# Patient Record
Sex: MALE | Race: WHITE | Employment: FULL TIME | URBAN - METROPOLITAN AREA
[De-identification: names, ages, dates, MRNs, and addresses within clinical notes are randomized per-mention and may not be internally consistent; named-entity substitution may affect disease eponyms.]

---

## 2017-01-18 ENCOUNTER — OFFICE VISIT (OUTPATIENT)
Dept: PAIN MANAGEMENT | Age: 57
End: 2017-01-18

## 2017-01-18 VITALS
DIASTOLIC BLOOD PRESSURE: 91 MMHG | SYSTOLIC BLOOD PRESSURE: 179 MMHG | HEART RATE: 76 BPM | BODY MASS INDEX: 33.93 KG/M2 | WEIGHT: 256 LBS | HEIGHT: 73 IN

## 2017-01-18 DIAGNOSIS — M54.12 BRACHIAL NEURITIS OR RADICULITIS: ICD-10-CM

## 2017-01-18 DIAGNOSIS — G24.3 CERVICAL DYSTONIA: ICD-10-CM

## 2017-01-18 DIAGNOSIS — M70.61 GREATER TROCHANTERIC BURSITIS OF RIGHT HIP: Primary | ICD-10-CM

## 2017-01-18 DIAGNOSIS — M25.551 CHRONIC RIGHT HIP PAIN: ICD-10-CM

## 2017-01-18 DIAGNOSIS — M50.30 DDD (DEGENERATIVE DISC DISEASE), CERVICAL: ICD-10-CM

## 2017-01-18 DIAGNOSIS — E29.1 HYPOGONADISM MALE: ICD-10-CM

## 2017-01-18 DIAGNOSIS — E55.9 HYPOVITAMINOSIS D: ICD-10-CM

## 2017-01-18 DIAGNOSIS — G89.29 CHRONIC RIGHT SHOULDER PAIN: ICD-10-CM

## 2017-01-18 DIAGNOSIS — M25.511 CHRONIC RIGHT SHOULDER PAIN: ICD-10-CM

## 2017-01-18 DIAGNOSIS — G89.29 CHRONIC RIGHT HIP PAIN: ICD-10-CM

## 2017-01-18 DIAGNOSIS — M54.16 LUMBAR NEURITIS: ICD-10-CM

## 2017-01-18 DIAGNOSIS — M96.1 POSTLAMINECTOMY SYNDROME, LUMBAR REGION: ICD-10-CM

## 2017-01-18 RX ORDER — HYDROCODONE BITARTRATE 40 MG/1
TABLET, EXTENDED RELEASE ORAL
Qty: 30 TAB | Refills: 0 | Status: SHIPPED | OUTPATIENT
Start: 2017-02-16 | End: 2017-03-16 | Stop reason: ALTCHOICE

## 2017-01-18 RX ORDER — HYDROCODONE BITARTRATE 40 MG/1
TABLET, EXTENDED RELEASE ORAL
Qty: 30 TAB | Refills: 0 | Status: SHIPPED | OUTPATIENT
Start: 2017-01-18 | End: 2017-01-18 | Stop reason: SDUPTHER

## 2017-01-18 RX ORDER — HYDROCODONE BITARTRATE AND ACETAMINOPHEN 10; 325 MG/1; MG/1
1 TABLET ORAL 4 TIMES DAILY
Qty: 120 TAB | Refills: 0 | Status: SHIPPED | OUTPATIENT
Start: 2017-02-05 | End: 2017-01-18 | Stop reason: SDUPTHER

## 2017-01-18 RX ORDER — ZOLPIDEM TARTRATE 5 MG/1
5 TABLET ORAL
Qty: 30 TAB | Refills: 5 | Status: SHIPPED | OUTPATIENT
Start: 2017-01-18 | End: 2017-06-08 | Stop reason: DRUGHIGH

## 2017-01-18 RX ORDER — HYDROCODONE BITARTRATE AND ACETAMINOPHEN 10; 325 MG/1; MG/1
1 TABLET ORAL 4 TIMES DAILY
Qty: 120 TAB | Refills: 0 | Status: SHIPPED | OUTPATIENT
Start: 2017-03-06 | End: 2017-03-16 | Stop reason: SDUPTHER

## 2017-01-18 NOTE — PROGRESS NOTES
Nursing Notes    Patient presents to the office today in follow-up. Patient rates his pain at 7/10 on the numerical pain scale. Reviewed medications with counts as follows:    Rx Date filled Qty Dispensed Pill Count Last Dose Short   norco 10/325 mg  01/7/16 240 139 today no                                     POC UDS was not performed in office today. Any new labs or imaging since last appointment? NO    Have you been to an emergency room (ER) or urgent care clinic since your last visit? NO            Have you been hospitalized since your last visit? NO     If yes, where, when, and reason for visit? Have you seen or consulted any other health care providers outside of the [address] [place] [address]  since your last visit? NO     If yes, where, when, and reason for visit? HM deferred to pcp.

## 2017-01-18 NOTE — PATIENT INSTRUCTIONS
Plan:  We will start Hysingla ER 40 mg daily for chronic, around-the-clock pain.   Merdis Brendan will be retained up to four times daily as needed for breakthrough pain  This may be adjusted depending on your response and tolerability of the medication  Physical therapy will be arranged for persistent right hip pain  Follow up in 2 months or sooner if needed  Regular exercise and attention to emotional health and diet remain the most effective ways to treat chronic pain of all kinds  You may contact me with questions or concerns through [address]

## 2017-01-18 NOTE — MR AVS SNAPSHOT
Visit Information Date & Time Provider Department Dept. Phone Encounter #  
 1/18/2017  9:20 AM Tk Patel [address] [place] [address] for Pain Management  [id number] Follow-up Instructions Return in about 2 months (around 3/18/2017). Your Appointments 2/9/2017 11:00 AM  
Office Visit with Pema Madrigal MD  
Internist of [address] [place] [address]) Appt Note: [address]; [address]; [address]  
 [address] [facility], [address] [id number] [address]  
  
   
 [address], [address] Upcoming Health Maintenance Date Due Hepatitis C Screening 1960 EYE EXAM RETINAL OR DILATED Q1 3/24/1971 Pneumococcal 19-64 Medium Risk (1 of 1 - PPSV23) 3/24/1980 DTaP/Tdap/Td series (1 - Tdap) 3/24/1982 FOBT Q 1 YEAR AGE 50-75 3/24/2011 INFLUENZA AGE 9 TO ADULT 8/1/2016 FOOT EXAM Q1 9/18/2016 HEMOGLOBIN A1C Q6M 1/26/2017 MICROALBUMIN Q1 7/26/2017 LIPID PANEL Q1 7/26/2017 Allergies as of 1/18/2017  Review Complete On: 1/18/2017 By: Logan Ashby LPN No Known Allergies Current Immunizations  Reviewed on 2/4/2016 Name Date Influenza Vaccine 1/27/2016 Influenza Vaccine PF 3/13/2015 Influenza Vaccine Whole 12/9/2010 Not reviewed this visit You Were Diagnosed With   
  
 Codes Comments Greater trochanteric bursitis of right hip    -  Primary ICD-10-CM: M70.61 ICD-9-CM: 726.5 Lumbar neuritis     ICD-10-CM: M54.16 
ICD-9-CM: 724.4 Postlaminectomy syndrome, lumbar region     ICD-10-CM: M96.1 ICD-9-CM: 722.83 Chronic right hip pain     ICD-10-CM: M25.551, G89.29 ICD-9-CM: 719.45, 338.29 Hypovitaminosis D     ICD-10-CM: E55.9 ICD-9-CM: 268.9 Hypogonadism male     ICD-10-CM: E29.1 ICD-9-CM: 257.2 Cervical dystonia     ICD-10-CM: G24.3 ICD-9-CM: 333.83  Brachial neuritis or radiculitis     ICD-10-CM: M54.12 
 ICD-9-CM: 723.4 Chronic right shoulder pain     ICD-10-CM: M25.511, G89.29 ICD-9-CM: 719.41, 338.29   
 DDD (degenerative disc disease), cervical     ICD-10-CM: M50.30 ICD-9-CM: 722.4 Vitals BP Pulse Height(growth percentile) Weight(growth percentile) BMI Smoking Status (!) 179/91 76 6' 1\" (1.854 m) 256 lb (116.1 kg) 33.78 kg/m2 Current Every Day Smoker Vitals History BMI and BSA Data Body Mass Index Body Surface Area 33.78 kg/m 2 2.45 m 2 Preferred Pharmacy Pharmacy Name Phone [address], [address] [place] [address] [phone] Your Updated Medication List  
  
   
This list is accurate as of: 1/18/17 10:24 AM.  Always use your most recent med list.  
  
  
  
  
 atorvastatin 40 mg tablet Commonly known as:  LIPITOR  
take 1 tablet by mouth once daily  
  
 cyanocobalamin 1,000 mcg tablet Take 2,000 mcg by mouth daily. ergocalciferol 50,000 unit capsule Commonly known as:  ERGOCALCIFEROL  
take 1 capsule by mouth two times a week  
  
 fenofibrate nanocrystallized 145 mg tablet Commonly known as:  TRICOR  
take 1 tablet by mouth once daily HYDROcodone bitartrate 40 mg ER tablet Commonly known as:  HYSINGLA ER  
for chronic, severe, refractory pain *DO NOT CRUSH,CHEW, OR DISSOLVE TABLET* Start taking on:  2/16/2017 HYDROcodone-acetaminophen  mg tablet Commonly known as:  Barb Petri Take 1 Tab by mouth four (4) times daily for 30 days. Max Daily Amount: 4 Tabs. As needed for breakthrough pain  Indications: PAIN Start taking on:  3/6/2017  
  
 lisinopril-hydroCHLOROthiazide 20-12.5 mg per tablet Commonly known as:  PRINZIDE, ZESTORETIC  
take 2 tablets by mouth once daily  
  
 metFORMIN 1,000 mg tablet Commonly known as:  GLUCOPHAGE  
TAKE 1 TABLET BY MOUTH TWO TIMES A DAY WITH MEALS  
  
 MULTIVITAMIN PO Take  by mouth. omega-3 acid ethyl esters 1 gram capsule Commonly known as:  Natacha [place] Take 2 Caps by mouth two (2) times a day. omeprazole 20 mg capsule Commonly known as:  PRILOSEC Take 20 mg by mouth daily. tamsulosin 0.4 mg capsule Commonly known as:  FLOMAX Take 1 Cap by mouth daily. varenicline 0.5 mg (11)- 1 mg (42) Dspk Commonly known as:  CHANTIX STARTER LAURA Take 0.5 mg by mouth two (2) times daily (after meals). VITAMIN C 1,000 mg tablet Generic drug:  ascorbic acid (vitamin C) Take 1,000 mg by mouth two (2) times a day. zolpidem 5 mg tablet Commonly known as:  AMBIEN Take 1 Tab by mouth nightly as needed for Sleep. Max Daily Amount: 5 mg. Prescriptions Printed Refills HYDROcodone bitartrate (HYSINGLA ER) 40 mg ER tablet 0 Starting on: 2/16/2017 Sig: for chronic, severe, refractory pain *DO NOT CRUSH,CHEW, OR DISSOLVE TABLET* Class: Print HYDROcodone-acetaminophen (NORCO)  mg tablet 0 Starting on: 3/6/2017 Sig: Take 1 Tab by mouth four (4) times daily for 30 days. Max Daily Amount: 4 Tabs. As needed for breakthrough pain  Indications: PAIN Class: Print Route: Oral  
 zolpidem (AMBIEN) 5 mg tablet 5 Sig: Take 1 Tab by mouth nightly as needed for Sleep. Max Daily Amount: 5 mg. Class: Print Route: Oral  
  
We Performed the Following REFERRAL TO PHYSICAL THERAPY [LEZ02 Custom] Comments:  
 Please evaluate and treat patient for chronic right hip pain attributable to OA and trochanteric burisitis. It is no longer relieved with medications or cortisone injections. Thank you in advance for seeing this pleasant patient. Follow-up Instructions Return in about 2 months (around 3/18/2017). Referral Information Referral ID Referred By Referred To  
  
 [id number] LANA, [address] [place] [address], [address] [place], [facility] [address] Phone: [phone] Fax: [phone] Visits Status Start Date End Date 18 New Request 1/18/17 1/18/18 If your referral has a status of pending review or denied, additional information will be sent to support the outcome of this decision. Patient Instructions Plan: We will start Hysingla ER 40 mg daily for chronic, around-the-clock pain. Mark Yury will be retained up to four times daily as needed for breakthrough pain This may be adjusted depending on your response and tolerability of the medication Physical therapy will be arranged for persistent right hip pain Follow up in 2 months or sooner if needed Regular exercise and attention to emotional health and diet remain the most effective ways to treat chronic pain of all kinds You may contact me with questions or concerns through [address] [facility]! Dear Kenneth Rome: 
Thank you for requesting a Ellie account. Our records indicate that you already have an active Ellie account. You can access your account anytime at [email or web address] Did you know that you can access your hospital and ER discharge instructions at any time in Ellie? You can also review all of your test results from your hospital stay or ER visit. Additional Information If you have questions, please visit the Frequently Asked Questions section of the Ellie website at [email or web address] Remember, Ellie is NOT to be used for urgent needs. For medical emergencies, dial 911. Now available from your iPhone and Android! Please provide this summary of care documentation to your next provider. Your primary care clinician is listed as Corrie Mcclellan. If you have any questions after today's visit, please call [phone].

## 2017-01-18 NOTE — PROGRESS NOTES
HISTORY OF PRESENT ILLNESS  Arianne Stover is a 55 y.o. male. Patient presents for follow up of chronic pain due to low back pain due to postlaminectomy syndrome and left hip pain. He also suffers with chronic neck pain due to cervical dystonia. He reports that right hip pain has returned en force, and is now again severe. He estimates that his last trochanteric bursa injection only offered 3-4 months of relief. He reports that the pain seems to have evolved, and is now described as aching, shooting, and throbbing. The pain radiates from the buttock down the entire right leg along the lateral aspect of the limb to the outer ankle. This pain worsens with lying supine or on his right side. Prolonged sitting and walking also exacerbates pain. He denies saddle anesthesia or bowel/bladder dysfunction. He denies any overt worsening of chronic back pain. He is now taking 800 mg Ibuprofen once or twice daily in addition to his usual Norco which has been modestly helpful. We discussed that his complaints are suspicious for both trochanteric bursitis as well as possible lumbar radiculopathy. We will refer to physical therapy to see if this improves pain and functionality. Pt reports average of 4-7/10 pain scale most days. We again discussed that [facility] [facility] 10/325 is high-dose, and seemingly not particularly effective for pain. He takes a tablet every 2-3 hours, day and night. We discussed that this is not an optimal way to address chronic, round-the-clock pain. We have in the past discussed changing to a long acting hydrocodone such as Hysingla ER. He would like to try this. Dosing and side effects were discussed in detail. We will start with 40 mg once daily with norco retained for breakthrough pain. This may be adjusted pending response and tolerability. A total of 45 minutes was spent with the patient of which more than 50% of the time was spent counseling the patient.    HPI--see above    JOANNE STEWART: Positive for neck pain.    Musculoskeletal: Positive for myalgias, back pain and joint pain. Physical Exam  Constitutional: He is oriented to person, place, and time. He appears well-developed and well-nourished. No distress. Musculoskeletal:        Left hip: He exhibits tenderness and bony tenderness. Lumbar back: He exhibits decreased range of motion, tenderness, bony tenderness, pain and spasm. Back:           Areas of tenderness noted with red arrows    Right trochanter is tender to palpation     Neurological: He is alert and oriented to person, place, and time. No cranial nerve deficit or sensory deficit. He exhibits abnormal muscle tone. Coordination and gait normal.   Psychiatric: He has a normal mood and affect. His behavior is normal. Judgment and thought content normal.   ASSESSMENT and PLAN    ICD-10-CM ICD-9-CM    1. Greater trochanteric bursitis of right hip M70.61 726.5 REFERRAL TO PHYSICAL THERAPY   2. Lumbar neuritis M54.16 724.4 HYDROcodone-acetaminophen (NORCO)  mg tablet      DISCONTINUED: HYDROcodone-acetaminophen (NORCO)  mg tablet   3. Postlaminectomy syndrome, lumbar region M96.1 722.83 HYDROcodone-acetaminophen (NORCO)  mg tablet      DISCONTINUED: HYDROcodone-acetaminophen (NORCO)  mg tablet   4. Chronic right hip pain M25.551 719.45 REFERRAL TO PHYSICAL THERAPY    G89.29 338.29    5. Hypovitaminosis D E55.9 268.9    6. Hypogonadism male Dr. George Kramer E29.1 257.2    7. Cervical dystonia G24.3 333.83    8. Brachial neuritis or radiculitis M54.12 723.4    9. Chronic right shoulder pain M25.511 719.41     G89.29 338.29    10. DDD (degenerative disc disease), cervical M50.30 722.4       Plan: We will start Hysingla ER 40 mg daily for chronic, around-the-clock pain.   Shelley Christianson will be retained up to four times daily as needed for breakthrough pain  This may be adjusted depending on your response and tolerability of the medication  Physical therapy will be arranged for persistent right hip pain  Follow up in 2 months or sooner if needed  Regular exercise and attention to emotional health and diet remain the most effective ways to treat chronic pain of all kinds  You may contact me with questions or concerns through [address]

## 2017-01-23 ENCOUNTER — TELEPHONE (OUTPATIENT)
Dept: PAIN MANAGEMENT | Age: 57
End: 2017-01-23

## 2017-01-23 NOTE — TELEPHONE ENCOUNTER
Pt has [address] for drug coverage. Hysingla er needs a pa - [employer] requires trial and failure of 3 opioids and 3 other therapies. Has only tried hydrocodone/apap - needs to try 2 other formulary opioids before they will consider hysingla er. Pt has tried 3 other therapies, but must try other opioids before hysingla er will be considered.

## 2017-01-24 ENCOUNTER — DOCUMENTATION ONLY (OUTPATIENT)
Dept: PAIN MANAGEMENT | Age: 57
End: 2017-01-24

## 2017-01-26 ENCOUNTER — TELEPHONE (OUTPATIENT)
Dept: PAIN MANAGEMENT | Age: 57
End: 2017-01-26

## 2017-01-26 NOTE — TELEPHONE ENCOUNTER
Returned pt's call re pa - explained he does not meet [employer]'s criteria for hysingla er so they won't cover it, but [facility] had given him a co-pay card to use if insurance wouldn't cover med. Pt said co-pay card isn't working - pharmacy tried to run it and it wouldn't go through. Pt said he did go online and activate the card - not sure what to do. Also, pt asked about a consult for ortho that [facility] was going to order.

## 2017-01-26 NOTE — TELEPHONE ENCOUNTER
Spoke with Bandar  re co-pay card would not work  hysingla er. Ria called drug rep and got phone number [phone] [address] helpline for pharmacy to call to get co-pay card to go through. [employer] - spoke with [address] [place] [address]. Explained what Bandar  found out. Ladi Acuna said they were able to get the co-pay card to work finally, so pt just needs to  hysingla er. Called pt to let him know - said yes pharmacy was able to get the card to work. Pt still waiting on answer about ortho consult. Explained this was forwarded to Bandar  - someone will contact him with more info when available.

## 2017-01-27 ENCOUNTER — DOCUMENTATION ONLY (OUTPATIENT)
Dept: PAIN MANAGEMENT | Age: 57
End: 2017-01-27

## 2017-01-27 PROBLEM — K63.5 COLON POLYP: Status: ACTIVE | Noted: 2017-01-27

## 2017-01-29 RX ORDER — FENOFIBRATE 145 MG/1
TABLET, COATED ORAL
Qty: 90 TAB | Refills: 3 | Status: SHIPPED | OUTPATIENT
Start: 2017-01-29 | End: 2018-03-20 | Stop reason: SDUPTHER

## 2017-02-23 ENCOUNTER — OFFICE VISIT (OUTPATIENT)
Dept: ORTHOPEDIC SURGERY | Age: 57
End: 2017-02-23

## 2017-02-23 VITALS
HEART RATE: 78 BPM | WEIGHT: 253 LBS | HEIGHT: 73 IN | BODY MASS INDEX: 33.53 KG/M2 | TEMPERATURE: 97.8 F | DIASTOLIC BLOOD PRESSURE: 73 MMHG | SYSTOLIC BLOOD PRESSURE: 148 MMHG

## 2017-02-23 DIAGNOSIS — M25.551 CHRONIC RIGHT HIP PAIN: Primary | ICD-10-CM

## 2017-02-23 DIAGNOSIS — M70.71 HIP BURSITIS, RIGHT: ICD-10-CM

## 2017-02-23 DIAGNOSIS — M25.561 CHRONIC PAIN OF RIGHT KNEE: ICD-10-CM

## 2017-02-23 DIAGNOSIS — G89.29 CHRONIC PAIN OF RIGHT KNEE: ICD-10-CM

## 2017-02-23 DIAGNOSIS — G89.29 CHRONIC RIGHT HIP PAIN: Primary | ICD-10-CM

## 2017-02-23 RX ORDER — MELOXICAM 15 MG/1
15 TABLET ORAL DAILY
Qty: 30 TAB | Refills: 2 | Status: SHIPPED | OUTPATIENT
Start: 2017-02-23 | End: 2017-06-01 | Stop reason: SDUPTHER

## 2017-02-23 RX ORDER — BETAMETHASONE SODIUM PHOSPHATE AND BETAMETHASONE ACETATE 3; 3 MG/ML; MG/ML
6 INJECTION, SUSPENSION INTRA-ARTICULAR; INTRALESIONAL; INTRAMUSCULAR; SOFT TISSUE ONCE
Qty: 1 ML | Refills: 0
Start: 2017-02-23 | End: 2017-02-23

## 2017-02-23 NOTE — PROGRESS NOTES
Patient: Claire Hernandez                MRN: [id number]       SSN: [id number]  YOB: 1960        AGE: 55 y.o. SEX: male  Body mass index is 33.38 kg/(m^2). PCP: Sobia Sorto MD  02/23/17  HISTORY: Mr. Javier Burt is a very nice gentleman who essentially has a combination of three problems including some low back pain with occasional radiculopathy, right hip pain that is laterally based, and he has had a couple of cortisone injections in the past, and a little bit of heterotopic ossification involving the abductor tendon, which is chronic. Additionally, he has moderate knee arthritis as well. The pain is moderate, aching. The worse problem is really the hip, but when he drives for a prolonged period of time, his low back is sore. He has occasional radiculopathy down the leg stopping at the level of the knee. He denies fevers, chills, night sweats, or weight loss. He is otherwise feeling well. He does  [place] [facility]. All systems are reviewed and are updated on the EMR. All other systems are reviewed and are otherwise negative. PHYSICAL EXAMINATION:  On examination today, he actually gets up and walks quite normally. There is no antalgic component to the gait. The low back is only mildly tender. He has a little decrease of sensation to L4 bilaterally. Tib/ant and EHL are 5/5. Straight leg raise is equivocal.  Both feet are warm and well-perfused. Both hips actually rotate well. There is tenderness over the greater trochanter on the right side. The knee itself has findings consistent with moderate arthritis and a bit of a varus malalignment. There is mild crepitus involving the patellofemoral articulation and just a slight effusion. Again, there is mild decreased sensation to L4 on the right side and left. Tib/ant and EHL are 5/5. He is well-nourished and well-developed.       RADIOGRAPHS:  X-ray evaluation, AP of the pelvis and AP and lateral of the hip, shows just minimal arthritis involving the hip itself and a little bit of heterotopic ossification in the abductor tendon. The knee has findings consistent with moderate arthritis. IMPRESSION:  My overall impression is some low back pain, mechanical, right hip bursitis, a little bit of arthritis, and moderate advanced right knee arthritis. PROCEDURE:  Under aseptic conditions and after informed, written consent with a time out, the right hip was injected with 1 cc of the Celestone preparation, i.e. 6 mg, which was well tolerated. PLAN:  I would recommend some therapy for his hip as well. Certainly, if he is not happy this week we could obtain an MRI, but he is not having any mechanical symptoms and not much in the way of groin pain. His knee will be treatment as symptomatic, and we will see him back accordingly in the not too distant future. REVIEW OF SYSTEMS:      CON: negative for weight loss, fever  EYE: negative for double vision  ENT: negative for hoarseness  RS:   negative for Tb  GI:    negative for blood in stool  :  negative for blood in urine  Other systems reviewed and noted below.           Past Medical History:   Diagnosis Date    Back problem     Colon polyp 08/2008    Dr Mullins Iba Depression 12/13    PHQ-9 was 6/27     Diabetes ([facility] [address] 3/15    on basis of elev hba1c; [facility]    Dyslipidemia     Enthesopathy of hip region     Fatty liver     on US w negative serologies 2009    Foot fracture, right     9/13 5th metatarsal    GERD (gastroesophageal reflux disease)     Hypertension     Hypogonadism male 3/13    Lumbago     Morbid obesity ([address]     peak weight 275 lbs, bmi 36.3 from 2/14    Phymatous rosacea     Postlaminectomy syndrome, lumbar region     Prediabetes on metformin     2 hr  2/10    Shoulder pain     Thoracic or lumbosacral neuritis or radiculitis, unspecified     Tobacco dependence syndrome        Family History Problem Relation Age of Onset    Diabetes Mother     Cancer Mother      stomach       Current Outpatient Prescriptions   Medication Sig Dispense Refill    fenofibrate nanocrystallized (TRICOR) 145 mg tablet take 1 tablet by mouth daily 90 Tab 3    HYDROcodone bitartrate (HYSINGLA ER) 40 mg ER tablet for chronic, severe, refractory pain *DO NOT CRUSH,CHEW, OR DISSOLVE TABLET* 30 Tab 0    zolpidem (AMBIEN) 5 mg tablet Take 1 Tab by mouth nightly as needed for Sleep. Max Daily Amount: 5 mg. 30 Tab 5    ergocalciferol (ERGOCALCIFEROL) 50,000 unit capsule take 1 capsule by mouth two times a week 13 Cap 4    lisinopril-hydrochlorothiazide (PRINZIDE, ZESTORETIC) 20-12.5 mg per tablet take 2 tablets by mouth once daily 60 Tab 12    metFORMIN (GLUCOPHAGE) 1,000 mg tablet TAKE 1 TABLET BY MOUTH TWO TIMES A DAY WITH MEALS 180 Tab 3    atorvastatin (LIPITOR) 40 mg tablet take 1 tablet by mouth once daily 30 Tab 12    tamsulosin (FLOMAX) 0.4 mg capsule Take 1 Cap by mouth daily. 30 Cap 3    omeprazole (PRILOSEC) 20 mg capsule Take 20 mg by mouth daily.  ascorbic acid (VITAMIN C) 1,000 mg tablet Take 1,000 mg by mouth two (2) times a day.  MULTIVITAMIN PO Take  by mouth.  [START ON 3/6/2017] HYDROcodone-acetaminophen (NORCO)  mg tablet Take 1 Tab by mouth four (4) times daily for 30 days. Max Daily Amount: 4 Tabs. As needed for breakthrough pain  Indications: PAIN 120 Tab 0    varenicline (CHANTIX STARTER LAURA) 0.5 mg (11)- 1 mg (42) DsPk Take 0.5 mg by mouth two (2) times daily (after meals). 1 Dose Pack 0    omega-3 acid ethyl esters (LOVAZA) 1 gram capsule Take 2 Caps by mouth two (2) times a day. 120 Cap 5    cyanocobalamin 1,000 mcg tablet Take 2,000 mcg by mouth daily.          No Known Allergies    Past Surgical History:   Procedure Laterality Date    HX COLONOSCOPY      Dr Xander Zimmer 8/22/08    HX ORTHOPAEDIC  12/06    left shoulder repair/rotator cuff    HX ORTHOPAEDIC      DEXA t score -1.0 spine, -0.4 hip (1/14) Dr. Barajas Keep HX OTHER SURGICAL  7/02    L5-S1 hemilaminectomy and diskectomy    NEUROLOGICAL PROCEDURE UNLISTED  4/13    MRI pituitary normal       Social History     Social History    Marital status:      Spouse name: N/A    Number of children: N/A    Years of education: N/A     Occupational History    Not on file. Social History Main Topics    Smoking status: Current Every Day Smoker     Packs/day: 2.00     Types: Cigarettes    Smokeless tobacco: Never Used    Alcohol use Yes      Comment: One drink per 3-4 months    Drug use: No      Comment: Marijuana use in the [de-identified]    Sexual activity: Not on file     Other Topics Concern    Not on file     Social History Narrative       Visit Vitals    /73    Pulse 78    Temp 97.8 °F (36.6 °C) (Oral)    Ht 6' 1\" (1.854 m)    Wt 253 lb (114.8 kg)    BMI 33.38 kg/m2         PHYSICAL EXAMINATION:  GENERAL: Alert and oriented x3, in no acute distress, well-developed, well-nourished, afebrile. HEART: No JVD. EYES: No scleral icterus   NECK: No significant lymphadenopathy   LUNGS: No respiratory compromise or indrawing  ABDOMEN: Soft, non-tender, non-distended. Electronically signed by:  Jud Villar MD

## 2017-02-23 NOTE — PATIENT INSTRUCTIONS
Patient's blood pressure was elevated at today's office visit. Patient instructed to contact primary care physician for treatment. Bursitis: Care Instructions  Your Care Instructions  A bursa is a small sac of fluid that helps the tissues around a joint slide over one another easily. Injury or overuse of a joint can cause pain, redness, and inflammation in the bursa (bursitis). Bursitis usually gets better if you avoid the activity that caused it. You can help prevent bursitis from coming back by doing stretching and strengthening exercises. You may also need to change the way you do some activities. Follow-up care is a key part of your treatment and safety. Be sure to make and go to all appointments, and call your doctor if you are having problems. Its also a good idea to know your test results and keep a list of the medicines you take. How can you care for yourself at home? · Put ice or a cold pack on the area for 10 to 20 minutes at a time. Try to do this every 1 to 2 hours for the next 3 days (when you are awake) or until the swelling goes down. Put a thin cloth between the ice and your skin. · After the 3 days of using ice, you may use heat on the area. You can use a hot water bottle; a warm, moist towel; or a heating pad set on low. You can also try alternating heat and ice. · Rest the area where you have pain. Stop any activities that cause pain. Switch to activities that do not stress the area. · Take pain medicines exactly as directed. ¨ If the doctor gave you a prescription medicine for pain, take it as prescribed. ¨ If you are not taking a prescription pain medicine, ask your doctor if you can take an over-the-counter medicine. ¨ Do not take two or more pain medicines at the same time unless the doctor told you to. Many pain medicines have acetaminophen, which is Tylenol. Too much acetaminophen (Tylenol) can be harmful.   · To prevent stiffness, gently move the joint as much as you can without pain every day. As the pain gets better, keep doing range-of-motion exercises. Ask your doctor for exercises that will make the muscles around the joint stronger. Do these as directed. · You can slowly return to the activity that caused the pain, but do it with less effort until you can do it without pain or swelling. Be sure to warm up before and stretch after you do the activity. When should you call for help? Call your doctor now or seek immediate medical care if:  · You get a fever and chills. · You have increased swelling or redness in a joint. · You cannot use a joint, or the pain in a joint gets worse. Watch closely for changes in your health, and be sure to contact your doctor if:  · You have pain for 2 weeks or longer despite home treatment. Where can you learn more? Go to http://maddie-madison.info/. Enter D474 in the search box to learn more about \"Bursitis: Care Instructions. \"  Current as of: May 23, 2016  Content Version: 11.1  © 6185-9074 Healthwise, Incorporated. Care instructions adapted under license by [employer] (which disclaims liability or warranty for this information). If you have questions about a medical condition or this instruction, always ask your healthcare professional. [address] any warranty or liability for your use of this information.

## 2017-02-23 NOTE — MR AVS SNAPSHOT
Visit Information Date & Time Provider Department Dept. Phone Encounter #  
 2/23/2017  3:45 PM Nhan Tesfaye, 27 [facility] 22 [id number] Your Appointments 3/16/2017  8:40 AM  
Follow Up with Maryuri Morton PA-C [facility] (AYUSH SCHEDULING) Appt Note: return in [address] [place] [address]  
[phone]  Dianna 4559 [id number] Upcoming Health Maintenance Date Due Hepatitis C Screening 1960 EYE EXAM RETINAL OR DILATED Q1 3/24/1971 Pneumococcal 19-64 Medium Risk (1 of 1 - PPSV23) 3/24/1980 DTaP/Tdap/Td series (1 - Tdap) 3/24/1982 INFLUENZA AGE 9 TO ADULT 8/1/2016 FOOT EXAM Q1 9/18/2016 HEMOGLOBIN A1C Q6M 1/26/2017 MICROALBUMIN Q1 7/26/2017 LIPID PANEL Q1 7/26/2017 COLONOSCOPY 8/22/2018 Allergies as of 2/23/2017  Review Complete On: 2/23/2017 By: Nhan Tesfaye MD  
 No Known Allergies Current Immunizations  Reviewed on 2/4/2016 Name Date Influenza Vaccine 1/27/2016 Influenza Vaccine PF 3/13/2015 Influenza Vaccine Whole 12/9/2010 Not reviewed this visit You Were Diagnosed With   
  
 Codes Comments Chronic right hip pain    -  Primary ICD-10-CM: M25.551, G89.29 ICD-9-CM: 719.45, 338.29 Chronic pain of right knee     ICD-10-CM: M25.561, G89.29 ICD-9-CM: 719.46, 338.29 Hip bursitis, right     ICD-10-CM: M70.71 ICD-9-CM: 726.5 Vitals BP  
  
  
  
  
  
 148/73 BMI and BSA Data Body Mass Index Body Surface Area  
 33.38 kg/m 2 2.43 m 2 Preferred Pharmacy Pharmacy Name Phone [address], [address] [place] [address] [phone] Your Updated Medication List  
  
   
This list is accurate as of: 2/23/17  4:42 PM.  Always use your most recent med list.  
  
  
  
  
 atorvastatin 40 mg tablet Commonly known as:  LIPITOR  
take 1 tablet by mouth once daily  
  
 cyanocobalamin 1,000 mcg tablet Take 2,000 mcg by mouth daily. ergocalciferol 50,000 unit capsule Commonly known as:  ERGOCALCIFEROL  
take 1 capsule by mouth two times a week  
  
 fenofibrate nanocrystallized 145 mg tablet Commonly known as:  TRICOR  
take 1 tablet by mouth daily HYDROcodone bitartrate 40 mg ER tablet Commonly known as:  HYSINGLA ER  
for chronic, severe, refractory pain *DO NOT CRUSH,CHEW, OR DISSOLVE TABLET* HYDROcodone-acetaminophen  mg tablet Commonly known as:  Juanetta Rasp Take 1 Tab by mouth four (4) times daily for 30 days. Max Daily Amount: 4 Tabs. As needed for breakthrough pain  Indications: PAIN Start taking on:  3/6/2017  
  
 lisinopril-hydroCHLOROthiazide 20-12.5 mg per tablet Commonly known as:  PRINZIDE, ZESTORETIC  
take 2 tablets by mouth once daily  
  
 metFORMIN 1,000 mg tablet Commonly known as:  GLUCOPHAGE  
TAKE 1 TABLET BY MOUTH TWO TIMES A DAY WITH MEALS  
  
 MULTIVITAMIN PO Take  by mouth. omega-3 acid ethyl esters 1 gram capsule Commonly known as:  Lesley Grew Take 2 Caps by mouth two (2) times a day. omeprazole 20 mg capsule Commonly known as:  PRILOSEC Take 20 mg by mouth daily. tamsulosin 0.4 mg capsule Commonly known as:  FLOMAX Take 1 Cap by mouth daily. varenicline 0.5 mg (11)- 1 mg (42) Dspk Commonly known as:  CHANTIX STARTER LAURA Take 0.5 mg by mouth two (2) times daily (after meals). VITAMIN C 1,000 mg tablet Generic drug:  ascorbic acid (vitamin C) Take 1,000 mg by mouth two (2) times a day. zolpidem 5 mg tablet Commonly known as:  AMBIEN Take 1 Tab by mouth nightly as needed for Sleep. Max Daily Amount: 5 mg. We Performed the Following AMB POC X-RAY RADEX HIP UNI WITH PELVIS 2-3 VIEWS [86243 CPT(R)] AMB POC XRAY, KNEE; COMPLETE, 4+ VIEW [85279 CPT(R)] Patient Instructions Patient's blood pressure was elevated at today's office visit. Patient instructed to contact primary care physician for treatment. Bursitis: Care Instructions Your Care Instructions A bursa is a small sac of fluid that helps the tissues around a joint slide over one another easily. Injury or overuse of a joint can cause pain, redness, and inflammation in the bursa (bursitis). Bursitis usually gets better if you avoid the activity that caused it. You can help prevent bursitis from coming back by doing stretching and strengthening exercises. You may also need to change the way you do some activities. Follow-up care is a key part of your treatment and safety. Be sure to make and go to all appointments, and call your doctor if you are having problems. Its also a good idea to know your test results and keep a list of the medicines you take. How can you care for yourself at home? · Put ice or a cold pack on the area for 10 to 20 minutes at a time. Try to do this every 1 to 2 hours for the next 3 days (when you are awake) or until the swelling goes down. Put a thin cloth between the ice and your skin. · After the 3 days of using ice, you may use heat on the area. You can use a hot water bottle; a warm, moist towel; or a heating pad set on low. You can also try alternating heat and ice. · Rest the area where you have pain. Stop any activities that cause pain. Switch to activities that do not stress the area. · Take pain medicines exactly as directed. ¨ If the doctor gave you a prescription medicine for pain, take it as prescribed. ¨ If you are not taking a prescription pain medicine, ask your doctor if you can take an over-the-counter medicine. ¨ Do not take two or more pain medicines at the same time unless the doctor told you to. Many pain medicines have acetaminophen, which is Tylenol. Too much acetaminophen (Tylenol) can be harmful. · To prevent stiffness, gently move the joint as much as you can without pain every day. As the pain gets better, keep doing range-of-motion exercises. Ask your doctor for exercises that will make the muscles around the joint stronger. Do these as directed. · You can slowly return to the activity that caused the pain, but do it with less effort until you can do it without pain or swelling. Be sure to warm up before and stretch after you do the activity. When should you call for help? Call your doctor now or seek immediate medical care if: 
· You get a fever and chills. · You have increased swelling or redness in a joint. · You cannot use a joint, or the pain in a joint gets worse. Watch closely for changes in your health, and be sure to contact your doctor if: 
· You have pain for 2 weeks or longer despite home treatment. Where can you learn more? Go to http://maddie-madison.info/. Enter U488 in the search box to learn more about \"Bursitis: Care Instructions. \" Current as of: May 23, 2016 Content Version: 11.1 © 5390-6516 View3. Care instructions adapted under license by [employer] (which disclaims liability or warranty for this information). If you have questions about a medical condition or this instruction, always ask your healthcare professional. [address] any warranty or liability for your use of this information. Introducing [facility] HE[facility]! Dear Leonel Doyle: 
Thank you for requesting a FINXI account. Our records indicate that you already have an active FINXI account. You can access your account anytime at [email or web address] [email or web address] Did you know that you can access your hospital and ER discharge instructions at any time in FINXI? You can also review all of your test results from your hospital stay or ER visit. Additional Information If you have questions, please visit the Frequently Asked Questions section of the [employer] website at [email or web address] Remember, Addvocate is NOT to be used for urgent needs. For medical emergencies, dial 911. Now available from your iPhone and Android! Please provide this summary of care documentation to your next provider. Your primary care clinician is listed as Nasreen Jerome. If you have any questions after today's visit, please call [phone].

## 2017-02-24 RX ORDER — HYDROCODONE BITARTRATE 60 MG/1
60 TABLET, EXTENDED RELEASE ORAL DAILY
Qty: 30 TAB | Refills: 0 | Status: SHIPPED | OUTPATIENT
Start: 2017-02-24 | End: 2017-03-16 | Stop reason: SDUPTHER

## 2017-02-27 ENCOUNTER — APPOINTMENT (OUTPATIENT)
Dept: PHYSICAL THERAPY | Age: 57
End: 2017-02-27

## 2017-03-02 ENCOUNTER — TELEPHONE (OUTPATIENT)
Dept: PAIN MANAGEMENT | Age: 57
End: 2017-03-02

## 2017-03-02 NOTE — TELEPHONE ENCOUNTER
Received a fax from the pharmacy for a pa for hysingla er - pt still does not meet criteria for [place] to approve hysingla er. Pt has co-pay card.

## 2017-03-03 DIAGNOSIS — M96.1 POSTLAMINECTOMY SYNDROME, LUMBAR REGION: ICD-10-CM

## 2017-03-03 DIAGNOSIS — M54.16 LUMBAR NEURITIS: ICD-10-CM

## 2017-03-03 RX ORDER — HYDROCODONE BITARTRATE AND ACETAMINOPHEN 10; 325 MG/1; MG/1
2 TABLET ORAL 4 TIMES DAILY
Qty: 240 TAB | Refills: 0 | Status: SHIPPED | OUTPATIENT
Start: 2017-03-03 | End: 2017-03-16 | Stop reason: SDUPTHER

## 2017-03-09 ENCOUNTER — HOSPITAL ENCOUNTER (OUTPATIENT)
Dept: PHYSICAL THERAPY | Age: 57
Discharge: HOME | End: 2017-03-09
Payer: COMMERCIAL

## 2017-03-09 PROCEDURE — 97161 PT EVAL LOW COMPLEX 20 MIN: CPT

## 2017-03-09 PROCEDURE — 97110 THERAPEUTIC EXERCISES: CPT

## 2017-03-09 NOTE — PROGRESS NOTES
[facility]  [address] [facility], [address] [address],[place] [address]  [phone] fax    Plan of Care/ Statement of Necessity for Physical Therapy Services    Patient name: Amna Hernandez Start of Care: 3/9/2017   Referral source: Luz Tee MD : 1960    Medical Diagnosis: Other bursitis of hip, right hip [M70.71]   Onset Date:1- /2 years    Treatment Diagnosis: decrease tolerance to ADLs and activities due to R hip pain   Prior Hospitalization: see medical history Provider#: [id number]   Medications: Verified on Patient summary List    Comorbidities:  L shoulder surgery X2 , Lower back surgery, HTN, DM, hearing impaired   Prior Level of Function: I , works full time, construction industry, 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 The Plan of Care and following information is based on the information from the initial evaluation. Assessment/ key information:  54 YO male diagnosed as above and with S/S consistent with above diagnosis presents to skilled outpatient PT. CCO right hip knee and LBP . Onset 1-1 1/2 years ago, no injury or event. Overall worsening. Pain today 5. He reports today is a good day and the injection he received did help. Previous Treatment/Compliance: medication, injection, xrays,   Pain 5, FOTO 60. Decreased lordosis, MMT B hip F 4+, Seated Hip F B 20 degrees. B minimal hamstring tightness with B TKE -5, Minimal STC R>L piriformis, + Right piriformis tension test, Minimal to moderate TTP R hip joint, posteriorly, greater trochanteric bursa region, buttock  Patient demonstrates the potential to make gains with improved ROM, strength, endurance/activity tolerance, functional FOTO survey score and all within a reasonable time frame so as to increase their functional independence with ADLs and activities for carryover to improve quality of life, tolerance to work demands and house hold chores, yard work, .  Patient requires skilled Physical Therapy so as to monitor their response to and modify their treatment plan accordingly. Patient appears to be an appropriate candidate for skilled outpatient Physical Therapy.         Evaluation Complexity History MEDIUM  Complexity : 1-2 comorbidities / personal factors will impact the outcome/ POC ; Examination HIGH Complexity : 4+ Standardized tests and measures addressing body structure, function, activity limitation and / or participation in recreation  ;Presentation LOW Complexity : Stable, uncomplicated  ;Clinical Decision Making MEDIUM Complexity : FOTO score of 26-74  Overall Complexity Rating: LOW   Problem List: pain affecting function, decrease ROM, decrease strength, decrease ADL/ functional abilitiies, decrease activity tolerance and decrease flexibility/ joint mobility   Treatment Plan may include any combination of the following: Therapeutic exercise, Therapeutic activities, Neuromuscular re-education, Physical agent/modality, Manual therapy and Patient education  Patient / Family readiness to learn indicated by: asking questions, trying to perform skills and interest  Persons(s) to be included in education: patient (P)  Barriers to Learning/Limitations: None  Patient Goal (s): Sandro Lab relief\"  Patient Self Reported Health Status: fair  Rehabilitation Potential: good    Short Term Goals: To be accomplished in 5 treatments:   1 patient will have established and be independent with HEP to aid with progression of skilled PT program   EVAL issued   CURRENT   2 patient will have pain <3/10 to aid with increase tolerance to ADLS and activities   EVAL 5   CURRENT   3 patient will have FOTO improved to 64 to show increase tolerance to ADLs and work demands   EVAL 60   CURRENT    Long Term Goals:  To be accomplished in 10-12 treatments:   1 patient will have pain <1-2/10 to aid with increase tolerance to ADLS and activities   EVAL 5   CURRENT   2 patient will have FOTO improved to 68 to show increase tolerance to ADLs and work demands   EVAL 60   CURRENT   3 patient will have MMT B seated hip F 5 to aid with increase cores strength for ADLS   EVAL B hip F 4+   CURRENT   4 patient will have overall improvement 30-50% to show increase tolerance to ADLS   EVAL   CURRENT    Frequency / Duration: Patient to be seen 2-3 times per week for 10-12 treatments. Patient/ Caregiver education and instruction: Diagnosis, prognosis, self care, activity modification and exercises   [x]  Plan of care has been reviewed with ADRIAN Crawford, PT 3/9/2017 5:45 PM  ________________________________________________________________________    I certify that the above Therapy Services are being furnished while the patient is under my care. I agree with the treatment plan and certify that this therapy is necessary.     [de-identified] Signature:____________________  Date:____________Time: _________    Please sign and return to [facility] - SOFI IBRAHIM [address], [address] [place] [address],[facility] [address]  [phone] fax

## 2017-03-09 NOTE — PROGRESS NOTES
PT DAILY TREATMENT NOTE/HIP EVAL3-16    Patient Name: Sivakumar Graham  Date:3/9/2017  : 1960  [x]  Patient  Verified  Payor: Yadi López / Plan: [employer] H[employer] / Product Type: HMO /    In time:505  Out time:543  Total Treatment Time (min): 38  Total Timed Codes (min): 8  1:1 Treatment Time ( [address] only): 8  Visit #: 1 of      Treatment Area: Other bursitis of hip, right hip [M70.71]    SUBJECTIVE  Pain Level (0-10 scale): 5  [x]constant []intermittent []improving [x]worsening []no change since onset  WORSE with sleeping position (SL),ascending stairs,  increased activity on feet, physical work, prolonged driving increased walking, Better with heating pad, changing positions when in pain  Any medication changes, allergies to medications, adverse drug reactions, diagnosis change, or new procedure performed?: [x] No    [] Yes (see summary sheet for update)  Subjective functional status/changes:     PLOF: I  Limitations to PLOF: pain  Mechanism of Injury:insideous  Current symptoms/Complaints: 56 YO male diagnosed as above and with S/S consistent with above diagnosis presents to skilled outpatient PT. CCO right hip knee and LBP . Onset 1-1 1/2 years ago, no injury or event. Overall worsening. Pain today 5. He reports today is a good day and the injection he received did help.    Previous Treatment/Compliance: medication, injection, xrays,   PMHx/Surgical Hx: L shoulder surgery X2 , Lower back surgery, HTN, DM, hearing impaired  Work Hx: full time construction company  Living Situation: lives in a 2 story house, 5 steps to enter, with rail, lives with wife and daughter  Pt Goals: pain relief  Barriers: [x]pain []financial []time []transportation []other  Motivation: high  Substance use: []Alcohol [x]Tobacco []other:   FABQ Score: []low []elevate  Cognition: A & O x 4  Other:    OBJECTIVE/EXAMINATION  Domestic Life: work, yard work and house work, enjoys walking   Activity/Recreational Limitations: pain  Mobility: I Self Care: I        Modality rationale:    Min Type Additional Details    [] Estim:  []Unatt       []IFC  []Premod                        []Other:  []w/ice   []w/heat  Position:  Location:    [] Estim: []Att    []TENS instruct  []NMES                    []Other:  []w/US   []w/ice   []w/heat  Position:  Location:    []  Traction: [] Cervical       []Lumbar                       [] Prone          []Supine                       []Intermittent   []Continuous Lbs:  [] before manual  [] after manual    []  Ultrasound: []Continuous   [] Pulsed                           []1MHz   []3MHz Location:  W/cm2:    []  Iontophoresis with dexamethasone         Location: [] Take home patch   [] In clinic    []  Ice     []  heat  []  Ice massage  []  Laser   []  Anodyne Position:  Location:    []  Laser with stim  []  Other: Position:  Location:    []  Vasopneumatic Device Pressure:       [] lo [] med [] hi   Temperature: [] lo [] med [] hi   [] Skin assessment post-treatment:  []intact []redness- no adverse reaction    []redness - adverse reaction:     30 min [x]Eval                  []Re-Eval       8 min Therapeutic Exercise:  [] See flow sheet :   Rationale: increase ROM, increase strength and improve coordination to improve the patients ability to aid with increase tolerance to ADLs and activities     min Therapeutic Activity:  []  See flow sheet :   Rationale:   to improve the patients ability to       min Neuromuscular Re-education:  []  See flow sheet :   Rationale:   to improve the patients ability to      min Manual Therapy:     Rationale:  to      min Gait Training:  ___ feet with ___ device on level surfaces with ___ level of assist   Rationale:           With   [] TE   [] TA   [] neuro   [] other: Patient Education: [x] Review HEP    [] Progressed/Changed HEP based on:   [] positioning   [] body mechanics   [] transfers   [] heat/ice application    [] other:      Other Objective/Functional Measures:  FALLS risk is low , no AD use, No LOB    Physical Therapy Evaluation- Hip    Posture: decrease lordosis,     Gait:  [] Normal    [] Abnormal    [] Antalgic    [] NWB    Device: no    Describe:         ROM/Strength        AROM                     PROM        Strength (1-5)  Hip Left Right Left Right Left Right   Flexion 20 20   4+ 4+   Extension         Abduction WFL WFL   5 5   Adduction WFL WFL   5 5   ER         IR         Knee Left Right Left Right Left Right   Extension -5 -5   5 5   Flexion              Flexibility: [] Unable to assess at this time  Hamstrings:    (L) Tightness= [] WNL   [x] Min   [] Mod   [] Severe    (R) Tightness= [] WNL   [x] Min   [] Mod   [] Severe  Quadriceps:    (L) Tightness= [] WNL   [] Min   [] Mod   [] Severe    (R) Tightness= [] WNL   [] Min   [] Mod   [] Severe  Gastroc:    (L) Tightness= [] WNL   [] Min   [] Mod   [] Severe    (R) Tightness= [] WNL   [] Min   [] Mod   [] Severe                                  Palpation    [x] Min  [] Mod  [] Severe    Location:STC R>L piriformis  [x] Min  [x] Mod  [] Severe    Location:TTP R hip joint, posteriorly, greater trochanteric bursa region, buttock  [] Min  [] Mod  [] Severe    Location:    Optional Tests  Deric/ Cha Test: [] Neg    [] Pos  Chucho Test:  [] Neg    [] Pos  Scouring Test: [] Neg    [] Pos  Trendelenberg:  [] Neg    [] Pos [] Left    [] Right  OberTest:   [] Neg    [] Pos  Ely's Test:  [] Neg    [] Pos  Piriformis Test:  [] Neg    [] Pos  Sub-talor alignment: [] Neurtral [] Pronation [] Supination  Forefoot alignment: [] Neutral [] Varus [] Valgus  Functional Leg Length (cm):   Right:  Left:  Discrepancy:    Other tests/ comments:       Pain Level (0-10 scale) post treatment: 5    ASSESSMENT/Changes in Function: Patient demonstrates the potential to make gains with improved ROM, strength, endurance/activity tolerance, functional FOTO survey score  and all within a reasonable time frame so as to increase their functional independence with ADLs and activities for carryover to improve quality of life, tolerance to work demands and house hold chores, yard work, . Patient requires skilled Physical Therapy so as to monitor their response to and modify their treatment plan accordingly. Patient appears to be an appropriate candidate for skilled outpatient Physical Therapy. Patient will continue to benefit from skilled PT services to modify and progress therapeutic interventions, address functional mobility deficits, address ROM deficits, address strength deficits, analyze and address soft tissue restrictions, analyze and cue movement patterns, analyze and modify body mechanics/ergonomics, assess and modify postural abnormalities and instruct in home and community integration to attain remaining goals.      [x]  See Plan of Care  []  See progress note/recertification  []  See Discharge Summary         Progress towards goals / Updated goals:       PLAN  [x]  Upgrade activities as tolerated     [x]  Continue plan of care  []  Update interventions per flow sheet       []  Discharge due to:_  []  Other:_      Wayne Garcia, PT 3/9/2017  5:06 PM

## 2017-03-16 ENCOUNTER — OFFICE VISIT (OUTPATIENT)
Dept: PAIN MANAGEMENT | Age: 57
End: 2017-03-16

## 2017-03-16 VITALS
SYSTOLIC BLOOD PRESSURE: 182 MMHG | BODY MASS INDEX: 33.38 KG/M2 | WEIGHT: 253 LBS | HEART RATE: 72 BPM | DIASTOLIC BLOOD PRESSURE: 89 MMHG

## 2017-03-16 DIAGNOSIS — G89.29 INSOMNIA SECONDARY TO CHRONIC PAIN: ICD-10-CM

## 2017-03-16 DIAGNOSIS — M70.61 GREATER TROCHANTERIC BURSITIS OF RIGHT HIP: ICD-10-CM

## 2017-03-16 DIAGNOSIS — M25.561 CHRONIC PAIN OF RIGHT KNEE: ICD-10-CM

## 2017-03-16 DIAGNOSIS — G89.29 CHRONIC PAIN OF RIGHT KNEE: ICD-10-CM

## 2017-03-16 DIAGNOSIS — M76.891 ENTHESOPATHY OF RIGHT HIP REGION: ICD-10-CM

## 2017-03-16 DIAGNOSIS — M96.1 POSTLAMINECTOMY SYNDROME, LUMBAR REGION: ICD-10-CM

## 2017-03-16 DIAGNOSIS — M50.30 DDD (DEGENERATIVE DISC DISEASE), CERVICAL: ICD-10-CM

## 2017-03-16 DIAGNOSIS — G47.01 INSOMNIA SECONDARY TO CHRONIC PAIN: ICD-10-CM

## 2017-03-16 DIAGNOSIS — M54.16 LUMBAR NEURITIS: ICD-10-CM

## 2017-03-16 DIAGNOSIS — Z79.899 ENCOUNTER FOR LONG-TERM (CURRENT) DRUG USE: ICD-10-CM

## 2017-03-16 DIAGNOSIS — M54.12 BRACHIAL NEURITIS OR RADICULITIS: ICD-10-CM

## 2017-03-16 DIAGNOSIS — M17.11 ARTHRITIS OF RIGHT KNEE: Primary | ICD-10-CM

## 2017-03-16 RX ORDER — HYDROCODONE BITARTRATE AND ACETAMINOPHEN 10; 325 MG/1; MG/1
1 TABLET ORAL 4 TIMES DAILY
Qty: 120 TAB | Refills: 0 | Status: SHIPPED | OUTPATIENT
Start: 2017-06-01 | End: 2017-06-08 | Stop reason: SDUPTHER

## 2017-03-16 RX ORDER — HYDROCODONE BITARTRATE 60 MG/1
60 TABLET, EXTENDED RELEASE ORAL DAILY
Qty: 30 TAB | Refills: 0 | Status: SHIPPED | OUTPATIENT
Start: 2017-03-28 | End: 2017-03-16 | Stop reason: SDUPTHER

## 2017-03-16 RX ORDER — HYDROCODONE BITARTRATE 60 MG/1
60 TABLET, EXTENDED RELEASE ORAL DAILY
Qty: 30 TAB | Refills: 0 | Status: SHIPPED | OUTPATIENT
Start: 2017-05-25 | End: 2017-06-08 | Stop reason: SDUPTHER

## 2017-03-16 RX ORDER — HYDROCODONE BITARTRATE AND ACETAMINOPHEN 10; 325 MG/1; MG/1
1 TABLET ORAL 4 TIMES DAILY
Qty: 120 TAB | Refills: 0 | Status: SHIPPED | OUTPATIENT
Start: 2017-05-03 | End: 2017-03-16 | Stop reason: SDUPTHER

## 2017-03-16 RX ORDER — HYDROCODONE BITARTRATE AND ACETAMINOPHEN 10; 325 MG/1; MG/1
1 TABLET ORAL 4 TIMES DAILY
Qty: 120 TAB | Refills: 0 | Status: SHIPPED | OUTPATIENT
Start: 2017-04-04 | End: 2017-03-16 | Stop reason: SDUPTHER

## 2017-03-16 RX ORDER — HYDROCODONE BITARTRATE 60 MG/1
60 TABLET, EXTENDED RELEASE ORAL DAILY
Qty: 30 TAB | Refills: 0 | Status: SHIPPED | OUTPATIENT
Start: 2017-04-26 | End: 2017-03-16 | Stop reason: SDUPTHER

## 2017-03-16 NOTE — PROGRESS NOTES
Nursing Notes    Patient presents to the office today in follow-up. Patient rates his pain at 6/10 on the numerical pain scale. Reviewed medications with counts as follows:    Rx Date filled Qty Dispensed Pill Count Last Dose Short   ambien 5 3/4/17 30 15 3/15/17 no   norco 10 3/6/17 120 77 3/16/17 no   hysingla 60 2/27/17 30 15 3/16/17 no           Comments: pt relays he is supposed to be able to take the ambien 1 or 2 tablets. POC UDS was not performed in office today    Any new labs or imaging since last appointment? YES, xrays of hip and knee    Have you been to an emergency room (ER) or urgent care clinic since your last visit? NO            Have you been hospitalized since your last visit? NO     If yes, where, when, and reason for visit? Have you seen or consulted any other health care providers outside of the [address] [place] [address]  since your last visit? NO     If yes, where, when, and reason for visit? Mr. Cheyanne Carrillo has a reminder for a \"due or due soon\" health maintenance. I have asked that he contact his primary care provider for follow-up on this health maintenance.

## 2017-03-16 NOTE — PROGRESS NOTES
HISTORY OF PRESENT ILLNESS  Dodie Mistry is a 55 y.o. male. Patient presents for follow up of chronic pain due to low back pain due to postlaminectomy syndrome and left hip pain. He also suffers with chronic neck pain due to cervical dystonia. After much ado and many days of delays at the pharmacy level, he was finally able to have his Hysingla ER 60 mg filled at his [facility]. He was extremely angry several days ago because it took over ten days to have the new dose filled. However, now that he has been taking this new dose for a couple of weeks, he is very happy with his pain control and tolerability. He also reports that he went to see Dr. Kenny Sosa for his persistent right hip and knee pain. X-rays confirmed moderate arthritis in the right hip and moderate to severe arthritis in the right knee, which surprised him. Dr. Kenny Sosa performed a hip injection, and he feels that this has helped significantly. Dr. Kenny Sosa also felt that he will likely need knee replacement surgery within the next couple of years, but Mr. Cheyanne Carrillo hopes to avoid this at all costs. He has been referred for physical therapy as well and is starting this next week. Pt reports average of 2-3/10 pain scale most days. Medications continue to work well for pain control overall. Dodie Mistry is tolerating medications well, with no untoward side effects noted. He is able to stay more active with less discomfort with these current doses. The patient reports an average of 70% relief with this regimen. Most recent UDS and  were consistent with prescribed medications. Pill counts are appropriate. He is informed of side effects, risks, and benefits of this regimen, and emphasizes that he derives a significant improvement in functionality and quality of life, and notes that non-opioid medications and therapies in the past have not offered significant benefit. He denies new or worsening insomnia or constipation issues.  He denies any falls, injuries, or hospitalizations since the last visit. HPI--see above    ROS  HENT: Positive for neck pain. Musculoskeletal: Positive for myalgias, back pain and joint pain. Physical Exam  Constitutional: He is oriented to person, place, and time. He appears well-developed and well-nourished. No distress. Musculoskeletal:        Left hip: He exhibits tenderness and bony tenderness. Lumbar back: He exhibits decreased range of motion, tenderness, bony tenderness, pain and spasm. Back:           Areas of tenderness noted with red arrows    Right trochanter is tender to palpation     Neurological: He is alert and oriented to person, place, and time. No cranial nerve deficit or sensory deficit. He exhibits abnormal muscle tone. Coordination and gait normal.   Psychiatric: He has a normal mood and affect. His behavior is normal. Judgment and thought content normal.   ASSESSMENT and PLAN    ICD-10-CM ICD-9-CM    1. Arthritis of right knee M19.90 716.96    2. Brachial neuritis or radiculitis M54.12 723.4    3. Greater trochanteric bursitis of right hip M70.61 726.5    4. DDD (degenerative disc disease), cervical M50.30 722.4    5. Insomnia secondary to chronic pain G89.29 338.29     G47.01 327.01    6. Enthesopathy of right hip region M76.891 726.5    7. Encounter for long-term (current) drug use Z79.899 V58.69    8. Chronic pain of right knee M25.561 719.46     G89.29 338.29       Plan:  Continue same medications as prescribed for chronic pain  Naloxone 4 mg will be prescribed for accidental overdose of opioids. This is now required with the CDC and the Board of Medicine for all patients on more than 90 mg morphine equivalent per day.  (You are currently on 100 mg MME)  Follow up in 3 months or sooner if needed  Regular exercise and attention to emotional health and diet remain the most effective ways to treat chronic pain of all kinds  You may contact me with questions or concerns through MyChart

## 2017-03-16 NOTE — PATIENT INSTRUCTIONS
Plan:  Continue same medications as prescribed for chronic pain  Naloxone 4 mg will be prescribed for accidental overdose of opioids. This is now required with the CDC and the Board of Medicine for all patients on more than 90 mg morphine equivalent per day.  (You are currently on 100 mg MME)  Follow up in 3 months or sooner if needed  Regular exercise and attention to emotional health and diet remain the most effective ways to treat chronic pain of all kinds  You may contact me with questions or concerns through [address]

## 2017-03-16 NOTE — MR AVS SNAPSHOT
Visit Information Date & Time Provider Department Dept. Phone Encounter #  
 3/16/2017  8:40 AM Tonny Goetz [facility] [address] for Pain Management [phone] Follow-up Instructions Return in about 3 months (around 6/16/2017). Follow-up and Disposition History Your Appointments 3/24/2017  3:00 PM  
Follow Up with Harriet Brown MD  
[address] [facility], [address] [facility] [place] [address] ([address]) Appt Note: 1 mo fu  
 [address], [address] [facility]  
[phone] [address] [facility] Upcoming Health Maintenance Date Due Hepatitis C Screening 1960 EYE EXAM RETINAL OR DILATED Q1 3/24/1971 Pneumococcal 19-64 Medium Risk (1 of 1 - PPSV23) 3/24/1980 DTaP/Tdap/Td series (1 - Tdap) 3/24/1982 INFLUENZA AGE 9 TO ADULT 8/1/2016 FOOT EXAM Q1 9/18/2016 HEMOGLOBIN A1C Q6M 1/26/2017 MICROALBUMIN Q1 7/26/2017 LIPID PANEL Q1 7/26/2017 COLONOSCOPY 8/22/2018 Allergies as of 3/16/2017  Review Complete On: 3/16/2017 By: Conrad Mercado RN No Known Allergies Current Immunizations  Reviewed on 2/4/2016 Name Date Influenza Vaccine 1/27/2016 Influenza Vaccine PF 3/13/2015 Influenza Vaccine Whole 12/9/2010 Not reviewed this visit You Were Diagnosed With   
  
 Codes Comments Arthritis of right knee    -  Primary ICD-10-CM: M19.90 ICD-9-CM: 716.96 Brachial neuritis or radiculitis     ICD-10-CM: M54.12 
ICD-9-CM: 723.4 Greater trochanteric bursitis of right hip     ICD-10-CM: M70.61 ICD-9-CM: 726.5 DDD (degenerative disc disease), cervical     ICD-10-CM: M50.30 ICD-9-CM: 722.4 Insomnia secondary to chronic pain     ICD-10-CM: G89.29, G47.01 
ICD-9-CM: 338.29, 327.01 Enthesopathy of right hip region     ICD-10-CM: M76.891 ICD-9-CM: 726.5 Encounter for long-term (current) drug use     ICD-10-CM: Z79.899 ICD-9-CM: V58.69 Chronic pain of right knee     ICD-10-CM: M25.561, G89.29 ICD-9-CM: 719.46, 338.29 Lumbar neuritis     ICD-10-CM: M54.16 
ICD-9-CM: 724.4 Postlaminectomy syndrome, lumbar region     ICD-10-CM: M96.1 ICD-9-CM: 722.83 Vitals BP Pulse Weight(growth percentile) BMI Smoking Status 182/89 72 253 lb (114.8 kg) 33.38 kg/m2 Current Every Day Smoker BMI and BSA Data Body Mass Index Body Surface Area  
 33.38 kg/m 2 2.43 m 2 Preferred Pharmacy Pharmacy Name Phone [address] [place] [address] [phone] Your Updated Medication List  
  
   
This list is accurate as of: 3/16/17  9:20 AM.  Always use your most recent med list.  
  
  
  
  
 atorvastatin 40 mg tablet Commonly known as:  LIPITOR  
take 1 tablet by mouth once daily  
  
 cyanocobalamin 1,000 mcg tablet Take 2,000 mcg by mouth daily. ergocalciferol 50,000 unit capsule Commonly known as:  ERGOCALCIFEROL  
take 1 capsule by mouth two times a week  
  
 fenofibrate nanocrystallized 145 mg tablet Commonly known as:  TRICOR  
take 1 tablet by mouth daily HYDROcodone bitartrate 60 mg ER tablet Commonly known as:  HYSINGLA Take 1 Tab by mouth daily. Max Daily Amount: 60 mg. for chronic, severe, refractory pain *DO NOT CRUSH,CHEW, OR DISSOLVE TABLET* Start taking on:  5/25/2017 HYDROcodone-acetaminophen  mg tablet Commonly known as:  Wayna Glaze Take 1 Tab by mouth four (4) times daily for 30 days. Max Daily Amount: 4 Tabs. As needed for breakthrough pain  Indications: Pain Start taking on:  6/1/2017  
  
 lisinopril-hydroCHLOROthiazide 20-12.5 mg per tablet Commonly known as:  PRINZIDE, ZESTORETIC  
take 2 tablets by mouth once daily  
  
 meloxicam 15 mg tablet Commonly known as:  MOBIC Take 1 Tab by mouth daily. metFORMIN 1,000 mg tablet Commonly known as:  GLUCOPHAGE  
TAKE 1 TABLET BY MOUTH TWO TIMES A DAY WITH MEALS  
  
 MULTIVITAMIN PO Take  by mouth. omega-3 acid ethyl esters 1 gram capsule Commonly known as:  May Herb Take 2 Caps by mouth two (2) times a day. omeprazole 20 mg capsule Commonly known as:  PRILOSEC Take 20 mg by mouth daily. tamsulosin 0.4 mg capsule Commonly known as:  FLOMAX Take 1 Cap by mouth daily. varenicline 0.5 mg (11)- 1 mg (42) Dspk Commonly known as:  CHANTIX STARTER LAURA Take 0.5 mg by mouth two (2) times daily (after meals). VITAMIN C 1,000 mg tablet Generic drug:  ascorbic acid (vitamin C) Take 1,000 mg by mouth two (2) times a day. zolpidem 5 mg tablet Commonly known as:  AMBIEN Take 1 Tab by mouth nightly as needed for Sleep. Max Daily Amount: 5 mg. Prescriptions Printed Refills HYDROcodone bitartrate (HYSINGLA) 60 mg ER tablet 0 Starting on: 5/25/2017 Sig: Take 1 Tab by mouth daily. Max Daily Amount: 60 mg. for chronic, severe, refractory pain *DO NOT CRUSH,CHEW, OR DISSOLVE TABLET* Class: Print Route: Oral  
 HYDROcodone-acetaminophen (NORCO)  mg tablet 0 Starting on: 6/1/2017 Sig: Take 1 Tab by mouth four (4) times daily for 30 days. Max Daily Amount: 4 Tabs. As needed for breakthrough pain  Indications: Pain Class: Print Route: Oral  
  
Follow-up Instructions Return in about 3 months (around 6/16/2017). To-Do List   
 03/20/2017 3:30 PM  
  Appointment with Tiffanie Hickman PTA at [address] [place] [address] ([phone])  
  
 03/23/2017 3:30 PM  
  Appointment with Tiffanie Hickman PTA at [facility] ([phone]) Patient Instructions Plan: 
Continue same medications as prescribed for chronic pain Naloxone 4 mg will be prescribed for accidental overdose of opioids.  This is now required with the CDC and the Board of Medicine for all patients on more than 90 mg morphine equivalent per day. (You are currently on 100 mg MME) Follow up in 3 months or sooner if needed Regular exercise and attention to emotional health and diet remain the most effective ways to treat chronic pain of all kinds You may contact me with questions or concerns through [address] Introducing [facility] SERVICES! Dear Lizet Parra: 
Thank you for requesting a [employer] account. Our records indicate that you already have an active [employer] account. You can access your account anytime at [email or web address] Did you know that you can access your hospital and ER discharge instructions at any time in [employer]? You can also review all of your test results from your hospital stay or ER visit. Additional Information If you have questions, please visit the Frequently Asked Questions section of the [employer] website at [email or web address] Remember, [employer] is NOT to be used for urgent needs. For medical emergencies, dial 911. Now available from your iPhone and Android! Please provide this summary of care documentation to your next provider. Your primary care clinician is listed as Sara Mercer. If you have any questions after today's visit, please call [phone].

## 2017-03-20 ENCOUNTER — HOSPITAL ENCOUNTER (OUTPATIENT)
Dept: PHYSICAL THERAPY | Age: 57
Discharge: HOME | End: 2017-03-20
Payer: COMMERCIAL

## 2017-03-20 PROCEDURE — 97035 APP MDLTY 1+ULTRASOUND EA 15: CPT

## 2017-03-20 PROCEDURE — 97110 THERAPEUTIC EXERCISES: CPT

## 2017-03-20 NOTE — PROGRESS NOTES
PT DAILY TREATMENT NOTE - [facility]     Patient Name: Kelton Oh  Date:3/20/2017  : 1960  [x]  Patient  Verified  Payor: Liseth Berumen / Plan: [employer] HMO / Product Type: HMO /    In time:3:26  Out time:4:03  Total Treatment Time (min): 32  Visit #: 2 of     Treatment Area: Other bursitis of hip, right hip [M70.71]    SUBJECTIVE  Pain Level (0-10 scale): 0  Any medication changes, allergies to medications, adverse drug reactions, diagnosis change, or new procedure performed?: [x] No    [] Yes (see summary sheet for update)  Subjective functional status/changes:   [] No changes reported  No pain  Right  Now.     OBJECTIVE    Modality rationale: decrease edema, decrease inflammation, decrease pain and increase tissue extensibility to improve the patients ability to perform  ADL   Min Type Additional Details    [] Estim:  []Unatt       []IFC  []Premod                        []Other:  []w/ice   []w/heat  Position:  Location:    [] Estim: []Att    []TENS instruct  []NMES                    []Other:  []w/US   []w/ice   []w/heat  Position:  Location:    []  Traction: [] Cervical       []Lumbar                       [] Prone          []Supine                       []Intermittent   []Continuous Lbs:  [] before manual  [] after manual    [x]  Ultrasound: [x]Continuous   [] Pulsed                  8         [x]1MHz   []3MHz W/cm2:1.3  Location:(R)  hip    []  Iontophoresis with dexamethasone         Location: [] Take home patch   [] In clinic    []  Ice     []  heat  []  Ice massage  []  Laser   []  Anodyne Position:  Location:    []  Laser with stim  []  Other:  Position:  Location:    []  Vasopneumatic Device Pressure:       [] lo [] med [] hi   Temperature: [] lo [] med [] hi   [x] Skin assessment post-treatment:  [x]intact []redness- no adverse reaction    []redness - adverse reaction:      min []Eval                  []Re-Eval       24 min Therapeutic Exercise:  [x] See flow sheet :   Rationale: increase ROM and increase strength to improve the patients ability to perform ADL      min Therapeutic Activity:  []  See flow sheet :   Rationale:   to improve the patients ability to       min Neuromuscular Re-education:  []  See flow sheet :   Rationale:   to improve the patients ability to      min Manual Therapy:     Rationale: decrease pain, increase ROM, increase tissue extensibility and decrease edema  to perform  ADL     min Gait Training:  ___ feet with ___ device on level surfaces with ___ level of assist   Rationale: With   [x] TE   [] TA   [] neuro   [] other: Patient Education: [x] Review HEP    [] Progressed/Changed HEP based on:   [] positioning   [] body mechanics   [] transfers   [] heat/ice application    [] other:      Other Objective/Functional Measures:  Fair  Response  To each there ex. Pain Level (0-10 scale) post treatment: 0    ASSESSMENT/Changes in Function:  Discussed with pt on importance  Of  Performing  HEP. Patient will continue to benefit from skilled PT services to address functional mobility deficits, address ROM deficits, address strength deficits, analyze and address soft tissue restrictions and instruct in home and community integration to attain remaining goals. [x]  See Plan of Care  []  See progress note/recertification  []  See Discharge Summary         Progress towards goals / Updated goals:  1 patient will have established and be independent with HEP to aid with progression of skilled PT program  EVAL issued  CURRENT  2 patient will have pain <3/10 to aid with increase tolerance to ADLS and activities  EVAL 5  CURRENT  3 patient will have FOTO improved to 64 to show increase tolerance to ADLs and work demands  EVAL 60  CURRENT     Long Term Goals:  To be accomplished in 10-12 treatments:  1 patient will have pain <1-2/10 to aid with increase tolerance to ADLS and activities  EVAL 5  CURRENT  2 patient will have FOTO improved to 68 to show increase tolerance to ADLs and work demands  EVAL 60  CURRENT  3 patient will have MMT B seated hip F 5 to aid with increase cores strength for ADLS  EVAL B hip F 4+  CURRENT  4 patient will have overall improvement 30-50% to show increase tolerance to ADLS  EVAL  CURRENT    PLAN  []  Upgrade activities as tolerated     [x]  Continue plan of care  []  Update interventions per flow sheet       []  Discharge due to:_  []  Other:_      James Lee PTA 3/20/2017  3:24 PM    Future Appointments  Date Time Provider Liliya [facility]   3/20/2017 3:30 PM Taras Smalls [facility]   3/23/2017 3:30 PM James Lee PTA [id number]   3/24/2017 3:00 PM Nadege Almodovar MD [facility] 69   6/8/2017 8:40 AM Richardson Martínez MD [facility] [address]

## 2017-03-23 ENCOUNTER — APPOINTMENT (OUTPATIENT)
Dept: PHYSICAL THERAPY | Age: 57
End: 2017-03-23
Payer: COMMERCIAL

## 2017-03-27 ENCOUNTER — HOSPITAL ENCOUNTER (OUTPATIENT)
Dept: PHYSICAL THERAPY | Age: 57
Discharge: HOME | End: 2017-03-27
Payer: COMMERCIAL

## 2017-03-27 PROCEDURE — 97035 APP MDLTY 1+ULTRASOUND EA 15: CPT

## 2017-03-27 PROCEDURE — 97110 THERAPEUTIC EXERCISES: CPT

## 2017-03-27 NOTE — PROGRESS NOTES
PT DAILY TREATMENT NOTE 3    Patient Name: Jaquan Torres  Date:3/27/2017  : 1960  [x]  Patient  Verified  Payor: Eden Fink / Plan: [address] [place] [address] [employer] [place] HMO / Product Type: HMO /    In time:0800  Out [id number]  Total Treatment Time (min): 34  Visit #: 3 of 12    Treatment Area: Other bursitis of hip, right hip [M70.71]    SUBJECTIVE  Pain Level (0-10 scale): 5  Any medication changes, allergies to medications, adverse drug reactions, diagnosis change, or new procedure performed?: [x] No    [] Yes (see summary sheet for update)  Subjective functional status/changes:   [] No changes reported  Not bad pain but I can feel it.   Increased pain after the exercises    OBJECTIVE  Modality rationale: decrease inflammation, decrease pain and increase tissue extensibility to improve the patients ability to tolerate increased activity levels   Min Type Additional Details    [] Estim:  []Unatt       []IFC  []Premod                        []Other:  []w/ice   []w/heat  Position:  Location:    [] Estim: []Att    []TENS instruct  []NMES                    []Other:  []w/US   []w/ice   []w/heat  Position:  Location:    []  Traction: [] Cervical       []Lumbar                       [] Prone          []Supine                       []Intermittent   []Continuous Lbs:  [] before manual  [] after manual    [x]  Ultrasound: [x]Continuous   [] Pulsed      8                     [x]1MHz   []3MHz Location: (R) Glute  W/cm2:1.8    []  Iontophoresis with dexamethasone         Location: [] Take home patch   [] In clinic    []  Ice     []  heat  []  Ice massage  []  Laser   []  Anodyne Position:  Location:    []  Laser with stim  []  Other: Position:  Location:    []  Vasopneumatic Device Pressure:       [] lo [] med [] hi   Temperature: [] lo [] med [] hi   [] Skin assessment post-treatment:  []intact []redness- no adverse reaction    []redness - adverse reaction:     26 min Therapeutic Exercise:  [x] See flow sheet :   Rationale: increase ROM, increase strength and improve coordination to improve the patients ability to tolerate increased activity levels             With   [] TE   [] TA   [] neuro   [] other: Patient Education: [x] Review HEP    [] Progressed/Changed HEP based on:   [] positioning   [] body mechanics   [] transfers   [] heat/ice application    [] other:      Other Objective/Functional Measures:   - Good response to US with decreased pain after treatment back down to 5/10     Pain Level (0-10 scale) post treatment: 4    ASSESSMENT/Changes in Function:      Patient will continue to benefit from skilled PT services to modify and progress therapeutic interventions, address functional mobility deficits, address ROM deficits, address strength deficits, analyze and address soft tissue restrictions and analyze and cue movement patterns to attain remaining goals. []  See Plan of Care  []  See progress note/recertification  []  See Discharge Summary         Progress towards goals / Updated goals:  1 patient will have established and be independent with HEP to aid with progression of skilled PT program  EVAL issued  CURRENT  2 patient will have pain <3/10 to aid with increase tolerance to ADLS and activities  EVAL 5  CURRENT  3 patient will have FOTO improved to 64 to show increase tolerance to ADLs and work demands  EVAL 60  CURRENT      Long Term Goals:  To be accomplished in 10-12 treatments:  1 patient will have pain <1-2/10 to aid with increase tolerance to ADLS and activities  EVAL 5  CURRENT  2 patient will have FOTO improved to 68 to show increase tolerance to ADLs and work demands  EVAL 60  CURRENT  3 patient will have MMT B seated hip F 5 to aid with increase cores strength for ADLS  EVAL B hip F 4+  CURRENT  4 patient will have overall improvement 30-50% to show increase tolerance to ADLS  EVAL  CURRENT    PLAN  [x]  Upgrade activities as tolerated     [x]  Continue plan of care  []  Update interventions per flow sheet       [] Discharge due to:_  []  Other:_      Joel Zarco, PT 3/27/2017  8:45 AM    Future Appointments  Date Time Provider Department Center   4/27/2017 4:15 PM Vivica Klinefelter, MD [place] [address]   6/8/2017 8:40 AM Kailey Martínez MD [facility] [address]

## 2017-04-03 ENCOUNTER — HOSPITAL ENCOUNTER (OUTPATIENT)
Dept: PHYSICAL THERAPY | Age: 57
Discharge: HOME | End: 2017-04-03
Payer: COMMERCIAL

## 2017-04-03 PROCEDURE — 97110 THERAPEUTIC EXERCISES: CPT

## 2017-04-03 PROCEDURE — 97035 APP MDLTY 1+ULTRASOUND EA 15: CPT

## 2017-04-03 NOTE — PROGRESS NOTES
PT DAILY TREATMENT NOTE 3-16    Patient Name: Moses Davis  [id number]  : 1960  [x]  Patient  Verified  Payor: Pablo Fitch / Plan: Vladimir Santos HMO / Product Type: HMO /    In LHFR:[id number]  Out JUVENCIO:1369  Total Treatment Time (min): 36  Visit #: 4 of 6-12    Treatment Area: Other bursitis of hip, right hip [M70.71]    SUBJECTIVE  Pain Level (0-10 scale): 6  Any medication changes, allergies to medications, adverse drug reactions, diagnosis change, or new procedure performed?: [x] No    [] Yes (see summary sheet for update)  Subjective functional status/changes:   [] No changes reported  Had increased pain in the (R) hip this weekend with pain going down the leg.     OBJECTIVE  Modality rationale: decrease inflammation, decrease pain and increase tissue extensibility to improve the patients ability to tolerate increased activity levels   Min Type Additional Details    [] Estim:  []Unatt       []IFC  []Premod                        []Other:  []w/ice   []w/heat  Position:  Location:    [] Estim: []Att    []TENS instruct  []NMES                    []Other:  []w/US   []w/ice   []w/heat  Position:  Location:    []  Traction: [] Cervical       []Lumbar                       [] Prone          []Supine                       []Intermittent   []Continuous Lbs:  [] before manual  [] after manual    [x]  Ultrasound: [x]Continuous   [] Pulsed                           [x]1MHz   []3MHz Location: (R) Buttocks  W/cm2: 1.8    []  Iontophoresis with dexamethasone         Location: [] Take home patch   [] In clinic    []  Ice     []  heat  []  Ice massage  []  Laser   []  Anodyne Position:  Location:    []  Laser with stim  []  Other: Position:  Location:    []  Vasopneumatic Device Pressure:       [] lo [] med [] hi   Temperature: [] lo [] med [] hi   [x] Skin assessment post-treatment:  [x]intact []redness- no adverse reaction    []redness - adverse reaction:     28 min Therapeutic Exercise:  [x] See flow sheet :   Rationale: increase ROM, increase strength and improve coordination to improve the patients ability to tolerate increased activity levels           With   [x] TE   [] TA   [] neuro   [] other: Patient Education: [x] Review HEP    [] Progressed/Changed HEP based on:   [] positioning   [] body mechanics   [] transfers   [] heat/ice application    [] other:      Other Objective/Functional Measures:   - Pt identifies pain in the buttocks along the superior glute and Piriformis region  - - reports pain radiating into (R) LE  - - Reports non compliance with stretches  - Decr pain after treatment     Pain Level (0-10 scale) post treatment: 3-4    ASSESSMENT/Changes in Function:      Patient will continue to benefit from skilled PT services to modify and progress therapeutic interventions, address functional mobility deficits, address ROM deficits, address strength deficits, analyze and address soft tissue restrictions and analyze and cue movement patterns to attain remaining goals. []  See Plan of Care  []  See progress note/recertification  []  See Discharge Summary         Progress towards goals / Updated goals:  1 patient will have established and be independent with HEP to aid with progression of skilled PT program  EVAL issued  CURRENT Pt reports non compliance 4/3/17  2 patient will have pain <3/10 to aid with increase tolerance to ADLS and activities  EVAL 5  CURRENT Increased pain today 6/10 4/3/17  3 patient will have FOTO improved to 64 to show increase tolerance to ADLs and work demands  EVAL 60  CURRENT      Long Term Goals:  To be accomplished in 10-12 treatments:  1 patient will have pain <1-2/10 to aid with increase tolerance to ADLS and activities  EVAL 5  CURRENT  2 patient will have FOTO improved to 68 to show increase tolerance to ADLs and work demands  EVAL 60  CURRENT  3 patient will have MMT B seated hip F 5 to aid with increase cores strength for ADLS  EVAL B hip F 4+  CURRENT  4 patient will have overall improvement 30-50% to show increase tolerance to ADLS  EVAL  CURRENT    PLAN  [x]  Upgrade activities as tolerated     [x]  Continue plan of care  []  Update interventions per flow sheet       []  Discharge due to:_  []  Other:_      Marlene Pew, PT 4/3/2017  7:55 AM    Future Appointments  Date Time Provider [facility]   4/3/2017 8:00 AM Marlene Pew, PT [facility] [address]   4/7/2017 7:30 AM Claribel Gonzalez, PT [facility] [address]   4/11/2017 8:00 AM Marlene Pew, PT [facility] [address]   4/14/2017 7:30 AM Marlene Pew, PT [facility] [address]   4/17/2017 7:30 AM Marlene Pew, PT [facility] [address]   4/21/2017 7:30 AM Marlene Pew, PT [facility] [address]   4/24/2017 7:30 AM Marlene Pew, PT [facility] [address]   4/27/2017 4:15 PM MD Oma Baker 69   4/28/2017 7:30 AM Marlene Pew, PT [facility] [address]   6/8/2017 8:40 AM Vero Orr MD [facility]

## 2017-04-07 ENCOUNTER — APPOINTMENT (OUTPATIENT)
Dept: PHYSICAL THERAPY | Age: 57
End: 2017-04-07
Payer: COMMERCIAL

## 2017-04-11 ENCOUNTER — HOSPITAL ENCOUNTER (OUTPATIENT)
Dept: PHYSICAL THERAPY | Age: 57
Discharge: HOME | End: 2017-04-11
Payer: COMMERCIAL

## 2017-04-11 ENCOUNTER — APPOINTMENT (OUTPATIENT)
Dept: PHYSICAL THERAPY | Age: 57
End: 2017-04-11
Payer: COMMERCIAL

## 2017-04-11 PROCEDURE — 97110 THERAPEUTIC EXERCISES: CPT

## 2017-04-11 NOTE — PROGRESS NOTES
PT DAILY TREATMENT NOTE 3-16    Patient Name: Easton Johnson  Date:2017  : 1960  [x]  Patient  Verified  Payor: Linda Nuno / Plan: Maribell Duff HMO / Product Type: HMO /    In YLLS:[id number]  Out time: 0843  Total Treatment Time (min): 38  Visit #: 5 of 6-12    Treatment Area: Other bursitis of hip, right hip [M70.71]    SUBJECTIVE  Pain Level (0-10 scale): 2-3  Any medication changes, allergies to medications, adverse drug reactions, diagnosis change, or new procedure performed?: [x] No    [] Yes (see summary sheet for update)  Subjective functional status/changes:   [] No changes reported  Had increased pain in the (R) hip this weekend with pain going down the leg.     OBJECTIVE  Modality rationale: decrease inflammation, decrease pain and increase tissue extensibility to improve the patients ability to tolerate increased activity levels   Min Type Additional Details    [] Estim:  []Unatt       []IFC  []Premod                        []Other:  []w/ice   []w/heat  Position:  Location:    [] Estim: []Att    []TENS instruct  []NMES                    []Other:  []w/US   []w/ice   []w/heat  Position:  Location:    []  Traction: [] Cervical       []Lumbar                       [] Prone          []Supine                       []Intermittent   []Continuous Lbs:  [] before manual  [] after manual    []  Ultrasound: []Continuous   [] Pulsed                           []1MHz   []3MHz Location:   W/cm2:     []  Iontophoresis with dexamethasone         Location: [] Take home patch   [] In clinic   10 [x]  Ice     []  heat  []  Ice massage  []  Laser   []  Anodyne Position: (L) SL  Location: (R) Glute    []  Laser with stim  []  Other: Position:  Location:    []  Vasopneumatic Device Pressure:       [] lo [] med [] hi   Temperature: [] lo [] med [] hi   [x] Skin assessment post-treatment:  [x]intact []redness- no adverse reaction    []redness - adverse reaction:     28 min Therapeutic Exercise:  [x] See flow sheet : Rationale: increase ROM, increase strength and improve coordination to improve the patients ability to tolerate increased activity levels           With   [x] TE   [] TA   [] neuro   [] other: Patient Education: [x] Review HEP    [] Progressed/Changed HEP based on:   [] positioning   [] body mechanics   [] transfers   [] heat/ice application    [] other:      Other Objective/Functional Measures:   - Good tolerance with exercise program  - MMT (R) Hip ER 5/5    Pain Level (0-10 scale) post treatment: 0    ASSESSMENT/Changes in Function:      Patient will continue to benefit from skilled PT services to modify and progress therapeutic interventions, address functional mobility deficits, address ROM deficits, address strength deficits, analyze and address soft tissue restrictions and analyze and cue movement patterns to attain remaining goals. []  See Plan of Care  []  See progress note/recertification  []  See Discharge Summary         Progress towards goals / Updated goals:  1 patient will have established and be independent with HEP to aid with progression of skilled PT program  EVAL issued  CURRENT Pt reports non compliance 4/3/17  2 patient will have pain <3/10 to aid with increase tolerance to ADLS and activities  EVAL 5  CURRENT Met at 2-3/10 4/11/17  3 patient will have FOTO improved to 64 to show increase tolerance to ADLs and work demands  EVAL 60  CURRENT      Long Term Goals:  To be accomplished in 10-12 treatments:  1 patient will have pain <1-2/10 to aid with increase tolerance to ADLS and activities  EVAL 5  CURRENT Progressing at 2-3/10 4/11/17  2 patient will have FOTO improved to 68 to show increase tolerance to ADLs and work demands  EVAL 60  CURRENT FOTO 62 4/11/17  3 patient will have MMT B seated hip F 5 to aid with increase cores strength for ADLS  EVAL B hip F 4+  CURRENT  4 patient will have overall improvement 30-50% to show increase tolerance to ADLS  EVAL  CURRENT    PLAN  [x]  Upgrade activities as tolerated     [x]  Continue plan of care  []  Update interventions per flow sheet       []  Discharge due to:_  []  Other:_      Di Frantz, PT 4/11/2017  8:22 AM    Future Appointments  Date Time Provider [facility]   4/14/2017 7:30 AM Di Frantz, PT [facility]   4/17/2017 7:30 AM Di Frantz, PT [facility]   4/21/2017 7:30 AM Antonette Mayer, PTA [facility]   4/24/2017 7:30 AM Di Frantz, PT [facility]   4/27/2017 4:15 PM April Constantino MD [place] [id number]   4/28/2017 7:30 AM Di Frantz, PT [facility]   6/8/2017 8:40 AM Iza Chen MD [facility]

## 2017-04-14 ENCOUNTER — HOSPITAL ENCOUNTER (OUTPATIENT)
Dept: PHYSICAL THERAPY | Age: 57
Discharge: HOME | End: 2017-04-14
Payer: COMMERCIAL

## 2017-04-14 PROCEDURE — 97035 APP MDLTY 1+ULTRASOUND EA 15: CPT

## 2017-04-14 PROCEDURE — 97110 THERAPEUTIC EXERCISES: CPT

## 2017-04-14 NOTE — PROGRESS NOTES
PT DAILY TREATMENT NOTE 3-16    Patient Name: Karla Murray  Date:2017  : 1960  [x]  Patient  Verified  Payor: Almasia Sheets / Plan: Martíndivya Delgado HMO / Product Type: HMO /    In BRITNEY:[id number]  Out time: 0841  Total Treatment Time (min): 66  Visit #: 6 of 6-12    Treatment Area: Other bursitis of hip, right hip [M70.71]    SUBJECTIVE  Pain Level (0-10 scale): 4  Any medication changes, allergies to medications, adverse drug reactions, diagnosis change, or new procedure performed?: [x] No    [] Yes (see summary sheet for update)  Subjective functional status/changes:   [] No changes reported  Increased pain today in the hip. Think I slept wrong.     OBJECTIVE  Modality rationale: decrease inflammation, decrease pain and increase tissue extensibility to improve the patients ability to tolerate increased activity levels   Min Type Additional Details    [] Estim:  []Unatt       []IFC  []Premod                        []Other:  []w/ice   []w/heat  Position:  Location:    [] Estim: []Att    []TENS instruct  []NMES                    []Other:  []w/US   []w/ice   []w/heat  Position:  Location:    []  Traction: [] Cervical       []Lumbar                       [] Prone          []Supine                       []Intermittent   []Continuous Lbs:  [] before manual  [] after manual    [x]  Ultrasound: [x]Continuous   [] Pulsed      8'                    [x]1MHz   []3MHz Location:  (R) TFL/Glute  W/cm2: 1.8    []  Iontophoresis with dexamethasone         Location: [] Take home patch   [] In clinic   10 [x]  Ice     []  heat  []  Ice massage  []  Laser   []  Anodyne Position: (L) SL  Location: (R) Glute    []  Laser with stim  []  Other: Position:  Location:    []  Vasopneumatic Device Pressure:       [] lo [] med [] hi   Temperature: [] lo [] med [] hi   [x] Skin assessment post-treatment:  [x]intact []redness- no adverse reaction    []redness - adverse reaction:     48 min Therapeutic Exercise:  [x] See flow sheet : Rationale: increase ROM, increase strength and improve coordination to improve the patients ability to tolerate increased activity levels           With   [x] TE   [] TA   [] neuro   [] other: Patient Education: [x] Review HEP    [] Progressed/Changed HEP based on:   [] positioning   [] body mechanics   [] transfers   [] heat/ice application    [] other:      Other Objective/Functional Measures:   - Good tolerance with added exercises  - MMT (B) Hip Flx 5-     Pain Level (0-10 scale) post treatment: 4    ASSESSMENT/Changes in Function:      Patient will continue to benefit from skilled PT services to modify and progress therapeutic interventions, address functional mobility deficits, address ROM deficits, address strength deficits, analyze and address soft tissue restrictions and analyze and cue movement patterns to attain remaining goals. []  See Plan of Care  []  See progress note/recertification  []  See Discharge Summary         Progress towards goals / Updated goals:  1 patient will have established and be independent with HEP to aid with progression of skilled PT program  EVAL issued  CURRENT Doing better with exercises and stretches 4/14/17  2 patient will have pain <3/10 to aid with increase tolerance to ADLS and activities  EVAL 5  CURRENT Met at 2-3/10 4/11/17  3 patient will have FOTO improved to 64 to show increase tolerance to ADLs and work demands  EVAL 60  [address] Term Goals:  To be accomplished in 10-12 treatments:  1 patient will have pain <1-2/10 to aid with increase tolerance to ADLS and activities  EVAL 5  CURRENT Progressing at 4/10 4/11/17  2 patient will have FOTO improved to 68 to show increase tolerance to ADLs and work demands  EVAL 60  CURRENT FOTO 62 4/11/17  3 patient will have MMT B seated hip F 5 to aid with increase cores strength for ADLS  EVAL B hip F 4+  CURRENT (B) Hip Flx 4+-5-/5 4/14/17  4 patient will have overall improvement 30-50% to show increase tolerance to ADLS  EVAL  CURRENT    PLAN  [x]  Upgrade activities as tolerated     [x]  Continue plan of care  []  Update interventions per flow sheet       []  Discharge due to:_  []  Other:_      Sabine Irving, PT 4/14/2017  7:44 AM    Future Appointments  Date Time Provider [facility]   4/17/2017 7:30 AM Sabine Irving, PT [facility]   4/21/2017 7:30 AM Juan Lutz, PTA [facility]   4/24/2017 7:30 AM Sabine Irving, PT [facility]   4/27/2017 4:15 PM James De La Torre MD [place] [address]   4/28/2017 7:30 AM Sabine Irving, PT [facility]   6/8/2017 8:40 AM Reilly España MD [facility]

## 2017-04-15 NOTE — PROGRESS NOTES
[facility]  [address] [facility], [address] [address],[place]  [phone] fax    Progress Note  Patient name: Easton Johnson Start of Care: 3/9/2017   Referral source: Omid Mazariegos MD : 1960    Medical Diagnosis: Other bursitis of hip, right hip [M70.71] Onset Date:1- /2 years    Treatment Diagnosis: decrease tolerance to ADLs and activities due to R hip pain   Prior Hospitalization: see medical history Provider#: [id number]   Medications: Verified on Patient summary List   Comorbidities:  L shoulder surgery X2 , Lower back surgery, HTN, DM, hearing impaired  Prior Level of Function: I , works full time, construction industry, 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 Visits from Start of Care: 6    Missed Visits: 1    Established Goals:         Excellent           Good         Limited           None  [] Increased ROM   []  [x]  []  []  [] Increased Strength  []  [x]  []  []  [] Increased Mobility  []  [x]  []  []   [] Decreased Pain   []  []  [x]  []  [] Decreased Swelling  []  []  []  []    Key Functional Changes: Patient has shown good progress with this treatment program. Pain as of last visit was 4-5/10 with patient reporting the the pain is not as sharp and he is able to rest better and do more activity. Patient has shown decreased pain and increased strength and mobility. MMT (B) Hip Flx 4+ - 5-/5 Hip ER 5/5. Patient reports improvement with overall involvement. FOTO score is 62. All STG/LTGs achieved as identified below.       Fall Risk Assessment: Patient demonstrates no Fall Risk   Progress towards goals / Updated goals:  1 patient will have established and be independent with HEP to aid with progression of skilled PT program  EVAL issued  CURRENT Doing better with exercises and stretches 17  2 patient will have pain <3/10 to aid with increase tolerance to ADLS and activities  EVAL 5  CURRENT Met at 2-3/10 4/11/17  3 patient will have FOTO improved to 64 to show increase tolerance to ADLs and work demands  EVAL 60  CURRENT Progressing at 62 4/11/17      Long Term Goals: To be accomplished in 10-12 treatments:  1 patient will have pain <1-2/10 to aid with increase tolerance to ADLS and activities  EVAL 5  CURRENT Progressing at 4/10 4/11/17  2 patient will have FOTO improved to 68 to show increase tolerance to ADLs and work demands  EVAL 60  CURRENT FOTO 62 4/11/17  3 patient will have MMT B seated hip F 5 to aid with increase cores strength for ADLS  EVAL B hip F 4+  CURRENT (B) Hip Flx 4+-5-/5 4/14/17  4 patient will have overall improvement 30-50% to show increase tolerance to ADLS  EVAL  CURRENT    Updated Goals: to be achieved in 4-6 weeks:  1. Pt will have FOTO improved to 68 to show increased tolerance to ADLs and work demands              PNStatus: 62              Current Status:  2. Pt will have decreased pain to 3/10 with verbalized understanding to manage the pain at that level for improved tolerance of ADLs and work demands              PNStatus: 4-5/10              Current Status:  3.   Pt will report an overall 75% improvement or better with involvement to return to usual daily tasks at a tolerable level               PNStatus: initiated              Current Status:    ASSESSMENT/RECOMMENDATIONS:  [x]Continue therapy per initial plan/protocol at a frequency of  2-3 x per week for 4-6 visits  []Continue therapy with the following recommended changes:_____________________      _____________________________________________________________________  []Discontinue therapy progressing towards or have reached established goals  []Discontinue therapy due to lack of appreciable progress towards goals  []Discontinue therapy due to lack of attendance or compliance  []Await Physician's recommendations/decisions regarding therapy  []Other:________________________________________________________________    Thank you for this referral.   Matilda Frankel, PT 4/15/2017 11:47 AM  NOTE TO PHYSICIAN:  PLEASE COMPLETE THE ORDERS BELOW AND   FAX TO [facility]: [phone]  If you are unable to process this request in 24 hours please contact our office: [phone]    []  I have read the above report and request that my patient continue as recommended. []  I have read the above report and request that my patient continue therapy with the following changes/special instructions:________________________________________  []I have read the above report and request that my patient be discharged from therapy.     Physicians signature: ______________________________Date: _____Time:______

## 2017-04-17 ENCOUNTER — APPOINTMENT (OUTPATIENT)
Dept: PHYSICAL THERAPY | Age: 57
End: 2017-04-17
Payer: COMMERCIAL

## 2017-04-21 ENCOUNTER — APPOINTMENT (OUTPATIENT)
Dept: PHYSICAL THERAPY | Age: 57
End: 2017-04-21
Payer: COMMERCIAL

## 2017-04-24 ENCOUNTER — APPOINTMENT (OUTPATIENT)
Dept: PHYSICAL THERAPY | Age: 57
End: 2017-04-24
Payer: COMMERCIAL

## 2017-05-15 RX ORDER — ATORVASTATIN CALCIUM 40 MG/1
TABLET, FILM COATED ORAL
Qty: 30 TAB | Refills: 12 | Status: SHIPPED | OUTPATIENT
Start: 2017-05-15 | End: 2018-06-23 | Stop reason: SDUPTHER

## 2017-05-19 ENCOUNTER — HOSPITAL ENCOUNTER (OUTPATIENT)
Dept: PHYSICAL THERAPY | Age: 57
Discharge: HOME | End: 2017-05-19
Payer: COMMERCIAL

## 2017-05-19 PROCEDURE — 97110 THERAPEUTIC EXERCISES: CPT

## 2017-05-19 NOTE — PROGRESS NOTES
PT DAILY TREATMENT NOTE - [facility]     Patient Name: Anastasiya Pickett  Date:2017  : 1960  [x]  Patient  Verified  Payor: Rufina Valenzuela / Plan: [employer] HMO / Product Type: HMO /    In time:7:35  Out time:8:11  Total Treatment Time (min): 36  Visit #: 7 of 12    Treatment Area: Other bursitis of hip, right hip [M70.71]    SUBJECTIVE  Pain Level (0-10 scale): 3-4  Any medication changes, allergies to medications, adverse drug reactions, diagnosis change, or new procedure performed?: [x] No    [] Yes (see summary sheet for update)  Subjective functional status/changes:   [] No changes reported  Im  Having  Pain at my  (R)  Knee. But  MD  Said  The  Hip doesn't  Have  Nothing  To do with it. He  Romana Night  I will  Be needing  Knee  Surgery.     OBJECTIVE    Modality rationale: decrease edema, decrease inflammation, decrease pain and increase tissue extensibility to improve the patients ability to perform ADL   Min Type Additional Details    [] Estim:  []Unatt       []IFC  []Premod                        []Other:  []w/ice   []w/heat  Position:  Location:    [] Estim: []Att    []TENS instruct  []NMES                    []Other:  []w/US   []w/ice   []w/heat  Position:  Location:    []  Traction: [] Cervical       []Lumbar                       [] Prone          []Supine                       []Intermittent   []Continuous Lbs:  [] before manual  [] after manual    []  Ultrasound: []Continuous   [] Pulsed                           []1MHz   []3MHz W/cm2:  Location:    []  Iontophoresis with dexamethasone         Location: [] Take home patch   [] In clinic    []  Ice     []  heat  []  Ice massage  []  Laser   []  Anodyne Position:  Location:    []  Laser with stim  []  Other:  Position:  Location:    []  Vasopneumatic Device Pressure:       [] lo [] med [] hi   Temperature: [] lo [] med [] hi   [x] Skin assessment post-treatment:  [x]intact []redness- no adverse reaction    []redness - adverse reaction:      min []Eval []Re-Eval       36 min Therapeutic Exercise:  [x] See flow sheet :   Rationale: increase ROM and increase strength to improve the patients ability to perform ADL     min Therapeutic Activity:  []  See flow sheet :   Rationale:   to improve the patients ability to      min Neuromuscular Re-education:  []  See flow sheet :   Rationale:   to improve the patients ability to      min Manual Therapy:     Rationale:  to      min Gait Training:  ___ feet with ___ device on level surfaces with ___ level of assist   Rationale: With   [x] TE   [] TA   [] neuro   [] other: Patient Education: [x] Review HEP    [] Progressed/Changed HEP based on:   [] positioning   [] body mechanics   [] transfers   [] heat/ice application    [] other:      Other Objective/Functional Measures:  Completed  Each there ex  Fairly  well    Pain Level (0-10 scale) post treatment: 0    ASSESSMENT/Changes in Function: Discussed  With pt on importance  Of  HEP. Benefited  With  Treatment  Today. Patient will continue to benefit from skilled PT services to address functional mobility deficits, address ROM deficits, address strength deficits, analyze and address soft tissue restrictions, analyze and cue movement patterns and instruct in home and community integration to attain remaining goals. [x]  See Plan of Care  []  See progress note/recertification  []  See Discharge Summary         Progress towards goals / Updated goals:  1. Pt will have FOTO improved to 68 to show increased tolerance to ADLs and work demands  PNStatus: 62  Current Status:  2. Pt will have decreased pain to 3/10 with verbalized understanding to manage the pain at that level for improved tolerance of ADLs and work demands  PNStatus: 4-5/10  Current Status:  3.  Pt will report an overall 75% improvement or better with involvement to return to usual daily tasks at a tolerable level   PNStatus: initiated  Current Status:    PLAN  []  Upgrade activities as tolerated     [x]  Continue plan of care  []  Update interventions per flow sheet       []  Discharge due to:_  []  Other:_      Antonette Mayer, PTA 5/19/2017  9:06 AM    Future Appointments  Date Time Provider [facility]   5/23/2017 7:30 AM Svitlana Latham, PT [facility]   5/25/2017 8:00 AM Antonette Cooper, PTA [facility]   5/25/2017 4:15 PM Shea Guevara MD [place] [id number]   5/31/2017 7:30 AM Thu Schulte, PT [facility]   6/2/2017 7:30 AM Thu Schulte, PT [facility]   6/8/2017 8:40 AM Kenneth Weinberg MD [facility]

## 2017-05-23 ENCOUNTER — HOSPITAL ENCOUNTER (OUTPATIENT)
Dept: PHYSICAL THERAPY | Age: 57
Discharge: HOME | End: 2017-05-23
Payer: COMMERCIAL

## 2017-05-23 PROCEDURE — 97530 THERAPEUTIC ACTIVITIES: CPT

## 2017-05-23 PROCEDURE — 97110 THERAPEUTIC EXERCISES: CPT

## 2017-05-23 NOTE — PROGRESS NOTES
PT DAILY TREATMENT NOTE 3-16    Patient Name: Fiona Pod  Date:2017  : 1960  [x]  Patient  Verified  Payor: Susan Macdonald / Plan: [employer] / Product Type: HMO /    In TVPS:[id number]  Out GJLT:[id number]  Total Treatment Time (min): 51  Visit #: 8 of 12    Treatment Area: Other bursitis of hip, right hip [M70.71]    SUBJECTIVE  Pain Level (0-10 scale): 0  Any medication changes, allergies to medications, adverse drug reactions, diagnosis change, or new procedure performed?: [x] No    [] Yes (see summary sheet for update)  Subjective functional status/changes:   [] No changes reported  Feeling really good today, no pain. Able to do all usual work tasks even if the hip acts up. Reports Fluctuating pain    OBJECTIVE  40 min Therapeutic Exercise:  [x] See flow sheet :   Rationale: increase ROM, increase strength and improve coordination to improve the patients ability to tolerate increased activity levels   11 min Therapeutic Activity:  [x]  See flow sheet :   Rationale: increase strength, improve coordination and Improve walking tolerance  to improve the patients ability to perform increased ADL             With   [x] TE   [] TA   [] neuro   [] other: Patient Education: [x] Review HEP    [] Progressed/Changed HEP based on:   [] positioning   [] body mechanics   [] transfers   [] heat/ice application    [] other:      Other Objective/Functional Measures:   - Good exercise participation  - VCs required to complete stretches  - Pt provided with additional HEP for stretches    Pain Level (0-10 scale) post treatment: 0    ASSESSMENT/Changes in Function:      Patient will continue to benefit from skilled PT services to modify and progress therapeutic interventions, address functional mobility deficits, address ROM deficits, address strength deficits, analyze and address soft tissue restrictions and analyze and cue movement patterns to attain remaining goals.      []  See Plan of Care  []  See progress note/recertification  []  See Discharge Summary         Progress towards goals / Updated goals:  1. Pt will have FOTO improved to 68 to show increased tolerance to ADLs and work demands  PNStatus: 62  Current Status:  2. Pt will have decreased pain to 3/10 with verbalized understanding to manage the pain at that level for improved tolerance of ADLs and work demands  PNStatus: 4-5/10  Current Status: Progressing at 0/10 5/23/17  3.  Pt will report an overall 75% improvement or better with involvement to return to usual daily tasks at a tolerable level   PNStatus: initiated  Current Status:    PLAN  [x]  Upgrade activities as tolerated     [x]  Continue plan of care  []  Update interventions per flow sheet       []  Discharge due to:_  []  Other:_      Jennie Avendaño, PT 5/23/2017  7:40 AM    Future Appointments  Date Time Provider [facility]   5/25/2017 8:00 AM ADRIAN Kerr [facility]   5/25/2017 4:15 PM Antonio Coelho MD [id number] [facility]   5/31/2017 7:30 AM Therese Lam, PT [facility]   6/2/2017 7:30 AM [facility]   6/8/2017 8:40 AM Gage Duran MD [facility]

## 2017-05-25 ENCOUNTER — APPOINTMENT (OUTPATIENT)
Dept: PHYSICAL THERAPY | Age: 57
End: 2017-05-25
Payer: COMMERCIAL

## 2017-05-30 RX ORDER — ERGOCALCIFEROL 1.25 MG/1
CAPSULE ORAL
Qty: 13 CAP | Refills: 4 | Status: SHIPPED | OUTPATIENT
Start: 2017-05-30 | End: 2018-07-28 | Stop reason: SDUPTHER

## 2017-06-02 ENCOUNTER — HOSPITAL ENCOUNTER (OUTPATIENT)
Dept: PHYSICAL THERAPY | Age: 57
Discharge: HOME | End: 2017-06-02
Payer: COMMERCIAL

## 2017-06-02 PROCEDURE — 97530 THERAPEUTIC ACTIVITIES: CPT

## 2017-06-02 PROCEDURE — 97110 THERAPEUTIC EXERCISES: CPT

## 2017-06-02 NOTE — PROGRESS NOTES
PT DAILY TREATMENT NOTE     Patient Name: Adriane Piazno  Date:2017  : 1960  [x]  Patient  Verified  Payor: Artist Rosette / Plan: [employer] HMO / Product Type: HMO /    In time:734 Out time:822  Total Treatment Time (min): 42  Visit #:9of 12    Treatment Area: Other bursitis of hip, right hip [M70.71]    SUBJECTIVE  Pain Level (0-10 scale): 2-3  Any medication changes, allergies to medications, adverse drug reactions, diagnosis change, or new procedure performed?: [x] No    [] Yes (see summary sheet for update)  Subjective functional status/changes:   [] No changes reported  Doing all right today. I am feeling a little bit of soreness.      OBJECTIVE    Modality rationale:      Min Type Additional Details    [] Estim:  []Unatt       []IFC  []Premod                        []Other:  []w/ice   []w/heat  Position:  Location:    [] Estim: []Att    []TENS instruct  []NMES                    []Other:  []w/US   []w/ice   []w/heat  Position:  Location:    []  Traction: [] Cervical       []Lumbar                       [] Prone          []Supine                       []Intermittent   []Continuous Lbs:  [] before manual  [] after manual    []  Ultrasound: []Continuous   [] Pulsed                           []1MHz   []3MHz W/cm2:  Location:    []  Iontophoresis with dexamethasone         Location: [] Take home patch   [] In clinic    []  Ice     []  heat  []  Ice massage  []  Laser   []  Anodyne Position:  Location:    []  Laser with stim  []  Other:  Position:  Location:    []  Vasopneumatic Device Pressure:       [] lo [] med [] hi   Temperature: [] lo [] med [] hi   [] Skin assessment post-treatment:  []intact []redness- no adverse reaction    []redness - adverse reaction:       min []Eval                  []Re-Eval       30 min Therapeutic Exercise:  [x] See flow sheet :   Rationale: increase ROM, increase strength and improve coordination to improve the patients ability to aid with increase tolerance to ADLS and activities. 12 min Therapeutic Activity:  []  See flow sheet :TM   Rationale: improve coordination, improve balance and increase proprioception  to improve the patients ability to aid with carryover to community ambulation      min Neuromuscular Re-education:  []  See flow sheet :   Rationale:   to improve the patients ability to      min Manual Therapy:     Rationale:  to      min Gait Training:  ___ feet with ___ device on level surfaces with ___ level of assist   Rationale: With   [] TE   [] TA   [] neuro   [] other: Patient Education: [x] Review HEP    [] Progressed/Changed HEP based on:   [] positioning   [] body mechanics   [] transfers   [] heat/ice application    [x] other: issued black tband for HEP     Other Objective/Functional Measures: VC exercises and technique for stretches. FOTO 62    Pain Level (0-10 scale) post treatment: 2-3    ASSESSMENT/Changes in Function: fransico well. Patient will continue to benefit from skilled PT services to modify and progress therapeutic interventions, address functional mobility deficits, address ROM deficits, address strength deficits, analyze and address soft tissue restrictions, analyze and cue movement patterns, analyze and modify body mechanics/ergonomics, assess and modify postural abnormalities and instruct in home and community integration to attain remaining goals. [x]  See Plan of Care  [x]  See progress note/recertification  []  See Discharge Summary         Progress towards goals / Updated goals:  1. Pt will have FOTO improved to 68 to show increased tolerance to ADLs and work demands  PNStatus: 62  Current Status:62  6/2/17  2. Pt will have decreased pain to 3/10 with verbalized understanding to manage the pain at that level for improved tolerance of ADLs and work demands  PNStatus: 4-5/10  Current Status: Progressing 2-3 6/2/17  3.  Pt will report an overall 75% improvement or better with involvement to return to usual daily tasks at a tolerable level   PNStatus: initiated  Current Status:NA       PLAN  [x]  Upgrade activities as tolerated     [x]  Continue plan of care  []  Update interventions per flow sheet       []  Discharge due to:_  [x]  Other:_  Send MD update (30 day)    Taylor Hurtado, PT 6/2/2017  7:33 AM    Future Appointments  Date Time Provider [facility]   6/8/2017 8:40 AM Jean Caicedo MD [facility] [address]

## 2017-06-02 NOTE — PROGRESS NOTES
[facility] [address], [address] [place] [address] [address]  [phone] fax    Physician Update  [x] Progress Note  [] Discharge Summary  Patient name: Belle Hernandez Start of Care: 3/9/17   Referral source: Caty Gracia MD : 1960   Medical/Treatment Diagnosis: Other bursitis of hip, right hip [M70.71] Onset Date:1-/2 years     Prior Hospitalization: see medical history Provider#: [id number]   Medications: Verified on Patient Summary List    Comorbidities: L shoulder surgery X2 , Lower back surgery, HTN, DM, hearing impaired  Prior Level of Function:I , works full time, construction industry, 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 Visits from Start of Care: 9    Missed Visits: 3    Status at Evaluation/Last Progress Note: MD note dated 4/15/17  Progress towards Goals: patient has only had 3 sessions since his latest MD note. He has had some scheduling issues due to his work schedule. Goals: to be achieved in 3-9 treatments:  1. Pt will have FOTO improved to 68 to show increased tolerance to ADLs and work demands  PNStatus: 62  Current Status:   2. Pt will have decreased pain to 0-1/10 with verbalized understanding to manage the pain at that level for improved tolerance of ADLs and work demands  PNStatus: 2-3  Current Status:    3.  Pt will report an overall 75% improvement or better with involvement to return to usual daily tasks at a tolerable level   PNStatus: NA  Current Status:      ASSESSMENT/RECOMMENDATIONS:  [x]Continue therapy per initial plan/protocol at a frequency of  2-3 x per week for 3-9 treatments  []Continue therapy with the following recommended changes:_____________________      _____________________________________________________________________  []Discontinue therapy progressing towards or have reached established goals  []Discontinue therapy due to lack of appreciable progress towards goals  []Discontinue therapy due to lack of attendance or compliance  []Await Physician's recommendations/decisions regarding therapy  []Other:________________________________________________________________    Thank you for this referral. Mago Perez, PT 6/2/2017 7:56 AM  NOTE TO PHYSICIAN:  PLEASE COMPLETE THE ORDERS BELOW AND   FAX TO [facility]: [phone]  If you are unable to process this request in 24 hours please contact our office: [phone]    ? I have read the above report and request that my patient continue as recommended. ? I have read the above report and request that my patient continue therapy with the following changes/special instructions:_____________________________________  ? I have read the above report and request that my patient be discharged from therapy.     Physicians signature: __________________________Date: ________Time:________

## 2017-06-05 RX ORDER — MELOXICAM 15 MG/1
TABLET ORAL
Qty: 30 TAB | Refills: 2 | Status: SHIPPED | OUTPATIENT
Start: 2017-06-05 | End: 2017-09-04 | Stop reason: SDUPTHER

## 2017-06-08 ENCOUNTER — OFFICE VISIT (OUTPATIENT)
Dept: PAIN MANAGEMENT | Age: 57
End: 2017-06-08

## 2017-06-08 VITALS
DIASTOLIC BLOOD PRESSURE: 95 MMHG | WEIGHT: 253 LBS | SYSTOLIC BLOOD PRESSURE: 166 MMHG | HEART RATE: 86 BPM | BODY MASS INDEX: 33.38 KG/M2

## 2017-06-08 DIAGNOSIS — G89.29 INSOMNIA SECONDARY TO CHRONIC PAIN: ICD-10-CM

## 2017-06-08 DIAGNOSIS — G24.3 CERVICAL DYSTONIA: ICD-10-CM

## 2017-06-08 DIAGNOSIS — M50.30 DDD (DEGENERATIVE DISC DISEASE), CERVICAL: ICD-10-CM

## 2017-06-08 DIAGNOSIS — Z79.899 ENCOUNTER FOR LONG-TERM (CURRENT) USE OF HIGH-RISK MEDICATION: ICD-10-CM

## 2017-06-08 DIAGNOSIS — M76.892 ENTHESOPATHY OF LEFT HIP REGION: ICD-10-CM

## 2017-06-08 DIAGNOSIS — M96.1 POSTLAMINECTOMY SYNDROME, LUMBAR REGION: Primary | ICD-10-CM

## 2017-06-08 DIAGNOSIS — M54.16 LUMBAR NEURITIS: ICD-10-CM

## 2017-06-08 DIAGNOSIS — F17.200 TOBACCO DEPENDENCE SYNDROME: ICD-10-CM

## 2017-06-08 DIAGNOSIS — M54.12 BRACHIAL NEURITIS OR RADICULITIS: ICD-10-CM

## 2017-06-08 DIAGNOSIS — G89.4 CHRONIC PAIN SYNDROME: ICD-10-CM

## 2017-06-08 DIAGNOSIS — G47.01 INSOMNIA SECONDARY TO CHRONIC PAIN: ICD-10-CM

## 2017-06-08 LAB
ALCOHOL UR POC: NORMAL
AMPHETAMINES UR POC: NEGATIVE
BARBITURATES UR POC: NEGATIVE
BENZODIAZEPINES UR POC: NEGATIVE
BUPRENORPHINE UR POC: NORMAL
CANNABINOIDS UR POC: NEGATIVE
CARISOPRODOL UR POC: NORMAL
COCAINE UR POC: NEGATIVE
FENTANYL UR POC: NORMAL
MDMA/ECSTASY UR POC: NEGATIVE
METHADONE UR POC: NEGATIVE
METHAMPHETAMINE UR POC: NEGATIVE
METHYLPHENIDATE UR POC: NEGATIVE
OPIATES UR POC: NORMAL
OXYCODONE UR POC: NORMAL
PHENCYCLIDINE UR POC: NEGATIVE
PROPOXYPHENE UR POC: NORMAL
TRAMADOL UR POC: NORMAL
TRICYCLICS UR POC: NEGATIVE

## 2017-06-08 RX ORDER — HYDROCODONE BITARTRATE AND ACETAMINOPHEN 10; 325 MG/1; MG/1
1 TABLET ORAL 4 TIMES DAILY
Qty: 120 TAB | Refills: 0 | Status: SHIPPED | OUTPATIENT
Start: 2017-08-26 | End: 2017-09-05 | Stop reason: SDUPTHER

## 2017-06-08 RX ORDER — HYDROCODONE BITARTRATE 60 MG/1
60 TABLET, EXTENDED RELEASE ORAL DAILY
Qty: 30 TAB | Refills: 0 | Status: SHIPPED | OUTPATIENT
Start: 2017-08-20 | End: 2017-09-05 | Stop reason: SDUPTHER

## 2017-06-08 RX ORDER — HYDROCODONE BITARTRATE 60 MG/1
60 TABLET, EXTENDED RELEASE ORAL DAILY
Qty: 30 TAB | Refills: 0 | Status: SHIPPED | OUTPATIENT
Start: 2017-06-24 | End: 2017-07-24

## 2017-06-08 RX ORDER — HYDROCODONE BITARTRATE AND ACETAMINOPHEN 10; 325 MG/1; MG/1
1 TABLET ORAL 4 TIMES DAILY
Qty: 120 TAB | Refills: 0 | Status: SHIPPED | OUTPATIENT
Start: 2017-06-30 | End: 2017-07-30

## 2017-06-08 RX ORDER — ZOLPIDEM TARTRATE 10 MG/1
5-10 TABLET ORAL
Qty: 30 TAB | Refills: 5 | Status: SHIPPED | OUTPATIENT
Start: 2017-06-08 | End: 2017-09-05 | Stop reason: SDUPTHER

## 2017-06-08 RX ORDER — HYDROCODONE BITARTRATE 60 MG/1
60 TABLET, EXTENDED RELEASE ORAL DAILY
Qty: 30 TAB | Refills: 0 | Status: SHIPPED | OUTPATIENT
Start: 2017-07-22 | End: 2017-08-14 | Stop reason: SDUPTHER

## 2017-06-08 RX ORDER — HYDROCODONE BITARTRATE AND ACETAMINOPHEN 10; 325 MG/1; MG/1
1 TABLET ORAL 4 TIMES DAILY
Qty: 120 TAB | Refills: 0 | Status: SHIPPED | OUTPATIENT
Start: 2017-07-28 | End: 2017-08-14 | Stop reason: SDUPTHER

## 2017-06-08 NOTE — PROGRESS NOTES
HISTORY OF PRESENT ILLNESS  Erna Amaya is a 65 y.o. male. HPI presents for follow up of chronic pain due to low back pain due to postlaminectomy syndrome and left hip pain. He also suffers with chronic neck pain due to cervical dystonia. Medications continue to work well for pain control overall. Erna Amaya is tolerating medications well, with no untoward side effects noted. He is able to stay more active with less discomfort with these current doses. The patient reports an average of 70% relief with this regimen. Most recent UDS and  were consistent with prescribed medications. Pill counts are appropriate. He is informed of side effects, risks, and benefits of this regimen, and emphasizes that he derives a significant improvement in functionality and quality of life, and notes that non-opioid medications and therapies in the past have not offered significant benefit. He denies new or worsening insomnia or constipation issues. He denies any falls, injuries, or hospitalizations since the last visit. Pain level today 5 out of 10, outcome 8/28,(The lower the upper number, the better the outcome)  Physical activity and mobility, mood, and sleep are all good. No reported side effects. A current review of the  does not identify any inconsistency. UDS obtained and reviewed; formal confirmation from laboratory is pending. Review of Systems   Constitutional: Positive for malaise/fatigue. Negative for chills, fever and weight loss. HENT: Negative for congestion and sore throat. Eyes: Negative for blurred vision and double vision. Reading glasses   Respiratory: Negative for cough and shortness of breath. Cardiovascular: Negative for chest pain and leg swelling. Gastrointestinal: Negative for constipation, diarrhea, heartburn, nausea and vomiting. Genitourinary: Negative. Musculoskeletal: Positive for back pain, joint pain and myalgias. Negative for falls and neck pain. Skin: Negative. Neurological: Negative for dizziness, tingling, seizures, weakness and headaches. Endo/Heme/Allergies: Does not bruise/bleed easily. Psychiatric/Behavioral: Negative for depression. The patient has insomnia. The patient is not nervous/anxious. All other systems reviewed and are negative. Physical Exam   Constitutional: He is oriented to person, place, and time. He appears well-developed and well-nourished. No distress. HENT:   Head: Normocephalic. Right Ear: External ear normal.   Left Ear: External ear normal.   Eyes: Conjunctivae and EOM are normal. Pupils are equal, round, and reactive to light. Neck: No thyromegaly present. Pulmonary/Chest: Effort normal. No respiratory distress. Musculoskeletal: He exhibits tenderness (lumbar/bilateral hips). He exhibits no edema. Right shoulder: He exhibits normal range of motion, no tenderness, no pain and no spasm. Right elbow: He exhibits no swelling. Tenderness (\"pain\"/nontender to palpation) found. Lateral epicondyle tenderness noted. Right hip: He exhibits tenderness (trochanteric bursa). Left hip: He exhibits decreased range of motion (flexion) and tenderness (trochanteric bursa). Lumbar back: He exhibits decreased range of motion, tenderness and pain. He exhibits no spasm. Neurological: He is alert and oriented to person, place, and time. Gait normal.   Skin: Skin is warm and dry. Psychiatric: He has a normal mood and affect. His behavior is normal. Judgment and thought content normal.   Nursing note and vitals reviewed. ASSESSMENT and PLAN  Encounter Diagnoses   Name Primary?     Postlaminectomy syndrome, lumbar region Yes    Encounter for long-term (current) use of high-risk medication     Lumbar neuritis     Brachial neuritis or radiculitis     Cervical dystonia     Enthesopathy of left hip region     Insomnia secondary to chronic pain     DDD (degenerative disc disease), cervical     Tobacco dependence syndrome     Chronic pain syndrome      He is scheduled see Dr. Amirah Mireles in follow-up of his right hip. He will continue on his current analgesic regimen as this is providing excellent pain control with improve functionality and minimal side effects. 3 month reassess him    No concerns are raised for misuse, abuse, or diversion. 1. Pain medications are prescribed with the objective of pain relief and improved physical and psychosocial function in this patient. 2. Counseled patient on proper use of prescribed medications and reviewed opioid contract. 3. Counseled patient about chronic medical conditions and their relationship to anxiety and depression and recommended mental health support as needed. 4. Reviewed with patient self-help tools, home exercise, and lifestyle changes to assist the patient in self-management of symptoms. 5. Advised patient to have a primary care provider to continue care for health maintenance and general medical conditions and support for referral to specialty care as needed. 6. Reviewed with patient the treatment plan, goals of treatment plan, and limitations of treatment plan, to include the potential for side effects from medications and procedures. If side effects occur, it is the responsibility of the patient to inform the clinic so that a change in the treatment plan can be made in a safe manner. The patient is advised that stopping prescribed medication may cause an increase in symptoms and possible medication withdrawal symptoms. The patient is informed an emergency room evaluation may be necessary if this occurs. DISPOSITION: The patients condition and plan were discussed at length and all questions were answered. The patient agrees with the plan.     Counseling occupied > 50% of visit:  Total time: 30 minutes

## 2017-06-08 NOTE — MR AVS SNAPSHOT
Visit Information Date & Time Provider Department Dept. Phone Encounter #  
 6/8/2017  8:40 AM Charis Stringer MD [facility] [address] [facility] [phone] [id number] Follow-up Instructions Return in about 3 months (around 9/8/2017). Upcoming Health Maintenance Date Due Hepatitis C Screening 1960 EYE EXAM RETINAL OR DILATED Q1 3/24/1971 Pneumococcal 19-64 Medium Risk (1 of 1 - PPSV23) 3/24/1980 DTaP/Tdap/Td series (1 - Tdap) 3/24/1982 FOOT EXAM Q1 9/18/2016 HEMOGLOBIN A1C Q6M 1/26/2017 MICROALBUMIN Q1 7/26/2017 LIPID PANEL Q1 7/26/2017 INFLUENZA AGE 9 TO ADULT 8/1/2017 COLONOSCOPY 8/22/2018 Allergies as of 6/8/2017  Review Complete On: 6/8/2017 By: Charis Stringer MD  
 No Known Allergies Current Immunizations  Reviewed on 2/4/2016 Name Date Influenza Vaccine 1/27/2016 Influenza Vaccine PF 3/13/2015 Influenza Vaccine Whole 12/9/2010 Not reviewed this visit You Were Diagnosed With   
  
 Codes Comments Postlaminectomy syndrome, lumbar region    -  Primary ICD-10-CM: M96.1 ICD-9-CM: 722.83 Encounter for long-term (current) use of high-risk medication     ICD-10-CM: Z79.899 ICD-9-CM: V58.69 Lumbar neuritis     ICD-10-CM: M54.16 
ICD-9-CM: 724.4 Vitals BP Pulse Weight(growth percentile) BMI Smoking Status (!) 166/95 86 253 lb (114.8 kg) 33.38 kg/m2 Current Every Day Smoker BMI and BSA Data Body Mass Index Body Surface Area  
 33.38 kg/m 2 2.43 m 2 Preferred Pharmacy Pharmacy Name Phone [address], [address] [place] [address] [phone] Your Updated Medication List  
  
   
This list is accurate as of: 6/8/17  9:20 AM.  Always use your most recent med list.  
  
  
  
  
 atorvastatin 40 mg tablet Commonly known as:  LIPITOR  
take 1 tablet by mouth daily  
  
 cyanocobalamin 1,000 mcg tablet Take 2,000 mcg by mouth daily. fenofibrate nanocrystallized 145 mg tablet Commonly known as:  TRICOR  
take 1 tablet by mouth daily * HYDROcodone bitartrate 60 mg ER tablet Commonly known as:  HYSINGLA Take 1 Tab by mouth daily for 30 days. Max Daily Amount: 60 mg. for chronic, severe, refractory pain *DO NOT CRUSH,CHEW, OR DISSOLVE TABLET*  Indications: Chronic Pain, Severe Pain Start taking on:  6/24/2017  
  
 * HYDROcodone bitartrate 60 mg ER tablet Commonly known as:  HYSINGLA Take 1 Tab by mouth daily for 30 days. Max Daily Amount: 60 mg. for chronic, severe, refractory pain *DO NOT CRUSH,CHEW, OR DISSOLVE TABLET*  Indications: Chronic Pain, Severe Pain Start taking on:  7/22/2017  
  
 * HYDROcodone bitartrate 60 mg ER tablet Commonly known as:  HYSINGLA Take 1 Tab by mouth daily for 30 days. Max Daily Amount: 60 mg. for chronic, severe, refractory pain *DO NOT CRUSH,CHEW, OR DISSOLVE TABLET*  Indications: Chronic Pain, Severe Pain Start taking on:  8/20/2017  
  
 * HYDROcodone-acetaminophen  mg tablet Commonly known as:  Niraj Nigh Take 1 Tab by mouth four (4) times daily for 30 days. Max Daily Amount: 4 Tabs. As needed for breakthrough pain  Indications: Pain Start taking on:  6/30/2017  
  
 * HYDROcodone-acetaminophen  mg tablet Commonly known as:  Niraj Nigh Take 1 Tab by mouth four (4) times daily for 30 days. Max Daily Amount: 4 Tabs. As needed for breakthrough pain  Indications: Pain Start taking on:  7/28/2017  
  
 * HYDROcodone-acetaminophen  mg tablet Commonly known as:  Niraj Nigh Take 1 Tab by mouth four (4) times daily for 30 days. Max Daily Amount: 4 Tabs. As needed for breakthrough pain  Indications: Pain Start taking on:  8/26/2017  
  
 lisinopril-hydroCHLOROthiazide 20-12.5 mg per tablet Commonly known as:  PRINZIDE, ZESTORETIC  
take 2 tablets by mouth once daily  
  
 meloxicam 15 mg tablet Commonly known as:  MOBIC  
 take 1 tablet by mouth once daily  
  
 metFORMIN 1,000 mg tablet Commonly known as:  GLUCOPHAGE  
TAKE 1 TABLET BY MOUTH TWO TIMES A DAY WITH MEALS  
  
 MULTIVITAMIN PO Take  by mouth. omega-3 acid ethyl esters 1 gram capsule Commonly known as:  Norina Gene Take 2 Caps by mouth two (2) times a day. omeprazole 20 mg capsule Commonly known as:  PRILOSEC Take 20 mg by mouth daily. tamsulosin 0.4 mg capsule Commonly known as:  FLOMAX Take 1 Cap by mouth daily. varenicline 0.5 mg (11)- 1 mg (42) Dspk Commonly known as:  CHANTIX STARTER LAURA Take 0.5 mg by mouth two (2) times daily (after meals). VITAMIN C 1,000 mg tablet Generic drug:  ascorbic acid (vitamin C) Take 1,000 mg by mouth two (2) times a day. VITAMIN D2 50,000 unit capsule Generic drug:  ergocalciferol  
take 1 capsule by mouth TWO TIMES A WEEK  
  
 zolpidem 10 mg tablet Commonly known as:  AMBIEN Take 0.5-1 Tabs by mouth nightly as needed for Sleep for up to 30 days. Max Daily Amount: 10 mg. Indications: SLEEP-ONSET INSOMNIA * Notice: This list has 6 medication(s) that are the same as other medications prescribed for you. Read the directions carefully, and ask your doctor or other care provider to review them with you. Prescriptions Printed Refills HYDROcodone bitartrate (HYSINGLA) 60 mg ER tablet 0 Starting on: 8/20/2017 Sig: Take 1 Tab by mouth daily for 30 days. Max Daily Amount: 60 mg. for chronic, severe, refractory pain *DO NOT CRUSH,CHEW, OR DISSOLVE TABLET*  Indications: Chronic Pain, Severe Pain Class: Print Route: Oral  
 HYDROcodone-acetaminophen (NORCO)  mg tablet 0 Starting on: 8/26/2017 Sig: Take 1 Tab by mouth four (4) times daily for 30 days. Max Daily Amount: 4 Tabs. As needed for breakthrough pain  Indications: Pain Class: Print Route: Oral  
 HYDROcodone bitartrate (HYSINGLA) 60 mg ER tablet 0 Starting on: 7/22/2017 Sig: Take 1 Tab by mouth daily for 30 days. Max Daily Amount: 60 mg. for chronic, severe, refractory pain *DO NOT CRUSH,CHEW, OR DISSOLVE TABLET*  Indications: Chronic Pain, Severe Pain Class: Print Route: Oral  
 HYDROcodone bitartrate (HYSINGLA) 60 mg ER tablet 0 Starting on: 6/24/2017 Sig: Take 1 Tab by mouth daily for 30 days. Max Daily Amount: 60 mg. for chronic, severe, refractory pain *DO NOT CRUSH,CHEW, OR DISSOLVE TABLET*  Indications: Chronic Pain, Severe Pain Class: Print Route: Oral  
 HYDROcodone-acetaminophen (NORCO)  mg tablet 0 Starting on: 7/28/2017 Sig: Take 1 Tab by mouth four (4) times daily for 30 days. Max Daily Amount: 4 Tabs. As needed for breakthrough pain  Indications: Pain Class: Print Route: Oral  
 HYDROcodone-acetaminophen (NORCO)  mg tablet 0 Starting on: 6/30/2017 Sig: Take 1 Tab by mouth four (4) times daily for 30 days. Max Daily Amount: 4 Tabs. As needed for breakthrough pain  Indications: Pain Class: Print Route: Oral  
 zolpidem (AMBIEN) 10 mg tablet 5 Sig: Take 0.5-1 Tabs by mouth nightly as needed for Sleep for up to 30 days. Max Daily Amount: 10 mg. Indications: SLEEP-ONSET INSOMNIA Class: Print Route: Oral  
  
We Performed the Following AMB POC DRUG SCREEN () [ [facility]] DRUG SCREEN [[id number] Custom] Follow-up Instructions Return in about 3 months (around 9/8/2017). Introducing [facility]! Dear Jasmina Mcmahon: 
Thank you for requesting a [employer] account. Our records indicate that you already have an active [employer] account. You can access your account anytime at [email or web address] Did you know that you can access your hospital and ER discharge instructions at any time in [employer]? You can also review all of your test results from your hospital stay or ER visit. Additional Information If you have questions, please visit the Frequently Asked Questions section of the [employer] website at [email or web address] Remember, [employer] is NOT to be used for urgent needs. For medical emergencies, dial 911. Now available from your iPhone and Android! Please provide this summary of care documentation to your next provider. Your primary care clinician is listed as Makenna Juarez. If you have any questions after today's visit, please call [phone].

## 2017-06-08 NOTE — PROGRESS NOTES
Nursing Notes    Patient presents to the office today in follow-up. Patient rates his pain at 5/10 on the numerical pain scale. Reviewed medications with counts as follows:    Rx Date filled Qty Dispensed Pill Count Last Dose Short   hysingla 60 5/26/17 30 18 6/8/17 no   ambien 5 5/27/17 30 14 6/7/17 *no   norco 10 6/1/17 120 89 6/8/17 no         Comments: * pt is allowed to use up to 2 per night if needed, some nights he only uses one. POC UDS was performed in office today    Any new labs or imaging since last appointment? NO    Have you been to an emergency room (ER) or urgent care clinic since your last visit? NO            Have you been hospitalized since your last visit? NO     If yes, where, when, and reason for visit? Have you seen or consulted any other health care providers outside of the [address] [place] [address]  since your last visit? NO     If yes, where, when, and reason for visit? Mr. Devora Darby has a reminder for a \"due or due soon\" health maintenance. I have asked that he contact his primary care provider for follow-up on this health maintenance.

## 2017-06-28 ENCOUNTER — APPOINTMENT (OUTPATIENT)
Dept: PHYSICAL THERAPY | Age: 57
End: 2017-06-28
Payer: COMMERCIAL

## 2017-06-29 ENCOUNTER — OFFICE VISIT (OUTPATIENT)
Dept: ORTHOPEDIC SURGERY | Age: 57
End: 2017-06-29

## 2017-06-29 VITALS
HEIGHT: 73 IN | SYSTOLIC BLOOD PRESSURE: 161 MMHG | BODY MASS INDEX: 34.11 KG/M2 | WEIGHT: 257.4 LBS | DIASTOLIC BLOOD PRESSURE: 80 MMHG | HEART RATE: 98 BPM | TEMPERATURE: 98.2 F

## 2017-06-29 DIAGNOSIS — M25.551 CHRONIC RIGHT HIP PAIN: Primary | ICD-10-CM

## 2017-06-29 DIAGNOSIS — G89.29 CHRONIC RIGHT HIP PAIN: Primary | ICD-10-CM

## 2017-06-29 DIAGNOSIS — M70.61 TROCHANTERIC BURSITIS OF RIGHT HIP: ICD-10-CM

## 2017-06-29 NOTE — PATIENT INSTRUCTIONS
Patient's blood pressure was elevated at today's office visit. Patient instructed to contact  primary care physician for treatment. Hip Bursitis: Care Instructions  Your Care Instructions    Bursitis is inflammation of the bursa. A bursa is a small sac of fluid that cushions a joint and helps it move easily. A bursa sits between a bone in the hip and the muscles and tendons in the thigh and buttock. Injury or overuse of the hip can cause bursitis. Activities that can lead to bursitis include twisting and rapid joint movement. Bursitis can cause hip pain. Bursitis usually gets better if you avoid the activity that caused it. If pain lasts or gets worse despite home treatment, your doctor may draw fluid from the bursa through a needle. This may relieve your pain and help your doctor know if you have an infection. If so, your doctor will prescribe antibiotics. If you have inflammation only, you may get a corticosteroid shot to reduce swelling and pain. Sometimes surgery is needed to drain or remove the bursa. Follow-up care is a key part of your treatment and safety. Be sure to make and go to all appointments, and call your doctor if you are having problems. It's also a good idea to know your test results and keep a list of the medicines you take. How can you care for yourself at home? · Put ice or a cold pack on your hip for 10 to 20 minutes at a time. Put a thin cloth between the ice and your skin. · After 3 days of using ice, you may use heat on your hip. You can use a hot water bottle, a heating pad set on low, or a warm, moist towel. · Rest your hip. Stop any activities that cause pain. Switch to activities that do not stress your hip. · Take your medicines exactly as prescribed. Call your doctor if you think you are having a problem with your medicine. · Ask your doctor if you can take an over-the-counter pain medicine, such as acetaminophen (Tylenol), ibuprofen (Advil, Motrin), or naproxen (Aleve). Be safe with medicines. Read and follow all instructions on the label. · To prevent stiffness, gently move the hip joint as much as you can without pain every day. As the pain gets better, keep doing range-of-motion exercises. Ask your doctor for exercises that will make the muscles around the hip joint stronger. Do these as directed. · You can slowly return to the activity that caused the pain, but do it with less effort until you can do it without pain or swelling. Be sure to warm up before and stretch after you do the activity. When should you call for help? Call your doctor now or seek immediate medical care if:  · You have a fever. · You have increased swelling or redness in your hip. · You cannot use your hip, or the pain in your hip gets worse. Watch closely for changes in your health, and be sure to contact your doctor if:  · You have pain for 2 weeks or longer despite home treatment. Where can you learn more? Go to http://maddie-madison.info/. Enter G248 in the search box to learn more about \"Hip Bursitis: Care Instructions. \"  Current as of: March 21, 2017  Content Version: 11.3  © 1158-2753 [employer]. Care instructions adapted under license by [employer] (which disclaims liability or warranty for this information). If you have questions about a medical condition or this instruction, always ask your healthcare professional. [address] any warranty or liability for your use of this information.

## 2017-06-29 NOTE — MR AVS SNAPSHOT
Visit Information Date & Time Provider Department Dept. Phone Encounter #  
 6/29/2017  4:00 PM Elise Bishop MD [facility] 22 [id number] Your Appointments 8/7/2017 10:35 AM  
LAB with [facility] NURSE VISIT Internist of [facility] ([address]) Appt Note: lab  
 [address], [address] [facility] [address]  
  
   
 [address], [address]  
  
    
 8/14/2017 11:15 AM  
PHYSICAL with Karin Alpers, MD  
Internist of [facility] [address]) Appt Note: ANNUAL CPE  
 [address] [facility] [address] [address]  
  
   
 [address], [address]  
  
    
 9/5/2017  8:20 AM  
Follow Up with Sarah Hubbard PA-C [facility] ([facility] SCHEDULING) Appt Note: 3 mon f/u per dh. ....to  
 [address] [place] [id number]  
[phone] [facility] [phone] Upcoming Health Maintenance Date Due Hepatitis C Screening 1960 EYE EXAM RETINAL OR DILATED Q1 3/24/1971 Pneumococcal 19-64 Medium Risk (1 of 1 - PPSV23) 3/24/1980 DTaP/Tdap/Td series (1 - Tdap) 3/24/1982 FOOT EXAM Q1 9/18/2016 HEMOGLOBIN A1C Q6M 1/26/2017 MICROALBUMIN Q1 7/26/2017 LIPID PANEL Q1 7/26/2017 INFLUENZA AGE 9 TO ADULT 8/1/2017 COLONOSCOPY 8/22/2018 Allergies as of 6/29/2017  Review Complete On: 6/29/2017 By: Elise Bishop MD  
 No Known Allergies Current Immunizations  Reviewed on 2/4/2016 Name Date Influenza Vaccine 1/27/2016 Influenza Vaccine PF 3/13/2015 Influenza Vaccine Whole 12/9/2010 Not reviewed this visit You Were Diagnosed With   
  
 Codes Comments Chronic right hip pain    -  Primary ICD-10-CM: M25.551, G89.29 ICD-9-CM: 719.45, 338.29  Trochanteric bursitis of right hip     ICD-10-CM: M70.61 
 ICD-9-CM: 726.5 Vitals BP Pulse Temp Height(growth percentile) Weight(growth percentile) BMI  
 161/80 98 98.2 °F (36.8 °C) (Oral) 6' 1\" (1.854 m) 257 lb 6.4 oz (116.8 kg) 33.96 kg/m2 Smoking Status Current Every Day Smoker BMI and BSA Data Body Mass Index Body Surface Area  
 33.96 kg/m 2 2.45 m 2 Preferred Pharmacy Pharmacy Name Phone [address], [address] [place] [address] [phone] Your Updated Medication List  
  
   
This list is accurate as of: 6/29/17  4:54 PM.  Always use your most recent med list.  
  
  
  
  
 atorvastatin 40 mg tablet Commonly known as:  LIPITOR  
take 1 tablet by mouth daily  
  
 cyanocobalamin 1,000 mcg tablet Take 2,000 mcg by mouth daily. fenofibrate nanocrystallized 145 mg tablet Commonly known as:  TRICOR  
take 1 tablet by mouth daily * HYDROcodone bitartrate 60 mg ER tablet Commonly known as:  HYSINGLA Take 1 Tab by mouth daily for 30 days. Max Daily Amount: 60 mg. for chronic, severe, refractory pain *DO NOT CRUSH,CHEW, OR DISSOLVE TABLET*  Indications: Chronic Pain, Severe Pain  
  
 * HYDROcodone bitartrate 60 mg ER tablet Commonly known as:  HYSINGLA Take 1 Tab by mouth daily for 30 days. Max Daily Amount: 60 mg. for chronic, severe, refractory pain *DO NOT CRUSH,CHEW, OR DISSOLVE TABLET*  Indications: Chronic Pain, Severe Pain Start taking on:  7/22/2017  
  
 * HYDROcodone bitartrate 60 mg ER tablet Commonly known as:  HYSINGLA Take 1 Tab by mouth daily for 30 days. Max Daily Amount: 60 mg. for chronic, severe, refractory pain *DO NOT CRUSH,CHEW, OR DISSOLVE TABLET*  Indications: Chronic Pain, Severe Pain Start taking on:  8/20/2017  
  
 * HYDROcodone-acetaminophen  mg tablet Commonly known as:  Melissa Vikas Take 1 Tab by mouth four (4) times daily for 30 days.  Max Daily Amount: 4 Tabs. As needed for breakthrough pain  Indications: Pain Start taking on:  6/30/2017  
  
 * HYDROcodone-acetaminophen  mg tablet Commonly known as:  Ceil Heap Take 1 Tab by mouth four (4) times daily for 30 days. Max Daily Amount: 4 Tabs. As needed for breakthrough pain  Indications: Pain Start taking on:  7/28/2017  
  
 * HYDROcodone-acetaminophen  mg tablet Commonly known as:  Ceil Heap Take 1 Tab by mouth four (4) times daily for 30 days. Max Daily Amount: 4 Tabs. As needed for breakthrough pain  Indications: Pain Start taking on:  8/26/2017  
  
 lisinopril-hydroCHLOROthiazide 20-12.5 mg per tablet Commonly known as:  PRINZIDE, ZESTORETIC  
take 2 tablets by mouth once daily  
  
 meloxicam 15 mg tablet Commonly known as:  MOBIC  
take 1 tablet by mouth once daily  
  
 metFORMIN 1,000 mg tablet Commonly known as:  GLUCOPHAGE  
TAKE 1 TABLET BY MOUTH TWO TIMES A DAY WITH MEALS  
  
 MULTIVITAMIN PO Take  by mouth. omega-3 acid ethyl esters 1 gram capsule Commonly known as:  Darylene Deist Take 2 Caps by mouth two (2) times a day. omeprazole 20 mg capsule Commonly known as:  PRILOSEC Take 20 mg by mouth daily. tamsulosin 0.4 mg capsule Commonly known as:  FLOMAX Take 1 Cap by mouth daily. varenicline 0.5 mg (11)- 1 mg (42) Dspk Commonly known as:  CHANTIX STARTER LAURA Take 0.5 mg by mouth two (2) times daily (after meals). VITAMIN C 1,000 mg tablet Generic drug:  ascorbic acid (vitamin C) Take 1,000 mg by mouth two (2) times a day. VITAMIN D2 50,000 unit capsule Generic drug:  ergocalciferol  
take 1 capsule by mouth TWO TIMES A WEEK  
  
 zolpidem 10 mg tablet Commonly known as:  AMBIEN Take 0.5-1 Tabs by mouth nightly as needed for Sleep for up to 30 days. Max Daily Amount: 10 mg. Indications: SLEEP-ONSET INSOMNIA * Notice:   This list has 6 medication(s) that are the same as other medications prescribed for you. Read the directions carefully, and ask your doctor or other care provider to review them with you. To-Do List   
 06/30/2017 8:00 AM  
  Appointment with Aby Carbajal PTA at [facility] ([phone])  
  
 07/03/2017 8:30 AM  
  Appointment with Aby Carbajal PTA at [facility] ([phone])  
  
 07/07/2017 9:00 AM  
  Appointment with Aby Carbajal PTA at [facility] ([phone]) Patient Instructions Patient's blood pressure was elevated at today's office visit. Patient instructed to contact  primary care physician for treatment. Hip Bursitis: Care Instructions Your Care Instructions Bursitis is inflammation of the bursa. A bursa is a small sac of fluid that cushions a joint and helps it move easily. A bursa sits between a bone in the hip and the muscles and tendons in the thigh and buttock. Injury or overuse of the hip can cause bursitis. Activities that can lead to bursitis include twisting and rapid joint movement. Bursitis can cause hip pain. Bursitis usually gets better if you avoid the activity that caused it. If pain lasts or gets worse despite home treatment, your doctor may draw fluid from the bursa through a needle. This may relieve your pain and help your doctor know if you have an infection. If so, your doctor will prescribe antibiotics. If you have inflammation only, you may get a corticosteroid shot to reduce swelling and pain. Sometimes surgery is needed to drain or remove the bursa. Follow-up care is a key part of your treatment and safety. Be sure to make and go to all appointments, and call your doctor if you are having problems. It's also a good idea to know your test results and keep a list of the medicines you take. How can you care for yourself at home? · Put ice or a cold pack on your hip for 10 to 20 minutes at a time. Put a thin cloth between the ice and your skin. · After 3 days of using ice, you may use heat on your hip. You can use a hot water bottle, a heating pad set on low, or a warm, moist towel. · Rest your hip. Stop any activities that cause pain. Switch to activities that do not stress your hip. · Take your medicines exactly as prescribed. Call your doctor if you think you are having a problem with your medicine. · Ask your doctor if you can take an over-the-counter pain medicine, such as acetaminophen (Tylenol), ibuprofen (Advil, Motrin), or naproxen (Aleve). Be safe with medicines. Read and follow all instructions on the label. · To prevent stiffness, gently move the hip joint as much as you can without pain every day. As the pain gets better, keep doing range-of-motion exercises. Ask your doctor for exercises that will make the muscles around the hip joint stronger. Do these as directed. · You can slowly return to the activity that caused the pain, but do it with less effort until you can do it without pain or swelling. Be sure to warm up before and stretch after you do the activity. When should you call for help? Call your doctor now or seek immediate medical care if: 
· You have a fever. · You have increased swelling or redness in your hip. · You cannot use your hip, or the pain in your hip gets worse. Watch closely for changes in your health, and be sure to contact your doctor if: 
· You have pain for 2 weeks or longer despite home treatment. Where can you learn more? Go to http://maddie-madison.info/. Enter Q020 in the search box to learn more about \"Hip Bursitis: Care Instructions. \" Current as of: March 21, 2017 Content Version: 11.3 © 4098-1071 [employer]. Care instructions adapted under license by [employer] (which disclaims liability or warranty for this information).  If you have questions about a medical condition or this instruction, always ask your healthcare professional. [employer] disclaims any warranty or liability for your use of this information. Introducing [facility]! Dear Kristofer Linton: 
Thank you for requesting a vushaper account. Our records indicate that you already have an active vushaper account. You can access your account anytime at [email or web address] Did you know that you can access your hospital and ER discharge instructions at any time in vushaper? You can also review all of your test results from your hospital stay or ER visit. Additional Information If you have questions, please visit the Frequently Asked Questions section of the vushaper website at [email or web address] Remember, vushaper is NOT to be used for urgent needs. For medical emergencies, dial 911. Now available from your iPhone and Android! Please provide this summary of care documentation to your next provider. Your primary care clinician is listed as Bearl Dense. If you have any questions after today's visit, please call [phone].

## 2017-06-29 NOTE — PROGRESS NOTES
Patient: Gio Wolf                MRN: [id number]       SSN: [id number]  YOB: 1960        AGE: 65 y.o. SEX: male  Body mass index is 33.96 kg/(m^2). PCP: Reynold Blunt MD  06/29/17    HISTORY: Ran Arboleda is here in followup. He has gone to some therapy for hip bursitis and back problems. He only occasionally gets pain in the groin area. He, about once a month or so, gets a radiculopathy going all the way down the right leg towards the foot, which can be quite unpleasant for him. He denies fevers or chills. He has had a couple of episodes where it is really quite sore to roll over on the hip at night. He denies fevers or chills. He thinks that the therapy has helped to some degree. He really likes Mobic, which has been helpful for him, and he has asked, and I would request, that he be followed up for kidney and liver function with the usual precautions with the Mobic. PHYSICAL EXAMINATION:  I did examine him today. He actually walks quite normally. He holds his back somewhat stiffly. Both hips rotate well, and I could not get the hips to click or lock. He has only minimal tenderness over the greater trochanter. The calf is nontender. Ishaan's sign is negative. The knees themselves are relatively noncontributory today. There is a minimal effusion, good motion, and not too much patellofemoral crepitus. Sensation is grossly intact to L4-5 distally. There is no foot drop. Tib/ant and EHL are 5/5. IMPRESSION:  My overall impression is continued low back pain and hip bursitis somewhat improved. PLAN:  I would recommend nonoperative measures for the time being. Certainly, if he gets more pain attributable to the hip or groin pain or difficulties with shoes and socks, I can certainly get an MRI, but I think it is more back than hip, and his bursitis seems to be improved.   If he needs an injection, I would be very happy to work him into the schedule at any time, and we can hopefully manage him nonoperatively currently. It has been a pleasure to share in his care. I will see him back whenever he would like to be seen. REVIEW OF SYSTEMS:      CON: negative for weight loss, fever  EYE: negative for double vision  ENT: negative for hoarseness  RS:   negative for Tb  GI:    negative for blood in stool  :  negative for blood in urine  Other systems reviewed and noted below. Past Medical History:   Diagnosis Date    Arthritis     Back problem     Colon polyp 08/2008    Dr Alejandrina Farrell Depression 12/13    PHQ-9 was 6/27     Diabetes ([facility] [address] 3/15    on basis of elev hba1c; [facility]    Dyslipidemia     Enthesopathy of hip region     Fatty liver     on US w negative serologies 2009    Foot fracture, right     9/13 5th metatarsal    GERD (gastroesophageal reflux disease)     Hypertension     Hypogonadism male 3/13    Lumbago     Morbid obesity ([facility] [address]     peak weight 275 lbs, bmi 36.3 from 2/14    Phymatous rosacea     Postlaminectomy syndrome, lumbar region     Prediabetes on metformin     2 hr  2/10    Shoulder pain     Thoracic or lumbosacral neuritis or radiculitis, unspecified     Tobacco dependence syndrome        Family History   Problem Relation Age of Onset    Diabetes Mother     Cancer Mother      stomach       Current Outpatient Prescriptions   Medication Sig Dispense Refill    [START ON 8/20/2017] HYDROcodone bitartrate (HYSINGLA) 60 mg ER tablet Take 1 Tab by mouth daily for 30 days. Max Daily Amount: 60 mg. for chronic, severe, refractory pain *DO NOT CRUSH,CHEW, OR DISSOLVE TABLET*  Indications: Chronic Pain, Severe Pain 30 Tab 0    [START ON 8/26/2017] HYDROcodone-acetaminophen (NORCO)  mg tablet Take 1 Tab by mouth four (4) times daily for 30 days. Max Daily Amount: 4 Tabs.  As needed for breakthrough pain  Indications: Pain 120 Tab 0    [START ON 7/22/2017] HYDROcodone bitartrate (HYSINGLA) 60 mg ER tablet Take 1 Tab by mouth daily for 30 days. Max Daily Amount: 60 mg. for chronic, severe, refractory pain *DO NOT CRUSH,CHEW, OR DISSOLVE TABLET*  Indications: Chronic Pain, Severe Pain 30 Tab 0    HYDROcodone bitartrate (HYSINGLA) 60 mg ER tablet Take 1 Tab by mouth daily for 30 days. Max Daily Amount: 60 mg. for chronic, severe, refractory pain *DO NOT CRUSH,CHEW, OR DISSOLVE TABLET*  Indications: Chronic Pain, Severe Pain 30 Tab 0    [START ON 7/28/2017] HYDROcodone-acetaminophen (NORCO)  mg tablet Take 1 Tab by mouth four (4) times daily for 30 days. Max Daily Amount: 4 Tabs. As needed for breakthrough pain  Indications: Pain 120 Tab 0    [START ON 6/30/2017] HYDROcodone-acetaminophen (NORCO)  mg tablet Take 1 Tab by mouth four (4) times daily for 30 days. Max Daily Amount: 4 Tabs. As needed for breakthrough pain  Indications: Pain 120 Tab 0    zolpidem (AMBIEN) 10 mg tablet Take 0.5-1 Tabs by mouth nightly as needed for Sleep for up to 30 days. Max Daily Amount: 10 mg. Indications: SLEEP-ONSET INSOMNIA 30 Tab 5    meloxicam (MOBIC) 15 mg tablet take 1 tablet by mouth once daily 30 Tab 2    VITAMIN D2 50,000 unit capsule take 1 capsule by mouth TWO TIMES A WEEK 13 Cap 4    atorvastatin (LIPITOR) 40 mg tablet take 1 tablet by mouth daily 30 Tab 12    fenofibrate nanocrystallized (TRICOR) 145 mg tablet take 1 tablet by mouth daily 90 Tab 3    lisinopril-hydrochlorothiazide (PRINZIDE, ZESTORETIC) 20-12.5 mg per tablet take 2 tablets by mouth once daily 60 Tab 12    varenicline (CHANTIX STARTER LAURA) 0.5 mg (11)- 1 mg (42) DsPk Take 0.5 mg by mouth two (2) times daily (after meals). 1 Dose Pack 0    metFORMIN (GLUCOPHAGE) 1,000 mg tablet TAKE 1 TABLET BY MOUTH TWO TIMES A DAY WITH MEALS 180 Tab 3    omega-3 acid ethyl esters (LOVAZA) 1 gram capsule Take 2 Caps by mouth two (2) times a day. 120 Cap 5    cyanocobalamin 1,000 mcg tablet Take 2,000 mcg by mouth daily.       tamsulosin (FLOMAX) 0.4 mg capsule Take 1 Cap by mouth daily. 30 Cap 3    omeprazole (PRILOSEC) 20 mg capsule Take 20 mg by mouth daily.  ascorbic acid (VITAMIN C) 1,000 mg tablet Take 1,000 mg by mouth two (2) times a day.  MULTIVITAMIN PO Take  by mouth. No Known Allergies    Past Surgical History:   Procedure Laterality Date    HX BACK SURGERY      HX COLONOSCOPY      Dr Lorna Ramirez 8/22/08    HX ORTHOPAEDIC  12/06    left shoulder repair/rotator cuff    HX ORTHOPAEDIC      DEXA t score -1.0 spine, -0.4 hip (1/14) Dr. Valdemar Proctor HX OTHER SURGICAL  7/02    L5-S1 hemilaminectomy and diskectomy    NEUROLOGICAL PROCEDURE UNLISTED  4/13    MRI pituitary normal    CA ANESTH,SURGERY OF SHOULDER         Social History     Social History    Marital status:      Spouse name: N/A    Number of children: N/A    Years of education: N/A     Occupational History    Not on file. Social History Main Topics    Smoking status: Current Every Day Smoker     Packs/day: 2.00     Types: Cigarettes    Smokeless tobacco: Never Used    Alcohol use No      Comment: One drink per 3-4 months    Drug use: No      Comment: Marijuana use in the [de-identified]    Sexual activity: Not on file     Other Topics Concern    Not on file     Social History Narrative       Visit Vitals    /80    Pulse 98    Temp 98.2 °F (36.8 °C) (Oral)    Ht 6' 1\" (1.854 m)    Wt 257 lb 6.4 oz (116.8 kg)    BMI 33.96 kg/m2         PHYSICAL EXAMINATION:  GENERAL: Alert and oriented x3, in no acute distress, well-developed, well-nourished, afebrile. HEART: No JVD. EYES: No scleral icterus   NECK: No significant lymphadenopathy   LUNGS: No respiratory compromise or indrawing  ABDOMEN: Soft, non-tender, non-distended. Electronically signed by:  Aryan Murillo MD

## 2017-06-30 ENCOUNTER — HOSPITAL ENCOUNTER (OUTPATIENT)
Dept: PHYSICAL THERAPY | Age: 57
Discharge: HOME | End: 2017-06-30
Payer: COMMERCIAL

## 2017-06-30 PROCEDURE — 97530 THERAPEUTIC ACTIVITIES: CPT

## 2017-06-30 PROCEDURE — 97110 THERAPEUTIC EXERCISES: CPT

## 2017-06-30 NOTE — PROGRESS NOTES
PT DAILY TREATMENT NOTE - [facility]     Patient Name: David West  Date:2017  : 1960  [x]  Patient  Verified  Payor: Milton Morales / Plan: [address] HMO / Product Type: HMO /    In time:8:00  Out time: 8:51  Total Treatment Time (min): 41  Visit #: 10 of 12    Treatment Area: Other bursitis of hip, right hip [M70.71]    SUBJECTIVE  Pain Level (0-10 scale): 5-6  Any medication changes, allergies to medications, adverse drug reactions, diagnosis change, or new procedure performed?: [x] No    [] Yes (see summary sheet for update)  Subjective functional status/changes:   [] No changes reported  I had  Rough  Night.     OBJECTIVE    Modality rationale: decrease edema, decrease inflammation, decrease pain and increase tissue extensibility to improve the patients ability to perform ADL   Min Type Additional Details    [] Estim:  []Unatt       []IFC  []Premod                        []Other:  []w/ice   []w/heat  Position:  Location:    [] Estim: []Att    []TENS instruct  []NMES                    []Other:  []w/US   []w/ice   []w/heat  Position:  Location:    []  Traction: [] Cervical       []Lumbar                       [] Prone          []Supine                       []Intermittent   []Continuous Lbs:  [] before manual  [] after manual    []  Ultrasound: []Continuous   [] Pulsed                           []1MHz   []3MHz W/cm2:  Location:    []  Iontophoresis with dexamethasone         Location: [] Take home patch   [] In clinic    []  Ice     []  heat  []  Ice massage  []  Laser   []  Anodyne Position:  Location:    []  Laser with stim  []  Other:  Position:  Location:    []  Vasopneumatic Device Pressure:       [] lo [] med [] hi   Temperature: [] lo [] med [] hi   [x] Skin assessment post-treatment:  [x]intact []redness- no adverse reaction    []redness - adverse reaction:      min []Eval                  []Re-Eval       30 min Therapeutic Exercise:  [x] See flow sheet :   Rationale: increase ROM and increase strength to improve the patients ability to perform  ADL    11 min Therapeutic Activity:  []  See flow sheet :   Rationale: increase ROM and increase strength  to improve the patients ability to perform ADL      min Neuromuscular Re-education:  []  See flow sheet :   Rationale:   to improve the patients ability to      min Manual Therapy:     Rationale: decrease pain, increase ROM, increase tissue extensibility and decrease edema  to perform ADL     min Gait Training:  ___ feet with ___ device on level surfaces with ___ level of assist   Rationale: With   [x] TE   [] TA   [] neuro   [] other: Patient Education: [x] Review HEP    [] Progressed/Changed HEP based on:   [] positioning   [] body mechanics   [] transfers   [] heat/ice application    [] other:      Other Objective/Functional Measures: FOTO:  56    Pain Level (0-10 scale) post treatment: 0    ASSESSMENT/Changes in Function: FOTO has  Decreased  Slightly  Due pt  Was non  Compliant  With  HEP. Fair  Response  To each there ex. Patient will continue to benefit from skilled PT services to address functional mobility deficits, address ROM deficits, address strength deficits, analyze and address soft tissue restrictions and instruct in home and community integration to attain remaining goals. [x]  See Plan of Care  []  See progress note/recertification  []  See Discharge Summary         Progress towards goals / Updated goals:  Goals: to be achieved in 3-9 treatments:  1. Pt will have FOTO improved to 68 to show increased tolerance to ADLs and work demands  PNStatus: 62  Current Status:   2. Pt will have decreased pain to 0-1/10 with verbalized understanding to manage the pain at that level for improved tolerance of ADLs and work demands  PNStatus: 2-3  Current Status:    3.  Pt will report an overall 75% improvement or better with involvement to return to usual daily tasks at a tolerable level   PNStatus: NA  Current Status:     PLAN  [] Upgrade activities as tolerated     [x]  Continue plan of care  []  Update interventions per flow sheet       []  Discharge due to:_  []  Other:_      Antonette Mayer PTA 6/30/2017  8:10 AM    Future Appointments  Date Time Provider [facility]   7/3/2017 8:30 AM Antonette Cooper PTA [facility] [address]   7/7/2017 9:00 AM Yancy Simpson PTA [facility]   8/7/2017 10:35 AM [facility] NURSE VISIT [facility] SCHED   8/14/2017 11:15 AM Mariam Peraza MD [facility] SCHED   9/5/2017 8:20 AM Ria Bustos PA-C [facility] SCHED

## 2017-07-05 RX ORDER — METFORMIN HYDROCHLORIDE 1000 MG/1
TABLET ORAL
Qty: 180 TAB | Refills: 3 | Status: SHIPPED | OUTPATIENT
Start: 2017-07-05 | End: 2018-08-27 | Stop reason: SDUPTHER

## 2017-07-21 ENCOUNTER — TELEPHONE (OUTPATIENT)
Dept: PAIN MANAGEMENT | Age: 57
End: 2017-07-21

## 2017-07-21 NOTE — TELEPHONE ENCOUNTER
Received call from pharmacy requesting to fill patient's hysingla one day early in order be able to process coupon for medication through  (which may not be open tomorrow) ; please advise.

## 2017-08-03 ENCOUNTER — TELEPHONE (OUTPATIENT)
Dept: INTERNAL MEDICINE CLINIC | Age: 57
End: 2017-08-03

## 2017-08-03 DIAGNOSIS — E78.5 DYSLIPIDEMIA: ICD-10-CM

## 2017-08-03 DIAGNOSIS — E11.65 UNCONTROLLED TYPE 2 DIABETES MELLITUS WITH COMPLICATION, UNSPECIFIED LONG TERM INSULIN USE STATUS: ICD-10-CM

## 2017-08-03 DIAGNOSIS — I10 ESSENTIAL HYPERTENSION: ICD-10-CM

## 2017-08-03 DIAGNOSIS — Z11.59 NEED FOR HEPATITIS C SCREENING TEST: ICD-10-CM

## 2017-08-03 DIAGNOSIS — Z00.00 ROUTINE GENERAL MEDICAL EXAMINATION AT A HEALTH CARE FACILITY: Primary | ICD-10-CM

## 2017-08-03 DIAGNOSIS — E11.8 UNCONTROLLED TYPE 2 DIABETES MELLITUS WITH COMPLICATION, UNSPECIFIED LONG TERM INSULIN USE STATUS: ICD-10-CM

## 2017-08-03 DIAGNOSIS — E55.9 HYPOVITAMINOSIS D: ICD-10-CM

## 2017-08-03 NOTE — PROGRESS NOTES
[facility] [address], [address] [place] [address],[facility] 300  [phone] fax    Discharge Summary    Patient name: Michelle Hannon     Start of Care:3/9/17  Referral source: Ever Hernandez MD    : 1960  Medical/Treatment Diagnosis: Other bursitis of hip, right hip [M70.71]  Onset Date: 1-1 1/2 years  Prior Hospitalization: see medical history   Provider#: [id number]  Comorbidities:L shoulder surgery X2 , Lower back surgery, HTN, DM, hearing impaired  Prior Level of Function::I , works full time, construction industry, 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      Medications: Verified on Patient Summary List    Visits from Start of Care: 10    Missed Visits: 4  Reporting Period : 3/9/17 to 17      Summary of Care: Patient seen for 10 skilled sessions. Please see latest MD note for specifics. Residual pain was 5-6. He had exercises for his HEP. He should follow up with the MD as needed.  Thank you  Goal:patient will have established and be independent with HEP to aid with progression of skilled PT program             Status at last note/certification:eval  Status at discharge: met    Goal:patient will have pain <3/10 to aid with increase tolerance to ADLS and activities  Status at last note/certification:eval  Status at discharge: not met, 5-6    Goal:patient will have FOTO improved to 64 to show increase tolerance to ADLs and work demands  Status at last note/certification:eval  Status at discharge: not met, 64    Goal:patient will have MMT B seated hip F 5 to aid with increase cores strength for ADLSStatus at last note/certification:eval  Status at discharge: not met (B) Hip Flx 4+-5-/5 17      ASSESSMENT/RECOMMENDATIONS:  []Discontinue therapy progressing towards or have reached established goals  []Discontinue therapy due to lack of appreciable progress towards goals  [x]Discontinue therapy due to lack of attendance or compliance  []Other:     Thank you for this referral.     Andrew Carrion, PT 8/3/2017 2:14 PM

## 2017-08-07 ENCOUNTER — HOSPITAL ENCOUNTER (OUTPATIENT)
Dept: LAB | Age: 57
Discharge: HOME | End: 2017-08-07

## 2017-08-07 LAB
25(OH)D3 SERPL-MCNC: 27 NG/ML (ref 32–100)
A-G RATIO,AGRAT: 2.3 RATIO (ref 1.1–2.6)
ABSOLUTE LYMPHOCYTE COUNT, 10803: 2.7 K/UL (ref 1–4.8)
ALBUMIN SERPL-MCNC: 4.6 G/DL (ref 3.5–5)
ALP SERPL-CCNC: 68 U/L (ref 25–115)
ALT SERPL-CCNC: 33 U/L (ref 5–40)
ANION GAP SERPL CALC-SCNC: 19 MMOL/L
AST SERPL W P-5'-P-CCNC: 22 U/L (ref 10–37)
AVG GLU, 10930: 141 MG/DL (ref 91–123)
BASOPHILS # BLD: 0 K/UL (ref 0–0.2)
BASOPHILS NFR BLD: 0 % (ref 0–2)
BILIRUB SERPL-MCNC: 0.2 MG/DL (ref 0.2–1.2)
BUN SERPL-MCNC: 20 MG/DL (ref 6–22)
CALCIUM SERPL-MCNC: 10 MG/DL (ref 8.4–10.4)
CHLORIDE SERPL-SCNC: 96 MMOL/L (ref 98–110)
CHOLEST SERPL-MCNC: 171 MG/DL (ref 110–200)
CO2 SERPL-SCNC: 27 MMOL/L (ref 20–32)
CREAT SERPL-MCNC: 0.8 MG/DL (ref 0.5–1.2)
CREATININE, URINE: 241 MG/DL
EOSINOPHIL # BLD: 0.3 K/UL (ref 0–0.5)
EOSINOPHIL NFR BLD: 3 % (ref 0–6)
ERYTHROCYTE [DISTWIDTH] IN BLOOD BY AUTOMATED COUNT: 13.5 % (ref 10–16)
GFRAA, 66117: >60
GFRNA, 66118: >60
GLOBULIN,GLOB: 2 G/DL (ref 2–4)
GLUCOSE SERPL-MCNC: 129 MG/DL (ref 65–99)
GRANULOCYTES,GRANS: 61 % (ref 40–75)
HBA1C MFR BLD HPLC: 6.5 % (ref 4.8–5.9)
HCT VFR BLD AUTO: 49.3 % (ref 39.3–51.6)
HDLC SERPL-MCNC: 27 MG/DL (ref 40–59)
HGB BLD-MCNC: 15.8 G/DL (ref 13.1–17.2)
LDLC SERPL CALC-MCNC: ABNORMAL MG/DL (ref 50–99)
LYMPHOCYTES, LYMLT: 28 % (ref 27–45)
MCH RBC QN AUTO: 29 PG (ref 26–34)
MCHC RBC AUTO-ENTMCNC: 32 G/DL (ref 32–36)
MCV RBC AUTO: 91 FL (ref 80–95)
MICROALB/CREAT RATIO, 140286: 15 MCG/MG OF CREATININE (ref 0–30)
MICROALBUMIN,URINE RANDOM 140054: 36.1 UG/ML (ref 0.1–17)
MONOCYTES # BLD: 0.7 K/UL (ref 0.1–0.9)
MONOCYTES NFR BLD: 7 % (ref 3–9)
NEUTROPHILS # BLD AUTO: 5.7 K/UL (ref 1.8–7.7)
PLATELET # BLD AUTO: 204 K/UL (ref 140–440)
PMV BLD AUTO: 10.4 FL (ref 6–10.8)
POTASSIUM SERPL-SCNC: 3.8 MMOL/L (ref 3.5–5.5)
PROT SERPL-MCNC: 6.6 G/DL (ref 6.4–8.3)
RBC # BLD AUTO: 5.41 M/UL (ref 3.8–5.8)
SENTARA SPECIMEN COL,SENBCF: NORMAL
SODIUM SERPL-SCNC: 142 MMOL/L (ref 133–145)
TRIGL SERPL-MCNC: 479 MG/DL (ref 40–149)
VLDLC SERPL CALC-MCNC: 96 MG/DL (ref 8–30)
WBC # BLD AUTO: 9.4 K/UL (ref 4–11)

## 2017-08-07 PROCEDURE — 99001 SPECIMEN HANDLING PT-LAB: CPT | Performed by: INTERNAL MEDICINE

## 2017-08-08 LAB
HCV AB SER IA-ACNC: NORMAL
LDL, DIRECT,DLDL: 84 MG/DL (ref 50–99)

## 2017-08-12 NOTE — PROGRESS NOTES
65 y.o. white male who presents for RPE    He denies any cardiovascular complaints. BP has been controlled, remains active although no set exercise program.    He stopped taking the lovaza due to cost, still on statin and fibrate    Denies polyuria, polydipsia, nocturia, vision change. Not checking sugars regularly. Weight is up some, could be better w the diet. He wants to see ophth as he's been having some vision issues and intermittent eye pain    Vitals 8/14/2017 6/29/2017 6/8/2017 3/16/2017 2/23/2017   Weight 261 lb 3.2 oz 257 lb 6.4 oz 253 lb 253 lb 253 lb     Denies any gi or gu complaints    Dr Madeline Palomino continues to follow him for pain mgmt. He saw Dr Charlynn Lombard for hip and knee pain on the right, much better w PT    He is interested in starting meds for smoking cessation but the chantix is too expensive.   He's interested in wellbutrin, no prior h/o seizures    Past Medical History:   Diagnosis Date    Arthritis     Back problem     Colon polyp 08/2008    Dr Dustin Kern Depression 12/13    PHQ-9 was 6/27     Diabetes ([facility] [address] 3/15    on basis of elev hba1c; [facility] for [facility]    Dyslipidemia     Enthesopathy of hip region     Fatty liver     on US w negative serologies 2009    Foot fracture, right     9/13 5th metatarsal    GERD (gastroesophageal reflux disease)     Hypertension     Hypogonadism male 3/13    Lumbago     Morbid obesity ([facility] [address]     peak weight 275 lbs, bmi 36.3 from 2/14    Phymatous rosacea     Postlaminectomy syndrome, lumbar region     Prediabetes on metformin     2 hr  2/10    Shoulder pain     Thoracic or lumbosacral neuritis or radiculitis, unspecified     Tobacco dependence syndrome      Past Surgical History:   Procedure Laterality Date    HX BACK SURGERY      HX COLONOSCOPY      Dr Aleks Barnett 8/22/08    HX ORTHOPAEDIC  12/06    left shoulder repair/rotator cuff    HX ORTHOPAEDIC      DEXA t score -1.0 spine, -0.4 hip (1/14) Dr. Cari Mistry [facility]  7/02 L5-S1 hemilaminectomy and diskectomy    NEUROLOGICAL PROCEDURE UNLISTED  4/13    MRI pituitary normal    [place] ANESTH,SURGERY OF SHOULDER       Social History     Social History    Marital status:      Spouse name: N/A    Number of children: N/A    Years of education: N/A     Occupational History    Not on file. Social History Main Topics    Smoking status: Current Every Day Smoker     Packs/day: 2.00     Types: Cigarettes    Smokeless tobacco: Never Used    Alcohol use No      Comment: One drink per 3-4 months    Drug use: No      Comment: Marijuana use in the [de-identified]    Sexual activity: Not on file     Other Topics Concern    Not on file     Social History Narrative     Family History   Problem Relation Age of Onset    Diabetes Mother     Cancer Mother      stomach     Immunization History   Administered Date(s) Administered    Influenza Vaccine 01/27/2016, 10/01/2016    Influenza Vaccine PF 03/13/2015    Influenza Vaccine Whole 12/09/2010    Pneumococcal Polysaccharide (PPSV-23) 08/14/2017     Current Outpatient Prescriptions   Medication Sig    metFORMIN (GLUCOPHAGE) 1,000 mg tablet take 1 tablet by mouth twice a day with meals    [START ON 8/20/2017] HYDROcodone bitartrate (HYSINGLA) 60 mg ER tablet Take 1 Tab by mouth daily for 30 days. Max Daily Amount: 60 mg. for chronic, severe, refractory pain *DO NOT CRUSH,CHEW, OR DISSOLVE TABLET*  Indications: Chronic Pain, Severe Pain    [START ON 8/26/2017] HYDROcodone-acetaminophen (NORCO)  mg tablet Take 1 Tab by mouth four (4) times daily for 30 days. Max Daily Amount: 4 Tabs.  As needed for breakthrough pain  Indications: Pain    meloxicam (MOBIC) 15 mg tablet take 1 tablet by mouth once daily    VITAMIN D2 50,000 unit capsule take 1 capsule by mouth TWO TIMES A WEEK    atorvastatin (LIPITOR) 40 mg tablet take 1 tablet by mouth daily    fenofibrate nanocrystallized (TRICOR) 145 mg tablet take 1 tablet by mouth daily    lisinopril-hydrochlorothiazide (PRINZIDE, ZESTORETIC) 20-12.5 mg per tablet take 2 tablets by mouth once daily    tamsulosin (FLOMAX) 0.4 mg capsule Take 1 Cap by mouth daily.  omeprazole (PRILOSEC) 20 mg capsule Take 20 mg by mouth daily.  ascorbic acid (VITAMIN C) 1,000 mg tablet Take 1,000 mg by mouth two (2) times a day.  MULTIVITAMIN PO Take  by mouth. No current facility-administered medications for this visit. No Known Allergies    REVIEW OF SYSTEMS: colo 6/08 Dr Marcella Fabian - no vision change or eye pain  Oral - no mouth pain, tongue or tooth problems  Ears - no hearing loss, ear pain, fullness, no swallowing problems  Cardiac - no CP, PND, orthopnea, edema, palpitations or syncope  Chest - no breast masses  Resp - no wheezing, chronic coughing, dyspnea  GI - no heartburn, nausea, vomiting, change in bowel habits, bleeding, hemorrhoids  Urinary - no dysuria, hematuria, flank pain, urgency, frequency  Constitutional - no wt loss, night sweats, unexplained fevers  Neuro - no focal weakness, numbness, paresthesias, incoordination, ataxia, involuntary movements  Endo - no polyuria, polydipsia, nocturia, hot flashes    Visit Vitals    /60 (BP 1 Location: Right arm, BP Patient Position: Sitting)    Pulse 77    Temp 99.3 °F (37.4 °C) (Oral)    Resp 16    Ht 6' 1\" (1.854 m)    Wt 261 lb 3.2 oz (118.5 kg)    SpO2 96%    BMI 34.46 kg/m2   Affect is appropriate. Mood stable  No apparent distress  HEENT --Anicteric sclerae, tympanic membranes normal,  ear canals normal.  PERRL, EOMI, conjunctiva and lids normal.  Disks were sharp  Sinuses were nontender, turbinates normal, hearing normal.  Oropharynx without  erythema, normal tongue, oral mucosa and tonsils. No thyromegaly, JVD, or bruits. Lungs --Clear to auscultation, normal percussion. Heart --Regular rate and rhythm, no murmurs, rubs, gallops, or clicks. Chest wall --Nontender to palpation.   PMI normal.  Abdomen -- Soft and nontender, no hepatosplenomegaly or masses. Prostate  -- no asymmetry, nodularity, tenderness or enlargement  Rectal  -- normal tone, guiaiac negative brown stool  Extremities -- Without cyanosis, clubbing, edema. 2+ pulses equally and bilaterally.   Normal looking digits, ROM intact    LABS  From 9/09 showed   gluc 88,   cr 0.70,              alt 106,                   chol 183, tg 431,  hdl 32, ldl-c NA,  wbc 8.0, hb 15.8, plt 149,        From 2/10 showed   gluc 120, cr 0.60, gfr>60, alt 113,                   chol 306, tg 1135, hdl 31, ldl-c NA,                                ast 45, ap 95, tb 0.4  From 3/10 showed                                    2 hr gtt 165  From 6/10 showed   gluc 165,                               hba1c 5.6,                tg 406,  hdl 31, ldl-c NA   From 11/12 showed gluc 102, cr 0.60,      alt 43,   hba1c 5.9, chol 183, tg 489,  hdl 28, ldl-c NA, wbc 9.0, hb 15.8, plt 185,  tsh 3.0,    psa 0.09,   From 11/12 showed                                 vit d 15.2, test 145, james 5.5, acth 1.7, fsh 5.8, lh 1.5, prl 5.1, b12 878, RA neg, crp 0.83, %sat 31, ferritin 308, hep a/b/c neg,  gracie neg, lyme neg  From 8/13 showed           hba1c 5.8, chol 176, tg 521,  hdl 23, ldl-c NA  From 11/13 showed gluc 135, cr 0.80, gfr 52,   alt 30,   hba1c 5.8, chol 154, tg 323,  hdl 31, ldl-c 58,                      vit d 20.5, test 185  From 3/14 showed           hba1c 6.0, chol 177, tg 607,  hdl 26, ldl-c NA  From 6/14 showed   gluc 114, cr 0.80, gfr>60,  alt 32,   hba1c 5.8, chol 147, tg 360,  hdl 29, ldl-c 46  From 7/14 showed   gluc 148, cr 0.77, gfr>60,             wbc 9.3, hb 14.9, plt 214  From 2/15 showed   gluc 140, cr 0.72, gfr 107, alt 39,   hba1c 6.5, chol 180, tg 377,  hdl 31, ldl-c 74, wbc 7.5, hb 15.2, plt 205  From 7/15 showed           hba1c 6.2, chol 137, tg 293,  hdl 28, ldl-c 50,               umar neg  From 7/16 showed   gluc 125, cr 0.60, gfr>60, alt 29,    hba1c 6.2, chol 145, tg 247,  hdl 32, ldl-c 64, wbc 7.6, hb 15.2, plt 198, umar 11.8, psa 0.35  From 8/17 showed   gluc 129, cr 0.80, gfr>60, alt 22,    hba1c 6.5, chol 171, tg 479,  hdl 27, ldl-c na, wbc 9.4, hb 15.8, plt 204, umar 15.0, vit d 27.0    Patient Active Problem List   Diagnosis Code    Encounter for long-term (current) drug use Z79.899    Dyslipidemia E78.5    Hypogonadism male Dr. Juan Blue E29.1    Hypovitaminosis D E55.9    Depression F32.9    Brachial neuritis or radiculitis M54.12    Enthesopathy of hip region M76.899    Insomnia secondary to chronic pain G89.29, G47.01    Essential hypertension I10    Cervical dystonia G24.3    Chronic right shoulder pain M25.511, G89.29    DDD (degenerative disc disease), cervical M50.30    Diabetes mellitus type 2, uncontrolled (HCC) E11.65    Obesity (BMI 30-39. 9) E66.9    Tobacco dependence syndrome F17.200    Greater trochanteric bursitis of right hip M70.61    Colon polyp 8/08 Dr Bert King K63.5    Arthritis of right knee M17.11    Chronic pain of right knee M25.561, G89.29    Chronic pain syndrome G89.4     Assessment and plan:  1. Chronic pain. F/U Dr Mahendra Ernst  2. HTN. Continue current, inc exercise as tolerated, wt loss would be ideal  3. DM. Continue current, f/u ophth, no podiatry at this time, as above w the wt loss  4. Dyslipidemia. Continue current lipitor and tricor, add otc krill oil  5. Fatty liver. Doing relatively well  6. Hypogonadism. F/U Dr. Juan Blue  7. Hypovit d. Continue supplementation  8. Depression. Doing well  9. Podiatry. F/U Dr. Dennis Frances  10. Smoking cessation discussed at length. Trial of wellbutrin, he is interested in trying nicotine replacement also. Total time 3 min  11. Will send to Dr Derek Neil  12. PVX given        RTC 2/18    Above conditions discussed at length and patient vocalized understanding.   All questions answered to patient satisfaction

## 2017-08-14 ENCOUNTER — OFFICE VISIT (OUTPATIENT)
Dept: INTERNAL MEDICINE CLINIC | Age: 57
End: 2017-08-14

## 2017-08-14 VITALS
BODY MASS INDEX: 34.62 KG/M2 | HEIGHT: 73 IN | WEIGHT: 261.2 LBS | OXYGEN SATURATION: 96 % | RESPIRATION RATE: 16 BRPM | DIASTOLIC BLOOD PRESSURE: 60 MMHG | TEMPERATURE: 99.3 F | HEART RATE: 77 BPM | SYSTOLIC BLOOD PRESSURE: 144 MMHG

## 2017-08-14 DIAGNOSIS — Z00.00 PHYSICAL EXAM: Primary | ICD-10-CM

## 2017-08-14 DIAGNOSIS — F32.A DEPRESSION, UNSPECIFIED DEPRESSION TYPE: ICD-10-CM

## 2017-08-14 DIAGNOSIS — E29.1 HYPOGONADISM MALE: ICD-10-CM

## 2017-08-14 DIAGNOSIS — I10 ESSENTIAL HYPERTENSION: ICD-10-CM

## 2017-08-14 DIAGNOSIS — Z23 ENCOUNTER FOR IMMUNIZATION: ICD-10-CM

## 2017-08-14 DIAGNOSIS — E11.9 CONTROLLED TYPE 2 DIABETES MELLITUS WITHOUT COMPLICATION, WITHOUT LONG-TERM CURRENT USE OF INSULIN (HCC): ICD-10-CM

## 2017-08-14 DIAGNOSIS — F17.200 TOBACCO DEPENDENCE SYNDROME: ICD-10-CM

## 2017-08-14 DIAGNOSIS — H57.13 EYE PAIN, BILATERAL: ICD-10-CM

## 2017-08-14 DIAGNOSIS — K63.5 HYPERPLASTIC COLONIC POLYP, UNSPECIFIED PART OF COLON: ICD-10-CM

## 2017-08-14 DIAGNOSIS — E66.9 OBESITY (BMI 30-39.9): ICD-10-CM

## 2017-08-14 DIAGNOSIS — E78.5 DYSLIPIDEMIA: ICD-10-CM

## 2017-08-14 DIAGNOSIS — G89.4 CHRONIC PAIN SYNDROME: ICD-10-CM

## 2017-08-14 RX ORDER — BUPROPION HYDROCHLORIDE 150 MG/1
150 TABLET, EXTENDED RELEASE ORAL 2 TIMES DAILY
Qty: 60 TAB | Refills: 5 | Status: SHIPPED | OUTPATIENT
Start: 2017-08-14 | End: 2019-05-03

## 2017-08-14 NOTE — MR AVS SNAPSHOT
Visit Information Date & Time Provider Department Dept. Phone Encounter #  
 8/14/2017 11:15 AM Elda Saleh MD Internist of [address] [id number] Your Appointments 9/5/2017  8:20 AM  
Follow Up with Saarhy Baker PA-C [address] for Pain Management ([facility] SCHEDULING) Appt Note: 3 mon f/u per . ....to  
 [facility] [address]  
[phone] [facility] [address] [address]  
  
    
 2/20/2018  8:00 AM  
Office Visit with Elda Saleh MD  
Internist of [address] [place] [address] [facility]) Appt Note: 6 month f/u  
 [address] [facility], [address] Arlana Jacks [address] [place]  
  
   
 [address], [address] Upcoming Health Maintenance Date Due  
 EYE EXAM RETINAL OR DILATED Q1 3/24/1971 Pneumococcal 19-64 Medium Risk (1 of 1 - PPSV23) 3/24/1980 DTaP/Tdap/Td series (1 - Tdap) 3/24/1982 FOOT EXAM Q1 9/18/2016 INFLUENZA AGE 9 TO ADULT 8/1/2017 HEMOGLOBIN A1C Q6M 2/7/2018 MICROALBUMIN Q1 8/7/2018 LIPID PANEL Q1 8/7/2018 COLONOSCOPY 8/22/2018 Allergies as of 8/14/2017  Review Complete On: 8/14/2017 By: Tory Logan No Known Allergies Current Immunizations  Reviewed on 2/4/2016 Name Date Influenza Vaccine 1/27/2016 Influenza Vaccine PF 3/13/2015 Influenza Vaccine Whole 12/9/2010 Not reviewed this visit Vitals BP Pulse Temp Resp Height(growth percentile) Weight(growth percentile) 160/88 (BP 1 Location: Right arm, BP Patient Position: Sitting) 77 99.3 °F (37.4 °C) (Oral) 16 6' 1\" (1.854 m) 261 lb 3.2 oz (118.5 kg) SpO2 BMI Smoking Status 96% 34.46 kg/m2 Current Every Day Smoker Vitals History BMI and BSA Data Body Mass Index Body Surface Area 34.46 kg/m 2 2.47 m 2 Preferred Pharmacy Pharmacy Name Phone [address] [phone] Your Updated Medication List  
  
   
This list is accurate as of: 8/14/17 12:24 PM.  Always use your most recent med list.  
  
  
  
  
 atorvastatin 40 mg tablet Commonly known as:  LIPITOR  
take 1 tablet by mouth daily  
  
 cyanocobalamin 1,000 mcg tablet Take 2,000 mcg by mouth daily. fenofibrate nanocrystallized 145 mg tablet Commonly known as:  TRICOR  
take 1 tablet by mouth daily HYDROcodone bitartrate 60 mg ER tablet Commonly known as:  HYSINGLA Take 1 Tab by mouth daily for 30 days. Max Daily Amount: 60 mg. for chronic, severe, refractory pain *DO NOT CRUSH,CHEW, OR DISSOLVE TABLET*  Indications: Chronic Pain, Severe Pain Start taking on:  8/20/2017 HYDROcodone-acetaminophen  mg tablet Commonly known as:  [address] Take 1 Tab by mouth four (4) times daily for 30 days. Max Daily Amount: 4 Tabs. As needed for breakthrough pain  Indications: Pain Start taking on:  8/26/2017  
  
 lisinopril-hydroCHLOROthiazide 20-12.5 mg per tablet Commonly known as:  PRINZIDE, ZESTORETIC  
take 2 tablets by mouth once daily  
  
 meloxicam 15 mg tablet Commonly known as:  MOBIC  
take 1 tablet by mouth once daily  
  
 metFORMIN 1,000 mg tablet Commonly known as:  GLUCOPHAGE  
take 1 tablet by mouth twice a day with meals MULTIVITAMIN PO Take  by mouth. omeprazole 20 mg capsule Commonly known as:  PRILOSEC Take 20 mg by mouth daily. tamsulosin 0.4 mg capsule Commonly known as:  FLOMAX Take 1 Cap by mouth daily. varenicline 0.5 mg (11)- 1 mg (42) Dspk Commonly known as:  CHANTIX STARTER LAURA Take 0.5 mg by mouth two (2) times daily (after meals). VITAMIN C 1,000 mg tablet Generic drug:  ascorbic acid (vitamin C) Take 1,000 mg by mouth two (2) times a day. VITAMIN D2 50,000 unit capsule Generic drug:  ergocalciferol take 1 capsule by mouth TWO TIMES A WEEK Introducing [facility]! Dear Monica Taylor: 
Thank you for requesting a [employer] account. Our records indicate that you already have an active [employer] account. You can access your account anytime at [email or web address] Did you know that you can access your hospital and ER discharge instructions at any time in [employer]? You can also review all of your test results from your hospital stay or ER visit. Additional Information If you have questions, please visit the Frequently Asked Questions section of the [employer] website at [email or web address] Remember, [employer] is NOT to be used for urgent needs. For medical emergencies, dial 911. Now available from your iPhone and Android! Please provide this summary of care documentation to your next provider. Your primary care clinician is listed as Steven Taylor. If you have any questions after today's visit, please call [phone].

## 2017-08-14 NOTE — PROGRESS NOTES
1. Have you been to the ER, urgent care clinic or hospitalized since your last visit? NO.     2. Have you seen or consulted any other health care providers outside of the [address] [place] [address] since your last visit (Include any pap smears or colon screening)? YES  Dr. Sophia Lynn and Spine Specialist    Do you have an Advanced Directive? YES    Patient states he believes he has one.

## 2017-09-05 ENCOUNTER — OFFICE VISIT (OUTPATIENT)
Dept: PAIN MANAGEMENT | Age: 57
End: 2017-09-05

## 2017-09-05 VITALS
DIASTOLIC BLOOD PRESSURE: 98 MMHG | BODY MASS INDEX: 34.72 KG/M2 | SYSTOLIC BLOOD PRESSURE: 177 MMHG | RESPIRATION RATE: 14 BRPM | WEIGHT: 262 LBS | HEIGHT: 73 IN | HEART RATE: 90 BPM | TEMPERATURE: 97.4 F

## 2017-09-05 DIAGNOSIS — M17.11 ARTHRITIS OF RIGHT KNEE: ICD-10-CM

## 2017-09-05 DIAGNOSIS — M50.30 DDD (DEGENERATIVE DISC DISEASE), CERVICAL: ICD-10-CM

## 2017-09-05 DIAGNOSIS — Z79.899 ENCOUNTER FOR LONG-TERM (CURRENT) DRUG USE: ICD-10-CM

## 2017-09-05 DIAGNOSIS — M96.1 POSTLAMINECTOMY SYNDROME, LUMBAR REGION: ICD-10-CM

## 2017-09-05 DIAGNOSIS — G24.3 CERVICAL DYSTONIA: ICD-10-CM

## 2017-09-05 DIAGNOSIS — M25.561 CHRONIC PAIN OF RIGHT KNEE: ICD-10-CM

## 2017-09-05 DIAGNOSIS — M76.892 ENTHESOPATHY OF LEFT HIP REGION: ICD-10-CM

## 2017-09-05 DIAGNOSIS — M54.16 LUMBAR NEURITIS: ICD-10-CM

## 2017-09-05 DIAGNOSIS — M25.511 CHRONIC RIGHT SHOULDER PAIN: ICD-10-CM

## 2017-09-05 DIAGNOSIS — M70.61 GREATER TROCHANTERIC BURSITIS OF RIGHT HIP: Primary | ICD-10-CM

## 2017-09-05 DIAGNOSIS — M54.12 BRACHIAL NEURITIS OR RADICULITIS: ICD-10-CM

## 2017-09-05 DIAGNOSIS — G89.29 CHRONIC RIGHT SHOULDER PAIN: ICD-10-CM

## 2017-09-05 DIAGNOSIS — G89.29 CHRONIC PAIN OF RIGHT KNEE: ICD-10-CM

## 2017-09-05 RX ORDER — HYDROCODONE BITARTRATE AND ACETAMINOPHEN 10; 325 MG/1; MG/1
1 TABLET ORAL 4 TIMES DAILY
Qty: 120 TAB | Refills: 0 | Status: SHIPPED | OUTPATIENT
Start: 2017-10-23 | End: 2017-09-05 | Stop reason: SDUPTHER

## 2017-09-05 RX ORDER — HYDROCODONE BITARTRATE AND ACETAMINOPHEN 10; 325 MG/1; MG/1
1 TABLET ORAL 4 TIMES DAILY
Qty: 120 TAB | Refills: 0 | Status: SHIPPED | OUTPATIENT
Start: 2017-09-24 | End: 2017-09-05 | Stop reason: SDUPTHER

## 2017-09-05 RX ORDER — HYDROCODONE BITARTRATE 60 MG/1
60 TABLET, EXTENDED RELEASE ORAL DAILY
Qty: 30 TAB | Refills: 0 | Status: SHIPPED | OUTPATIENT
Start: 2017-11-15 | End: 2017-09-05 | Stop reason: SDUPTHER

## 2017-09-05 RX ORDER — HYDROCODONE BITARTRATE 60 MG/1
60 TABLET, EXTENDED RELEASE ORAL DAILY
Qty: 30 TAB | Refills: 0 | Status: SHIPPED | OUTPATIENT
Start: 2017-12-14 | End: 2017-12-18 | Stop reason: SDUPTHER

## 2017-09-05 RX ORDER — HYDROCODONE BITARTRATE AND ACETAMINOPHEN 10; 325 MG/1; MG/1
1 TABLET ORAL 4 TIMES DAILY
Qty: 120 TAB | Refills: 0 | Status: SHIPPED | OUTPATIENT
Start: 2017-12-20 | End: 2017-12-18 | Stop reason: SDUPTHER

## 2017-09-05 RX ORDER — NALOXONE HYDROCHLORIDE 4 MG/.1ML
4 SPRAY NASAL
Qty: 1 PACKAGE | Refills: 0 | Status: SHIPPED | OUTPATIENT
Start: 2017-09-05

## 2017-09-05 RX ORDER — ZOLPIDEM TARTRATE 10 MG/1
5-10 TABLET ORAL
Qty: 30 TAB | Refills: 2 | Status: SHIPPED | OUTPATIENT
Start: 2017-12-02 | End: 2017-12-18 | Stop reason: SDUPTHER

## 2017-09-05 RX ORDER — LISINOPRIL AND HYDROCHLOROTHIAZIDE 12.5; 2 MG/1; MG/1
TABLET ORAL
Qty: 60 TAB | Refills: 12 | Status: SHIPPED | OUTPATIENT
Start: 2017-09-05 | End: 2018-09-13 | Stop reason: SDUPTHER

## 2017-09-05 RX ORDER — HYDROCODONE BITARTRATE 60 MG/1
60 TABLET, EXTENDED RELEASE ORAL DAILY
Qty: 30 TAB | Refills: 0 | Status: SHIPPED | OUTPATIENT
Start: 2017-10-17 | End: 2017-09-05 | Stop reason: SDUPTHER

## 2017-09-05 RX ORDER — HYDROCODONE BITARTRATE AND ACETAMINOPHEN 10; 325 MG/1; MG/1
1 TABLET ORAL 4 TIMES DAILY
Qty: 120 TAB | Refills: 0 | Status: SHIPPED | OUTPATIENT
Start: 2017-11-21 | End: 2017-09-05 | Stop reason: SDUPTHER

## 2017-09-05 RX ORDER — HYDROCODONE BITARTRATE 60 MG/1
60 TABLET, EXTENDED RELEASE ORAL DAILY
Qty: 30 TAB | Refills: 0 | Status: SHIPPED | OUTPATIENT
Start: 2017-09-18 | End: 2017-09-05 | Stop reason: SDUPTHER

## 2017-09-05 NOTE — PROGRESS NOTES
Nursing Notes    Patient presents to the office today in follow-up. Patient rates his pain at 3/10 on the numerical pain scale. Reviewed medications with counts as follows:    Rx Date filled Qty Dispensed Pill Count Last Dose Short   hydrocone 10-3256mg 08/26/17/ 120 82 today no   hysingla ER 60mg 08/20/17 30 13 today no   [id number] 08/11/17 30 3.5 yesterday no                            Comments:      POC UDS was not performed  in office today    Any new labs or imaging since last appointment? YES,lab PCP     Have you been to an emergency room (ER) or urgent care clinic since your last visit? NO            Have you been hospitalized since your last visit? NO     If yes, where, when, and reason for visit? Have you seen or consulted any other health care providers outside of the [address] [place] [address]  since your last visit? YES,PCP     If yes, where, when, and reason for visit?   Today No Remington Tsang

## 2017-09-05 NOTE — PROGRESS NOTES
HISTORY OF PRESENT ILLNESS  William Taylor is a 65 y.o. male. Patient presents for follow up of chronic pain due to low back pain due to postlaminectomy syndrome and left hip pain. He also suffers with chronic neck pain due to cervical dystonia. He reports that right hip and leg pain has worsened over the past several weeks. He notes that his pain waxes and wanes in severity, particularly at the outer aspect of the hip. Dr. Christofer Burns recommended non-operative measures such as PT and injections, and informed Mr. Dwight Herbert that he may call to come in for injections whenever needed. He has completed a course of PT, which was helpful overall. He has \"tough days\" but overall feels that his pain remains well controlled, and he is able to continue working full time in a demanding profession. Pain is now described as aching, shooting, and throbbing. The pain radiates from the buttock down the entire right leg along the lateral aspect of the limb to the outer ankle. This pain worsens with lying supine or on his right side. Prolonged sitting and walking also exacerbates pain. He denies saddle anesthesia or bowel/bladder dysfunction. He denies any overt worsening of chronic back pain. Neck pain has been stable and well controlled. Pt reports average of 3-5/10 pain scale most days. Medications continue to work well for pain control overall. William Taylor is tolerating medications well, with no untoward side effects noted. He is able to stay more active with less discomfort with these current doses. The patient reports an average of 50-60% relief with this regimen. Most recent UDS and  were consistent with prescribed medications. Pill counts are appropriate. He is informed of side effects, risks, and benefits of this regimen, and emphasizes that he derives a significant improvement in functionality and quality of life, and notes that non-opioid medications and therapies in the past have not offered significant benefit.   We also discussed the departure of Dr. Sunday Fuentes and myself from the practice and discussed at length \"next steps\". He chooses to stay with the practice for the time being but may choose to follow Dr. Sunday Fuentes if this practice declines to continue this current regimen. We will prescribe Naloxone 4 mg nasal spray to use in case of accidental overdose. The patient is aware not to use Naloxone for any reasons other than opioid toxicity, as its use may precipitate withdrawals. The patient was instructed on directions and indications for use, and has also been advised to inform family members on how to use naloxone if overdose occurs. The patient expresses understanding. He denies new or worsening insomnia or constipation issues. He denies any falls, injuries, or hospitalizations since the last visit. A total of 45 minutes was spent with the patient of which more than 50% of the time was spent counseling the patient. HPI--see above    ROS  HENT: Positive for neck pain. Musculoskeletal: Positive for myalgias, back pain and joint pain. Physical Exam   Constitutional: He is oriented to person, place, and time. He appears well-developed and well-nourished. No distress. Musculoskeletal:        Left hip: He exhibits tenderness and bony tenderness. Lumbar back: He exhibits decreased range of motion, tenderness, bony tenderness, pain and spasm. Back:           Areas of tenderness noted with red arrows    Right trochanter is tender to palpation     Neurological: He is alert and oriented to person, place, and time. No cranial nerve deficit or sensory deficit. He exhibits abnormal muscle tone. Coordination and gait normal.   Psychiatric: He has a normal mood and affect. His behavior is normal. Judgment and thought content normal.   ASSESSMENT and PLAN    ICD-10-CM ICD-9-CM    1. Greater trochanteric bursitis of right hip M70.61 726.5    2.  Lumbar neuritis M54.16 724.4 HYDROcodone-acetaminophen (NORCO) 10325 mg tablet      DISCONTINUED: HYDROcodone-acetaminophen (NORCO)  mg tablet      DISCONTINUED: HYDROcodone-acetaminophen (NORCO)  mg tablet      DISCONTINUED: HYDROcodone-acetaminophen (NORCO)  mg tablet   3. Postlaminectomy syndrome, lumbar region M96.1 722.83 HYDROcodone-acetaminophen (NORCO)  mg tablet      DISCONTINUED: HYDROcodone-acetaminophen (NORCO)  mg tablet      DISCONTINUED: HYDROcodone-acetaminophen (NORCO)  mg tablet      DISCONTINUED: HYDROcodone-acetaminophen (NORCO)  mg tablet   4. Cervical dystonia G24.3 333.83    5. Brachial neuritis or radiculitis M54.12 723.4    6. Arthritis of right knee M17.11 716.96    7. Chronic pain of right knee M25.561 719.46     G89.29 338.29    8. DDD (degenerative disc disease), cervical M50.30 722.4    9. Enthesopathy of left hip region M76.892 726.5    10. Encounter for long-term (current) drug use Z79.899 V58.69    11. Chronic right shoulder pain M25.511 719.41     G89.29 338.29       Plan:  Continue same medications as prescribed for chronic pain  Per the [address] of Medicine recommendation, we will prescribe Naloxone 4 mg nasal spray to use in case of accidental overdose. Do no use Naloxone for any reasons other than opioid toxicity, as its use may precipitate withdrawals.     Follow up in 4 months or sooner if needed  Regular exercise and attention to emotional health and diet remain the most effective ways to treat chronic pain of all kinds  You may contact me with questions or concerns through [address]

## 2017-09-05 NOTE — MR AVS SNAPSHOT
Visit Information Date & Time Provider Department Dept. Phone Encounter #  
 9/5/2017  8:20 AM Sandro Arevalo [facility] [phone] [id number] Follow-up Instructions Return in about 4 months (around 1/5/2018). Your Appointments 2/20/2018  8:00 AM  
Office Visit with Paul Harrison MD  
Internist of [facility] [address] [facility]) Appt Note: 6 month f/u  
 [address] [facility], [id number] [id number] [address]  
  
   
 [address], [address] Upcoming Health Maintenance Date Due DTaP/Tdap/Td series (1 - Tdap) 3/24/1982 FOOT EXAM Q1 9/18/2016 INFLUENZA AGE 9 TO ADULT 8/1/2017 HEMOGLOBIN A1C Q6M 2/7/2018 MICROALBUMIN Q1 8/7/2018 LIPID PANEL Q1 8/7/2018 COLONOSCOPY 8/22/2018 EYE EXAM RETINAL OR DILATED Q1 8/24/2018 Allergies as of 9/5/2017  Review Complete On: 9/5/2017 By: Heath Seip No Known Allergies Current Immunizations  Reviewed on 8/14/2017 Name Date Influenza Vaccine 10/1/2016, 1/27/2016 Influenza Vaccine PF 3/13/2015 Influenza Vaccine Whole 12/9/2010 Pneumococcal Polysaccharide (PPSV-23) 8/14/2017 Not reviewed this visit You Were Diagnosed With   
  
 Codes Comments Greater trochanteric bursitis of right hip    -  Primary ICD-10-CM: M70.61 ICD-9-CM: 726.5 Lumbar neuritis     ICD-10-CM: M54.16 
ICD-9-CM: 724.4 Postlaminectomy syndrome, lumbar region     ICD-10-CM: M96.1 ICD-9-CM: 722.83 Cervical dystonia     ICD-10-CM: G24.3 ICD-9-CM: 333.83 Brachial neuritis or radiculitis     ICD-10-CM: M54.12 
ICD-9-CM: 723.4 Arthritis of right knee     ICD-10-CM: M17.11 ICD-9-CM: 716.96 Chronic pain of right knee     ICD-10-CM: M25.561, G89.29 ICD-9-CM: 719.46, 338.29   
 DDD (degenerative disc disease), cervical     ICD-10-CM: M50.30 ICD-9-CM: 722.4 Enthesopathy of left hip region     ICD-10-CM: M07.691 ICD-9-CM: 726.5 Encounter for long-term (current) drug use     ICD-10-CM: Z79.899 ICD-9-CM: V58.69 Chronic right shoulder pain     ICD-10-CM: M25.511, G89.29 ICD-9-CM: 719.41, 338.29 Vitals BP Pulse Temp Resp Height(growth percentile) Weight(growth percentile) (!) 177/98 90 97.4 °F (36.3 °C) 14 6' 1\" (1.854 m) 262 lb (118.8 kg) BMI Smoking Status 34.57 kg/m2 Current Every Day Smoker BMI and BSA Data Body Mass Index Body Surface Area 34.57 kg/m 2 2.47 m 2 Preferred Pharmacy Pharmacy Name Phone [address] [place] [address] [phone] Your Updated Medication List  
  
   
This list is accurate as of: 9/5/17  9:01 AM.  Always use your most recent med list.  
  
  
  
  
 atorvastatin 40 mg tablet Commonly known as:  LIPITOR  
take 1 tablet by mouth daily buPROPion  mg SR tablet Commonly known as:  Garret Valdes Take 1 Tab by mouth two (2) times a day. fenofibrate nanocrystallized 145 mg tablet Commonly known as:  TRICOR  
take 1 tablet by mouth daily HYDROcodone bitartrate 60 mg ER tablet Commonly known as:  HYSINGLA Take 1 Tab by mouth daily for 30 days. Max Daily Amount: 60 mg. for chronic, severe, refractory pain *DO NOT CRUSH,CHEW, OR DISSOLVE TABLET*  Indications: Chronic Pain, Severe Pain Start taking on:  12/14/2017 HYDROcodone-acetaminophen  mg tablet Commonly known as:  Leah Mura Take 1 Tab by mouth four (4) times daily for 30 days. Max Daily Amount: 4 Tabs. As needed for breakthrough pain  Indications: Pain Start taking on:  12/20/2017  
  
 lisinopril-hydroCHLOROthiazide 20-12.5 mg per tablet Commonly known as:  PRINZIDE, ZESTORETIC  
take 2 tablets by mouth once daily  
  
 meloxicam 15 mg tablet Commonly known as:  MOBIC  
take 1 tablet by mouth once daily metFORMIN 1,000 mg tablet Commonly known as:  GLUCOPHAGE  
take 1 tablet by mouth twice a day with meals MULTIVITAMIN PO Take  by mouth.  
  
 naloxone 4 mg/actuation Spry 4 mg by Nasal route once as needed (opioid overdose) for up to 1 dose. May substitute 2 mg syringe if insurance requires  
  
 omeprazole 20 mg capsule Commonly known as:  PRILOSEC Take 20 mg by mouth daily. tamsulosin 0.4 mg capsule Commonly known as:  FLOMAX Take 1 Cap by mouth daily. VITAMIN C 1,000 mg tablet Generic drug:  ascorbic acid (vitamin C) Take 1,000 mg by mouth two (2) times a day. VITAMIN D2 50,000 unit capsule Generic drug:  ergocalciferol  
take 1 capsule by mouth TWO TIMES A WEEK  
  
 zolpidem 10 mg tablet Commonly known as:  AMBIEN Take 0.5-1 Tabs by mouth nightly as needed for Sleep for up to 30 days. Max Daily Amount: 10 mg. Indications: SLEEP-ONSET INSOMNIA Start taking on:  2017 Prescriptions Printed Refills HYDROcodone bitartrate (HYSINGLA) 60 mg ER tablet 0 Starting on: 2017 Sig: Take 1 Tab by mouth daily for 30 days. Max Daily Amount: 60 mg. for chronic, severe, refractory pain *DO NOT CRUSH,CHEW, OR DISSOLVE TABLET*  Indications: Chronic Pain, Severe Pain Class: Print Route: Oral  
 HYDROcodone-acetaminophen (NORCO)  mg tablet 0 Starting on: 2017 Sig: Take 1 Tab by mouth four (4) times daily for 30 days. Max Daily Amount: 4 Tabs. As needed for breakthrough pain  Indications: Pain Class: Print Route: Oral  
 naloxone 4 mg/actuation spry 0 Si mg by Nasal route once as needed (opioid overdose) for up to 1 dose. May substitute 2 mg syringe if insurance requires Class: Print Route: Nasal  
 zolpidem (AMBIEN) 10 mg tablet 2 Starting on: 2017 Sig: Take 0.5-1 Tabs by mouth nightly as needed for Sleep for up to 30 days. Max Daily Amount: 10 mg.  Indications: SLEEP-ONSET INSOMNIA  
 Class: Print Route: Oral  
  
Follow-up Instructions Return in about 4 months (around 1/5/2018). Patient Instructions Plan: 
Continue same medications as prescribed for chronic pain Follow up in 4 months or sooner if needed Regular exercise and attention to emotional health and diet remain the most effective ways to treat chronic pain of all kinds You may contact me with questions or concerns through [address] Introducing [facility] SERVICES! Dear Hannah Stone: 
Thank you for requesting a InS[employer] account. Our records indicate that you already have an active InSpa account. You can access your account anytime at [email or web address] Did you know that you can access your hospital and ER discharge instructions at any time in InSpa? You can also review all of your test results from your hospital stay or ER visit. Additional Information If you have questions, please visit the Frequently Asked Questions section of the InSpa website at [email or web address] Remember, InSpa is NOT to be used for urgent needs. For medical emergencies, dial 911. Now available from your iPhone and Android! Please provide this summary of care documentation to your next provider. Your primary care clinician is listed as Maru Bryson. If you have any questions after today's visit, please call [phone].

## 2017-12-18 ENCOUNTER — OFFICE VISIT (OUTPATIENT)
Dept: PAIN MANAGEMENT | Age: 57
End: 2017-12-18

## 2017-12-18 VITALS
BODY MASS INDEX: 34.43 KG/M2 | RESPIRATION RATE: 20 BRPM | WEIGHT: 261 LBS | SYSTOLIC BLOOD PRESSURE: 174 MMHG | DIASTOLIC BLOOD PRESSURE: 93 MMHG | HEART RATE: 74 BPM | TEMPERATURE: 98.2 F

## 2017-12-18 DIAGNOSIS — G89.29 CHRONIC RIGHT SHOULDER PAIN: ICD-10-CM

## 2017-12-18 DIAGNOSIS — M96.1 POSTLAMINECTOMY SYNDROME, LUMBAR REGION: Primary | ICD-10-CM

## 2017-12-18 DIAGNOSIS — M25.511 CHRONIC RIGHT SHOULDER PAIN: ICD-10-CM

## 2017-12-18 DIAGNOSIS — M50.30 DDD (DEGENERATIVE DISC DISEASE), CERVICAL: ICD-10-CM

## 2017-12-18 DIAGNOSIS — M25.561 CHRONIC PAIN OF RIGHT KNEE: ICD-10-CM

## 2017-12-18 DIAGNOSIS — M17.11 ARTHRITIS OF RIGHT KNEE: ICD-10-CM

## 2017-12-18 DIAGNOSIS — M54.16 LUMBAR NEURITIS: ICD-10-CM

## 2017-12-18 DIAGNOSIS — G89.29 CHRONIC PAIN OF RIGHT KNEE: ICD-10-CM

## 2017-12-18 DIAGNOSIS — Z79.899 ENCOUNTER FOR LONG-TERM (CURRENT) USE OF HIGH-RISK MEDICATION: ICD-10-CM

## 2017-12-18 DIAGNOSIS — G89.4 CHRONIC PAIN SYNDROME: ICD-10-CM

## 2017-12-18 LAB
ALCOHOL UR POC: NORMAL
AMPHETAMINES UR POC: NEGATIVE
BARBITURATES UR POC: NEGATIVE
BENZODIAZEPINES UR POC: NEGATIVE
BUPRENORPHINE UR POC: NORMAL
CANNABINOIDS UR POC: NEGATIVE
CARISOPRODOL UR POC: NORMAL
COCAINE UR POC: NEGATIVE
FENTANYL UR POC: NORMAL
MDMA/ECSTASY UR POC: NEGATIVE
METHADONE UR POC: NEGATIVE
METHAMPHETAMINE UR POC: NEGATIVE
METHYLPHENIDATE UR POC: NEGATIVE
OPIATES UR POC: NORMAL
OXYCODONE UR POC: NEGATIVE
PHENCYCLIDINE UR POC: NORMAL
PROPOXYPHENE UR POC: NORMAL
TRAMADOL UR POC: NORMAL
TRICYCLICS UR POC: NEGATIVE

## 2017-12-18 RX ORDER — HYDROCODONE BITARTRATE AND ACETAMINOPHEN 10; 325 MG/1; MG/1
1 TABLET ORAL 4 TIMES DAILY
Qty: 120 TAB | Refills: 0 | Status: SHIPPED | OUTPATIENT
Start: 2017-12-20 | End: 2017-12-18 | Stop reason: SDUPTHER

## 2017-12-18 RX ORDER — HYDROCODONE BITARTRATE 60 MG/1
60 TABLET, EXTENDED RELEASE ORAL DAILY
Qty: 30 TAB | Refills: 0 | Status: SHIPPED | OUTPATIENT
Start: 2018-01-17 | End: 2017-12-18 | Stop reason: SDUPTHER

## 2017-12-18 RX ORDER — ZOLPIDEM TARTRATE 10 MG/1
5-10 TABLET ORAL
Qty: 30 TAB | Refills: 2 | Status: SHIPPED | OUTPATIENT
Start: 2017-12-18 | End: 2018-01-17

## 2017-12-18 RX ORDER — HYDROCODONE BITARTRATE 60 MG/1
60 TABLET, EXTENDED RELEASE ORAL DAILY
Qty: 30 TAB | Refills: 0 | Status: SHIPPED | OUTPATIENT
Start: 2018-02-16 | End: 2018-03-14 | Stop reason: CLARIF

## 2017-12-18 RX ORDER — HYDROCODONE BITARTRATE AND ACETAMINOPHEN 10; 325 MG/1; MG/1
1 TABLET ORAL 4 TIMES DAILY
Qty: 120 TAB | Refills: 0 | Status: SHIPPED | OUTPATIENT
Start: 2018-01-17 | End: 2017-12-18 | Stop reason: SDUPTHER

## 2017-12-18 RX ORDER — HYDROCODONE BITARTRATE 60 MG/1
60 TABLET, EXTENDED RELEASE ORAL DAILY
Qty: 30 TAB | Refills: 0 | Status: SHIPPED | OUTPATIENT
Start: 2017-12-18 | End: 2017-12-18 | Stop reason: SDUPTHER

## 2017-12-18 RX ORDER — HYDROCODONE BITARTRATE AND ACETAMINOPHEN 10; 325 MG/1; MG/1
1 TABLET ORAL 4 TIMES DAILY
Qty: 120 TAB | Refills: 0 | Status: SHIPPED | OUTPATIENT
Start: 2018-02-16 | End: 2018-03-01 | Stop reason: SDUPTHER

## 2017-12-18 NOTE — PATIENT INSTRUCTIONS
Chronic Pain: Care Instructions  Your Care Instructions    Chronic pain is pain that lasts a long time (months or even years) and may or may not have a clear cause. It is different from acute pain, which usually does have a clear cause-like an injury or illness-and gets better over time. Chronic pain:  · Lasts over time but may vary from day to day. · Does not go away despite efforts to end it. · May disrupt your sleep and lead to fatigue. · May cause depression or anxiety. · May make your muscles tense, causing more pain. · Can disrupt your work, hobbies, home life, and relationships with friends and family. Chronic pain is a very real condition. It is not just in your head. Treatment can help and usually includes several methods used together, such as medicines, physical therapy, exercise, and other treatments. Learning how to relax and changing negative thought patterns can also help you cope. Chronic pain is complex. Taking an active role in your treatment will help you better manage your pain. Tell your doctor if you have trouble dealing with your pain. You may have to try several things before you find what works best for you. Follow-up care is a key part of your treatment and safety. Be sure to make and go to all appointments, and call your doctor if you are having problems. It's also a good idea to know your test results and keep a list of the medicines you take. How can you care for yourself at home? · Pace yourself. Break up large jobs into smaller tasks. Save harder tasks for days when you have less pain, or go back and forth between hard tasks and easier ones. Take rest breaks. · Relax, and reduce stress. Relaxation techniques such as deep breathing or meditation can help. · Keep moving. Gentle, daily exercise can help reduce pain over the long run. Try low- or no-impact exercises such as walking, swimming, and stationary biking. Do stretches to stay flexible.   · Try heat, cold packs, and massage. · Get enough sleep. Chronic pain can make you tired and drain your energy. Talk with your doctor if you have trouble sleeping because of pain. · Think positive. Your thoughts can affect your pain level. Do things that you enjoy to distract yourself when you have pain instead of focusing on the pain. See a movie, read a book, listen to music, or spend time with a friend. · If you think you are depressed, talk to your doctor about treatment. · Keep a daily pain diary. Record how your moods, thoughts, sleep patterns, activities, and medicine affect your pain. You may find that your pain is worse during or after certain activities or when you are feeling a certain emotion. Having a record of your pain can help you and your doctor find the best ways to treat your pain. · Take pain medicines exactly as directed. ¨ If the doctor gave you a prescription medicine for pain, take it as prescribed. ¨ If you are not taking a prescription pain medicine, ask your doctor if you can take an over-the-counter medicine. Reducing constipation caused by pain medicine  · Include fruits, vegetables, beans, and whole grains in your diet each day. These foods are high in fiber. · Drink plenty of fluids, enough so that your urine is light yellow or clear like water. If you have kidney, heart, or liver disease and have to limit fluids, talk with your doctor before you increase the amount of fluids you drink. · If your doctor recommends it, get more exercise. Walking is a good choice. Bit by bit, increase the amount you walk every day. Try for at least 30 minutes on most days of the week. · Schedule time each day for a bowel movement. A daily routine may help. Take your time and do not strain when having a bowel movement. When should you call for help? Call your doctor now or seek immediate medical care if:  ? · Your pain gets worse or is out of control.    ? · You feel down or blue, or you do not enjoy things like you once did. You may be depressed, which is common in people with chronic pain. Depression can be treated. ? · You have vomiting or cramps for more than 2 hours. ? Watch closely for changes in your health, and be sure to contact your doctor if:  ? · You cannot sleep because of pain. ? · You are very worried or anxious about your pain. ? · You have trouble taking your pain medicine. ? · You have any concerns about your pain medicine. ? · You have trouble with bowel movements, such as:  ¨ No bowel movement in 3 days. ¨ Blood in the anal area, in your stool, or on the toilet paper. ¨ Diarrhea for more than 24 hours. Where can you learn more? Go to http://maddie-madison.info/. Enter N004 in the search box to learn more about \"Chronic Pain: Care Instructions. \"  Current as of: October 14, 2016  Content Version: 11.4  © 6041-7035 [employer]. Care instructions adapted under license by [employer] (which disclaims liability or warranty for this information). If you have questions about a medical condition or this instruction, always ask your healthcare professional. [place] [address] any warranty or liability for your use of this information.

## 2017-12-18 NOTE — PROGRESS NOTES
Nursing Notes    Patient presents to the office today in follow-up. Patient rates his pain at 7/10 on the numerical pain scale. Reviewed medications with counts as follows:    Rx Date filled Qty Dispensed Pill Count Last Dose Short   norco 10/325mg 11/21/17 120 3 Today  No ( with one juliana day )    hysingla 60mg ER 12/14/17 30 25 This am  No                                   Comments: Patient given educational material regarding opioids and chronic pain. 2. B/p elevated; patient states he just drank an energy drink; provider made aware. POC UDS was performed in office today per verbal order of provider (GS) ; rbv'd. Any new labs or imaging since last appointment? NO    Have you been to an emergency room (ER) or urgent care clinic since your last visit? NO            Have you been hospitalized since your last visit? NO     If yes, where, when, and reason for visit? Have you seen or consulted any other health care providers outside of the [address] [place] [address]  since your last visit? NO     If yes, where, when, and reason for visit? Patient did not bring in medications , ambien 10mg (#30) filled on 12/03/17 to this office; advised to bring in all medications to all office visit. HM deferred to pcp.

## 2017-12-18 NOTE — PROGRESS NOTES
HISTORY OF PRESENT ILLNESS  Angel Garvin is a 65 y.o. male. HPI Comments: Visit survey reviewed  Chief complaint- chronic pain syndrome- low back pain, neck pain  Medication helps general activity, mood, walking, sleep, enjoyment of life  80% complete relief in the past 30 days  He will continue with Ambien 10 mg at night as needed  Extended release hydrocodone 60 mg once a day  Hydrocodone 10 mg 4 times a day as needed  Wellbutrin prescribed by primary care physician for smoking cessation  Mobic prescribed by orthopedic clinic    The patient's care with this clinic has been under the direction of Dr. Mohan Vann. I have reviewed medical information today including progress notes. Review of past treatments, past medications, current medications. Review of diagnoses. I have obtained history from the patient today as well. There have been changes concerning pain management across our country and in the state of [place]. Also, changes within our own clinic. Nursing discussed these changes with the patient. I also spoke with the patient on these matters today. The patient's questions were answered. The patient is aware that Dr. Mohan Vann is no longer with this clinic. The patient is also aware that they are free to choose a different pain clinic if they wish and our clinic will help transition them if necessary. Chronic pain and opioids. Also, when appropriate, the patient will receive a copy of a research study, titled- Benzodiazepines and risk of all cause mortality in adults. No new significant side effects reported  Medication continues to help improve quality of life. Medications reviewed including risk and benefits. The patient did tell me, he just handed in a prescription for hydrocodone to the pharmacy. Therefore he only needs 2 prescriptions for 3 months    Recent average level of pain-7        Review of Systems   Constitutional: Positive for malaise/fatigue.  Negative for chills, fever and weight loss. HENT: Negative for congestion and sore throat. Eyes: Negative for blurred vision and double vision. Reading glasses   Respiratory: Negative for cough and shortness of breath. Cardiovascular: Negative for chest pain and leg swelling. Gastrointestinal: Negative for constipation, diarrhea, heartburn, nausea and vomiting. Genitourinary: Negative. Musculoskeletal: Positive for back pain, joint pain and myalgias. Negative for falls and neck pain. Skin: Negative. Neurological: Negative for dizziness, tingling, seizures, weakness and headaches. Endo/Heme/Allergies: Does not bruise/bleed easily. Psychiatric/Behavioral: Negative for depression. The patient has insomnia. The patient is not nervous/anxious. All other systems reviewed and are negative. Physical Exam   Constitutional: He appears well-developed and well-nourished. He is cooperative. He does not have a sickly appearance. HENT:   Head: Normocephalic and atraumatic. Right Ear: External ear normal. No drainage. Left Ear: External ear normal. No drainage. Nose: Nose normal.   Eyes: Lids are normal. Right eye exhibits no discharge. Left eye exhibits no discharge. Right conjunctiva has no hemorrhage. Left conjunctiva has no hemorrhage. Neck: Neck supple. No tracheal deviation present. No thyroid mass present. Pulmonary/Chest: Effort normal. No respiratory distress. Neurological: He is alert. No cranial nerve deficit. Skin: Skin is intact. No rash noted. Psychiatric: His speech is normal. His affect is not angry. He does not express inappropriate judgment. Nursing note and vitals reviewed. ASSESSMENT and PLAN  Encounter Diagnoses   Name Primary?     Postlaminectomy syndrome, lumbar region Yes    Encounter for long-term (current) use of high-risk medication     Lumbar neuritis     Chronic right shoulder pain     DDD (degenerative disc disease), cervical     Arthritis of right knee     Chronic pain of right knee     Chronic pain syndrome    No indicators for recent medication misuse.  reviewed. Pain Meds and Quality Of Life have been reviewed. Nonpharmacologic therapy and non-opioid pharmacologic therapy were considered. If opioid therapy is prescribed, this is only if the expected benefits are anticipated to outweigh risks. Possible changes to treatment plan considered. Support/education given as needed. Today-medications are as listed. No significant changes to medications. Follow up -- 3 months. Urine test today  He was reminded to follow-up with his primary care physician concerning elevated blood pressure  He prefers a 3 month follow-up  The patient was informed that moving forward, I will no longer be with this clinic. They were reminded that they can continue care with this clinic and I did request a follow-up for the patient with this clinic. They will also receive a list of other pain physicians/clinics in the area in case they are interested.

## 2017-12-18 NOTE — MR AVS SNAPSHOT
Visit Information Date & Time Provider Department Dept. Phone Encounter #  
 12/18/2017  1:15 PM Yasir Parada MD [facility] [phone] Follow-up Instructions Return in about 3 months (around 3/18/2018). Follow-up and Disposition History Your Appointments 2/20/2018  8:00 AM  
Office Visit with Ellen Cantrell MD  
[facility]ists of [address] [place] [address] [address] [facility]) Appt Note: 6 month f/u  
 [address] [facility], [address] [address] [address] [place]  
  
   
 [address], [address] Upcoming Health Maintenance Date Due DTaP/Tdap/Td series (1 - Tdap) 3/24/1982 FOOT EXAM Q1 9/18/2016 Influenza Age 5 to Adult 8/1/2017 HEMOGLOBIN A1C Q6M 2/7/2018 MICROALBUMIN Q1 8/7/2018 LIPID PANEL Q1 8/7/2018 COLONOSCOPY 8/22/2018 EYE EXAM RETINAL OR DILATED Q1 8/24/2018 Allergies as of 12/18/2017  Review Complete On: 12/18/2017 By: Yasir Parada MD  
 No Known Allergies Current Immunizations  Reviewed on 8/14/2017 Name Date Influenza Vaccine 10/1/2016, 1/27/2016 Influenza Vaccine PF 3/13/2015 Influenza Vaccine Whole 12/9/2010 Pneumococcal Polysaccharide (PPSV-23) 8/14/2017 Not reviewed this visit You Were Diagnosed With   
  
 Codes Comments Postlaminectomy syndrome, lumbar region    -  Primary ICD-10-CM: M96.1 ICD-9-CM: 722.83 Encounter for long-term (current) use of high-risk medication     ICD-10-CM: Z79.899 ICD-9-CM: V58.69 Lumbar neuritis     ICD-10-CM: M54.16 
ICD-9-CM: 724.4 Chronic right shoulder pain     ICD-10-CM: M25.511, G89.29 ICD-9-CM: 719.41, 338.29   
 DDD (degenerative disc disease), cervical     ICD-10-CM: M50.30 ICD-9-CM: 722.4 Arthritis of right knee     ICD-10-CM: M17.11 ICD-9-CM: 716.96 Chronic pain of right knee     ICD-10-CM: M25.561, G89.29 ICD-9-CM: 719.46, 338.29   
 Chronic pain syndrome     ICD-10-CM: G89.4 ICD-9-CM: 338. 4 Vitals BP Pulse Temp Resp Weight(growth percentile) BMI  
 (!) 174/93 74 98.2 °F (36.8 °C) 20 261 lb (118.4 kg) 34.43 kg/m2 Smoking Status Current Every Day Smoker Vitals History BMI and BSA Data Body Mass Index Body Surface Area 34.43 kg/m 2 2.47 m 2 Preferred Pharmacy Pharmacy Name Phone [address], [address] [place] [address] [phone] Your Updated Medication List  
  
   
This list is accurate as of: 12/18/17  2:04 PM.  Always use your most recent med list.  
  
  
  
  
 atorvastatin 40 mg tablet Commonly known as:  LIPITOR  
take 1 tablet by mouth daily buPROPion  mg SR tablet Commonly known as:  [employer] Take 1 Tab by mouth two (2) times a day. fenofibrate nanocrystallized 145 mg tablet Commonly known as:  TRICOR  
take 1 tablet by mouth daily HYDROcodone bitartrate 60 mg ER tablet Commonly known as:  HYSINGLA Take 1 Tab by mouth daily for 30 days. Max Daily Amount: 60 mg. for chronic, severe, refractory pain *DO NOT CRUSH,CHEW, OR DISSOLVE TABLET*  Indications: Chronic Pain, Severe Pain Start taking on:  2/16/2018 HYDROcodone-acetaminophen  mg tablet Commonly known as:  Marlena President Take 1 Tab by mouth four (4) times daily for 30 days. Max Daily Amount: 4 Tabs. As needed for breakthrough pain  Indications: Pain Start taking on:  2/16/2018  
  
 lisinopril-hydroCHLOROthiazide 20-12.5 mg per tablet Commonly known as:  PRINZIDE, ZESTORETIC  
take 2 tablets by mouth daily  
  
 meloxicam 15 mg tablet Commonly known as:  MOBIC  
take 1 tablet by mouth once daily  
  
 metFORMIN 1,000 mg tablet Commonly known as:  GLUCOPHAGE  
take 1 tablet by mouth twice a day with meals MULTIVITAMIN PO Take  by mouth.  
  
 naloxone 4 mg/actuation nasal spray Commonly known as:  [employer]  
 4 mg by Nasal route once as needed (opioid overdose) for up to 1 dose. May substitute 2 mg syringe if insurance requires  
  
 omeprazole 20 mg capsule Commonly known as:  PRILOSEC Take 20 mg by mouth daily. tamsulosin 0.4 mg capsule Commonly known as:  FLOMAX Take 1 Cap by mouth daily. VITAMIN C 1,000 mg tablet Generic drug:  ascorbic acid (vitamin C) Take 1,000 mg by mouth two (2) times a day. VITAMIN D2 50,000 unit capsule Generic drug:  ergocalciferol  
take 1 capsule by mouth TWO TIMES A WEEK  
  
 zolpidem 10 mg tablet Commonly known as:  AMBIEN Take 0.5-1 Tabs by mouth nightly as needed for Sleep for up to 30 days. Max Daily Amount: 10 mg. Indications: SLEEP-ONSET INSOMNIA Prescriptions Printed Refills  
 zolpidem (AMBIEN) 10 mg tablet 2 Sig: Take 0.5-1 Tabs by mouth nightly as needed for Sleep for up to 30 days. Max Daily Amount: 10 mg. Indications: SLEEP-ONSET INSOMNIA Class: Print Route: Oral  
 HYDROcodone bitartrate (HYSINGLA) 60 mg ER tablet 0 Starting on: 2/16/2018 Sig: Take 1 Tab by mouth daily for 30 days. Max Daily Amount: 60 mg. for chronic, severe, refractory pain *DO NOT CRUSH,CHEW, OR DISSOLVE TABLET*  Indications: Chronic Pain, Severe Pain Class: Print Route: Oral  
 HYDROcodone-acetaminophen (NORCO)  mg tablet 0 Starting on: 2/16/2018 Sig: Take 1 Tab by mouth four (4) times daily for 30 days. Max Daily Amount: 4 Tabs. As needed for breakthrough pain  Indications: Pain Class: Print Route: Oral  
  
We Performed the Following AMB [facility] DRUG SCREEN () [ [facility]] DRUG SCREEN [[id number] Custom] Follow-up Instructions Return in about 3 months (around 3/18/2018). Patient Instructions Chronic Pain: Care Instructions Your Care Instructions Chronic pain is pain that lasts a long time (months or even years) and may or may not have a clear cause. It is different from acute pain, which usually does have a clear cause-like an injury or illness-and gets better over time. Chronic pain: 
· Lasts over time but may vary from day to day. · Does not go away despite efforts to end it. · May disrupt your sleep and lead to fatigue. · May cause depression or anxiety. · May make your muscles tense, causing more pain. · Can disrupt your work, hobbies, home life, and relationships with friends and family. Chronic pain is a very real condition. It is not just in your head. Treatment can help and usually includes several methods used together, such as medicines, physical therapy, exercise, and other treatments. Learning how to relax and changing negative thought patterns can also help you cope. Chronic pain is complex. Taking an active role in your treatment will help you better manage your pain. Tell your doctor if you have trouble dealing with your pain. You may have to try several things before you find what works best for you. Follow-up care is a key part of your treatment and safety. Be sure to make and go to all appointments, and call your doctor if you are having problems. It's also a good idea to know your test results and keep a list of the medicines you take. How can you care for yourself at home? · Pace yourself. Break up large jobs into smaller tasks. Save harder tasks for days when you have less pain, or go back and forth between hard tasks and easier ones. Take rest breaks. · Relax, and reduce stress. Relaxation techniques such as deep breathing or meditation can help. · Keep moving. Gentle, daily exercise can help reduce pain over the long run. Try low- or no-impact exercises such as walking, swimming, and stationary biking. Do stretches to stay flexible. · Try heat, cold packs, and massage. · Get enough sleep. Chronic pain can make you tired and drain your energy. Talk with your doctor if you have trouble sleeping because of pain. · Think positive. Your thoughts can affect your pain level. Do things that you enjoy to distract yourself when you have pain instead of focusing on the pain. See a movie, read a book, listen to music, or spend time with a friend. · If you think you are depressed, talk to your doctor about treatment. · Keep a daily pain diary. Record how your moods, thoughts, sleep patterns, activities, and medicine affect your pain. You may find that your pain is worse during or after certain activities or when you are feeling a certain emotion. Having a record of your pain can help you and your doctor find the best ways to treat your pain. · Take pain medicines exactly as directed. ¨ If the doctor gave you a prescription medicine for pain, take it as prescribed. ¨ If you are not taking a prescription pain medicine, ask your doctor if you can take an over-the-counter medicine. Reducing constipation caused by pain medicine · Include fruits, vegetables, beans, and whole grains in your diet each day. These foods are high in fiber. · Drink plenty of fluids, enough so that your urine is light yellow or clear like water. If you have kidney, heart, or liver disease and have to limit fluids, talk with your doctor before you increase the amount of fluids you drink. · If your doctor recommends it, get more exercise. Walking is a good choice. Bit by bit, increase the amount you walk every day. Try for at least 30 minutes on most days of the week. · Schedule time each day for a bowel movement. A daily routine may help. Take your time and do not strain when having a bowel movement. When should you call for help? Call your doctor now or seek immediate medical care if: 
? · Your pain gets worse or is out of control. ? · You feel down or blue, or you do not enjoy things like you once did. You may be depressed, which is common in people with chronic pain. Depression can be treated. ? · You have vomiting or cramps for more than 2 hours. ? Watch closely for changes in your health, and be sure to contact your doctor if: 
? · You cannot sleep because of pain. ? · You are very worried or anxious about your pain. ? · You have trouble taking your pain medicine. ? · You have any concerns about your pain medicine. ? · You have trouble with bowel movements, such as: 
¨ No bowel movement in 3 days. ¨ Blood in the anal area, in your stool, or on the toilet paper. ¨ Diarrhea for more than 24 hours. Where can you learn more? Go to http://maddie-madison.info/. Enter N004 in the search box to learn more about \"Chronic Pain: Care Instructions. \" Current as of: October 14, 2016 Content Version: 11.4 © 6659-8017 [employer]. Care instructions adapted under license by [employer] (which disclaims liability or warranty for this information). If you have questions about a medical condition or this instruction, always ask your healthcare professional. [address] any warranty or liability for your use of this information. Introducing hospitals & HEALTH SERVICES! Dear Vandana Ocasio: 
Thank you for requesting a [employer] account. Our records indicate that you already have an active [employer] account. You can access your account anytime at [email or web address] Did you know that you can access your hospital and ER discharge instructions at any time in [employer]? You can also review all of your test results from your hospital stay or ER visit. Additional Information If you have questions, please visit the Frequently Asked Questions section of the [employer] website at [email or web address] Remember, [employer] is NOT to be used for urgent needs. For medical emergencies, dial 911. Now available from your iPhone and Android! Please provide this summary of care documentation to your next provider. Your primary care clinician is listed as Dwight Webber. If you have any questions after today's visit, please call [phone].

## 2017-12-19 RX ORDER — MELOXICAM 15 MG/1
15 TABLET ORAL DAILY
Qty: 30 TAB | Refills: 2 | Status: SHIPPED | OUTPATIENT
Start: 2017-12-19 | End: 2018-02-20 | Stop reason: SDUPTHER

## 2017-12-19 NOTE — TELEPHONE ENCOUNTER
Last Visit: 06/29/2017 with MD Vicenta Arreguin    Next Appointment: noted to f/u prn   Previous Refill Encounters: 09/06/2017 per MD Vicenta Arreguin #[phone] with 2 refills     Requested Prescriptions     Pending Prescriptions Disp Refills    meloxicam (MOBIC) 15 mg tablet [Pharmacy Med Name: MELOXICAM 15 MG TABLET] 30 Tab 2     Sig: Take 1 Tab by mouth daily.

## 2018-01-08 RX ORDER — MELOXICAM 15 MG/1
TABLET ORAL
Qty: 30 TAB | Refills: 2 | Status: SHIPPED | OUTPATIENT
Start: 2018-01-08 | End: 2018-09-17

## 2018-01-12 ENCOUNTER — TELEPHONE (OUTPATIENT)
Dept: PAIN MANAGEMENT | Age: 58
End: 2018-01-12

## 2018-01-12 NOTE — TELEPHONE ENCOUNTER
Submitted pa to [facility] for hysingla - according to pharmacy pt has new insurance.  Louann Polanco is still his insurance - may have changed to the [facility] side of [place] which is [facility]

## 2018-02-11 DIAGNOSIS — E11.9 CONTROLLED TYPE 2 DIABETES MELLITUS WITHOUT COMPLICATION, WITHOUT LONG-TERM CURRENT USE OF INSULIN (HCC): ICD-10-CM

## 2018-02-13 DIAGNOSIS — E11.9 CONTROLLED TYPE 2 DIABETES MELLITUS WITHOUT COMPLICATION, WITHOUT LONG-TERM CURRENT USE OF INSULIN (HCC): ICD-10-CM

## 2018-02-17 ENCOUNTER — HOSPITAL ENCOUNTER (OUTPATIENT)
Dept: LAB | Age: 58
Discharge: HOME | End: 2018-02-17

## 2018-02-17 LAB
A-G RATIO,AGRAT: 2.5 RATIO (ref 1.1–2.6)
ALBUMIN SERPL-MCNC: 4.7 G/DL (ref 3.5–5)
ALP SERPL-CCNC: 81 U/L (ref 25–115)
ALT SERPL-CCNC: 44 U/L (ref 5–40)
ANION GAP SERPL CALC-SCNC: 19 MMOL/L
AST SERPL W P-5'-P-CCNC: 35 U/L (ref 10–37)
AVG GLU, 10930: 139 MG/DL (ref 91–123)
BILIRUB SERPL-MCNC: 0.4 MG/DL (ref 0.2–1.2)
BUN SERPL-MCNC: 8 MG/DL (ref 6–22)
CALCIUM SERPL-MCNC: 9.2 MG/DL (ref 8.4–10.4)
CHLORIDE SERPL-SCNC: 92 MMOL/L (ref 98–110)
CHOLEST SERPL-MCNC: 168 MG/DL (ref 110–200)
CO2 SERPL-SCNC: 26 MMOL/L (ref 20–32)
CREAT SERPL-MCNC: 0.5 MG/DL (ref 0.5–1.2)
GFRAA, 66117: >60
GFRNA, 66118: >60
GLOBULIN,GLOB: 1.9 G/DL (ref 2–4)
GLUCOSE SERPL-MCNC: 121 MG/DL (ref 70–99)
HBA1C MFR BLD HPLC: 6.5 % (ref 4.8–5.9)
HDLC SERPL-MCNC: 26 MG/DL (ref 40–59)
HDLC SERPL-MCNC: 6.5 MG/DL (ref 0–5)
LDL, DIRECT,DLDL: 72 MG/DL (ref 50–99)
LDLC SERPL CALC-MCNC: ABNORMAL MG/DL (ref 50–99)
POTASSIUM SERPL-SCNC: 4.1 MMOL/L (ref 3.5–5.5)
PROT SERPL-MCNC: 6.6 G/DL (ref 6.4–8.3)
PSA SERPL-MCNC: 0.11 NG/ML
SENTARA SPECIMEN COL,SENBCF: NORMAL
SODIUM SERPL-SCNC: 137 MMOL/L (ref 133–145)
TRIGL SERPL-MCNC: 501 MG/DL (ref 40–149)
VLDLC SERPL CALC-MCNC: 100 MG/DL (ref 8–30)

## 2018-02-17 PROCEDURE — 99001 SPECIMEN HANDLING PT-LAB: CPT | Performed by: INTERNAL MEDICINE

## 2018-02-20 ENCOUNTER — OFFICE VISIT (OUTPATIENT)
Dept: INTERNAL MEDICINE CLINIC | Age: 58
End: 2018-02-20

## 2018-02-20 VITALS
DIASTOLIC BLOOD PRESSURE: 89 MMHG | SYSTOLIC BLOOD PRESSURE: 132 MMHG | RESPIRATION RATE: 14 BRPM | HEIGHT: 73 IN | HEART RATE: 78 BPM | OXYGEN SATURATION: 100 % | WEIGHT: 253 LBS | BODY MASS INDEX: 33.53 KG/M2 | TEMPERATURE: 98.8 F

## 2018-02-20 DIAGNOSIS — G89.4 CHRONIC PAIN SYNDROME: ICD-10-CM

## 2018-02-20 DIAGNOSIS — E78.5 DYSLIPIDEMIA: ICD-10-CM

## 2018-02-20 DIAGNOSIS — E55.9 HYPOVITAMINOSIS D: ICD-10-CM

## 2018-02-20 DIAGNOSIS — K63.5 HYPERPLASTIC COLONIC POLYP, UNSPECIFIED PART OF COLON: ICD-10-CM

## 2018-02-20 DIAGNOSIS — I10 ESSENTIAL HYPERTENSION: ICD-10-CM

## 2018-02-20 DIAGNOSIS — E66.9 OBESITY (BMI 30-39.9): ICD-10-CM

## 2018-02-20 DIAGNOSIS — E11.9 CONTROLLED TYPE 2 DIABETES MELLITUS WITHOUT COMPLICATION, WITHOUT LONG-TERM CURRENT USE OF INSULIN (HCC): Primary | ICD-10-CM

## 2018-02-20 DIAGNOSIS — F17.200 TOBACCO DEPENDENCE SYNDROME: ICD-10-CM

## 2018-02-20 RX ORDER — VARENICLINE TARTRATE 1 MG/1
1 TABLET, FILM COATED ORAL 2 TIMES DAILY
Qty: 60 TAB | Refills: 5 | Status: SHIPPED | OUTPATIENT
Start: 2018-02-20 | End: 2018-05-31 | Stop reason: SDUPTHER

## 2018-02-20 RX ORDER — ZOLPIDEM TARTRATE 10 MG/1
TABLET ORAL
COMMUNITY
End: 2018-05-31 | Stop reason: SDUPTHER

## 2018-02-20 RX ORDER — VARENICLINE TARTRATE 25 MG
0.5 KIT ORAL
Qty: 1 DOSE PACK | Refills: 0 | Status: SHIPPED | OUTPATIENT
Start: 2018-02-20 | End: 2018-09-17

## 2018-02-20 NOTE — PROGRESS NOTES
1. Have you been to the ER, urgent care clinic or hospitalized since your last visit? NO.     2. Have you seen or consulted any other health care providers outside of the [address] [place] [address] since your last visit (Include any pap smears or colon screening)? NO      Do you have an Advanced Directive? YES    Would you like information on Advanced Directives?  NO

## 2018-02-20 NOTE — TELEPHONE ENCOUNTER
[facility] called-chantix starter pack has  0.5mg and 1 mg in it- directions say take 0.5mg daily- please advise

## 2018-02-20 NOTE — MR AVS SNAPSHOT
[address] [facility] 
 
 
 [address] [facility], [facility] [address] [phone] Patient: Sterling Miranda MRN: [id number] PKO:[id number] Visit Information Date & Time Provider Department Dept. Phone Encounter #  
 2/20/2018  8:00 AM Leanna Chávez MD [facility] of [address] [place] [address] [phone] Your Appointments 3/1/2018  3:20 PM  
Follow Up with LUCILLE Fong [facility] ([facility] SCHEDULING) Appt Note: Monday, March 19th 2018 @ 9:40am.  
 [address] [address]  
[phone] [address]  
  
    
 8/24/2018  2:30 PM  
Office Visit with Leanna Chávez MD  
[facility] of [address] [place] [address] [address] [facility]) Appt Note: 6 month labs at [facility] [address] [facility], [facility] [id number] [place] [address]  
  
   
 [address] [facility] Upcoming Health Maintenance Date Due DTaP/Tdap/Td series (1 - Tdap) 3/24/1982 FOOT EXAM Q1 9/18/2016 Influenza Age 5 to Adult 8/1/2017 MICROALBUMIN Q1 8/7/2018 HEMOGLOBIN A1C Q6M 8/17/2018 COLONOSCOPY 8/22/2018 EYE EXAM RETINAL OR DILATED Q1 8/24/2018 LIPID PANEL Q1 2/17/2019 Allergies as of 2/20/2018  Review Complete On: 2/20/2018 By: Leanna Chávez MD  
 No Known Allergies Current Immunizations  Reviewed on 8/14/2017 Name Date Influenza Vaccine 10/1/2016, 1/27/2016 Influenza Vaccine PF 3/13/2015 Influenza Vaccine Whole 12/9/2010 Pneumococcal Polysaccharide (PPSV-23) 8/14/2017 Not reviewed this visit Vitals BP Pulse Temp Resp Height(growth percentile) Weight(growth percentile) (!) 138/98 (BP 1 Location: Left arm, BP Patient Position: Sitting) 78 98.8 °F (37.1 °C) (Oral) 14 6' 1\" (1.854 m) 253 lb (114.8 kg) SpO2 BMI Smoking Status 100% 33.38 kg/m2 Current Every Day Smoker Vitals History BMI and BSA Data Body Mass Index Body Surface Area  
 33.38 kg/m 2 2.43 m 2 Preferred Pharmacy Pharmacy Name Phone [address], [address] [place] [address] [phone] Your Updated Medication List  
  
   
This list is accurate as of: 2/20/18  8:38 AM.  Always use your most recent med list.  
  
  
  
  
 AMBIEN 10 mg tablet Generic drug:  zolpidem Take  by mouth nightly as needed for Sleep. atorvastatin 40 mg tablet Commonly known as:  LIPITOR  
take 1 tablet by mouth daily buPROPion  mg SR tablet Commonly known as:  Joe Radha Take 1 Tab by mouth two (2) times a day. fenofibrate nanocrystallized 145 mg tablet Commonly known as:  TRICOR  
take 1 tablet by mouth daily HYDROcodone bitartrate 60 mg ER tablet Commonly known as:  HYSINGLA Take 1 Tab by mouth daily for 30 days. Max Daily Amount: 60 mg. for chronic, severe, refractory pain *DO NOT CRUSH,CHEW, OR DISSOLVE TABLET*  Indications: Chronic Pain, Severe Pain HYDROcodone-acetaminophen  mg tablet Commonly known as:  Alexandra Pont Take 1 Tab by mouth four (4) times daily for 30 days. Max Daily Amount: 4 Tabs. As needed for breakthrough pain  Indications: Pain  
  
 lisinopril-hydroCHLOROthiazide 20-12.5 mg per tablet Commonly known as:  PRINZIDE, ZESTORETIC  
take 2 tablets by mouth daily  
  
 meloxicam 15 mg tablet Commonly known as:  MOBIC  
take 1 tablet by mouth once daily  
  
 metFORMIN 1,000 mg tablet Commonly known as:  GLUCOPHAGE  
take 1 tablet by mouth twice a day with meals MULTIVITAMIN PO Take  by mouth.  
  
 naloxone 4 mg/actuation nasal spray Commonly known as:  NARCAN  
4 mg by Nasal route once as needed (opioid overdose) for up to 1 dose. May substitute 2 mg syringe if insurance requires  
  
 omeprazole 20 mg capsule Commonly known as:  PRILOSEC Take 20 mg by mouth daily. tamsulosin 0.4 mg capsule Commonly known as:  FLOMAX Take 1 Cap by mouth daily. VITAMIN C 1,000 mg tablet Generic drug:  ascorbic acid (vitamin C) Take 1,000 mg by mouth two (2) times a day. VITAMIN D2 50,000 unit capsule Generic drug:  ergocalciferol  
take 1 capsule by mouth TWO TIMES A WEEK Introducing [facility] & HEALTH SERVICES! Dear Dianne Miranda: 
Thank you for requesting a [employer] account. Our records indicate that you already have an active [employer] account. You can access your account anytime at [email or web address] Did you know that you can access your hospital and ER discharge instructions at any time in [employer]? You can also review all of your test results from your hospital stay or ER visit. Additional Information If you have questions, please visit the Frequently Asked Questions section of the [employer] website at [email or web address] Remember, [employer] is NOT to be used for urgent needs. For medical emergencies, dial 911. Now available from your iPhone and Android! Please provide this summary of care documentation to your next provider. Your primary care clinician is listed as Blank Gonsalves. If you have any questions after today's visit, please call [phone].

## 2018-02-20 NOTE — PROGRESS NOTES
65 y.o. white male who presents for f/u    He denies any cardiovascular complaints. BP has been intermittently high although asymptomatic    He stopped taking the lovaza due to cost, still on statin and fibrate and taking krill oil without much effects as below    Denies polyuria, polydipsia, nocturia, vision change. Not checking sugars regularly. Vitals 2/20/2018 12/18/2017 9/5/2017 8/14/2017 6/29/2017   Weight 253 lb 261 lb 262 lb 261 lb 3.2 oz 257 lb 6.4 oz     Denies any gi or gu complaints    Pain mgmt continues to follow him.   He saw Dr Toyin Morales for hip and knee pain on the right, much better w PT    He's interested in chantix as he has new insurance coverage, the wellbutrin hasn't really helped stop the smoking    He's had cold sx about 2 weeks, peaked last weekend and he's much better today     IF 2/18    Past Medical History:   Diagnosis Date    Arthritis     Back problem     Colon polyp 08/2008    Dr Jean Adrian Depression 12/13    PHQ-9 was 6/27     Diabetes ([facility] [address] 3/15    on basis of elev hba1c; [facility]    Dyslipidemia     Enthesopathy of hip region     Fatty liver     on US w negative serologies 2009    Foot fracture, right     9/13 5th metatarsal    GERD (gastroesophageal reflux disease)     Hypertension     Hypogonadism male 3/13    Lumbago     Morbid obesity ([address]     peak weight 275 lbs, bmi 36.3 from 2/14; IF 2/18    Phymatous rosacea     Postlaminectomy syndrome, lumbar region     Prediabetes on metformin     2 hr  2/10    Shoulder pain     Thoracic or lumbosacral neuritis or radiculitis, unspecified     Tobacco dependence syndrome      Past Surgical History:   Procedure Laterality Date    HX BACK SURGERY      HX COLONOSCOPY      Dr Debbi Sutherland 8/22/08    HX ORTHOPAEDIC  12/06    left shoulder repair/rotator cuff    HX ORTHOPAEDIC      DEXA t score -1.0 spine, -0.4 hip (1/14) Dr. Jose Cruz Avila [address] [facility]  7/02    L5-S1 hemilaminectomy and diskectomy    NEUROLOGICAL PROCEDURE UNLISTED  4/13    MRI pituitary normal    [facility] ANESTH,SURGERY OF SHOULDER       Social History     Social History    Marital status:      Spouse name: N/A    Number of children: N/A    Years of education: N/A     Occupational History    Not on file. Social History Main Topics    Smoking status: Current Every Day Smoker     Packs/day: 2.00     Types: Cigarettes    Smokeless tobacco: Never Used    Alcohol use No      Comment: One drink per 3-4 months    Drug use: No      Comment: Marijuana use in the [de-identified]    Sexual activity: Not on file     Other Topics Concern    Not on file     Social History Narrative     Family History   Problem Relation Age of Onset    Diabetes Mother     Cancer Mother      stomach     Immunization History   Administered Date(s) Administered    Influenza Vaccine 01/27/2016, 10/01/2016, 10/01/2017    Influenza Vaccine PF 03/13/2015    Influenza Vaccine Whole 12/09/2010    Pneumococcal Polysaccharide (PPSV-23) 08/14/2017     Current Outpatient Prescriptions   Medication Sig    zolpidem (AMBIEN) 10 mg tablet Take  by mouth nightly as needed for Sleep.  varenicline (CHANTIX STARTER LAURA) 0.5 mg (11)- 1 mg (42) DsPk Take 0.5 mg by mouth daily (after breakfast).  varenicline (CHANTIX) 1 mg tablet Take 1 Tab by mouth two (2) times a day.  meloxicam (MOBIC) 15 mg tablet take 1 tablet by mouth once daily    HYDROcodone-acetaminophen (NORCO)  mg tablet Take 1 Tab by mouth four (4) times daily for 30 days. Max Daily Amount: 4 Tabs. As needed for breakthrough pain  Indications: Pain    lisinopril-hydroCHLOROthiazide (PRINZIDE, ZESTORETIC) 20-12.5 mg per tablet take 2 tablets by mouth daily    naloxone 4 mg/actuation spry 4 mg by Nasal route once as needed (opioid overdose) for up to 1 dose. May substitute 2 mg syringe if insurance requires    buPROPion SR (WELLBUTRIN SR) 150 mg SR tablet Take 1 Tab by mouth two (2) times a day.     metFORMIN (GLUCOPHAGE) 1,000 mg tablet take 1 tablet by mouth twice a day with meals    VITAMIN D2 50,000 unit capsule take 1 capsule by mouth TWO TIMES A WEEK    atorvastatin (LIPITOR) 40 mg tablet take 1 tablet by mouth daily    fenofibrate nanocrystallized (TRICOR) 145 mg tablet take 1 tablet by mouth daily    omeprazole (PRILOSEC) 20 mg capsule Take 20 mg by mouth daily.  ascorbic acid (VITAMIN C) 1,000 mg tablet Take 1,000 mg by mouth two (2) times a day.  MULTIVITAMIN PO Take  by mouth.  HYDROcodone bitartrate (HYSINGLA) 60 mg ER tablet Take 1 Tab by mouth daily for 30 days. Max Daily Amount: 60 mg. for chronic, severe, refractory pain *DO NOT CRUSH,CHEW, OR DISSOLVE TABLET*  Indications: Chronic Pain, Severe Pain    tamsulosin (FLOMAX) 0.4 mg capsule Take 1 Cap by mouth daily. No current facility-administered medications for this visit. No Known Allergies    REVIEW OF SYSTEMS: colo 6/08 Dr Kailey Deal - no vision change or eye pain  Oral - no mouth pain, tongue or tooth problems  Ears - no hearing loss, ear pain, fullness, no swallowing problems  Cardiac - no CP, PND, orthopnea, edema, palpitations or syncope  Chest - no breast masses  Resp - no wheezing, chronic coughing, dyspnea  GI - no heartburn, nausea, vomiting, change in bowel habits, bleeding, hemorrhoids  Urinary - no dysuria, hematuria, flank pain, urgency, frequency  Constitutional - no wt loss, night sweats, unexplained fevers  Neuro - no focal weakness, numbness, paresthesias, incoordination, ataxia, involuntary movements  Endo - no polyuria, polydipsia, nocturia, hot flashes    Visit Vitals    /89 (BP 1 Location: Left arm, BP Patient Position: Sitting)    Pulse 78    Temp 98.8 °F (37.1 °C) (Oral)    Resp 14    Ht 6' 1\" (1.854 m)    Wt 253 lb (114.8 kg)    SpO2 100%    BMI 33.38 kg/m2   Affect is appropriate.   Mood stable  No apparent distress  HEENT --Anicteric sclerae, tympanic membranes normal, No thyromegaly, JVD, or bruits. Lungs --Clear to auscultation, normal percussion. Heart --Regular rate and rhythm, no murmurs, rubs, gallops, or clicks. Chest wall --Nontender to palpation. PMI normal.  Abdomen -- Soft and nontender, no hepatosplenomegaly or masses. Extremities -- Without cyanosis, clubbing, edema. 2+ pulses equally and bilaterally.     LABS  From 9/09 showed   gluc 88,   cr 0.70,              alt 106,                   chol 183, tg 431,  hdl 32, ldl-c NA,  wbc 8.0, hb 15.8, plt 149,        From 2/10 showed   gluc 120, cr 0.60, gfr>60, alt 113,                   chol 306, tg 1135, hdl 31, ldl-c NA,                                ast 45, ap 95, tb 0.4  From 3/10 showed                                    2 hr gtt 165  From 6/10 showed   gluc 165,                               hba1c 5.6,                tg 406,  hdl 31, ldl-c NA   From 11/12 showed gluc 102, cr 0.60,      alt 43,   hba1c 5.9, chol 183, tg 489,  hdl 28, ldl-c NA, wbc 9.0, hb 15.8, plt 185,  tsh 3.0,    psa 0.09,   From 11/12 showed                                 vit d 15.2, test 145, james 5.5, acth 1.7, fsh 5.8, lh 1.5, prl 5.1, b12 878, RA neg, crp 0.83, %sat 31, ferritin 308, hep a/b/c neg,  gracie neg, lyme neg  From 8/13 showed           hba1c 5.8, chol 176, tg 521,  hdl 23, ldl-c NA  From 11/13 showed gluc 135, cr 0.80, gfr 52,   alt 30,   hba1c 5.8, chol 154, tg 323,  hdl 31, ldl-c 58,                      vit d 20.5, test 185  From 3/14 showed           hba1c 6.0, chol 177, tg 607,  hdl 26, ldl-c NA  From 6/14 showed   gluc 114, cr 0.80, gfr>60,  alt 32,   hba1c 5.8, chol 147, tg 360,  hdl 29, ldl-c 46  From 7/14 showed   gluc 148, cr 0.77, gfr>60,             wbc 9.3, hb 14.9, plt 214  From 2/15 showed   gluc 140, cr 0.72, gfr 107, alt 39,   hba1c 6.5, chol 180, tg 377,  hdl 31, ldl-c 74, wbc 7.5, hb 15.2, plt 205  From 7/15 showed           hba1c 6.2, chol 137, tg 293,  hdl 28, ldl-c 50,               umar neg  From 7/16 showed   gluc 125, cr 0.60, gfr>60, alt 29,    hba1c 6.2, chol 145, tg 247,  hdl 32, ldl-c 64, wbc 7.6, hb 15.2, plt 198, umar 11.8, psa 0.35  From 8/17 showed   gluc 129, cr 0.80, gfr>60, alt 22,    hba1c 6.5, chol 171, tg 479,  hdl 27, ldl-c na, wbc 9.4, hb 15.8, plt 204, umar 15.0, vit d 27.0  From 2/18 showed   gluc 121, cr 0.50, gfr>60, alt 44,    hba1c 6.5, chol 168, tg 501,  hdl 26, ldl-c na,           psa 0.11    Patient Active Problem List   Diagnosis Code    Encounter for long-term (current) drug use Z79.899    Dyslipidemia E78.5    Hypogonadism male Dr. Enio Robbins E29.1    Hypovitaminosis D E55.9    Brachial neuritis or radiculitis M54.12    Enthesopathy of hip region M76.899    Insomnia secondary to chronic pain G89.29, G47.01    Essential hypertension I10    Cervical dystonia G24.3    Chronic right shoulder pain M25.511, G89.29    DDD (degenerative disc disease), cervical M50.30    Obesity (BMI 30-39. 9) E66.9    Tobacco dependence syndrome F17.200    Greater trochanteric bursitis of right hip M70.61    Colon polyp 8/08 Dr Jeanne Penny K63.5    Arthritis of right knee M17.11    Chronic pain of right knee M25.561, G89.29    Chronic pain syndrome G89.4    Controlled type 2 diabetes mellitus without complication, without long-term current use of insulin (HCC) E11.9     Assessment and plan:  1. Chronic pain. F/U Dr Bari See  2. HTN. Continue current, inc exercise as tolerated, wt loss would be ideal  3. DM. Continue current, f/u ophth, no podiatry at this time, as above w the wt loss  4. Dyslipidemia. Continue current lipitor and tricor, not sure the krill oil is helping  5. Fatty liver. Doing relatively well  6. Hypogonadism. F/U Dr. Enio Robbins  7. Hypovit d. Continue supplementation  8. Depression. Doing well  9. Podiatry. F/U Dr. Reji Ellis  10. Smoking cessation discussed at length. Wants to try chantix, sfx discussed  11. Obesity. Intermittent fasting discussed at length. Explained the concepts in detail. Went over possible physiologic changes that could occur and how that would possibly impact his situation in a positive way. Discussed 16:8 program in particular. Emphasized the difference between hunger and hypoglycemia, he is aware. He will do some more research and consider implementing in the near future      RTC 8/18    Above conditions discussed at length and patient vocalized understanding.   All questions answered to patient satisfaction

## 2018-02-22 ENCOUNTER — TELEPHONE (OUTPATIENT)
Dept: INTERNAL MEDICINE CLINIC | Age: 58
End: 2018-02-22

## 2018-02-22 DIAGNOSIS — J40 BRONCHITIS: Primary | ICD-10-CM

## 2018-02-22 RX ORDER — AZITHROMYCIN 250 MG/1
TABLET, FILM COATED ORAL
Qty: 6 TAB | Refills: 0 | Status: SHIPPED | OUTPATIENT
Start: 2018-02-22 | End: 2018-05-31 | Stop reason: ALTCHOICE

## 2018-02-22 NOTE — TELEPHONE ENCOUNTER
Pt says he saw RD on Tuesday. Says he was told to call if he got worse. Says he has a bad dry cough, chest congestion that is not moving, can't come out. Making chest feel tight. No fever. Wants to know if you can call something in? [facility] on [address] [facility] [address]

## 2018-03-01 ENCOUNTER — OFFICE VISIT (OUTPATIENT)
Dept: PAIN MANAGEMENT | Age: 58
End: 2018-03-01

## 2018-03-01 VITALS
BODY MASS INDEX: 33.53 KG/M2 | DIASTOLIC BLOOD PRESSURE: 81 MMHG | WEIGHT: 253 LBS | RESPIRATION RATE: 16 BRPM | HEART RATE: 77 BPM | TEMPERATURE: 98.4 F | SYSTOLIC BLOOD PRESSURE: 168 MMHG | HEIGHT: 73 IN

## 2018-03-01 DIAGNOSIS — M96.1 POSTLAMINECTOMY SYNDROME, LUMBAR REGION: ICD-10-CM

## 2018-03-01 DIAGNOSIS — G89.29 CHRONIC RIGHT SHOULDER PAIN: ICD-10-CM

## 2018-03-01 DIAGNOSIS — G89.4 CHRONIC PAIN SYNDROME: ICD-10-CM

## 2018-03-01 DIAGNOSIS — M50.30 DDD (DEGENERATIVE DISC DISEASE), CERVICAL: ICD-10-CM

## 2018-03-01 DIAGNOSIS — M25.511 CHRONIC RIGHT SHOULDER PAIN: ICD-10-CM

## 2018-03-01 DIAGNOSIS — M54.12 BRACHIAL NEURITIS OR RADICULITIS: ICD-10-CM

## 2018-03-01 DIAGNOSIS — M70.61 GREATER TROCHANTERIC BURSITIS OF RIGHT HIP: ICD-10-CM

## 2018-03-01 DIAGNOSIS — M54.16 LUMBAR NEURITIS: ICD-10-CM

## 2018-03-01 DIAGNOSIS — M25.561 CHRONIC PAIN OF RIGHT KNEE: ICD-10-CM

## 2018-03-01 DIAGNOSIS — G24.3 CERVICAL DYSTONIA: ICD-10-CM

## 2018-03-01 DIAGNOSIS — G89.29 CHRONIC PAIN OF RIGHT KNEE: ICD-10-CM

## 2018-03-01 RX ORDER — HYDROCODONE BITARTRATE 15 MG/1
1 CAPSULE, EXTENDED RELEASE ORAL EVERY 12 HOURS
Qty: 60 EACH | Refills: 0 | Status: SHIPPED | OUTPATIENT
Start: 2018-03-30 | End: 2018-03-14 | Stop reason: CLARIF

## 2018-03-01 RX ORDER — HYDROCODONE BITARTRATE AND ACETAMINOPHEN 10; 325 MG/1; MG/1
1 TABLET ORAL 4 TIMES DAILY
Qty: 120 TAB | Refills: 0 | Status: SHIPPED | OUTPATIENT
Start: 2018-05-15 | End: 2018-05-31 | Stop reason: SDUPTHER

## 2018-03-01 RX ORDER — HYDROCODONE BITARTRATE AND ACETAMINOPHEN 10; 325 MG/1; MG/1
1 TABLET ORAL 4 TIMES DAILY
Qty: 120 TAB | Refills: 0 | Status: SHIPPED | OUTPATIENT
Start: 2018-03-17 | End: 2018-05-31 | Stop reason: SDUPTHER

## 2018-03-01 RX ORDER — HYDROCODONE BITARTRATE 15 MG/1
15 CAPSULE, EXTENDED RELEASE ORAL EVERY 12 HOURS
Qty: 60 EACH | Refills: 0 | Status: SHIPPED | OUTPATIENT
Start: 2018-03-01 | End: 2018-03-14 | Stop reason: CLARIF

## 2018-03-01 RX ORDER — HYDROCODONE BITARTRATE 15 MG/1
1 CAPSULE, EXTENDED RELEASE ORAL EVERY 12 HOURS
Qty: 30 EACH | Refills: 0 | Status: SHIPPED | OUTPATIENT
Start: 2018-04-29 | End: 2018-03-14 | Stop reason: CLARIF

## 2018-03-01 RX ORDER — HYDROCODONE BITARTRATE AND ACETAMINOPHEN 10; 325 MG/1; MG/1
1 TABLET ORAL 4 TIMES DAILY
Qty: 120 TAB | Refills: 0 | Status: SHIPPED | OUTPATIENT
Start: 2018-04-16 | End: 2018-05-31 | Stop reason: SDUPTHER

## 2018-03-01 NOTE — PATIENT INSTRUCTIONS
1. Continue current plan with no evidence of addiction or diversion. Stable on current medication without adverse events. 2. Discontinue Hysingla. Due to insurance denial  3. Add Zohydro 15 mg every 12 hours. 4. Refill hydrocodone 10/325 mg. Please take half to 1 tablet up to 4 times daily as needed  5. Naloxone 4 mg nasal spray for opioid induced respiratory depression emergency only. 6. Discussed risks of addiction, dependency, and opioid induced hyperalgesia.    7. Return to clinic in 3 months

## 2018-03-01 NOTE — MR AVS SNAPSHOT
[address] [address] 
[phone] Patient: Rolando Arroyo MRN: [id number] PIL:[id number] Visit Information Date & Time Provider Department Dept. Phone Encounter #  
 3/1/2018  3:20 PM Simón Lin, [facility] [address] Pain Management  [id number] Follow-up Instructions Return in about 3 months (around 6/1/2018). Your Appointments 8/24/2018  2:30 PM  
Office Visit with Ana Cristina Mera MD  
[facility] [address] [place] [address] [facility]) Appt Note: 6 month labs at [facility] [address], [address] [place]  
  
   
 [address], [address] Upcoming Health Maintenance Date Due DTaP/Tdap/Td series (1 - Tdap) 3/24/1982 COLONOSCOPY 8/22/2018 MICROALBUMIN Q1 8/7/2018 HEMOGLOBIN A1C Q6M 8/17/2018 EYE EXAM RETINAL OR DILATED Q1 8/24/2018 LIPID PANEL Q1 2/17/2019 FOOT EXAM Q1 2/20/2019 Allergies as of 3/1/2018  Review Complete On: 3/1/2018 By: LUCILLE Stearns No Known Allergies Current Immunizations  Reviewed on 2/20/2018 Name Date Influenza Vaccine 10/1/2017, 10/1/2016, 1/27/2016 Influenza Vaccine PF 3/13/2015 Influenza Vaccine Whole 12/9/2010 Pneumococcal Polysaccharide (PPSV-23) 8/14/2017 Not reviewed this visit You Were Diagnosed With   
  
 Codes Comments Lumbar neuritis     ICD-10-CM: M54.16 
ICD-9-CM: 724.4 Postlaminectomy syndrome, lumbar region     ICD-10-CM: M96.1 ICD-9-CM: 722.83 Brachial neuritis or radiculitis     ICD-10-CM: M54.12 
ICD-9-CM: 723.4 Cervical dystonia     ICD-10-CM: G24.3 ICD-9-CM: 333.83 Chronic right shoulder pain     ICD-10-CM: M25.511, G89.29 ICD-9-CM: 719.41, 338.29   
 DDD (degenerative disc disease), cervical     ICD-10-CM: M50.30 ICD-9-CM: 722.4 Greater trochanteric bursitis of right hip     ICD-10-CM: M70.61 ICD-9-CM: 726.5 Chronic pain of right knee     ICD-10-CM: M25.561, G89.29 ICD-9-CM: 719.46, 338.29 Chronic pain syndrome     ICD-10-CM: G89.4 ICD-9-CM: 338. 4 Vitals BP Pulse Temp Resp Height(growth percentile) Weight(growth percentile) 168/81 (BP 1 Location: Right arm, BP Patient Position: Sitting) 77 98.4 °F (36.9 °C) 16 6' 1\" (1.854 m) 253 lb (114.8 kg) BMI Smoking Status 33.38 kg/m2 Current Every Day Smoker BMI and BSA Data Body Mass Index Body Surface Area  
 33.38 kg/m 2 2.43 m 2 Preferred Pharmacy Pharmacy Name Phone [address], [address] [place] [address] [phone] Your Updated Medication List  
  
   
This list is accurate as of 3/1/18  4:49 PM.  Always use your most recent med list.  
  
  
  
  
 AMBIEN 10 mg tablet Generic drug:  zolpidem Take  by mouth nightly as needed for Sleep. atorvastatin 40 mg tablet Commonly known as:  LIPITOR  
take 1 tablet by mouth daily  
  
 azithromycin 250 mg tablet Commonly known as:  Tameka Grumet Take 2 tablets today, then take 1 tablet daily buPROPion  mg SR tablet Commonly known as:  Bacon [place] Take 1 Tab by mouth two (2) times a day. fenofibrate nanocrystallized 145 mg tablet Commonly known as:  TRICOR  
take 1 tablet by mouth daily * HYDROcodone bitartrate 60 mg ER tablet Commonly known as:  HYSINGLA Take 1 Tab by mouth daily for 30 days. Max Daily Amount: 60 mg. for chronic, severe, refractory pain *DO NOT CRUSH,CHEW, OR DISSOLVE TABLET*  Indications: Chronic Pain, Severe Pain  
  
 * HYDROcodone bitartrate 15 mg Cr12 Commonly known as:  ZOHYDRO ER Take 15 mg by mouth every twelve (12) hours for 30 days. Max Daily Amount: 30 mg.  
  
 * HYDROcodone bitartrate 15 mg Cr12 Commonly known as:  ZOHYDRO ER  
 Take 1 Cap by mouth every twelve (12) hours for 30 days. Max Daily Amount: 2 Caps. Start taking on:  3/30/2018  
  
 * HYDROcodone bitartrate 15 mg Cr12 Commonly known as:  ZOHYDRO ER Take 1 Cap by mouth every twelve (12) hours for 30 days. Max Daily Amount: 2 Caps. Start taking on:  4/29/2018  
  
 * HYDROcodone-acetaminophen  mg tablet Commonly known as:  Ferne [place] Take 1 Tab by mouth four (4) times daily for 30 days. Max Daily Amount: 4 Tabs. As needed for breakthrough pain  Indications: Pain Start taking on:  3/17/2018  
  
 * HYDROcodone-acetaminophen  mg tablet Commonly known as:  Ferne Arrow Take 1 Tab by mouth four (4) times daily for 30 days. Max Daily Amount: 4 Tabs. As needed for breakthrough pain  Indications: Pain Start taking on:  4/16/2018  
  
 * HYDROcodone-acetaminophen  mg tablet Commonly known as:  Ferne Arrow Take 1 Tab by mouth four (4) times daily for 30 days. Max Daily Amount: 4 Tabs. As needed for breakthrough pain  Indications: Pain Start taking on:  5/15/2018  
  
 lisinopril-hydroCHLOROthiazide 20-12.5 mg per tablet Commonly known as:  PRINZIDE, ZESTORETIC  
take 2 tablets by mouth daily  
  
 meloxicam 15 mg tablet Commonly known as:  MOBIC  
take 1 tablet by mouth once daily  
  
 metFORMIN 1,000 mg tablet Commonly known as:  GLUCOPHAGE  
take 1 tablet by mouth twice a day with meals MULTIVITAMIN PO Take  by mouth.  
  
 naloxone 4 mg/actuation nasal spray Commonly known as:  NARCAN  
4 mg by Nasal route once as needed (opioid overdose) for up to 1 dose. May substitute 2 mg syringe if insurance requires  
  
 omeprazole 20 mg capsule Commonly known as:  PRILOSEC Take 20 mg by mouth daily. tamsulosin 0.4 mg capsule Commonly known as:  FLOMAX Take 1 Cap by mouth daily. * varenicline 0.5 mg (11)- 1 mg (42) Dspk Commonly known as:  CHANTIX STARTER LAURA Take 0.5 mg by mouth daily (after breakfast). * varenicline 1 mg tablet Commonly known as:  Lilminna [place] Take 1 Tab by mouth two (2) times a day. VITAMIN C 1,000 mg tablet Generic drug:  ascorbic acid (vitamin C) Take 1,000 mg by mouth two (2) times a day. VITAMIN D2 50,000 unit capsule Generic drug:  ergocalciferol  
take 1 capsule by mouth TWO TIMES A WEEK * Notice: This list has 9 medication(s) that are the same as other medications prescribed for you. Read the directions carefully, and ask your doctor or other care provider to review them with you. Prescriptions Printed Refills HYDROcodone-acetaminophen (NORCO)  mg tablet 0 Starting on: 3/17/2018 Sig: Take 1 Tab by mouth four (4) times daily for 30 days. Max Daily Amount: 4 Tabs. As needed for breakthrough pain  Indications: Pain Class: Print Route: Oral  
 HYDROcodone-acetaminophen (NORCO)  mg tablet 0 Starting on: 4/16/2018 Sig: Take 1 Tab by mouth four (4) times daily for 30 days. Max Daily Amount: 4 Tabs. As needed for breakthrough pain  Indications: Pain Class: Print Route: Oral  
 HYDROcodone-acetaminophen (NORCO)  mg tablet 0 Starting on: 5/15/2018 Sig: Take 1 Tab by mouth four (4) times daily for 30 days. Max Daily Amount: 4 Tabs. As needed for breakthrough pain  Indications: Pain Class: Print Route: Oral  
 HYDROcodone bitartrate (ZOHYDRO ER) 15 mg CR12 0 Sig: Take 15 mg by mouth every twelve (12) hours for 30 days. Max Daily Amount: 30 mg.  
 Class: Print Route: Oral  
 HYDROcodone bitartrate (ZOHYDRO ER) 15 mg CR12 0 Starting on: 3/30/2018 Sig: Take 1 Cap by mouth every twelve (12) hours for 30 days. Max Daily Amount: 2 Caps. Class: Print Route: Oral  
 HYDROcodone bitartrate (ZOHYDRO ER) 15 mg CR12 0 Starting on: 4/29/2018 Sig: Take 1 Cap by mouth every twelve (12) hours for 30 days. Max Daily Amount: 2 Caps. Class: Print Route: Oral  
  
Follow-up Instructions Return in about 3 months (around 6/1/2018). Patient Instructions 1. Continue current plan with no evidence of addiction or diversion. Stable on current medication without adverse events. 2. Discontinue Hysingla. Due to insurance denial 
3. Add Zohydro 15 mg every 12 hours. 4. Refill hydrocodone 10/325 mg. Please take half to 1 tablet up to 4 times daily as needed 5. Naloxone 4 mg nasal spray for opioid induced respiratory depression emergency only. 6. Discussed risks of addiction, dependency, and opioid induced hyperalgesia. 7. Return to clinic in 3 months Introducing [facility]! Dear Ajit Darden: 
Thank you for requesting a [employer] account. Our records indicate that you already have an active [employer] account. You can access your account anytime at [email or web address] Did you know that you can access your hospital and ER discharge instructions at any time in [employer]? You can also review all of your test results from your hospital stay or ER visit. Additional Information If you have questions, please visit the Frequently Asked Questions section of the [employer] website at [email or web address] Remember, [employer] is NOT to be used for urgent needs. For medical emergencies, dial 911. Now available from your iPhone and Android! Please provide this summary of care documentation to your next provider. Your primary care clinician is listed as Miriam Cohen. If you have any questions after today's visit, please call [phone].

## 2018-03-01 NOTE — PROGRESS NOTES
HISTORY OF PRESENT ILLNESS  Shaila Nixon is a 65 y.o. male    HPI: Mr. Marley Payan  returns today for f/u of chronic pain due to low back pain due to postlaminectomy syndrome and left hip pain. He also suffers with chronic neck pain due to cervical dystonia. Today is my first visit with Mr. Marley Payan. He continues with pain unchanged since last visit. We discussed his current condition medications in detail today. He has been doing very well with his current treatment plan which has been offering good pain control. Unfortunately his insurance has denied continuing with Hysingla which has been very effective. We discussed several different options in the office today. I have recommended that we try Zohydro. He reports that he has been out of the medication for about 2 weeks due to insurance. We will have to adjust his dosage. He has been using 60 mg daily. He has been continuing with his hydrocodone. He admits that he did take a few extra hydrocodone because he was out of his Hysingla. Extensive counseling was provided today. I explained to him that further self increases could result in discharge from our practice. We will start Zohydro ER 15 mg every 12 hours. We had a lengthy conversation about long-acting versus short acting medication. I have asked him to continue with the Zohydro on a set schedule and uses hydrocodone as half to 1 tablet up to 4 times daily on an as-needed basis only. He is in agreement with this plan. He also notes that his PCP is now started him on Chantix to help him with smoking cessation. He is otherwise doing well with no other complaints today. I will have him follow-up in 3 months or sooner if needed. Current medication management includes  Hysingla ER 60 mg once daily and hydrocodone 10/325 mg 4 times daily as needed. Chantix prescribed from outside provider for smoking cessation. Medications are helping with pain control and quality of life.  His pain is 4-5/10 with medication and 6-7/10 without. Pt describes pain as aching and stabbing. Aggravating factors include activity. Relieved with rest, medication, and avoiding painful activities. Current treatment is helping to improve general activity, mood, sleep, and enjoyment of life    Mr. Jefferson Kimble is tolerating medications well, with no side effects noted. He is able to stay more active with less discomfort with these current doses. The patient reports an average of 30% pain relief with current treatment/medications. Pill counts are appropriate. He is informed of side effects, risks, and benefits of this regimen, and emphasizes that he derives a significant improvement in functionality and quality of life, and notes that non-opioid medications and therapies in the past have not offered significant benefit. He  is otherwise doing well with no other complaints today. He denies any adverse events including nausea, vomiting, dizziness, increased constipation, hallucinations, or seizures. Because the patient's current regimen places him/her at increased risk for possible overdose, a prescription for naloxone nasal spray has been provided. The patient understands that this medication is only to be used in the setting of a possible overdose and that inadvertent use of this medication could precipitate overt withdrawal.        No Known Allergies    Past Surgical History:   Procedure Laterality Date    HX BACK SURGERY      HX COLONOSCOPY      Dr Glass Solders 8/22/08    HX ORTHOPAEDIC  12/06    left shoulder repair/rotator cuff    HX ORTHOPAEDIC      DEXA t score -1.0 spine, -0.4 hip (1/14) Dr. Mahnaz Guevara HX OTHER SURGICAL  7/02    L5-S1 hemilaminectomy and diskectomy    NEUROLOGICAL PROCEDURE UNLISTED  4/13    MRI pituitary normal    [place] ANESTH,SURGERY OF SHOULDER           Review of Systems   Constitutional: Negative for chills and fever. HENT: Negative for congestion and sore throat.     Eyes: Negative for blurred vision and double vision. Respiratory: Negative for cough, shortness of breath and wheezing. Cardiovascular: Negative for chest pain, palpitations and leg swelling. Gastrointestinal: Negative for abdominal pain, constipation, diarrhea, heartburn, nausea and vomiting. Genitourinary: Negative. Musculoskeletal: Positive for back pain, joint pain and myalgias. Negative for falls and neck pain. Skin: Negative. Neurological: Negative for dizziness, tingling, seizures, loss of consciousness, weakness and headaches. Endo/Heme/Allergies: Does not bruise/bleed easily. Psychiatric/Behavioral: Negative for depression. The patient has insomnia. The patient is not nervous/anxious. All other systems reviewed and are negative. Physical Exam   Constitutional: He is oriented to person, place, and time and well-developed, well-nourished, and in no distress. No distress. HENT:   Head: Normocephalic and atraumatic. Eyes: EOM are normal.   Pulmonary/Chest: Effort normal.   Neurological: He is alert and oriented to person, place, and time. Skin: Skin is dry. No rash noted. No erythema. Psychiatric: Mood, memory, affect and judgment normal.   Nursing note and vitals reviewed. ASSESSMENT:    1. Lumbar neuritis    2. Postlaminectomy syndrome, lumbar region    3. Brachial neuritis or radiculitis    4. Cervical dystonia    5. Chronic right shoulder pain    6. DDD (degenerative disc disease), cervical    7. Greater trochanteric bursitis of right hip    8. Chronic pain of right knee    9. Chronic pain syndrome           [place] Prescription Monitoring Program was reviewed which does not demonstrate aberrancies and/or inconsistencies with regard to the historical prescribing of controlled medications to this patient by other providers. PLAN / Pt Instructions:  1. Continue current plan with no evidence of addiction or diversion. Stable on current medication without adverse events. 2. Discontinue Hysingla. Due to insurance denial  3. Add Zohydro 15 mg every 12 hours. 4. Refill hydrocodone 10/325 mg. Please take half to 1 tablet up to 4 times daily as needed  5. Naloxone 4 mg nasal spray for opioid induced respiratory depression emergency only. 6. Discussed risks of addiction, dependency, and opioid induced hyperalgesia. 7. Return to clinic in 3 months      Medications Ordered Today   Medications    HYDROcodone-acetaminophen (NORCO)  mg tablet     Sig: Take 1 Tab by mouth four (4) times daily for 30 days. Max Daily Amount: 4 Tabs. As needed for breakthrough pain  Indications: Pain     Dispense:  120 Tab     Refill:  0    HYDROcodone-acetaminophen (NORCO)  mg tablet     Sig: Take 1 Tab by mouth four (4) times daily for 30 days. Max Daily Amount: 4 Tabs. As needed for breakthrough pain  Indications: Pain     Dispense:  120 Tab     Refill:  0    HYDROcodone-acetaminophen (NORCO)  mg tablet     Sig: Take 1 Tab by mouth four (4) times daily for 30 days. Max Daily Amount: 4 Tabs. As needed for breakthrough pain  Indications: Pain     Dispense:  120 Tab     Refill:  0    HYDROcodone bitartrate (ZOHYDRO ER) 15 mg CR12     Sig: Take 15 mg by mouth every twelve (12) hours for 30 days. Max Daily Amount: 30 mg. Dispense:  60 Each     Refill:  0    HYDROcodone bitartrate (ZOHYDRO ER) 15 mg CR12     Sig: Take 1 Cap by mouth every twelve (12) hours for 30 days. Max Daily Amount: 2 Caps. Dispense:  60 Each     Refill:  0    HYDROcodone bitartrate (ZOHYDRO ER) 15 mg CR12     Sig: Take 1 Cap by mouth every twelve (12) hours for 30 days. Max Daily Amount: 2 Caps. Dispense:  30 Each     Refill:  0         DISPOSITION     Pain medications are prescribed with the objective of pain relief and improved physical and psychosocial function in this patient.  Pain Meds and Quality Of Life have been reviewed. Nonpharmacologic therapy and non-opioid pharmacologic therapy have been considered.  Opioid therapy is only prescribed if the expected benefits are anticipated to outweigh risks.  Counseled patient on proper use of prescribed medications.  Reviewed with patient self-help tools, home exercise, and lifestyle changes to assist the patient in self-management of symptoms.  Reviewed with patient the treatment plan, goals of treatment plan, and limitations of treatment plan, to include the potential for side effects from medications and procedures. If side effects occur, it is the responsibility of the patient to inform the clinic so that a change in the treatment plan can be made in a safe manner. The patient is advised that stopping prescribed medication may cause an increase in symptoms and possible medication withdrawal symptoms. The patient is informed an emergency room evaluation may be necessary if this occurs. Spent 25 minutes with patient today which more than 50% of that time was spent on counseling and coordination of care. [facility], [id number] 3/1/2018        Note: Please excuse any typographical errors. Voice recognition software was used for this note and may cause mistakes.

## 2018-03-01 NOTE — PROGRESS NOTES
Nursing Notes    Patient presents to the office today in follow-up. Patient rates his pain at 6/10 on the numerical pain scale. Reviewed medications with counts as follows:    Rx Date filled Qty Dispensed Pill Count Last Dose Short   Ambien 10 mg 02/26/18 30 28.5 Last pm no   Norco 10 mg 02/16/18 120 46 today Yes 4 days         Comments:  Patient is here today for a follow up appt today he states his pain level today is a 6  He states he went to his pcm due to having a fever he was given a z pack and is better now. He states he was not able to fill the Hysingla and he has been self increasing on the [place]     POC UDS was not performed in office today    Any new labs or imaging since last appointment? YES labs done at [facility]     Have you been to an emergency room (ER) or urgent care clinic since your last visit? NO            Have you been hospitalized since your last visit? NO     If yes, where, when, and reason for visit? Have you seen or consulted any other health care providers outside of the [address] [place] [address]  since your last visit? YES   PCM  If yes, where, when, and reason for visit? HM deferred to pcp.

## 2018-03-02 ENCOUNTER — TELEPHONE (OUTPATIENT)
Dept: PAIN MANAGEMENT | Age: 58
End: 2018-03-02

## 2018-03-02 NOTE — TELEPHONE ENCOUNTER
Zohydro er submitted to [facility]). Called Nikia at [employer] - told her I had submitted the pa - hopefully we will have an answer by Mon. Explained pt has not tried much besides hysingla er and hydrocodone/apap - several doses and that is where we are running into problems. Troy Black asked me to call once we get an answer.

## 2018-03-07 ENCOUNTER — TELEPHONE (OUTPATIENT)
Dept: PAIN MANAGEMENT | Age: 58
End: 2018-03-07

## 2018-03-07 NOTE — TELEPHONE ENCOUNTER
Called [facility] re pa for zohydro er submitted on 3/2/17. Spoke with Karson Ross - at first she could not find the pt - then she said she saw 2 active accounts. Karson Ross said she does not see anything in either account for a pa. Explained is was submitted through cover my meds on 3/2/18 and there is confirmations it was sent through cover my meds. Since she didn't find the request could we do an urgent request over the phone. Karson Ross said yes - asked me to hold a moment and was disconnected. Faxed urgent request to [facility]. Total time 15 min. Called pt to let him know what the situation is. Told him I will call him and fax the pharmacy when I receive an answer. Pt understood.

## 2018-03-12 ENCOUNTER — TELEPHONE (OUTPATIENT)
Dept: PAIN MANAGEMENT | Age: 58
End: 2018-03-12

## 2018-03-13 ENCOUNTER — TELEPHONE (OUTPATIENT)
Dept: PAIN MANAGEMENT | Age: 58
End: 2018-03-13

## 2018-03-14 DIAGNOSIS — G89.4 CHRONIC PAIN SYNDROME: Primary | ICD-10-CM

## 2018-03-14 RX ORDER — MORPHINE SULFATE 15 MG/1
15 TABLET, FILM COATED, EXTENDED RELEASE ORAL EVERY 12 HOURS
Qty: 60 TAB | Refills: 0 | Status: SHIPPED | OUTPATIENT
Start: 2018-03-14 | End: 2018-05-31 | Stop reason: SDUPTHER

## 2018-03-14 RX ORDER — MORPHINE SULFATE 15 MG/1
15 TABLET, FILM COATED, EXTENDED RELEASE ORAL EVERY 12 HOURS
Qty: 60 TAB | Refills: 0 | Status: SHIPPED | OUTPATIENT
Start: 2018-04-13 | End: 2018-05-31 | Stop reason: SDUPTHER

## 2018-03-14 NOTE — TELEPHONE ENCOUNTER
Spoke with Bakari - will issue new script for mser 15mg bid. [address] to make sure pt would not have a problem getting this script filled. Spoke with Joshua Riggs - after checking pt's plan she mser 15mg bid is on the formulary and no pa is required. Called pt to let him know Meaghan Gillespie will issue a new script. Pt said he works in [place], but will leave early to come  script. Please call pt to let him know when script is ready - he wants to get it to the pharmacy before we close just incase there are any issues even though the insurance said it should go through. Told pt will let him know when ready.

## 2018-03-14 NOTE — TELEPHONE ENCOUNTER
Zohydro er was denied on the original pa and reconsideration by On license of [facility]. Notes had been submitted showing pt has tried hydrocodone/apap, oxycodone/apap and hysingla er (paid out of pocket with savings card for a year because insurance would not approve). Even though notes showed pt on hysingla er [facility] did not accept this - only noted trial and failure of hydrocodone/apap and oxycodone/apap. We have exhausted our options for getting this medication. [facility] says the only thing that can be done at this point is for the pt to appeal zohydro er if he wishes to. Spoke with pt explained about the denial. Asked pt if he has ever tried anything else like mser or fentanyl. Pt states he has not tried anything besides hysingla er - he has been out of this for a month. Told pt I will talk with Eunice Fox and let him know what he is going to do at this point. Also told him if Eunice Fox will write a new script I will call [facility] to make sure he won't have a problem filling the script (will do a pa if necessary).

## 2018-03-14 NOTE — TELEPHONE ENCOUNTER
Called pt - left vm - to let him know Monica Bray has issued new scripts for mser - pt must return zohydro er script.

## 2018-03-20 RX ORDER — FENOFIBRATE 145 MG/1
TABLET, COATED ORAL
Qty: 90 TAB | Refills: 3 | Status: SHIPPED | OUTPATIENT
Start: 2018-03-20 | End: 2019-04-15 | Stop reason: SDUPTHER

## 2018-05-14 DIAGNOSIS — Z79.899 ENCOUNTER FOR LONG-TERM (CURRENT) USE OF HIGH-RISK MEDICATION: Primary | ICD-10-CM

## 2018-05-14 NOTE — TELEPHONE ENCOUNTER
Patient needs prescription to last until appt on 05/31/18 ; patient only given one month prescription of ms contin due to zohydro denial by insurance.

## 2018-05-15 RX ORDER — MORPHINE SULFATE 15 MG/1
15 TABLET, FILM COATED, EXTENDED RELEASE ORAL EVERY 12 HOURS
Qty: 60 TAB | Refills: 0 | Status: SHIPPED | OUTPATIENT
Start: 2018-05-15 | End: 2018-05-31 | Stop reason: SDUPTHER

## 2018-05-31 ENCOUNTER — OFFICE VISIT (OUTPATIENT)
Dept: PAIN MANAGEMENT | Age: 58
End: 2018-05-31

## 2018-05-31 VITALS
SYSTOLIC BLOOD PRESSURE: 170 MMHG | DIASTOLIC BLOOD PRESSURE: 90 MMHG | HEART RATE: 93 BPM | TEMPERATURE: 97.4 F | WEIGHT: 253 LBS | RESPIRATION RATE: 16 BRPM | BODY MASS INDEX: 33.53 KG/M2 | HEIGHT: 73 IN

## 2018-05-31 DIAGNOSIS — G89.4 CHRONIC PAIN SYNDROME: ICD-10-CM

## 2018-05-31 DIAGNOSIS — M25.561 CHRONIC PAIN OF RIGHT KNEE: ICD-10-CM

## 2018-05-31 DIAGNOSIS — M54.16 LUMBAR NEURITIS: ICD-10-CM

## 2018-05-31 DIAGNOSIS — M54.12 BRACHIAL NEURITIS OR RADICULITIS: ICD-10-CM

## 2018-05-31 DIAGNOSIS — G89.29 CHRONIC PAIN OF RIGHT KNEE: ICD-10-CM

## 2018-05-31 DIAGNOSIS — M96.1 POSTLAMINECTOMY SYNDROME, LUMBAR REGION: Primary | ICD-10-CM

## 2018-05-31 DIAGNOSIS — G47.9 DIFFICULTY SLEEPING: ICD-10-CM

## 2018-05-31 DIAGNOSIS — G24.3 CERVICAL DYSTONIA: ICD-10-CM

## 2018-05-31 DIAGNOSIS — Z79.899 ENCOUNTER FOR LONG-TERM (CURRENT) USE OF HIGH-RISK MEDICATION: ICD-10-CM

## 2018-05-31 DIAGNOSIS — M50.30 DDD (DEGENERATIVE DISC DISEASE), CERVICAL: ICD-10-CM

## 2018-05-31 LAB
ALCOHOL UR POC: NORMAL
AMPHETAMINES UR POC: NEGATIVE
BARBITURATES UR POC: NORMAL
BENZODIAZEPINES UR POC: NEGATIVE
BUPRENORPHINE UR POC: NEGATIVE
CANNABINOIDS UR POC: NEGATIVE
CARISOPRODOL UR POC: NORMAL
COCAINE UR POC: NEGATIVE
FENTANYL UR POC: NORMAL
MDMA/ECSTASY UR POC: NORMAL
METHADONE UR POC: NEGATIVE
METHAMPHETAMINE UR POC: NORMAL
METHYLPHENIDATE UR POC: NORMAL
OPIATES UR POC: NORMAL
OXYCODONE UR POC: NORMAL
PHENCYCLIDINE UR POC: NORMAL
PROPOXYPHENE UR POC: NORMAL
TRAMADOL UR POC: NORMAL
TRICYCLICS UR POC: NORMAL

## 2018-05-31 RX ORDER — MORPHINE SULFATE 15 MG/1
15 TABLET, FILM COATED, EXTENDED RELEASE ORAL EVERY 12 HOURS
Qty: 60 TAB | Refills: 0 | Status: SHIPPED | OUTPATIENT
Start: 2018-06-14 | End: 2019-04-30

## 2018-05-31 RX ORDER — ZOLPIDEM TARTRATE 5 MG/1
TABLET ORAL
Qty: 18 TAB | Refills: 0 | Status: SHIPPED | OUTPATIENT
Start: 2018-05-31 | End: 2018-09-17

## 2018-05-31 RX ORDER — MORPHINE SULFATE 15 MG/1
15 TABLET, FILM COATED, EXTENDED RELEASE ORAL EVERY 12 HOURS
Qty: 60 TAB | Refills: 0 | Status: SHIPPED | OUTPATIENT
Start: 2018-08-12 | End: 2018-09-10 | Stop reason: SDUPTHER

## 2018-05-31 RX ORDER — MORPHINE SULFATE 15 MG/1
15 TABLET, FILM COATED, EXTENDED RELEASE ORAL EVERY 12 HOURS
Qty: 60 TAB | Refills: 0 | Status: SHIPPED | OUTPATIENT
Start: 2018-07-13 | End: 2018-08-12

## 2018-05-31 RX ORDER — HYDROCODONE BITARTRATE AND ACETAMINOPHEN 10; 325 MG/1; MG/1
1 TABLET ORAL 4 TIMES DAILY
Qty: 120 TAB | Refills: 0 | Status: SHIPPED | OUTPATIENT
Start: 2018-08-12 | End: 2018-09-10 | Stop reason: SDUPTHER

## 2018-05-31 RX ORDER — HYDROCODONE BITARTRATE AND ACETAMINOPHEN 10; 325 MG/1; MG/1
1 TABLET ORAL 4 TIMES DAILY
Qty: 120 TAB | Refills: 0 | Status: SHIPPED | OUTPATIENT
Start: 2018-07-13 | End: 2018-09-10 | Stop reason: SDUPTHER

## 2018-05-31 RX ORDER — HYDROCODONE BITARTRATE AND ACETAMINOPHEN 10; 325 MG/1; MG/1
1 TABLET ORAL 4 TIMES DAILY
Qty: 120 TAB | Refills: 0 | Status: SHIPPED | OUTPATIENT
Start: 2018-06-14 | End: 2018-09-10 | Stop reason: SDUPTHER

## 2018-05-31 NOTE — PROGRESS NOTES
HISTORY OF PRESENT ILLNESS  Isa Mathews is a 63 y.o. male    HPI: Mr. Minh Sarabia  returns today for f/u of chronic pain due to low back pain due to postlaminectomy syndrome and left hip pain. He also suffers with chronic neck pain due to cervical dystonia. Mr. Minh Sarabia continues unchanged since last visit. He has been doing very well with his current treatment plan. We try to switch his Hysingla ER over to Zohydro due to insurance issues but this was denied as well. We started him on morphine ER 15 mg which has been working well. He very happy since starting morphine ER and says that he does not even notice that he is taking the medication but has been helping with his pain significantly. We had a very lengthy conversation about changes to our practice. I explained to him that moving forward we will be focusing on a more conservative and non-opioid plan of care. This will result in changes to his current treatment plan. I have agreed to continue with his current treatment plan for today but he understands we will begin tapering next visit. We will start by tapering his morphine ER. He is not very happy with these changes but states that he would like to see how he will do with the non-opioid plan of care. I also explained to him that we can no longer prescribe opioid medications while he is concurrently using benzodiazepines. This will include his Ambien. We discussed tapering plan in detail. Please see plan below. He is otherwise doing well with no other complaints today. I will have him follow-up in 3 months for further evaluation and recommendation. I did ask that he call and cancel his appointment and pain management agreement if he does decide to transfer his care. Current medication management includes Morphine ER 15 mg q 12 hours and hydrocodone 10/325 mg 4 times daily as needed. Chantix prescribed from outside provider for smoking cessation.   Medications are helping with pain control and quality of life. His pain is 4-5/10 with medication and 6-7/10 without. Pt describes pain as aching and stabbing. Aggravating factors include activity. Relieved with rest, medication, and avoiding painful activities. Current treatment is helping to improve general activity, mood, sleep, and enjoyment of life    Mr. Alejandro Coleman is tolerating medications well, with no side effects noted. He is able to stay more active with less discomfort with these current doses. The patient reports an average of 30% pain relief with current treatment/medications. He is informed of side effects, risks, and benefits of this regimen, and emphasizes that he derives a significant improvement in functionality and quality of life, and notes that non-opioid medications and therapies in the past have not offered significant benefit. He  is otherwise doing well with no other complaints today. He denies any adverse events including nausea, vomiting, dizziness, increased constipation, hallucinations, or seizures. Because the patient's current regimen places him/her at increased risk for possible overdose, a prescription for naloxone nasal spray has been provided. The patient understands that this medication is only to be used in the setting of a possible overdose and that inadvertent use of this medication could precipitate overt withdrawal.     MME: 90  COMM: 4  OSWESTRY: 30 %  ORT: 0  PMA updated: 5/31/2018  POC UDS today. Confirmation pending.        No Known Allergies    Past Surgical History:   Procedure Laterality Date    HX BACK SURGERY      HX COLONOSCOPY      Dr Michele Paniagua 8/22/08    HX ORTHOPAEDIC  12/06    left shoulder repair/rotator cuff    HX ORTHOPAEDIC      DEXA t score -1.0 spine, -0.4 hip (1/14) Dr. Fercho Alicea HX OTHER SURGICAL  7/02    L5-S1 hemilaminectomy and diskectomy    NEUROLOGICAL PROCEDURE UNLISTED  4/13    MRI pituitary normal    [place] ANESTH,SURGERY OF SHOULDER           Review of Systems Constitutional: Negative for chills and fever. HENT: Negative for congestion and sore throat. Eyes: Negative for blurred vision and double vision. Respiratory: Negative for cough, shortness of breath and wheezing. Cardiovascular: Negative for chest pain, palpitations and leg swelling. Gastrointestinal: Negative for abdominal pain, constipation, diarrhea, heartburn, nausea and vomiting. Genitourinary: Negative. Musculoskeletal: Positive for back pain, joint pain and myalgias. Negative for falls and neck pain. Skin: Negative. Neurological: Negative for dizziness, tingling, seizures, loss of consciousness, weakness and headaches. Endo/Heme/Allergies: Does not bruise/bleed easily. Psychiatric/Behavioral: Negative for depression. The patient has insomnia. The patient is not nervous/anxious. All other systems reviewed and are negative. Physical Exam   Constitutional: He is oriented to person, place, and time and well-developed, well-nourished, and in no distress. No distress. HENT:   Head: Normocephalic and atraumatic. Eyes: EOM are normal.   Pulmonary/Chest: Effort normal.   Neurological: He is alert and oriented to person, place, and time. Skin: Skin is dry. No rash noted. No erythema. Psychiatric: Mood, memory, affect and judgment normal.   Nursing note and vitals reviewed. ASSESSMENT:    1. Postlaminectomy syndrome, lumbar region    2. Lumbar neuritis    3. Chronic pain syndrome    4. Difficulty sleeping    5. Cervical dystonia    6. DDD (degenerative disc disease), cervical    7. Chronic pain of right knee    8. Brachial neuritis or radiculitis    9. Encounter for long-term (current) use of high-risk medication         [place] Prescription Monitoring Program was reviewed which does not demonstrate aberrancies and/or inconsistencies with regard to the historical prescribing of controlled medications to this patient by other providers.     PLAN / Pt Instructions:  1. Continue current plan with no evidence of addiction or diversion. Stable on current medication without adverse events. 2. Refill morphine ER 15 mg every 12 hours. Plans to start tapering next visit. 3. Refill hydrocodone 10/325 mg. Please take half to 1 tablet up to 4 times daily as needed  4. Refill and follow tapering plan as discussed regarding Ambien. 5. Naloxone 4 mg nasal spray for opioid induced respiratory depression emergency only. 6. Discussed risks of addiction, dependency, and opioid induced hyperalgesia. 7. Please remember to call at least 4-5 business days prior to your medication refill. 8. Return to clinic in 3 months. Please call and cancel your appointment and pain management agreement if you do decide to transfer your care. Medications Ordered Today   Medications    morphine CR (MS CONTIN) 15 mg CR tablet     Sig: Take 1 Tab by mouth every twelve (12) hours. Max Daily Amount: 30 mg. Dispense:  60 Tab     Refill:  0    HYDROcodone-acetaminophen (NORCO)  mg tablet     Sig: Take 1 Tab by mouth four (4) times daily for 30 days. Max Daily Amount: 4 Tabs. As needed for breakthrough pain  Indications: Pain     Dispense:  120 Tab     Refill:  0    morphine CR (MS CONTIN) 15 mg CR tablet     Sig: Take 1 Tab by mouth every twelve (12) hours for 30 days. Max Daily Amount: 30 mg. Dispense:  60 Tab     Refill:  0    HYDROcodone-acetaminophen (NORCO)  mg tablet     Sig: Take 1 Tab by mouth four (4) times daily for 30 days. Max Daily Amount: 4 Tabs. As needed for breakthrough pain  Indications: Pain     Dispense:  120 Tab     Refill:  0    morphine CR (MS CONTIN) 15 mg CR tablet     Sig: Take 1 Tab by mouth every twelve (12) hours for 30 days. Max Daily Amount: 30 mg. Dispense:  60 Tab     Refill:  0    HYDROcodone-acetaminophen (NORCO)  mg tablet     Sig: Take 1 Tab by mouth four (4) times daily for 30 days. Max Daily Amount: 4 Tabs. As needed for breakthrough pain  Indications: Pain     Dispense:  120 Tab     Refill:  0    zolpidem (AMBIEN) 5 mg tablet     Sig: Take 1 tablet nightly as needed times 2 weeks, then half tablet nightly as needed times 1 week then discontinue     Dispense:  18 Tab     Refill:  0         DISPOSITION     Pain medications are prescribed with the objective of pain relief and improved physical and psychosocial function in this patient.  Pain Meds and Quality Of Life have been reviewed. Nonpharmacologic therapy and non-opioid pharmacologic therapy have been considered. Opioid therapy is only prescribed if the expected benefits are anticipated to outweigh risks.  Counseled patient on proper use of prescribed medications.  Reviewed with patient self-help tools, home exercise, and lifestyle changes to assist the patient in self-management of symptoms.  Reviewed with patient the treatment plan, goals of treatment plan, and limitations of treatment plan, to include the potential for side effects from medications and procedures. If side effects occur, it is the responsibility of the patient to inform the clinic so that a change in the treatment plan can be made in a safe manner. The patient is advised that stopping prescribed medication may cause an increase in symptoms and possible medication withdrawal symptoms. The patient is informed an emergency room evaluation may be necessary if this occurs. Spent 25 minutes with patient today which more than 50% of that time was spent on counseling and coordination of care. Raheel Hopper 5/31/2018        Note: Please excuse any typographical errors. Voice recognition software was used for this note and may cause mistakes.

## 2018-05-31 NOTE — PROGRESS NOTES
Nursing Notes    Patient presents to the office today in follow-up. Patient rates his pain at 6/10 on the numerical pain scale. Reviewed medications with counts as follows:    Rx Date filled Qty Dispensed Pill Count Last Dose Short   norco  mg 05/15/18 120 42 today Yes 3 days   ambien 10 mg 04/25/18 30 0 2 days ago no   Morphine ER 15 mg 05/15/18 60 23 today no                          Comments:     POC UDS was performed in office today    Any new labs or imaging since last appointment? NO    Have you been to an emergency room (ER) or urgent care clinic since your last visit? NO            Have you been hospitalized since your last visit? NO     If yes, where, when, and reason for visit? Have you seen or consulted any other health care providers outside of the [address] [place] [address]  since your last visit? NO     If yes, where, when, and reason for visit? Mr. Minh Sarabia has a reminder for a \"due or due soon\" health maintenance. I have asked that he contact his primary care provider for follow-up on this health maintenance.

## 2018-05-31 NOTE — MR AVS SNAPSHOT
[address] [place] [address] 
[phone] Patient: Corinne Housekeeper MRN: [id number] PFA:[id number] Visit Information Date & Time Provider Department Dept. Phone Encounter #  
 5/31/2018  8:20 AM Radha Ellis, [facility] [phone] [id number] Follow-up Instructions Return in about 3 months (around 8/31/2018). Your Appointments 8/24/2018  2:30 PM  
Office Visit with Merlinda Posner, MD  
Internists of [address] [place] [address]) Appt Note: 6 month labs at [facility] [address], [address]  
  
   
 [address], [address] Upcoming Health Maintenance Date Due DTaP/Tdap/Td series (1 - Tdap) 3/24/1982 COLONOSCOPY 8/22/2018 Influenza Age 5 to Adult 8/1/2018 MICROALBUMIN Q1 8/7/2018 HEMOGLOBIN A1C Q6M 8/17/2018 EYE EXAM RETINAL OR DILATED Q1 8/24/2018 LIPID PANEL Q1 2/17/2019 FOOT EXAM Q1 2/20/2019 Allergies as of 5/31/2018  Review Complete On: 5/31/2018 By: LUCILLE Pereira No Known Allergies Current Immunizations  Reviewed on 2/20/2018 Name Date Influenza Vaccine 10/1/2017, 10/1/2016, 1/27/2016 Influenza Vaccine PF 3/13/2015 Influenza Vaccine Whole 12/9/2010 Pneumococcal Polysaccharide (PPSV-23) 8/14/2017 Not reviewed this visit You Were Diagnosed With   
  
 Codes Comments Postlaminectomy syndrome, lumbar region    -  Primary ICD-10-CM: M96.1 ICD-9-CM: 722.83 Lumbar neuritis     ICD-10-CM: M54.16 
ICD-9-CM: 724.4 Chronic pain syndrome     ICD-10-CM: G89.4 ICD-9-CM: 338.4 Difficulty sleeping     ICD-10-CM: G47.9 ICD-9-CM: 780.50 Cervical dystonia     ICD-10-CM: G24.3 ICD-9-CM: 333.83 DDD (degenerative disc disease), cervical     ICD-10-CM: M50.30 ICD-9-CM: 722.4 Chronic pain of right knee     ICD-10-CM: M25.561, G89.29 ICD-9-CM: 719.46, 338.29 Brachial neuritis or radiculitis     ICD-10-CM: M54.12 
ICD-9-CM: 723.4 Encounter for long-term (current) use of high-risk medication     ICD-10-CM: Z79.899 ICD-9-CM: V58.69 Vitals BP Pulse Temp Resp Height(growth percentile) Weight(growth percentile) 170/90 (BP 1 Location: Right arm, BP Patient Position: Sitting) 93 97.4 °F (36.3 °C) (Oral) 16 6' 1\" (1.854 m) 253 lb (114.8 kg) BMI Smoking Status 33.38 kg/m2 Current Every Day Smoker Vitals History BMI and BSA Data Body Mass Index Body Surface Area  
 33.38 kg/m 2 2.43 m 2 Preferred Pharmacy Pharmacy Name Phone [address], [address] [place] [address] [phone] Your Updated Medication List  
  
   
This list is accurate as of 5/31/18  9:12 AM.  Always use your most recent med list.  
  
  
  
  
 atorvastatin 40 mg tablet Commonly known as:  LIPITOR  
take 1 tablet by mouth daily buPROPion  mg SR tablet Commonly known as:  Teresa Koko Take 1 Tab by mouth two (2) times a day. fenofibrate nanocrystallized 145 mg tablet Commonly known as:  TRICOR  
take 1 tablet by mouth once daily * HYDROcodone-acetaminophen  mg tablet Commonly known as:  Jordyn Dimes Take 1 Tab by mouth four (4) times daily for 30 days. Max Daily Amount: 4 Tabs. As needed for breakthrough pain  Indications: Pain Start taking on:  6/14/2018  
  
 * HYDROcodone-acetaminophen  mg tablet Commonly known as:  Jordyn Dimes Take 1 Tab by mouth four (4) times daily for 30 days. Max Daily Amount: 4 Tabs. As needed for breakthrough pain  Indications: Pain Start taking on:  7/13/2018  
  
 * HYDROcodone-acetaminophen  mg tablet Commonly known as:  Jordyn Dimes Take 1 Tab by mouth four (4) times daily for 30 days.  Max Daily Amount: 4 Tabs. As needed for breakthrough pain  Indications: Pain Start taking on:  8/12/2018  
  
 lisinopril-hydroCHLOROthiazide 20-12.5 mg per tablet Commonly known as:  PRINZIDE, ZESTORETIC  
take 2 tablets by mouth daily  
  
 meloxicam 15 mg tablet Commonly known as:  MOBIC  
take 1 tablet by mouth once daily  
  
 metFORMIN 1,000 mg tablet Commonly known as:  GLUCOPHAGE  
take 1 tablet by mouth twice a day with meals * morphine CR 15 mg CR tablet Commonly known as:  MS CONTIN Take 1 Tab by mouth every twelve (12) hours. Max Daily Amount: 30 mg. Start taking on:  6/14/2018 * morphine CR 15 mg CR tablet Commonly known as:  MS CONTIN Take 1 Tab by mouth every twelve (12) hours for 30 days. Max Daily Amount: 30 mg. Start taking on:  7/13/2018 * morphine CR 15 mg CR tablet Commonly known as:  MS CONTIN Take 1 Tab by mouth every twelve (12) hours for 30 days. Max Daily Amount: 30 mg. Start taking on:  8/12/2018 MULTIVITAMIN PO Take  by mouth.  
  
 naloxone 4 mg/actuation nasal spray Commonly known as:  NARCAN  
4 mg by Nasal route once as needed (opioid overdose) for up to 1 dose. May substitute 2 mg syringe if insurance requires  
  
 omeprazole 20 mg capsule Commonly known as:  PRILOSEC Take 20 mg by mouth daily. tamsulosin 0.4 mg capsule Commonly known as:  FLOMAX Take 1 Cap by mouth daily. varenicline 0.5 mg (11)- 1 mg (42) Dspk Commonly known as:  CHANTIX STARTER LAURA Take 0.5 mg by mouth daily (after breakfast). VITAMIN C 1,000 mg tablet Generic drug:  ascorbic acid (vitamin C) Take 1,000 mg by mouth two (2) times a day. VITAMIN D2 50,000 unit capsule Generic drug:  ergocalciferol  
take 1 capsule by mouth TWO TIMES A WEEK  
  
 zolpidem 5 mg tablet Commonly known as:  AMBIEN Take 1 tablet nightly as needed times 2 weeks, then half tablet nightly as needed times 1 week then discontinue * Notice: This list has 6 medication(s) that are the same as other medications prescribed for you. Read the directions carefully, and ask your doctor or other care provider to review them with you. Prescriptions Printed Refills  
 morphine CR (MS CONTIN) 15 mg CR tablet 0 Starting on: 6/14/2018 Sig: Take 1 Tab by mouth every twelve (12) hours. Max Daily Amount: 30 mg.  
 Class: Print Route: Oral  
 HYDROcodone-acetaminophen (NORCO)  mg tablet 0 Starting on: 6/14/2018 Sig: Take 1 Tab by mouth four (4) times daily for 30 days. Max Daily Amount: 4 Tabs. As needed for breakthrough pain  Indications: Pain Class: Print Route: Oral  
 morphine CR (MS CONTIN) 15 mg CR tablet 0 Starting on: 7/13/2018 Sig: Take 1 Tab by mouth every twelve (12) hours for 30 days. Max Daily Amount: 30 mg.  
 Class: Print Route: Oral  
 HYDROcodone-acetaminophen (NORCO)  mg tablet 0 Starting on: 7/13/2018 Sig: Take 1 Tab by mouth four (4) times daily for 30 days. Max Daily Amount: 4 Tabs. As needed for breakthrough pain  Indications: Pain Class: Print Route: Oral  
 morphine CR (MS CONTIN) 15 mg CR tablet 0 Starting on: 8/12/2018 Sig: Take 1 Tab by mouth every twelve (12) hours for 30 days. Max Daily Amount: 30 mg.  
 Class: Print Route: Oral  
 HYDROcodone-acetaminophen (NORCO)  mg tablet 0 Starting on: 8/12/2018 Sig: Take 1 Tab by mouth four (4) times daily for 30 days. Max Daily Amount: 4 Tabs. As needed for breakthrough pain  Indications: Pain Class: Print Route: Oral  
 zolpidem (AMBIEN) 5 mg tablet 0 Sig: Take 1 tablet nightly as needed times 2 weeks, then half tablet nightly as needed times 1 week then discontinue Class: Print We Performed the Following AMB POC DRUG SCREEN () [ [facility]] DRUG SCREEN [[id number] Custom] Follow-up Instructions Return in about 3 months (around 8/31/2018). Patient Instructions 1. Continue current plan with no evidence of addiction or diversion. Stable on current medication without adverse events. 2. Refill morphine ER 15 mg every 12 hours. Plans to start tapering next visit. 3. Refill hydrocodone 10/325 mg. Please take half to 1 tablet up to 4 times daily as needed 4. Refill and follow tapering plan as discussed regarding Ambien. 5. Naloxone 4 mg nasal spray for opioid induced respiratory depression emergency only. 6. Discussed risks of addiction, dependency, and opioid induced hyperalgesia. 7. Please remember to call at least 4-5 business days prior to your medication refill. 8. Return to clinic in 3 months. Please call and cancel your appointment and pain management agreement if you do decide to transfer your care. Introducing [facility] SERVICES! Dear Nadira Macdonald: 
Thank you for requesting a [employer] account. Our records indicate that you already have an active CallAround account. You can access your account anytime at [email or web address] Did you know that you can access your hospital and ER discharge instructions at any time in CallAround? You can also review all of your test results from your hospital stay or ER visit. Additional Information If you have questions, please visit the Frequently Asked Questions section of the CallAround website at [email or web address] Remember, CallAround is NOT to be used for urgent needs. For medical emergencies, dial 911. Now available from your iPhone and Android! Please provide this summary of care documentation to your next provider. Your primary care clinician is listed as Mouna Martin. If you have any questions after today's visit, please call [phone].

## 2018-05-31 NOTE — PATIENT INSTRUCTIONS
1. Continue current plan with no evidence of addiction or diversion. Stable on current medication without adverse events. 2. Refill morphine ER 15 mg every 12 hours. Plans to start tapering next visit. 3. Refill hydrocodone 10/325 mg. Please take half to 1 tablet up to 4 times daily as needed  4. Refill and follow tapering plan as discussed regarding Ambien. 5. Naloxone 4 mg nasal spray for opioid induced respiratory depression emergency only. 6. Discussed risks of addiction, dependency, and opioid induced hyperalgesia. 7. Please remember to call at least 4-5 business days prior to your medication refill. 8. Return to clinic in 3 months. Please call and cancel your appointment and pain management agreement if you do decide to transfer your care.

## 2018-06-24 RX ORDER — ATORVASTATIN CALCIUM 40 MG/1
TABLET, FILM COATED ORAL
Qty: 30 TAB | Refills: 12 | Status: SHIPPED | OUTPATIENT
Start: 2018-06-24 | End: 2019-06-25 | Stop reason: SDUPTHER

## 2018-07-05 ENCOUNTER — TELEPHONE (OUTPATIENT)
Dept: PAIN MANAGEMENT | Age: 58
End: 2018-07-05

## 2018-07-05 NOTE — TELEPHONE ENCOUNTER
Returned call re script refills - left vm. Asked pt to anwer the 4 A's in order for the scripts to be refilled. Explained once the questions are answered we can forward the request for refills to [facility], [facility] - will not be back in the office until Monday 7/9/18.

## 2018-07-05 NOTE — TELEPHONE ENCOUNTER
Patrick Zamora has called requesting a refill of their controlled medication, morphine sulfate er and hydrocodone/apap, for the management of back pain. Last office visit date: 2/31/18    Date last  was pulled and reviewed : 7/5/18    Was the patient compliant when the above report was pulled? yes    Analgesia: 70% with current regimen    Aberrancies: None within the past 30 days    ADL's: Able to complete    Adverse Reaction: none    Provider's last note and plan of care reviewed? Yes    Request forwarded to provider for review.

## 2018-07-06 ENCOUNTER — DOCUMENTATION ONLY (OUTPATIENT)
Dept: PAIN MANAGEMENT | Age: 58
End: 2018-07-06

## 2018-07-06 NOTE — TELEPHONE ENCOUNTER
Patrick Ibarramstead has called requesting a refill of their controlled medication, Norco  mg and Morphine 15 mg, for the management of Postlaminectomy syndrome, lumbar region. Last office visit date: 5/31/18    Date last  was pulled and reviewed : 7/6/18 and compliant. Last filled 6/14/18    Was the patient compliant when the above report was pulled? yes    Analgesia: Patient report 80% of pain relief with current medication regimen    Aberrancies: No aberrancies in the last 30 days. ADL's: Patient report he is able to do basic ADL's at home. Adverse Reaction:Patient report no adverse reaction. Provider's last note and plan of care reviewed? yes  Request forwarded to provider for review.

## 2018-07-29 RX ORDER — ERGOCALCIFEROL 1.25 MG/1
CAPSULE ORAL
Qty: 13 CAP | Refills: 4 | Status: SHIPPED | OUTPATIENT
Start: 2018-07-29 | End: 2019-05-03

## 2018-08-09 ENCOUNTER — TELEPHONE (OUTPATIENT)
Dept: INTERNAL MEDICINE CLINIC | Age: 58
End: 2018-08-09

## 2018-08-09 DIAGNOSIS — G89.4 CHRONIC PAIN SYNDROME: Primary | ICD-10-CM

## 2018-08-09 NOTE — TELEPHONE ENCOUNTER
Patient has been going to [facility]. He has been having a hard times getting his meds. He left messages mon and weds and has not gotten a response. His meds will run out on Sunday. He was taking Ambien and that was the only way he was getting any sleep. They took him off that medication for no reason. He is at a loss. He is looking for some advice because he needs his meds.

## 2018-08-09 NOTE — TELEPHONE ENCOUNTER
The plan was very clearly outlined by LUCILLE Jerez at their last visit in May if you read the note  He was told they would be tapering his meds - they're doing this to ALL their patients as it's a philosophy/practice change    This means eventually getting him off all pain meds  Suggest he keep calling their office  He's supposed to have an appt 3 mos from last visit (which is this month august)  We will NOT be refilling his pain meds in our office    He will need to establish with a pain mgmt clinic if he wishes to continue pain meds  Suggest Dr Claudette Santiago appt - schedule if he's interested

## 2018-08-10 ENCOUNTER — TELEPHONE (OUTPATIENT)
Dept: PAIN MANAGEMENT | Age: 58
End: 2018-08-10

## 2018-08-10 NOTE — TELEPHONE ENCOUNTER
Spoke with patient he has an appointment in 1 week with LUCILLE Perrin. They are going to give him the refills and he is okay with going to see .

## 2018-08-10 NOTE — TELEPHONE ENCOUNTER
Mansfieldmiguel a Hansen has called requesting a refill of their controlled medication, morphine sulfate ER and norco, for the management of his chronic neck and back pain. Last office visit date: 05/31/18    Date last  was pulled and reviewed : 08/10/18, last fill date for his morphine sulfate ER and norco was 07/13/18    Was the patient compliant when the above report was pulled? yes    Analgesia: pt reports an 80% pain relief with his current opioid medication regimen     Aberrancies: none noted    ADL's: pt reports being able to perform his normal daily duties while taking his medication     Adverse Reaction: none reported by pt at this time. Provider's last note and plan of care reviewed? Yes, pre-printed prescription. Provider gives the verbal order for the pt to come by the office to  his prescriptions. The order was RBV'd  Request forwarded to provider for review.

## 2018-08-20 DIAGNOSIS — E11.9 CONTROLLED TYPE 2 DIABETES MELLITUS WITHOUT COMPLICATION, WITHOUT LONG-TERM CURRENT USE OF INSULIN (HCC): ICD-10-CM

## 2018-08-21 DIAGNOSIS — E11.9 CONTROLLED TYPE 2 DIABETES MELLITUS WITHOUT COMPLICATION, WITHOUT LONG-TERM CURRENT USE OF INSULIN (HCC): ICD-10-CM

## 2018-08-22 DIAGNOSIS — E55.9 HYPOVITAMINOSIS D: ICD-10-CM

## 2018-08-22 DIAGNOSIS — E11.9 CONTROLLED TYPE 2 DIABETES MELLITUS WITHOUT COMPLICATION, WITHOUT LONG-TERM CURRENT USE OF INSULIN (HCC): ICD-10-CM

## 2018-08-28 RX ORDER — METFORMIN HYDROCHLORIDE 1000 MG/1
TABLET ORAL
Qty: 180 TAB | Refills: 3 | Status: SHIPPED | OUTPATIENT
Start: 2018-08-28 | End: 2019-09-04 | Stop reason: SDUPTHER

## 2018-09-10 ENCOUNTER — OFFICE VISIT (OUTPATIENT)
Dept: PAIN MANAGEMENT | Age: 58
End: 2018-09-10

## 2018-09-10 VITALS
HEART RATE: 76 BPM | WEIGHT: 246.6 LBS | TEMPERATURE: 98.6 F | DIASTOLIC BLOOD PRESSURE: 77 MMHG | RESPIRATION RATE: 16 BRPM | HEIGHT: 72 IN | BODY MASS INDEX: 33.4 KG/M2 | SYSTOLIC BLOOD PRESSURE: 147 MMHG

## 2018-09-10 DIAGNOSIS — G89.4 CHRONIC PAIN SYNDROME: ICD-10-CM

## 2018-09-10 DIAGNOSIS — G89.29 CHRONIC PAIN OF RIGHT KNEE: ICD-10-CM

## 2018-09-10 DIAGNOSIS — M25.561 CHRONIC PAIN OF RIGHT KNEE: ICD-10-CM

## 2018-09-10 DIAGNOSIS — M96.1 POSTLAMINECTOMY SYNDROME, LUMBAR REGION: ICD-10-CM

## 2018-09-10 DIAGNOSIS — G24.3 CERVICAL DYSTONIA: ICD-10-CM

## 2018-09-10 DIAGNOSIS — M54.12 BRACHIAL NEURITIS OR RADICULITIS: ICD-10-CM

## 2018-09-10 DIAGNOSIS — M54.16 LUMBAR NEURITIS: ICD-10-CM

## 2018-09-10 DIAGNOSIS — M50.30 DDD (DEGENERATIVE DISC DISEASE), CERVICAL: ICD-10-CM

## 2018-09-10 RX ORDER — HYDROCODONE BITARTRATE AND ACETAMINOPHEN 10; 325 MG/1; MG/1
1 TABLET ORAL
Qty: 120 TAB | Refills: 0 | Status: SHIPPED | OUTPATIENT
Start: 2018-10-09 | End: 2018-09-17 | Stop reason: SDUPTHER

## 2018-09-10 RX ORDER — HYDROCODONE BITARTRATE AND ACETAMINOPHEN 10; 325 MG/1; MG/1
1 TABLET ORAL
Qty: 120 TAB | Refills: 0 | Status: SHIPPED | OUTPATIENT
Start: 2018-11-08 | End: 2018-09-17 | Stop reason: SDUPTHER

## 2018-09-10 RX ORDER — HYDROCODONE BITARTRATE AND ACETAMINOPHEN 10; 325 MG/1; MG/1
1 TABLET ORAL
Qty: 120 TAB | Refills: 0 | Status: SHIPPED | OUTPATIENT
Start: 2018-09-10 | End: 2018-10-10

## 2018-09-10 RX ORDER — MORPHINE SULFATE 15 MG/1
15 TABLET, FILM COATED, EXTENDED RELEASE ORAL DAILY
Qty: 30 TAB | Refills: 0 | Status: SHIPPED | OUTPATIENT
Start: 2018-09-10 | End: 2018-09-17 | Stop reason: SDUPTHER

## 2018-09-10 NOTE — MR AVS SNAPSHOT
[address] [facility] [address] 
[phone] Patient: Sivakumar Graham MRN: [id number] UCA:[id number]/[id number] Visit Information Date & Time Provider Department Dept. Phone Encounter #  
 9/10/2018  3:00 PM Jorja Crigler, [facility] [address] for Pain Management [phone] [id number] Follow-up Instructions Return in about 3 months (around 12/10/2018). Your Appointments 9/17/2018  2:45 PM  
Office Visit with Andrew Dia MD  
Internists of [address] [place] [address]) Appt Note: 6 month labs at [facility]; PT R/S FROM 8/24/18; Rescheduling Appointment From 09/10/2018  
 [address] [facility], [address] [facility] [address]  
  
   
 [address], [address] Upcoming Health Maintenance Date Due DTaP/Tdap/Td series (1 - Tdap) 3/24/1982 Influenza Age 5 to Adult 8/1/2018 MICROALBUMIN Q1 8/7/2018 HEMOGLOBIN A1C Q6M 8/17/2018 COLONOSCOPY 8/22/2018 EYE EXAM RETINAL OR DILATED Q1 8/24/2018 LIPID PANEL Q1 2/17/2019 FOOT EXAM Q1 2/20/2019 Allergies as of 9/10/2018  Review Complete On: 9/10/2018 By: Jorja Crigler, PA No Known Allergies Current Immunizations  Reviewed on 2/20/2018 Name Date Influenza Vaccine 10/1/2017, 10/1/2016, 1/27/2016 Influenza Vaccine PF 3/13/2015 Influenza Vaccine Whole 12/9/2010 Pneumococcal Polysaccharide (PPSV-23) 8/14/2017 Not reviewed this visit You Were Diagnosed With   
  
 Codes Comments Lumbar neuritis     ICD-10-CM: M54.16 
ICD-9-CM: 724.4 Postlaminectomy syndrome, lumbar region     ICD-10-CM: M96.1 ICD-9-CM: 722.83 Chronic pain syndrome     ICD-10-CM: G89.4 ICD-9-CM: 338.4 Brachial neuritis or radiculitis     ICD-10-CM: M54.12 
ICD-9-CM: 723.4 Cervical dystonia     ICD-10-CM: G24.3 ICD-9-CM: 333.83   
 DDD (degenerative disc disease), cervical     ICD-10-CM: M50.30 ICD-9-CM: 722.4 Chronic pain of right knee     ICD-10-CM: M25.561, G89.29 ICD-9-CM: 719.46, 338.29 Vitals BP Pulse Temp Resp Height(growth percentile) Weight(growth percentile) 147/77 (BP 1 Location: Left arm, BP Patient Position: Sitting) 76 98.6 °F (37 °C) 16 6' (1.829 m) 246 lb 9.6 oz (111.9 kg) BMI Smoking Status 33.44 kg/m2 Current Every Day Smoker Vitals History BMI and BSA Data Body Mass Index Body Surface Area  
 33.44 kg/m 2 2.38 m 2 Preferred Pharmacy Pharmacy Name Phone [address], [address] [place] [address] [phone] Your Updated Medication List  
  
   
This list is accurate as of 9/10/18  4:22 PM.  Always use your most recent med list.  
  
  
  
  
 atorvastatin 40 mg tablet Commonly known as:  LIPITOR  
take 1 tablet by mouth once daily buPROPion  mg SR tablet Commonly known as:  Evaline Milton Take 1 Tab by mouth two (2) times a day. fenofibrate nanocrystallized 145 mg tablet Commonly known as:  TRICOR  
take 1 tablet by mouth once daily * HYDROcodone-acetaminophen  mg tablet Commonly known as:  Ramesh Solis Take 1 Tab by mouth four (4) times daily as needed for Pain for up to 30 days. Max Daily Amount: 4 Tabs. As needed for breakthrough pain  Indications: Pain  
  
 * HYDROcodone-acetaminophen  mg tablet Commonly known as:  Ramesh Solis Take 1 Tab by mouth four (4) times daily as needed for Pain for up to 30 days. Max Daily Amount: 4 Tabs. As needed for breakthrough pain  Indications: Pain Start taking on:  10/9/2018  
  
 * HYDROcodone-acetaminophen  mg tablet Commonly known as:  Ramesh Solis Take 1 Tab by mouth four (4) times daily as needed for Pain for up to 30 days. Max Daily Amount: 4 Tabs. As needed for breakthrough pain  Indications: Pain Start taking on:  11/8/2018 lisinopril-hydroCHLOROthiazide 20-12.5 mg per tablet Commonly known as:  PRINZIDE, ZESTORETIC  
take 2 tablets by mouth daily  
  
 meloxicam 15 mg tablet Commonly known as:  MOBIC  
take 1 tablet by mouth once daily  
  
 metFORMIN 1,000 mg tablet Commonly known as:  GLUCOPHAGE  
TAKE 1 TABLET BY MOUTH TWO TIMES A DAY WITH MEALS  
  
 * morphine CR 15 mg CR tablet Commonly known as:  MS CONTIN Take 1 Tab by mouth every twelve (12) hours. Max Daily Amount: 30 mg.  
  
 * morphine CR 15 mg CR tablet Commonly known as:  MS CONTIN Take 1 Tab by mouth daily for 30 days. Max Daily Amount: 15 mg. MULTIVITAMIN PO Take  by mouth.  
  
 naloxone 4 mg/actuation nasal spray Commonly known as:  NARCAN  
4 mg by Nasal route once as needed (opioid overdose) for up to 1 dose. May substitute 2 mg syringe if insurance requires  
  
 omeprazole 20 mg capsule Commonly known as:  PRILOSEC Take 20 mg by mouth daily. tamsulosin 0.4 mg capsule Commonly known as:  FLOMAX Take 1 Cap by mouth daily. varenicline 0.5 mg (11)- 1 mg (42) Dspk Commonly known as:  CHANTIX STARTER LAURA Take 0.5 mg by mouth daily (after breakfast). VITAMIN C 1,000 mg tablet Generic drug:  ascorbic acid (vitamin C) Take 1,000 mg by mouth two (2) times a day. VITAMIN D2 50,000 unit capsule Generic drug:  ergocalciferol  
take 1 capsule by mouth two times a week  
  
 zolpidem 5 mg tablet Commonly known as:  AMBIEN Take 1 tablet nightly as needed times 2 weeks, then half tablet nightly as needed times 1 week then discontinue * Notice: This list has 5 medication(s) that are the same as other medications prescribed for you. Read the directions carefully, and ask your doctor or other care provider to review them with you. Prescriptions Printed Refills  HYDROcodone-acetaminophen (NORCO)  mg tablet 0  
 Sig: Take 1 Tab by mouth four (4) times daily as needed for Pain for up to 30 days. Max Daily Amount: 4 Tabs. As needed for breakthrough pain  Indications: Pain Class: Print Route: Oral  
 morphine CR (MS CONTIN) 15 mg CR tablet 0 Sig: Take 1 Tab by mouth daily for 30 days. Max Daily Amount: 15 mg.  
 Class: Print Route: Oral  
 HYDROcodone-acetaminophen (NORCO)  mg tablet 0 Starting on: 10/9/2018 Sig: Take 1 Tab by mouth four (4) times daily as needed for Pain for up to 30 days. Max Daily Amount: 4 Tabs. As needed for breakthrough pain  Indications: Pain Class: Print Route: Oral  
 HYDROcodone-acetaminophen (NORCO)  mg tablet 0 Starting on: 11/8/2018 Sig: Take 1 Tab by mouth four (4) times daily as needed for Pain for up to 30 days. Max Daily Amount: 4 Tabs. As needed for breakthrough pain  Indications: Pain Class: Print Route: Oral  
  
Follow-up Instructions Return in about 3 months (around 12/10/2018). Patient Instructions 1. Modify current plan with no evidence of addiction or diversion. Stable on current medication without adverse events. 2. Refill and adjust morphine ER 15 mg down to once daily ×1 month, then discontinue. 3. Refill hydrocodone 10/325 mg. Please take half to 1 tablet up to 4 times daily as needed 4. Please follow-up with your PCP to discuss treatment for sleep. Please remember to avoid any benzodiazepines while you are concurrently using opioid medication. 5. Naloxone 4 mg nasal spray for opioid induced respiratory depression emergency only. 6. Discussed risks of addiction, dependency, and opioid induced hyperalgesia. 7. Please remember to call at least 5 business days prior to your medication refill. 8. Return to clinic in 3 months. Please call and cancel your appointment and pain management agreement if you do decide to transfer your care. Introducing [facility]! Dear Dominique Bowles: Thank you for requesting a [employer] account. Our records indicate that you already have an active [employer] account. You can access your account anytime at [email or web address] Did you know that you can access your hospital and ER discharge instructions at any time in [employer]? You can also review all of your test results from your hospital stay or ER visit. Additional Information If you have questions, please visit the Frequently Asked Questions section of the [employer] website at [email or web address] Remember, [employer] is NOT to be used for urgent needs. For medical emergencies, dial 911. Now available from your iPhone and Android! Please provide this summary of care documentation to your next provider. Your primary care clinician is listed as Wenceslao Closs. If you have any questions after today's visit, please call [phone].

## 2018-09-10 NOTE — PROGRESS NOTES
HISTORY OF PRESENT ILLNESS  Padmini Rosario is a 63 y.o. male    HPI: Mr. Keshawn Hardy  returns today for f/u of chronic pain due to low back pain due to postlaminectomy syndrome and left hip pain. He also suffers with chronic neck pain due to cervical dystonia. Mr. Keshawn Hardy continues unchanged since last visit. He continues to do well with his current treatment plan which has been offering significant pain control. We had a lengthy conversation previously regarding changes to our practice. We will start tapering his morphine. Please see tapering plan below. He will continue with his hydrocodone to use on an as-needed basis only. He does understand that we will begin tapering his hydrocodone next visit. We tapered and discontinued his Ambien last visit. I have asked him to follow-up with his PCP to discuss further treatment for sleep. He was reminded to avoid benzodiazepines with his current treatment plan. I will have him follow-up in 3 months for further evaluation and recommendation or sooner if needed. Current medication management includes Morphine ER 15 mg q 12 hours and hydrocodone 10/325 mg 4 times daily as needed. Chantix prescribed from outside provider for smoking cessation. Medications are helping with pain control and quality of life. His pain is 4-5/10 with medication and 6-7/10 without. Pt describes pain as aching and stabbing. Aggravating factors include activity. Relieved with rest, medication, and avoiding painful activities. Current treatment is helping to improve general activity, mood, sleep, and enjoyment of life    Mr. Keshawn Hardy is tolerating medications well, with no side effects noted. He is able to stay more active with less discomfort with these current doses. The patient reports an average of 30% pain relief with current treatment/medications.    He is informed of side effects, risks, and benefits of this regimen, and emphasizes that he derives a significant improvement in functionality and quality of life, and notes that non-opioid medications and therapies in the past have not offered significant benefit. He  is otherwise doing well with no other complaints today. He denies any adverse events including nausea, vomiting, dizziness, increased constipation, hallucinations, or seizures. Because the patient's current regimen places him/her at increased risk for possible overdose, a prescription for naloxone nasal spray has been provided. The patient understands that this medication is only to be used in the setting of a possible overdose and that inadvertent use of this medication could precipitate overt withdrawal.     MME: 70  COMM: 2  OSWESTRY: 30 %  ORT: 0  PMA updated: 5/31/2018  Last UDS reviewed. No Known Allergies    Past Surgical History:   Procedure Laterality Date    HX BACK SURGERY      HX COLONOSCOPY      Dr Chun Rojas 8/22/08    HX ORTHOPAEDIC  12/06    left shoulder repair/rotator cuff    HX ORTHOPAEDIC      DEXA t score -1.0 spine, -0.4 hip (1/14) Dr. Carmen Loera HX OTHER SURGICAL  7/02    L5-S1 hemilaminectomy and diskectomy    NEUROLOGICAL PROCEDURE UNLISTED  4/13    MRI pituitary normal    AR ANESTH,SURGERY OF SHOULDER           Review of Systems   Constitutional: Negative for chills and fever. HENT: Negative for congestion and sore throat. Eyes: Negative for blurred vision and double vision. Respiratory: Negative for cough, shortness of breath and wheezing. Cardiovascular: Negative for chest pain, palpitations and leg swelling. Gastrointestinal: Negative for abdominal pain, constipation, diarrhea, heartburn, nausea and vomiting. Genitourinary: Negative. Musculoskeletal: Positive for back pain, joint pain and myalgias. Negative for falls and neck pain. Skin: Negative. Neurological: Negative for dizziness, tingling, seizures, loss of consciousness, weakness and headaches. Endo/Heme/Allergies: Does not bruise/bleed easily. Psychiatric/Behavioral: Negative for depression. The patient has insomnia. The patient is not nervous/anxious. All other systems reviewed and are negative. Physical Exam   Constitutional: He is oriented to person, place, and time and well-developed, well-nourished, and in no distress. No distress. HENT:   Head: Normocephalic and atraumatic. Eyes: EOM are normal.   Pulmonary/Chest: Effort normal.   Neurological: He is alert and oriented to person, place, and time. Skin: Skin is dry. No rash noted. No erythema. Psychiatric: Mood, memory, affect and judgment normal.   Nursing note and vitals reviewed. ASSESSMENT:    1. Lumbar neuritis    2. Postlaminectomy syndrome, lumbar region    3. Chronic pain syndrome    4. Brachial neuritis or radiculitis    5. Cervical dystonia    6. DDD (degenerative disc disease), cervical    7. Chronic pain of right knee         [place] Prescription Monitoring Program was reviewed which does not demonstrate aberrancies and/or inconsistencies with regard to the historical prescribing of controlled medications to this patient by other providers. PLAN / Pt Instructions:  1. Modify current plan with no evidence of addiction or diversion. Stable on current medication without adverse events. 2. Refill and adjust morphine ER 15 mg down to once daily ×1 month, then discontinue. 3. Refill hydrocodone 10/325 mg. Please take half to 1 tablet up to 4 times daily as needed  4. Please follow-up with your PCP to discuss treatment for sleep. Please remember to avoid any benzodiazepines while you are concurrently using opioid medication. 5. Naloxone 4 mg nasal spray for opioid induced respiratory depression emergency only. 6. Discussed risks of addiction, dependency, and opioid induced hyperalgesia. 7. Please remember to call at least 5 business days prior to your medication refill. 8. Return to clinic in 3 months.  Please call and cancel your appointment and pain management agreement if you do decide to transfer your care. Medications Ordered Today   Medications    HYDROcodone-acetaminophen (NORCO)  mg tablet     Sig: Take 1 Tab by mouth four (4) times daily as needed for Pain for up to 30 days. Max Daily Amount: 4 Tabs. As needed for breakthrough pain  Indications: Pain     Dispense:  120 Tab     Refill:  0    morphine CR (MS CONTIN) 15 mg CR tablet     Sig: Take 1 Tab by mouth daily for 30 days. Max Daily Amount: 15 mg. Dispense:  30 Tab     Refill:  0    HYDROcodone-acetaminophen (NORCO)  mg tablet     Sig: Take 1 Tab by mouth four (4) times daily as needed for Pain for up to 30 days. Max Daily Amount: 4 Tabs. As needed for breakthrough pain  Indications: Pain     Dispense:  120 Tab     Refill:  0    HYDROcodone-acetaminophen (NORCO)  mg tablet     Sig: Take 1 Tab by mouth four (4) times daily as needed for Pain for up to 30 days. Max Daily Amount: 4 Tabs. As needed for breakthrough pain  Indications: Pain     Dispense:  120 Tab     Refill:  0         DISPOSITION     Pain medications are prescribed with the objective of pain relief and improved physical and psychosocial function in this patient.  Pain Meds and Quality Of Life have been reviewed. Nonpharmacologic therapy and non-opioid pharmacologic therapy have been considered. Opioid therapy is only prescribed if the expected benefits are anticipated to outweigh risks.  Counseled patient on proper use of prescribed medications.  Reviewed with patient self-help tools, home exercise, and lifestyle changes to assist the patient in self-management of symptoms.  Reviewed with patient the treatment plan, goals of treatment plan, and limitations of treatment plan, to include the potential for side effects from medications and procedures.   If side effects occur, it is the responsibility of the patient to inform the clinic so that a change in the treatment plan can be made in a safe manner. The patient is advised that stopping prescribed medication may cause an increase in symptoms and possible medication withdrawal symptoms. The patient is informed an emergency room evaluation may be necessary if this occurs. Spent 25 minutes with patient today which more than 50% of that time was spent on counseling and coordination of care. Raheel Bolden 9/10/2018        Note: Please excuse any typographical errors. Voice recognition software was used for this note and may cause mistakes.

## 2018-09-10 NOTE — PROGRESS NOTES
Nursing Notes    Patient presents to the office today in follow-up. Patient rates his pain at 7/10 on the numerical pain scale. Reviewed medications with counts as follows:    Rx Date filled Qty Dispensed Pill Count Last Dose Short   Norco 10 mg 08/12/18 120 0 today no   Ms.contin Er 15 mg  08/12/18 60 0 todday no         Comments: Patient is here today for a follow up appt today he states his pain level today is a 7  PHQ 9 was done patient denies any depression     POC UDS was not performed in office today    Any new labs or imaging since last appointment? NO    Have you been to an emergency room (ER) or urgent care clinic since your last visit? NO            Have you been hospitalized since your last visit? NO     If yes, where, when, and reason for visit? Have you seen or consulted any other health care providers outside of the [address] [place] [address]  since your last visit? NO     If yes, where, when, and reason for visit? Mr. Dalila Palomino has a reminder for a \"due or due soon\" health maintenance. I have asked that he contact his primary care provider for follow-up on this health maintenance.

## 2018-09-10 NOTE — PATIENT INSTRUCTIONS
1. Modify current plan with no evidence of addiction or diversion. Stable on current medication without adverse events. 2. Refill and adjust morphine ER 15 mg down to once daily ×1 month, then discontinue. 3. Refill hydrocodone 10/325 mg. Please take half to 1 tablet up to 4 times daily as needed  4. Please follow-up with your PCP to discuss treatment for sleep. Please remember to avoid any benzodiazepines while you are concurrently using opioid medication. 5. Naloxone 4 mg nasal spray for opioid induced respiratory depression emergency only. 6. Discussed risks of addiction, dependency, and opioid induced hyperalgesia. 7. Please remember to call at least 5 business days prior to your medication refill. 8. Return to clinic in 3 months. Please call and cancel your appointment and pain management agreement if you do decide to transfer your care.

## 2018-09-13 RX ORDER — LISINOPRIL AND HYDROCHLOROTHIAZIDE 12.5; 2 MG/1; MG/1
TABLET ORAL
Qty: 60 TAB | Refills: 12 | Status: SHIPPED | OUTPATIENT
Start: 2018-09-13 | End: 2019-04-30

## 2018-09-17 ENCOUNTER — OFFICE VISIT (OUTPATIENT)
Dept: INTERNAL MEDICINE CLINIC | Age: 58
End: 2018-09-17

## 2018-09-17 VITALS
SYSTOLIC BLOOD PRESSURE: 132 MMHG | OXYGEN SATURATION: 96 % | BODY MASS INDEX: 33.05 KG/M2 | TEMPERATURE: 98 F | HEART RATE: 74 BPM | HEIGHT: 72 IN | RESPIRATION RATE: 14 BRPM | WEIGHT: 244 LBS | DIASTOLIC BLOOD PRESSURE: 82 MMHG

## 2018-09-17 DIAGNOSIS — E29.1 HYPOGONADISM IN MALE: ICD-10-CM

## 2018-09-17 DIAGNOSIS — E11.9 CONTROLLED TYPE 2 DIABETES MELLITUS WITHOUT COMPLICATION, WITHOUT LONG-TERM CURRENT USE OF INSULIN (HCC): Primary | ICD-10-CM

## 2018-09-17 DIAGNOSIS — G47.00 INSOMNIA, UNSPECIFIED TYPE: ICD-10-CM

## 2018-09-17 DIAGNOSIS — E78.5 DYSLIPIDEMIA: ICD-10-CM

## 2018-09-17 DIAGNOSIS — I10 ESSENTIAL HYPERTENSION: ICD-10-CM

## 2018-09-17 DIAGNOSIS — K63.5 HYPERPLASTIC COLONIC POLYP, UNSPECIFIED PART OF COLON: ICD-10-CM

## 2018-09-17 DIAGNOSIS — Z23 ENCOUNTER FOR IMMUNIZATION: ICD-10-CM

## 2018-09-17 DIAGNOSIS — E66.9 OBESITY (BMI 30-39.9): ICD-10-CM

## 2018-09-17 RX ORDER — TRAZODONE HYDROCHLORIDE 50 MG/1
50 TABLET ORAL
Qty: 30 TAB | Refills: 2 | Status: SHIPPED | OUTPATIENT
Start: 2018-09-17 | End: 2018-12-05 | Stop reason: SDUPTHER

## 2018-09-17 NOTE — PROGRESS NOTES
1. Have you been to the ER, urgent care clinic or hospitalized since your last visit? NO.     2. Have you seen or consulted any other health care providers outside of the [address] [place] [address] since your last visit (Include any pap smears or colon screening)? NO      Do you have an Advanced Directive? NO    Would you like information on Advanced Directives?  NO  Chief Complaint   Patient presents with    Hypertension     6 month follow up

## 2018-09-17 NOTE — PROGRESS NOTES
Davida Zarco is a 63 y.o. male who presents for routine immunizations. Per verbal order from [address] for influenza. Influenza given in left deltoid. He denies any symptoms , reactions or allergies that would exclude them from being immunized today. Risks and adverse reactions were discussed and the VIS was given to them. All questions were addressed. He was observed for 15 min post injection. There were no reactions observed.     Kenn Florentino LPN

## 2018-09-17 NOTE — PATIENT INSTRUCTIONS
Vaccine Information Statement    Influenza (Flu) Vaccine (Inactivated or Recombinant): What you need to know    Many Vaccine Information Statements are available in Irish and other languages. See www.immunize.org/vis  Hojas de Información Sobre Vacunas están disponibles en Español y en muchos otros idiomas. Visite www.immunize.org/vis    1. Why get vaccinated? Influenza (flu) is a contagious disease that spreads around the [place] every year, usually between October and May. Flu is caused by influenza viruses, and is spread mainly by coughing, sneezing, and close contact. Anyone can get flu. Flu strikes suddenly and can last several days. Symptoms vary by age, but can include:   fever/chills   sore throat   muscle aches   fatigue   cough   headache    runny or stuffy nose    Flu can also lead to pneumonia and blood infections, and cause diarrhea and seizures in children. If you have a medical condition, such as heart or lung disease, flu can make it worse. Flu is more dangerous for some people. Infants and young children, people 72years of age and older, pregnant women, and people with certain health conditions or a weakened immune system are at greatest risk. Each year thousands of people in the [facility] die from flu, and many more are hospitalized. Flu vaccine can:   keep you from getting flu,   make flu less severe if you do get it, and   keep you from spreading flu to your family and other people. 2. Inactivated and recombinant flu vaccines    A dose of flu vaccine is recommended every flu season. Children 6 months through 6years of age may need two doses during the same flu season. Everyone else needs only one dose each flu season.        Some inactivated flu vaccines contain a very small amount of a mercury-based preservative called thimerosal. Studies have not shown thimerosal in vaccines to be harmful, but flu vaccines that do not contain thimerosal are available. There is no live flu virus in flu shots. They cannot cause the flu. There are many flu viruses, and they are always changing. Each year a new flu vaccine is made to protect against three or four viruses that are likely to cause disease in the upcoming flu season. But even when the vaccine doesnt exactly match these viruses, it may still provide some protection    Flu vaccine cannot prevent:   flu that is caused by a virus not covered by the vaccine, or   illnesses that look like flu but are not. It takes about 2 weeks for protection to develop after vaccination, and protection lasts through the flu season. 3. Some people should not get this vaccine    Tell the person who is giving you the vaccine:     If you have any severe, life-threatening allergies. If you ever had a life-threatening allergic reaction after a dose of flu vaccine, or have a severe allergy to any part of this vaccine, you may be advised not to get vaccinated. Most, but not all, types of flu vaccine contain a small amount of egg protein.  If you ever had Guillain-Barré Syndrome (also called GBS). Some people with a history of GBS should not get this vaccine. This should be discussed with your doctor.  If you are not feeling well. It is usually okay to get flu vaccine when you have a mild illness, but you might be asked to come back when you feel better. 4. Risks of a vaccine reaction    With any medicine, including vaccines, there is a chance of reactions. These are usually mild and go away on their own, but serious reactions are also possible. Most people who get a flu shot do not have any problems with it.      Minor problems following a flu shot include:    soreness, redness, or swelling where the shot was given     hoarseness   sore, red or itchy eyes   cough   fever   aches   headache   itching   fatigue  If these problems occur, they usually begin soon after the shot and last 1 or 2 days. More serious problems following a flu shot can include the following:     There may be a small increased risk of Guillain-Barré Syndrome (GBS) after inactivated flu vaccine. This risk has been estimated at 1 or 2 additional cases per million people vaccinated. This is much lower than the risk of severe complications from flu, which can be prevented by flu vaccine.  Young children who get the flu shot along with pneumococcal vaccine (PCV13) and/or DTaP vaccine at the same time might be slightly more likely to have a seizure caused by fever. Ask your doctor for more information. Tell your doctor if a child who is getting flu vaccine has ever had a seizure. Problems that could happen after any injected vaccine:      People sometimes faint after a medical procedure, including vaccination. Sitting or lying down for about 15 minutes can help prevent fainting, and injuries caused by a fall. Tell your doctor if you feel dizzy, or have vision changes or ringing in the ears.  Some people get severe pain in the shoulder and have difficulty moving the arm where a shot was given. This happens very rarely.  Any medication can cause a severe allergic reaction. Such reactions from a vaccine are very rare, estimated at about 1 in a million doses, and would happen within a few minutes to a few hours after the vaccination. As with any medicine, there is a very remote chance of a vaccine causing a serious injury or death. The safety of vaccines is always being monitored. For more information, visit: www.cdc.gov/vaccinesafety/    5. What if there is a serious reaction? What should I look for?  Look for anything that concerns you, such as signs of a severe allergic reaction, very high fever, or unusual behavior.     Signs of a severe allergic reaction can include hives, swelling of the face and throat, difficulty breathing, a fast heartbeat, dizziness, and weakness - usually within a few minutes to a few hours after the vaccination. What should I do?  If you think it is a severe allergic reaction or other emergency that cant wait, call 9-1-1 and get the person to the nearest hospital. Otherwise, call your doctor.  Reactions should be reported to the Vaccine Adverse Event Reporting System (VAERS). Your doctor should file this report, or you can do it yourself through  the VAERS web site at www.vaers. [facility].gov, or by calling 9-915.703.9728. VAERS does not give medical advice. 6. The National Vaccine Injury Compensation Program    The [facility] Vaccine Injury Compensation Program (VICP) is a federal program that was created to compensate people who may have been injured by certain vaccines. Persons who believe they may have been injured by a vaccine can learn about the program and about filing a claim by calling 0-571.192.3882 or visiting the [facility] website at www.[facility].gov/vaccinecompensation. There is a time limit to file a claim for compensation. 7. How can I learn more?  Ask your healthcare provider. He or she can give you the vaccine package insert or suggest other sources of information.  Call your local or state health department.  Contact the Centers for Disease Control and Prevention (CDC):  - Call 8-479.435.1249 (1-800-CDC-INFO) or  - Visit CDCs website at www.cdc.gov/flu    Vaccine Information Statement   Inactivated Influenza Vaccine   8/7/2015  [address] AT[address] Davis Oscar [id number]    Department of Health and Human Services  Centers for Disease Control and Prevention    Office Use Only

## 2018-09-17 NOTE — MR AVS SNAPSHOT
[address] [facility] 
 
 
 [address], [facility] 200 [facility] 
[phone] Patient: William Taylor MRN: [id number] ASHLEY:[birth year] Visit Information Date & Time Provider Department Dept. Phone Encounter #  
 9/17/2018  2:45 PM Aida Bertrand MD [facility] [address] [place] [address] [id number] Your Appointments 12/6/2018  3:00 PM  
Follow Up with LUCILLE Dumont [address] for Pain Management (AYUSH SCHEDULING) Appt Note: Return in about 3 months (around 12/10/2018). 30 [facility] [address]  
[phone] [address]  
  
    
 3/26/2019  2:00 PM  
PHYSICAL with iAda Bertrand MD  
[facility] [address] [place] [address] [facility] [facility]) Appt Note: rpe rd  
 [address] [facility], [facility] [address] [place]  
  
   
 [address] [facility] Upcoming Health Maintenance Date Due DTaP/Tdap/Td series (1 - Tdap) 3/24/1982 Influenza Age 5 to Adult 8/1/2018 MICROALBUMIN Q1 8/7/2018 HEMOGLOBIN A1C Q6M 8/17/2018 COLONOSCOPY 8/22/2018 EYE EXAM RETINAL OR DILATED Q1 8/24/2018 LIPID PANEL Q1 2/17/2019 FOOT EXAM Q1 2/20/2019 Allergies as of 9/17/2018  Review Complete On: 9/17/2018 By: Daphne Whyte LPN No Known Allergies Current Immunizations  Reviewed on 2/20/2018 Name Date Influenza Vaccine 10/1/2017, 10/1/2016, 1/27/2016 Influenza Vaccine (Quad) PF 9/17/2018 Influenza Vaccine PF 3/13/2015 Influenza Vaccine Whole 12/9/2010 Pneumococcal Polysaccharide (PPSV-23) 8/14/2017 Not reviewed this visit You Were Diagnosed With   
  
 Codes Comments Encounter for immunization    -  Primary ICD-10-CM: K66 ICD-9-CM: V03.89 Insomnia, unspecified type     ICD-10-CM: G47.00 ICD-9-CM: 780.52 Vitals BP Pulse Temp Resp Height(growth percentile) Weight(growth percentile) 132/82 74 98 °F (36.7 °C) (Oral) 14 6' (1.829 m) 244 lb (110.7 kg) SpO2 BMI Smoking Status 96% 33.09 kg/m2 Current Every Day Smoker Vitals History BMI and BSA Data Body Mass Index Body Surface Area 33.09 kg/m 2 2.37 m 2 Preferred Pharmacy Pharmacy Name Phone [address], [address] [place] [address] [phone] Your Updated Medication List  
  
   
This list is accurate as of 9/17/18  4:16 PM.  Always use your most recent med list.  
  
  
  
  
 atorvastatin 40 mg tablet Commonly known as:  LIPITOR  
take 1 tablet by mouth once daily buPROPion  mg SR tablet Commonly known as:  [place] Antoine Take 1 Tab by mouth two (2) times a day. fenofibrate nanocrystallized 145 mg tablet Commonly known as:  TRICOR  
take 1 tablet by mouth once daily HYDROcodone-acetaminophen  mg tablet Commonly known as:  Viva Olivares Take 1 Tab by mouth four (4) times daily as needed for Pain for up to 30 days. Max Daily Amount: 4 Tabs. As needed for breakthrough pain  Indications: Pain  
  
 lisinopril-hydroCHLOROthiazide 20-12.5 mg per tablet Commonly known as:  PRINZIDE, ZESTORETIC  
TAKE 2 TABLETS BY MOUTH DAILY  
  
 meloxicam 15 mg tablet Commonly known as:  MOBIC  
take 1 tablet by mouth once daily  
  
 metFORMIN 1,000 mg tablet Commonly known as:  GLUCOPHAGE  
TAKE 1 TABLET BY MOUTH TWO TIMES A DAY WITH MEALS  
  
 morphine CR 15 mg CR tablet Commonly known as:  MS CONTIN Take 1 Tab by mouth every twelve (12) hours. Max Daily Amount: 30 mg. MULTIVITAMIN PO Take  by mouth.  
  
 naloxone 4 mg/actuation nasal spray Commonly known as:  NARCAN  
4 mg by Nasal route once as needed (opioid overdose) for up to 1 dose. May substitute 2 mg syringe if insurance requires  
  
 omeprazole 20 mg capsule Commonly known as:  PRILOSEC  
 Take 20 mg by mouth daily. tamsulosin 0.4 mg capsule Commonly known as:  FLOMAX Take 1 Cap by mouth daily. traZODone 50 mg tablet Commonly known as:  Omaira Ivonne Take 1 Tab by mouth nightly. varenicline 0.5 mg (11)- 1 mg (42) Dspk Commonly known as:  CHANTIX STARTER LAURA Take 0.5 mg by mouth daily (after breakfast). VITAMIN C 1,000 mg tablet Generic drug:  ascorbic acid (vitamin C) Take 1,000 mg by mouth two (2) times a day. VITAMIN D2 50,000 unit capsule Generic drug:  ergocalciferol  
take 1 capsule by mouth two times a week  
  
 zolpidem 5 mg tablet Commonly known as:  AMBIEN Take 1 tablet nightly as needed times 2 weeks, then half tablet nightly as needed times 1 week then discontinue Prescriptions Sent to Pharmacy Refills  
 traZODone (DESYREL) 50 mg tablet 2 Sig: Take 1 Tab by mouth nightly. Class: Normal  
 Pharmacy: [facility] [address] #: [phone] Route: Oral  
  
We Performed the Following INFLUENZA VIRUS VAC QUAD,SPLIT,PRESV FREE SYRINGE IM [id number] CPT(R)] Patient Instructions Vaccine Information Statement Influenza (Flu) Vaccine (Inactivated or Recombinant): What you need to know Many Vaccine Information Statements are available in [place]ican and other languages. See www.immunize.org/vis Hojas de Información Sobre Vacunas están disponibles en Español y en muchos otros idiomas. Visite www.immunize.org/vis 1. Why get vaccinated? Influenza (flu) is a contagious disease that spreads around the [place] every year, usually between October and May. Flu is caused by influenza viruses, and is spread mainly by coughing, sneezing, and close contact. Anyone can get flu. Flu strikes suddenly and can last several days. Symptoms vary by age, but can include: 
 fever/chills  sore throat  muscle aches  fatigue  cough  headache  runny or stuffy nose Flu can also lead to pneumonia and blood infections, and cause diarrhea and seizures in children. If you have a medical condition, such as heart or lung disease, flu can make it worse. Flu is more dangerous for some people. Infants and young children, people 72years of age and older, pregnant women, and people with certain health conditions or a weakened immune system are at greatest risk. Each year thousands of people in the [facility] die from flu, and many more are hospitalized. Flu vaccine can: 
 keep you from getting flu, 
 make flu less severe if you do get it, and 
 keep you from spreading flu to your family and other people. 2. Inactivated and recombinant flu vaccines A dose of flu vaccine is recommended every flu season. Children 6 months through 6years of age may need two doses during the same flu season. Everyone else needs only one dose each flu season. Some inactivated flu vaccines contain a very small amount of a mercury-based preservative called thimerosal. Studies have not shown thimerosal in vaccines to be harmful, but flu vaccines that do not contain thimerosal are available. There is no live flu virus in flu shots. They cannot cause the flu. There are many flu viruses, and they are always changing. Each year a new flu vaccine is made to protect against three or four viruses that are likely to cause disease in the upcoming flu season. But even when the vaccine doesnt exactly match these viruses, it may still provide some protection Flu vaccine cannot prevent: 
 flu that is caused by a virus not covered by the vaccine, or 
 illnesses that look like flu but are not. It takes about 2 weeks for protection to develop after vaccination, and protection lasts through the flu season. 3. Some people should not get this vaccine Tell the person who is giving you the vaccine:  If you have any severe, life-threatening allergies. If you ever had a life-threatening allergic reaction after a dose of flu vaccine, or have a severe allergy to any part of this vaccine, you may be advised not to get vaccinated. Most, but not all, types of flu vaccine contain a small amount of egg protein.  If you ever had Guillain-Barré Syndrome (also called GBS). Some people with a history of GBS should not get this vaccine. This should be discussed with your doctor.  If you are not feeling well. It is usually okay to get flu vaccine when you have a mild illness, but you might be asked to come back when you feel better. 4. Risks of a vaccine reaction With any medicine, including vaccines, there is a chance of reactions. These are usually mild and go away on their own, but serious reactions are also possible. Most people who get a flu shot do not have any problems with it. Minor problems following a flu shot include:  
 soreness, redness, or swelling where the shot was given  hoarseness  sore, red or itchy eyes  cough  fever  aches  headache  itching  fatigue If these problems occur, they usually begin soon after the shot and last 1 or 2 days. More serious problems following a flu shot can include the following:  There may be a small increased risk of Guillain-Barré Syndrome (GBS) after inactivated flu vaccine. This risk has been estimated at 1 or 2 additional cases per million people vaccinated. This is much lower than the risk of severe complications from flu, which can be prevented by flu vaccine.  Young children who get the flu shot along with pneumococcal vaccine (PCV13) and/or DTaP vaccine at the same time might be slightly more likely to have a seizure caused by fever. Ask your doctor for more information. Tell your doctor if a child who is getting flu vaccine has ever had a seizure. Problems that could happen after any injected vaccine:  People sometimes faint after a medical procedure, including vaccination. Sitting or lying down for about 15 minutes can help prevent fainting, and injuries caused by a fall. Tell your doctor if you feel dizzy, or have vision changes or ringing in the ears.  Some people get severe pain in the shoulder and have difficulty moving the arm where a shot was given. This happens very rarely.  Any medication can cause a severe allergic reaction. Such reactions from a vaccine are very rare, estimated at about 1 in a million doses, and would happen within a few minutes to a few hours after the vaccination. As with any medicine, there is a very remote chance of a vaccine causing a serious injury or death. The safety of vaccines is always being monitored. For more information, visit: www.cdc.gov/vaccinesafety/ 
 
 
6. The National Vaccine Injury Compensation Program 
 
The [facility] Vaccine Injury Compensation Program (VICP) is a federal program that was created to compensate people who may have been injured by certain vaccines. Persons who believe they may have been injured by a vaccine can learn about the program and about filing a claim by calling 6-286.550.6113 or visiting the [employer] website at www.[facility].gov/vaccinecompensation. There is a time limit to file a claim for compensation. 7. How can I learn more?  Ask your healthcare provider. He or she can give you the vaccine package insert or suggest other sources of information.  Call your local or state health department.  Contact the Centers for Disease Control and Prevention (CDC): 
- Call 1-969.747.3763 (0-731-JHY-INFO) or 
- Visit CDCs website at www.cdc.gov/flu Vaccine Information Statement Inactivated Influenza Vaccine 8/7/2015 
42 ATUL Davis Oscar [id number] Department of Health and [employer] Centers for Disease Control and Prevention Office Use Only [facility]! Dear David Centeno: 
Thank you for requesting a [employer] account. Our records indicate that you already have an active [employer] account. You can access your account anytime at [email or web address] Did you know that you can access your hospital and ER discharge instructions at any time in [employer]? You can also review all of your test results from your hospital stay or ER visit. Additional Information If you have questions, please visit the Frequently Asked Questions section of the [employer] website at [email or web address] Remember, [employer] is NOT to be used for urgent needs. For medical emergencies, dial 911. Now available from your iPhone and Android! Please provide this summary of care documentation to your next provider. Your primary care clinician is listed as Durga Wagner. If you have any questions after today's visit, please call [phone].

## 2018-09-17 NOTE — PROGRESS NOTES
63 y.o. white male who presents for f/u    He denies any cardiovascular complaints. Trying to be active although no set exercise regime    Denies polyuria, polydipsia, nocturia, vision change. Not checking sugars regularly. We talked about intermittent fasting in 2/18 and he did it intermittently specially during the summer months and he did drop about 17 lbs on his scale but has not been consistent and gained some of the weight back     Vitals 9/17/2018 9/10/2018 5/31/2018 3/1/2018 2/20/2018   Weight 244 lb 246 lb 9.6 oz 253 lb 253 lb 253 lb     Denies any gi or gu complaints    He is changing over to Dr Avila Elaine the near future.   Wants something for sleep as the Estela Huggins was dc'ed by pain clinic    He wants to restart test replacement but [facility]; interested in depo vs topicals     IF 2/18    Past Medical History:   Diagnosis Date    Arthritis     Back problem     Colon polyp 08/2008    Dr Liana Bah Depression 12/13    PHQ-9 was 6/27     Diabetes ([facility] [address] 3/15    on basis of elev hba1c; [facility] for [facility]    Dyslipidemia     Enthesopathy of hip region     Fatty liver     on US w negative serologies 2009    Foot fracture, right     9/13 5th metatarsal    GERD (gastroesophageal reflux disease)     Hypertension     Hypogonadism male 03/2013    Dr Loree Mcdowell; took androgel in past    Lumbago     Morbid obesity ([address]     peak weight 275 lbs, bmi 36.3 from 2/14; IF 2/18 start weight 253 lbs    Phymatous rosacea     Postlaminectomy syndrome, lumbar region     Prediabetes on metformin     2 hr  2/10    Shoulder pain     Thoracic or lumbosacral neuritis or radiculitis, unspecified     Tobacco dependence syndrome      Past Surgical History:   Procedure Laterality Date    HX BACK SURGERY      HX COLONOSCOPY      Dr Glen Singletary 8/22/08    HX ORTHOPAEDIC  12/06    left shoulder repair/rotator cuff    HX ORTHOPAEDIC      DEXA t score -1.0 spine, -0.4 hip (1/14)  Dorita    HX OTHER SURGICAL  7/02    L5-S1 hemilaminectomy and diskectomy    NEUROLOGICAL PROCEDURE UNLISTED  4/13    MRI pituitary normal    MA ANESTH,SURGERY OF SHOULDER       Social History     Social History    Marital status:      Spouse name: N/A    Number of children: N/A    Years of education: N/A     Occupational History    Not on file. Social History Main Topics    Smoking status: Current Every Day Smoker     Packs/day: 2.00     Types: Cigarettes    Smokeless tobacco: Never Used    Alcohol use No      Comment: One drink per 3-4 months    Drug use: No      Comment: Marijuana use in the [de-identified]    Sexual activity: Not on file     Other Topics Concern    Not on file     Social History Narrative     Current Outpatient Prescriptions   Medication Sig    traZODone (DESYREL) 50 mg tablet Take 1 Tab by mouth nightly.  lisinopril-hydroCHLOROthiazide (PRINZIDE, ZESTORETIC) 20-12.5 mg per tablet TAKE 2 TABLETS BY MOUTH DAILY    HYDROcodone-acetaminophen (NORCO)  mg tablet Take 1 Tab by mouth four (4) times daily as needed for Pain for up to 30 days. Max Daily Amount: 4 Tabs. As needed for breakthrough pain  Indications: Pain    metFORMIN (GLUCOPHAGE) 1,000 mg tablet TAKE 1 TABLET BY MOUTH TWO TIMES A DAY WITH MEALS    VITAMIN D2 50,000 unit capsule take 1 capsule by mouth two times a week    atorvastatin (LIPITOR) 40 mg tablet take 1 tablet by mouth once daily    morphine CR (MS CONTIN) 15 mg CR tablet Take 1 Tab by mouth every twelve (12) hours. Max Daily Amount: 30 mg. (Patient taking differently: Take 15 mg by mouth daily.)    fenofibrate nanocrystallized (TRICOR) 145 mg tablet take 1 tablet by mouth once daily    tamsulosin (FLOMAX) 0.4 mg capsule Take 1 Cap by mouth daily. (Patient taking differently: Take  by mouth as needed.)    omeprazole (PRILOSEC) 20 mg capsule Take 20 mg by mouth daily.     ascorbic acid (VITAMIN C) 1,000 mg tablet Take 1,000 mg by mouth two (2) times a day.  MULTIVITAMIN PO Take  by mouth.  naloxone 4 mg/actuation spry 4 mg by Nasal route once as needed (opioid overdose) for up to 1 dose. May substitute 2 mg syringe if insurance requires    buPROPion SR (WELLBUTRIN SR) 150 mg SR tablet Take 1 Tab by mouth two (2) times a day. (Patient taking differently: Take 150 mg by mouth two (2) times a day. Indications: not taking)     No current facility-administered medications for this visit. No Known Allergies    REVIEW OF SYSTEMS: colo 6/08 Dr Isrrael Kan - no vision change or eye pain  Oral - no mouth pain, tongue or tooth problems  Ears - no hearing loss, ear pain, fullness, no swallowing problems  Cardiac - no CP, PND, orthopnea, edema, palpitations or syncope  Chest - no breast masses  Resp - no wheezing, chronic coughing, dyspnea  GI - no heartburn, nausea, vomiting, change in bowel habits, bleeding, hemorrhoids  Urinary - no dysuria, hematuria, flank pain, urgency, frequency  Constitutional - no wt loss, night sweats, unexplained fevers  Neuro - no focal weakness, numbness, paresthesias, incoordination, ataxia, involuntary movements  Endo - no polyuria, polydipsia, nocturia, hot flashes    Visit Vitals    /82    Pulse 74    Temp 98 °F (36.7 °C) (Oral)    Resp 14    Ht 6' (1.829 m)    Wt 244 lb (110.7 kg)    SpO2 96%    BMI 33.09 kg/m2   Affect is appropriate. Mood stable  No apparent distress  HEENT --Anicteric sclerae, tympanic membranes normal, No thyromegaly, JVD, or bruits. Lungs --Clear to auscultation, normal percussion. Heart --Regular rate and rhythm, no murmurs, rubs, gallops, or clicks. Chest wall --Nontender to palpation. PMI normal.  Abdomen -- Soft and nontender, no hepatosplenomegaly or masses. Extremities -- Without cyanosis, clubbing, edema. 2+ pulses equally and bilaterally.     LABS  From 9/09 showed   gluc 88,   cr 0.70,              alt 106,                   chol 183, tg 431,  hdl 32, ldl-c NA,  wbc 8.0, hb 15.8, plt 149,        From 2/10 showed   gluc 120, cr 0.60, gfr>60, alt 113,                   chol 306, tg 1135, hdl 31, ldl-c NA,                                ast 45, ap 95, tb 0.4  From 3/10 showed                                    2 hr gtt 165  From 6/10 showed   gluc 165,                               hba1c 5.6,                tg 406,  hdl 31, ldl-c NA   From 11/12 showed gluc 102, cr 0.60,      alt 43,   hba1c 5.9, chol 183, tg 489,  hdl 28, ldl-c NA, wbc 9.0, hb 15.8, plt 185,  tsh 3.0,    psa 0.09,   From 11/12 showed                                 vit d 15.2, test 145, james 5.5, acth 1.7, fsh 5.8, lh 1.5, prl 5.1, b12 878, RA neg, crp 0.83, %sat 31, ferritin 308, hep a/b/c neg,  gracie neg, lyme neg  From 8/13 showed           hba1c 5.8, chol 176, tg 521,  hdl 23, ldl-c NA  From 11/13 showed gluc 135, cr 0.80, gfr 52,   alt 30,   hba1c 5.8, chol 154, tg 323,  hdl 31, ldl-c 58,                      vit d 20.5, test 185  From 3/14 showed           hba1c 6.0, chol 177, tg 607,  hdl 26, ldl-c NA  From 6/14 showed   gluc 114, cr 0.80, gfr>60,  alt 32,   hba1c 5.8, chol 147, tg 360,  hdl 29, ldl-c 46  From 7/14 showed   gluc 148, cr 0.77, gfr>60,             wbc 9.3, hb 14.9, plt 214  From 2/15 showed   gluc 140, cr 0.72, gfr 107, alt 39,   hba1c 6.5, chol 180, tg 377,  hdl 31, ldl-c 74, wbc 7.5, hb 15.2, plt 205  From 7/15 showed           hba1c 6.2, chol 137, tg 293,  hdl 28, ldl-c 50,               umar neg  From 7/16 showed   gluc 125, cr 0.60, gfr>60, alt 29,    hba1c 6.2, chol 145, tg 247,  hdl 32, ldl-c 64, wbc 7.6, hb 15.2, plt 198, umar 11.8, psa 0.35  From 8/17 showed   gluc 129, cr 0.80, gfr>60, alt 22,    hba1c 6.5, chol 171, tg 479,  hdl 27, ldl-c na, wbc 9.4, hb 15.8, plt 204, umar 15.0, vit d 27.0  From 2/18 showed   gluc 121, cr 0.50, gfr>60, alt 44,    hba1c 6.5, chol 168, tg 501,  hdl 26, ldl-c na,           psa 0.11    Patient Active Problem List   Diagnosis Code    Encounter for long-term (current) drug use Z79.899    Dyslipidemia E78.5    Hypogonadism male Dr. Brad Pak E29.1    Hypovitaminosis D E55.9    Brachial neuritis or radiculitis M54.12    Enthesopathy of hip region M76.899    Insomnia secondary to chronic pain G89.29, G47.01    Essential hypertension I10    Cervical dystonia G24.3    Chronic right shoulder pain M25.511, G89.29    DDD (degenerative disc disease), cervical M50.30    Obesity (BMI 30-39. 9) E66.9    Tobacco dependence syndrome F17.200    Greater trochanteric bursitis of right hip M70.61    Colon polyp 8/08 Dr Mac Desir K63.5    Arthritis of right knee M17.11    Chronic pain of right knee M25.561, G89.29    Chronic pain syndrome G89.4    Controlled type 2 diabetes mellitus without complication, without long-term current use of insulin (HCC) E11.9     Assessment and plan:  1. Chronic pain. F/U pain clinic  2. HTN. Continue current, inc exercise as tolerated, wt loss would be ideal  3. DM. Continue current, f/u ophth, check labs  4. Dyslipidemia. Continue current lipitor and tricor  5. Fatty liver. Doing relatively well  6. Hypogonadism. He will go to [facility]  7. Hypovit d. Continue supplementation  8. Depression. Doing well  9. Podiatry. F/U Dr. Libia De La Paz  10. Smoking cessation again reiterated  11. Obesity. He will try to be more strict with the fasting      RTC 3/19    Above conditions discussed at length and patient vocalized understanding.   All questions answered to patient satisfaction

## 2018-09-20 ENCOUNTER — TELEPHONE (OUTPATIENT)
Dept: INTERNAL MEDICINE CLINIC | Age: 58
End: 2018-09-20

## 2018-09-20 NOTE — TELEPHONE ENCOUNTER
According to Aydin ADKINS at Pain Management- as follows    We tapered and discontinued his Ambien last visit. I have asked him to follow-up with his PCP to discuss further treatment for sleep. He was reminded to avoid benzodiazepines with his current treatment plan.

## 2018-09-20 NOTE — TELEPHONE ENCOUNTER
He is having side effects such as morning grogginess, & extreme dry mouth    May he go back on the [place]? Advise the patient, LM to let him know id it has been approved. [facility] is using the [facility] off of [address].

## 2018-09-20 NOTE — TELEPHONE ENCOUNTER
If we start ambien, pain clinic may stop writing for his narcotics  They also don't want him taking benzos   Not sure what to offer to him at this point

## 2018-09-21 NOTE — TELEPHONE ENCOUNTER
Patient is returning call. He doesn't get very good  reception where he works so if he doesn't answer, leave a message and he will call right back.

## 2018-10-03 ENCOUNTER — TELEPHONE (OUTPATIENT)
Dept: PAIN MANAGEMENT | Age: 58
End: 2018-10-03

## 2018-10-03 NOTE — TELEPHONE ENCOUNTER
Medicine refill called in [id number]  10:33am    1. Name, verified  2. Medicine - hydrocodone, morphine  3.  [phone]  4. Analgesia - 80-90%  5. ADL - yes  6.   Adverse reaction - no

## 2018-10-04 NOTE — TELEPHONE ENCOUNTER
Cali Quezada has called requesting a refill of their controlled medication Norco and Morphine  for the management of Lumbar neuritis     Last office visit date: 09/10/18    Date last  was pulled and reviewed : 10/04/18    Was the patient compliant when the above report was pulled? yes    Analgesia: The patient states that he receives 80-90% of pain relief from the medications     Aberrancies: There are no aberrancies noted at this time     ADL's: The patient states that he is able to perform his adls while on the medication     Adverse Reaction: There are no adverse reactions noted at this time     Provider's last note and plan of care reviewed? yes  Request forwarded to provider for review.     Script is preprinted I will send this call to [facility]

## 2018-10-22 ENCOUNTER — HOSPITAL ENCOUNTER (OUTPATIENT)
Age: 58
Discharge: HOME | End: 2018-10-22
Attending: PHYSICAL MEDICINE & REHABILITATION
Payer: COMMERCIAL

## 2018-10-22 DIAGNOSIS — M70.61 TROCHANTERIC BURSITIS OF RIGHT HIP: ICD-10-CM

## 2018-10-22 DIAGNOSIS — M96.1 LUMBAR POSTLAMINECTOMY SYNDROME: ICD-10-CM

## 2018-10-22 LAB — CREAT UR-MCNC: 0.7 MG/DL (ref 0.6–1.3)

## 2018-10-22 PROCEDURE — 74011250636 HC RX REV CODE- 250/636

## 2018-10-22 PROCEDURE — A9575 INJ GADOTERATE MEGLUMI 0.1ML: HCPCS

## 2018-10-22 PROCEDURE — 72158 MRI LUMBAR SPINE W/O & W/DYE: CPT

## 2018-10-22 PROCEDURE — 73721 MRI JNT OF LWR EXTRE W/O DYE: CPT

## 2018-10-22 PROCEDURE — 82565 ASSAY OF CREATININE: CPT

## 2018-10-22 RX ORDER — GADOTERATE MEGLUMINE 376.9 MG/ML
23 INJECTION INTRAVENOUS
Status: COMPLETED | OUTPATIENT
Start: 2018-10-22 | End: 2018-10-22

## 2018-10-22 RX ADMIN — GADOTERATE MEGLUMINE 23 ML: 376.9 INJECTION INTRAVENOUS at 19:00

## 2018-11-02 ENCOUNTER — TELEPHONE (OUTPATIENT)
Dept: PAIN MANAGEMENT | Age: 58
End: 2018-11-02

## 2018-11-02 NOTE — TELEPHONE ENCOUNTER
Shanae Dobson has called requesting a refill of their controlled medication, hydrocodone, for the management of back pain. Last office visit date: 9/10/18 with Scar Schultz, next 12/6/18 with Huma Leivaer    Date last  was pulled and reviewed : 11/2/18 last filled 10/9/18    Was the patient compliant when the above report was pulled? yes    Analgesia: 85-90%    Aberrancies: none    ADL's: yes    Adverse Reaction: no    Provider's last note and plan of care reviewed? yes  Request forwarded to provider for review.   Contract 6/28/18   UDS 7/26/18                     prescriptions pre-printed by provider

## 2019-03-17 DIAGNOSIS — E29.1 HYPOGONADISM IN MALE: ICD-10-CM

## 2019-03-17 DIAGNOSIS — E11.9 CONTROLLED TYPE 2 DIABETES MELLITUS WITHOUT COMPLICATION, WITHOUT LONG-TERM CURRENT USE OF INSULIN (HCC): ICD-10-CM

## 2019-03-18 DIAGNOSIS — E11.9 CONTROLLED TYPE 2 DIABETES MELLITUS WITHOUT COMPLICATION, WITHOUT LONG-TERM CURRENT USE OF INSULIN (HCC): ICD-10-CM

## 2019-03-19 DIAGNOSIS — E11.9 CONTROLLED TYPE 2 DIABETES MELLITUS WITHOUT COMPLICATION, WITHOUT LONG-TERM CURRENT USE OF INSULIN (HCC): ICD-10-CM

## 2019-03-20 DIAGNOSIS — E29.1 HYPOGONADISM IN MALE: ICD-10-CM

## 2019-04-15 DIAGNOSIS — E78.5 DYSLIPIDEMIA: Primary | ICD-10-CM

## 2019-04-15 DIAGNOSIS — I10 ESSENTIAL HYPERTENSION: ICD-10-CM

## 2019-04-15 DIAGNOSIS — E29.1 HYPOGONADISM IN MALE: ICD-10-CM

## 2019-04-15 DIAGNOSIS — Z00.00 PHYSICAL EXAM: ICD-10-CM

## 2019-04-15 DIAGNOSIS — E11.9 CONTROLLED TYPE 2 DIABETES MELLITUS WITHOUT COMPLICATION, WITHOUT LONG-TERM CURRENT USE OF INSULIN (HCC): ICD-10-CM

## 2019-04-18 RX ORDER — FENOFIBRATE 145 MG/1
TABLET, COATED ORAL
Qty: 30 TAB | Refills: 1 | Status: SHIPPED | OUTPATIENT
Start: 2019-04-18 | End: 2019-06-25 | Stop reason: SDUPTHER

## 2019-04-18 NOTE — TELEPHONE ENCOUNTER
Please update labs with current information. He will go to [facility]. Patient has scheduled a pe and will go get labs at [facility]. Can you call in a 30 day supply?

## 2019-04-25 ENCOUNTER — APPOINTMENT (OUTPATIENT)
Dept: GENERAL RADIOLOGY | Age: 59
DRG: 247 | End: 2019-04-25
Attending: PHYSICIAN ASSISTANT
Payer: COMMERCIAL

## 2019-04-25 ENCOUNTER — TELEPHONE (OUTPATIENT)
Dept: INTERNAL MEDICINE CLINIC | Age: 59
End: 2019-04-25

## 2019-04-25 ENCOUNTER — HOSPITAL ENCOUNTER (INPATIENT)
Age: 59
LOS: 5 days | Discharge: HOME | DRG: 247 | End: 2019-04-30
Attending: EMERGENCY MEDICINE | Admitting: INTERNAL MEDICINE
Payer: COMMERCIAL

## 2019-04-25 ENCOUNTER — OFFICE VISIT (OUTPATIENT)
Dept: INTERNAL MEDICINE CLINIC | Age: 59
End: 2019-04-25

## 2019-04-25 VITALS
RESPIRATION RATE: 14 BRPM | BODY MASS INDEX: 34.13 KG/M2 | DIASTOLIC BLOOD PRESSURE: 72 MMHG | HEART RATE: 89 BPM | WEIGHT: 252 LBS | HEIGHT: 72 IN | OXYGEN SATURATION: 97 % | SYSTOLIC BLOOD PRESSURE: 145 MMHG | TEMPERATURE: 98.7 F

## 2019-04-25 DIAGNOSIS — L02.811 CELLULITIS AND ABSCESS OF HEAD: ICD-10-CM

## 2019-04-25 DIAGNOSIS — S01.00XA OPEN WOUND OF SCALP, UNSPECIFIED OPEN WOUND TYPE, INITIAL ENCOUNTER: Primary | ICD-10-CM

## 2019-04-25 DIAGNOSIS — I48.91 ATRIAL FIBRILLATION WITH RAPID VENTRICULAR RESPONSE (HCC): Primary | ICD-10-CM

## 2019-04-25 DIAGNOSIS — L03.811 CELLULITIS AND ABSCESS OF HEAD: ICD-10-CM

## 2019-04-25 DIAGNOSIS — I21.4 NSTEMI (NON-ST ELEVATED MYOCARDIAL INFARCTION) (HCC): ICD-10-CM

## 2019-04-25 LAB
ANION GAP SERPL CALC-SCNC: 10 MMOL/L (ref 3–18)
ATRIAL RATE: 159 BPM
BASOPHILS # BLD: 0 K/UL (ref 0–0.1)
BASOPHILS NFR BLD: 0 % (ref 0–2)
BUN SERPL-MCNC: 19 MG/DL (ref 7–18)
BUN/CREAT SERPL: 14 (ref 12–20)
CALCIUM SERPL-MCNC: 9.5 MG/DL (ref 8.5–10.1)
CALCULATED R AXIS, ECG10: -2 DEGREES
CALCULATED T AXIS, ECG11: 3 DEGREES
CHLORIDE SERPL-SCNC: 101 MMOL/L (ref 100–108)
CK MB CFR SERPL CALC: 2 % (ref 0–4)
CK MB SERPL-MCNC: 2.5 NG/ML (ref 5–25)
CK SERPL-CCNC: 126 U/L (ref 39–308)
CO2 SERPL-SCNC: 30 MMOL/L (ref 21–32)
CREAT SERPL-MCNC: 1.37 MG/DL (ref 0.6–1.3)
D DIMER PPP FEU-MCNC: 0.3 UG/ML(FEU)
DIAGNOSIS, 93000: NORMAL
DIFFERENTIAL METHOD BLD: ABNORMAL
EOSINOPHIL # BLD: 0.2 K/UL (ref 0–0.4)
EOSINOPHIL NFR BLD: 1 % (ref 0–5)
ERYTHROCYTE [DISTWIDTH] IN BLOOD BY AUTOMATED COUNT: 13.2 % (ref 11.6–14.5)
GLUCOSE SERPL-MCNC: 196 MG/DL (ref 74–99)
HCT VFR BLD AUTO: 48.8 % (ref 36–48)
HGB BLD-MCNC: 16.1 G/DL (ref 13–16)
LYMPHOCYTES # BLD: 2.2 K/UL (ref 0.9–3.6)
LYMPHOCYTES NFR BLD: 14 % (ref 21–52)
MAGNESIUM SERPL-MCNC: 2.1 MG/DL (ref 1.6–2.6)
MCH RBC QN AUTO: 28.9 PG (ref 24–34)
MCHC RBC AUTO-ENTMCNC: 33 G/DL (ref 31–37)
MCV RBC AUTO: 87.5 FL (ref 74–97)
MONOCYTES # BLD: 1.1 K/UL (ref 0.05–1.2)
MONOCYTES NFR BLD: 7 % (ref 3–10)
NEUTS SEG # BLD: 12.5 K/UL (ref 1.8–8)
NEUTS SEG NFR BLD: 78 % (ref 40–73)
PLATELET # BLD AUTO: 238 K/UL (ref 135–420)
PMV BLD AUTO: 10.1 FL (ref 9.2–11.8)
POTASSIUM SERPL-SCNC: 3.6 MMOL/L (ref 3.5–5.5)
Q-T INTERVAL, ECG07: 294 MS
QRS DURATION, ECG06: 96 MS
QTC CALCULATION (BEZET), ECG08: 500 MS
RBC # BLD AUTO: 5.58 M/UL (ref 4.7–5.5)
SODIUM SERPL-SCNC: 141 MMOL/L (ref 136–145)
TROPONIN I SERPL-MCNC: 0.05 NG/ML (ref 0–0.04)
TSH SERPL DL<=0.05 MIU/L-ACNC: 1.01 UIU/ML (ref 0.36–3.74)
VENTRICULAR RATE, ECG03: 174 BPM
WBC # BLD AUTO: 15.9 K/UL (ref 4.6–13.2)

## 2019-04-25 PROCEDURE — 99285 EMERGENCY DEPT VISIT HI MDM: CPT

## 2019-04-25 PROCEDURE — 96365 THER/PROPH/DIAG IV INF INIT: CPT

## 2019-04-25 PROCEDURE — 84443 ASSAY THYROID STIM HORMONE: CPT

## 2019-04-25 PROCEDURE — 85379 FIBRIN DEGRADATION QUANT: CPT

## 2019-04-25 PROCEDURE — 82550 ASSAY OF CK (CPK): CPT

## 2019-04-25 PROCEDURE — 83735 ASSAY OF MAGNESIUM: CPT

## 2019-04-25 PROCEDURE — 96376 TX/PRO/DX INJ SAME DRUG ADON: CPT

## 2019-04-25 PROCEDURE — 80048 BASIC METABOLIC PNL TOTAL CA: CPT

## 2019-04-25 PROCEDURE — 74011250636 HC RX REV CODE- 250/636: Performed by: EMERGENCY MEDICINE

## 2019-04-25 PROCEDURE — 74011250637 HC RX REV CODE- 250/637: Performed by: INTERNAL MEDICINE

## 2019-04-25 PROCEDURE — 71045 X-RAY EXAM CHEST 1 VIEW: CPT

## 2019-04-25 PROCEDURE — 74011250636 HC RX REV CODE- 250/636: Performed by: INTERNAL MEDICINE

## 2019-04-25 PROCEDURE — 96361 HYDRATE IV INFUSION ADD-ON: CPT

## 2019-04-25 PROCEDURE — 96375 TX/PRO/DX INJ NEW DRUG ADDON: CPT

## 2019-04-25 PROCEDURE — 85025 COMPLETE CBC W/AUTO DIFF WBC: CPT

## 2019-04-25 PROCEDURE — 74011000250 HC RX REV CODE- 250: Performed by: EMERGENCY MEDICINE

## 2019-04-25 PROCEDURE — 74011000258 HC RX REV CODE- 258: Performed by: EMERGENCY MEDICINE

## 2019-04-25 PROCEDURE — 65660000000 HC RM CCU STEPDOWN

## 2019-04-25 PROCEDURE — 93005 ELECTROCARDIOGRAM TRACING: CPT

## 2019-04-25 RX ORDER — AMOXICILLIN AND CLAVULANATE POTASSIUM 875; 125 MG/1; MG/1
1 TABLET, FILM COATED ORAL 2 TIMES DAILY
Qty: 20 TAB | Refills: 0 | Status: SHIPPED | OUTPATIENT
Start: 2019-04-25 | End: 2019-04-30

## 2019-04-25 RX ORDER — DILTIAZEM HYDROCHLORIDE 5 MG/ML
20 INJECTION INTRAVENOUS
Status: COMPLETED | OUTPATIENT
Start: 2019-04-25 | End: 2019-04-25

## 2019-04-25 RX ORDER — TAMSULOSIN HYDROCHLORIDE 0.4 MG/1
0.4 CAPSULE ORAL DAILY
Status: DISCONTINUED | OUTPATIENT
Start: 2019-04-26 | End: 2019-04-30 | Stop reason: HOSPADM

## 2019-04-25 RX ORDER — FENOFIBRATE 145 MG/1
145 TABLET, COATED ORAL DAILY
Status: DISCONTINUED | OUTPATIENT
Start: 2019-04-26 | End: 2019-04-30 | Stop reason: HOSPADM

## 2019-04-25 RX ORDER — HYDROCODONE BITARTRATE AND ACETAMINOPHEN 10; 325 MG/1; MG/1
1 TABLET ORAL
COMMUNITY

## 2019-04-25 RX ORDER — SODIUM CHLORIDE 9 MG/ML
500 INJECTION, SOLUTION INTRAVENOUS ONCE
Status: COMPLETED | OUTPATIENT
Start: 2019-04-25 | End: 2019-04-25

## 2019-04-25 RX ORDER — METOPROLOL TARTRATE 50 MG/1
100 TABLET ORAL EVERY 12 HOURS
Status: DISCONTINUED | OUTPATIENT
Start: 2019-04-25 | End: 2019-04-30 | Stop reason: HOSPADM

## 2019-04-25 RX ORDER — TRAZODONE HYDROCHLORIDE 50 MG/1
50 TABLET ORAL
Status: DISCONTINUED | OUTPATIENT
Start: 2019-04-25 | End: 2019-04-30 | Stop reason: HOSPADM

## 2019-04-25 RX ORDER — SODIUM CHLORIDE 9 MG/ML
500 INJECTION, SOLUTION INTRAVENOUS CONTINUOUS
Status: DISCONTINUED | OUTPATIENT
Start: 2019-04-25 | End: 2019-04-26

## 2019-04-25 RX ORDER — ATORVASTATIN CALCIUM 40 MG/1
40 TABLET, FILM COATED ORAL
Status: DISCONTINUED | OUTPATIENT
Start: 2019-04-25 | End: 2019-04-30 | Stop reason: HOSPADM

## 2019-04-25 RX ORDER — BUPROPION HYDROCHLORIDE 150 MG/1
150 TABLET, EXTENDED RELEASE ORAL 2 TIMES DAILY
Status: DISCONTINUED | OUTPATIENT
Start: 2019-04-26 | End: 2019-04-30 | Stop reason: HOSPADM

## 2019-04-25 RX ORDER — MORPHINE SULFATE 15 MG/1
15 TABLET, FILM COATED, EXTENDED RELEASE ORAL DAILY
Status: DISCONTINUED | OUTPATIENT
Start: 2019-04-26 | End: 2019-04-26

## 2019-04-25 RX ORDER — HEPARIN SODIUM 5000 [USP'U]/ML
5000 INJECTION, SOLUTION INTRAVENOUS; SUBCUTANEOUS EVERY 8 HOURS
Status: DISCONTINUED | OUTPATIENT
Start: 2019-04-25 | End: 2019-04-26

## 2019-04-25 RX ORDER — METOPROLOL TARTRATE 5 MG/5ML
5 INJECTION INTRAVENOUS
Status: COMPLETED | OUTPATIENT
Start: 2019-04-25 | End: 2019-04-25

## 2019-04-25 RX ADMIN — TRAZODONE HYDROCHLORIDE 50 MG: 50 TABLET ORAL at 22:52

## 2019-04-25 RX ADMIN — ATORVASTATIN CALCIUM 40 MG: 40 TABLET, FILM COATED ORAL at 22:52

## 2019-04-25 RX ADMIN — DILTIAZEM HYDROCHLORIDE 20 MG: 5 INJECTION INTRAVENOUS at 14:16

## 2019-04-25 RX ADMIN — DILTIAZEM HYDROCHLORIDE 15 MG/HR: 5 INJECTION INTRAVENOUS at 19:36

## 2019-04-25 RX ADMIN — DILTIAZEM HYDROCHLORIDE 2 MG/HR: 5 INJECTION INTRAVENOUS at 14:54

## 2019-04-25 RX ADMIN — METOPROLOL TARTRATE 100 MG: 50 TABLET ORAL at 22:52

## 2019-04-25 RX ADMIN — HEPARIN SODIUM 5000 UNITS: 5000 INJECTION INTRAVENOUS; SUBCUTANEOUS at 22:52

## 2019-04-25 RX ADMIN — DILTIAZEM HYDROCHLORIDE 20 MG: 5 INJECTION INTRAVENOUS at 14:03

## 2019-04-25 RX ADMIN — METOPROLOL TARTRATE 5 MG: 5 INJECTION INTRAVENOUS at 14:48

## 2019-04-25 RX ADMIN — SODIUM CHLORIDE 500 ML: 900 INJECTION, SOLUTION INTRAVENOUS at 14:07

## 2019-04-25 RX ADMIN — SODIUM CHLORIDE 500 ML: 900 INJECTION, SOLUTION INTRAVENOUS at 14:55

## 2019-04-25 NOTE — Clinical Note
UNSURE IF WIFE IS PRESENT, WILL HAVE [facility] STAFF NOTIFY HER WHEN AVAILABLE

## 2019-04-25 NOTE — TELEPHONE ENCOUNTER
Patient calling office stating that about an hour after he left from his appointment he started having throbbing pain in his left upper arm and shoulder. He stated that he was also experiencing a numbing feeling in the arm. He was concerned as to if this could have been caused by the BP check in our office. No other concerns or symptoms. Spoke with Dena Green who advised that she wanted the patient to go to the ED for evaluation and treatment as these were new symptoms and he had none of these complaints while in office. Patient agreed and stated he would go to ED. No further questions or concerns at this time.

## 2019-04-25 NOTE — ED NOTES
Attempted to call report to [address] [place] [address], was on hold for 14 minutes and phone was not picked up.

## 2019-04-25 NOTE — Clinical Note
ID band present and verified. Family is not present.  PER PT, WIFE IS \"ON THE WAY\" TO [address]

## 2019-04-25 NOTE — ED PROVIDER NOTES
HPI patient states he went out to work today he got out of his truck he developed an acute onset of heaviness in the center of his chest, the heaviness radiated to his left shoulder and upper arm. Patient also experienced shortness of breath. He says that when he tried to walk around he became even more short of breath. He says he is never had these symptoms before. Patient got back into his truck and drove to the ER for further evaluation. Patient relates that early this morning he went to his PCP office for follow-up of a scalp infection and was feeling fine. Records from the PCPs office show that at that time his pulse was 89. Patient says for the past several days he has been otherwise feeling fine. He denies any history of cardiac problems. He denies any other symptoms or complaints.     Past Medical History:   Diagnosis Date    Arthritis     Back problem     Colon polyp 08/2008    Dr Liana Bah Depression 12/13    PHQ-9 was 6/27     Diabetes ([facility] [address] 3/15    on basis of elev hba1c; [facility]    Dyslipidemia     Enthesopathy of hip region     Fatty liver     on US w negative serologies 2009    Foot fracture, right     9/13 5th metatarsal    GERD (gastroesophageal reflux disease)     Hypertension     Hypogonadism male 03/2013    Dr Loree Mcdowell; took androgel in past    Lumbago     Morbid obesity ([address]     peak weight 275 lbs, bmi 36.3 from 2/14; IF 2/18 start weight 253 lbs    Phymatous rosacea     Postlaminectomy syndrome, lumbar region     Prediabetes on metformin     2 hr  2/10    Shoulder pain     Thoracic or lumbosacral neuritis or radiculitis, unspecified     Tobacco dependence syndrome        Past Surgical History:   Procedure Laterality Date    HX BACK SURGERY      HX COLONOSCOPY      Dr Glen Singletary 8/22/08    HX ORTHOPAEDIC  12/06    left shoulder repair/rotator cuff    HX ORTHOPAEDIC      DEXA t score -1.0 spine, -0.4 hip (1/14) Dr. Glory Chandra 7/02    L5-S1 hemilaminectomy and diskectomy    NEUROLOGICAL PROCEDURE UNLISTED  4/13    MRI pituitary normal    ME ANESTH,SURGERY OF SHOULDER           Family History:   Problem Relation Age of Onset    Diabetes Mother     Cancer Mother         stomach       Social History     Socioeconomic History    Marital status:      Spouse name: Not on file    Number of children: Not on file    Years of education: Not on file    Highest education level: Not on file   Occupational History    Not on file   Social Needs    Financial resource strain: Not on file    Food insecurity:     Worry: Not on file     Inability: Not on file    Transportation needs:     Medical: Not on file     Non-medical: Not on file   Tobacco Use    Smoking status: Current Every Day Smoker     Packs/day: 2.00     Types: Cigarettes    Smokeless tobacco: Never Used   Substance and Sexual Activity    Alcohol use: No     Comment: One drink per 3-4 months    Drug use: No     Comment: Marijuana use in the [de-identified]    Sexual activity: Not on file   Lifestyle    Physical activity:     Days per week: Not on file     Minutes per session: Not on file    Stress: Not on file   Relationships    Social connections:     Talks on phone: Not on file     Gets together: Not on file     Attends Worship service: Not on file     Active member of club or organization: Not on file     Attends meetings of clubs or organizations: Not on file     Relationship status: Not on file    Intimate partner violence:     Fear of current or ex partner: Not on file     Emotionally abused: Not on file     Physically abused: Not on file     Forced sexual activity: Not on file   Other Topics Concern    Not on file   Social History Narrative    Not on file         ALLERGIES: Patient has no known allergies. Review of Systems   Constitutional: Negative. HENT: Negative. Eyes: Negative. Respiratory: Positive for chest tightness and shortness of breath. Cardiovascular: Negative. Gastrointestinal: Negative. Endocrine: Negative. Genitourinary: Negative. Musculoskeletal: Negative. Neurological: Negative. Psychiatric/Behavioral: Negative. Vitals:    04/25/19 1405 04/25/19 1406 04/25/19 1407 04/25/19 1408   BP:       Pulse: (!) 164 (!) 171 (!) 152 (!) 154   Resp: 17 17 22 24   Temp:       SpO2: 97% 96% 98% 92%   Weight:       Height:                Physical Exam   Constitutional: He is oriented to person, place, and time. He appears well-developed and well-nourished. HENT:   Head: Normocephalic and atraumatic. Mouth/Throat: Oropharynx is clear and moist.   Eyes: Pupils are equal, round, and reactive to light. EOM are normal.   Neck: Neck supple. Cardiovascular:   Heart sounds are tachycardic and irregularly irregular   Pulmonary/Chest: Effort normal and breath sounds normal.   Abdominal: Soft. Musculoskeletal: Normal range of motion. Neurological: He is alert and oriented to person, place, and time. Skin: Skin is warm and dry. There is a 2 x 2 cm area in the mid top scalp consistent with a resolving cellulitis. Mild erythema and mild tenderness to palpation no drainage noted. Psychiatric: He has a normal mood and affect. Nursing note and vitals reviewed. [facility]    CRITICAL CARE:  3:33 PM  I have spent 60 minutes of critical care time involved in lab review, consultations with specialist, family decision-making, and documentation. During this entire length of time I was immediately available to the patient. Critical Care: The reason for providing this level of medical care for this critically ill patient was due a critical illness that impaired one or more vital organ systems such that there was a high probability of imminent or life threatening deterioration in the patients condition.  This care involved high complexity decision making to assess, manipulate, and support vital system functions, to treat this degreee vital organ system failure and to prevent further life threatening deterioration of the patients condition. EKG that was performed on arrival was read by me at 1343 as atrial fibrillation with rapid ventricular response at a rate of 174. Patient presented with atrial fib and rapid ventricular response. He was treated with a bolus of IV Cardizem and started on a Cardizem drip. This was also supplemented with a single dose of IV Lopressor. This resulted in the patient's heart rate coming down to the 105-110 range. He remained in atrial fibrillation. However, he states he is feeling much better now that his heart rate has come down. He continues to not to deny any chest pain. Again, he denies ever having these types of symptoms or any cardiac history in the past.    I have arranged for patient to be admitted to [facility] on a Cardizem drip with cardiology consult. The patient understands and agrees with this disposition.   Yesi Fitzgerald MD 3:30 PM    Procedures

## 2019-04-25 NOTE — Clinical Note
Lesion located in the Proximal Diag 2. Balloon inflated using multiple inflations inflation technique. Pressure = 13 fiona; Duration = 16 sec.

## 2019-04-25 NOTE — Clinical Note
Contrast Dose Calculator:  
Patient's age: 63.  
Patient's sex: Male. Patient weight (kg) = 114.3. Creatinine level (mg/dL) = 0.66. Creatinine clearance (mL/min): 197.23. Contrast concentration (mg/mL) = 300. MACD = 300 mL. Max Contrast dose per Creatinine Cl calculator = 443. 78 mL.

## 2019-04-25 NOTE — ED NOTES
Patient greeted / introduced myself as their primary nurse. Encouraged to voice any concerns, and all questions/concerns addressed. Assessment in progress. Explanation and teaching of all care given,  including any pending orders or procedures. Call bell with reach. Patient fall risk assessed, with prevention measures in place, to include bed in lowest position with casters locked and rail up, call bell within reach, frequent toileting in progress, lights on, pathway to bathroom free from obstacles. Patient instructed to call for assist OOB at all times. Floor dry. Instructed patient that staff will make hourly rounds to provide reassessments in pain control, concerns, toileting, and any other updates in care. Hand hygiene maintained prior to and after patient/staff interaction. Patient in on cardiac monitor with A-Fib RVR, receiving Cardizem 15mg/hr via 20ga IV left AC. Heartrate 110 to 140 depending on activity. Patient walking around room with cardiac monitor on, oxygen via nasal cannula @ 2 LPM. Patient states he has been more tired today, states he was getting short of breath with minor exertion earlier today. Denies any SOB, Difficulty Breathing, Denies any Chest pain/discomfort at this time.

## 2019-04-25 NOTE — Clinical Note
TRANSFER - OUT REPORT:  
 
Verbal report given to: Claudio Meehan RN. Miguelina Crabtree Report consisted of patient's Situation, Background, Assessment and  
Recommendations(SBAR). Opportunity for questions and clarification was provided. Patient transported with a Registered Nurse and [address] [place] [address] / Patient Care Tech. Patient transported to: [address]

## 2019-04-25 NOTE — Clinical Note
TRANSFER - IN REPORT:  
 
Verbal report received from: Jeromy Hurtado RN. Carmita Song Report consisted of patient's Situation, Background, Assessment and  
Recommendations(SBAR). Opportunity for questions and clarification was provided. Assessment completed upon patient's arrival to unit and care assumed. Patient transported with a Registered Nurse and [address] [place] [address] / [facility].

## 2019-04-25 NOTE — PROGRESS NOTES
Amanuel Juarez presents today for   Chief Complaint   Patient presents with    Cyst     large fluid filled bump on head, area is red and painful x 2 weeks               Depression Screening:  3 most recent PHQ Screens 5/31/2018   Little interest or pleasure in doing things Not at all   Feeling down, depressed, irritable, or hopeless Not at all   Total Score PHQ 2 0       Learning Assessment:  Learning Assessment 5/31/2018   PRIMARY LEARNER Patient   HIGHEST LEVEL OF EDUCATION - PRIMARY LEARNER  [facility] PRIMARY LEARNER -   CO-LEARNER CAREGIVER No   PRIMARY LANGUAGE ENGLISH   LEARNER PREFERENCE PRIMARY DEMONSTRATION     -   ANSWERED BY patient   RELATIONSHIP SELF       Abuse Screening:  Abuse Screening Questionnaire 5/31/2018   Do you ever feel afraid of your partner? N   Are you in a relationship with someone who physically or mentally threatens you? N   Is it safe for you to go home? Y       Fall Risk  Fall Risk Assessment, last 12 mths 5/31/2018   Able to walk? Yes   Fall in past 12 months? No           Coordination of Care:  1. Have you been to the ER, urgent care clinic since your last visit? NO   Hospitalized since your last visit? NO    2. Have you seen or consulted any other health care providers outside of the [address] [place] [address] since your last visit? Include any pap smears or colon screening.  NO

## 2019-04-25 NOTE — CONSULTS
[facility] - Consult Note    Consultation request by James Hartmann MD for advice/opinion related to evaluating Atrial fibrillation with rapid ventricular response ([facility] [address] [I48.91]    Date of  Admission: 4/25/2019  1:38 PM   Primary Care Physician:  Yola Lmi MD     Assessment:     Patient Active Problem List   Diagnosis Code    Encounter for long-term (current) drug use Z79.899    Dyslipidemia E78.5   [facility] Hypogonadism male Dr. Ger Duke E29.1    Hypovitaminosis D E55.9    Brachial neuritis or radiculitis M54.12    Enthesopathy of hip region M76.899    Insomnia secondary to chronic pain G89.29, G47.01    Essential hypertension I10    Cervical dystonia G24.3    Chronic right shoulder pain M25.511, G89.29    DDD (degenerative disc disease), cervical M50.30    Obesity (BMI 30-39. 9) E66.9    Tobacco dependence syndrome F17.200    Greater trochanteric bursitis of right hip M70.61    Colon polyp 8/08 Dr Darcy Braun K63.5    Arthritis of right knee M17.11    Chronic pain of right knee M25.561, G89.29    Chronic pain syndrome G89.4    Controlled type 2 diabetes mellitus without complication, without long-term current use of insulin (HCC) E11.9    Atrial fibrillation with rapid ventricular response (HCC) I48.91     - New Onset Atrial Fibrillation - with RVR; possibly secondary to underlying infection given elevated WBCs on admission. Presented with rates in the 170s, initially, placed on a cardizem gtt, and given PO beta blocker, rates improved to 100s. EKG reveals rapid AF, with inferior, anterolateral ST depressions. Pt reports associated chest heaviness, SOB; diaphoresis. Thyroid studies normal. Initial troponin minimally elevated (0.05), . Patient denies any previous history of CHF or MI  - Chest Pain - Concerning for NSTEMI; patient complained CP with left arm radiation, associated weakness, diaphoresis, shortness of breath. Initial troponin 0.05, then 1,94.   May be demand ischemia due to sustained rapid atrial fibrillation, though CAD is a concern due to elevated risk factors and presenting symptomology  - Essential Hypertension - Controlled  - Hyperlipidemia -  On Statin  - Diabetes Mellitus, type II - On metformin  - Tobacco Abuse - Stress cessation    Atrial Fibrillation CHADSVASC2 Score Stroke Risk:   63 y.o. <65        + 0    male Male     [de-identified]   CHF HX: No    + 0   HTN HX: Yes    +1   Stroke/TIA/Thromboembolism No    +0   Vascular Disease HX: No    + 0   Diabetes Mellitus Yes    +1   CHADSVASC 2 Score 2      Annual Stroke Risk 2.2%- moderate-high      No primary cardiologist     Plan:     - Obtain echocardiogram to assess CV structure, function  - Trend troponin  - Continue PO beta blocker; optimize to effect  - Start heparin gtt  - Given elevated [id number] score, would recommend NOAC on discharge  - Patient should undergo an ischemic work up now that heart rates are adequately controlled, given elevated CAD risk factors, elevated troponin  - Continue ASA, Statin     History of Present Illness: This is a 63 y.o. male admitted for Atrial fibrillation with rapid ventricular response ([facility] [address] [I48.91]. Patient complains of chest pain, left arm pain. The patient came to the hospital after experiencing chest pain with radiation to left arm. He reports associated shortness of breath, diaphoresis. On arrival to [facility] ER, the patient was noted to be in rapid atrial fibrillation with a rate in th e 140-150s, a condition about which the patient is unaware. At the time, the patient had a minimally elevated troponin, and his rate was later controlled on beta blockers. On arrival to [place], his troponin increased to 1.94. The patient was chest pain free on examination. He has an additional medical history of HTN, DMII, hyperlipidemia. He admits regular tobacco use, but denies any recreational drug use.       Cardiac risk factors: smoking/ tobacco exposure, dyslipidemia, diabetes mellitus, obesity, male gender, hypertension      Review of Symptoms:  Except as stated above include:  Constitutional:  negative  Respiratory:  negative  Cardiovascular:  Chest pain, rapid AF  Gastrointestinal: negative  Genitourinary:  negative  Musculoskeletal:  Negative  Neurological:  Negative  Dermatological:  Negative  Endocrinological: Negative  Psychological:  Negative    Pertinent items are noted in HPI.      Past Medical History:     Past Medical History:   Diagnosis Date    Arthritis     Back problem     Colon polyp 08/2008    Dr Prince Presume Depression 12/13    PHQ-9 was 6/27     Diabetes ([facility] [address] 3/15    on basis of elev hba1c; [facility]    Dyslipidemia     Enthesopathy of hip region     Fatty liver     on US w negative serologies 2009    Foot fracture, right     9/13 5th metatarsal    GERD (gastroesophageal reflux disease)     Hypertension     Hypogonadism male 03/2013    Dr Bozena Montoya; took androgel in past    Lumbago     Morbid obesity ([facility] [address]     peak weight 275 lbs, bmi 36.3 from 2/14; IF 2/18 start weight 253 lbs    Phymatous rosacea     Postlaminectomy syndrome, lumbar region     Prediabetes on metformin     2 hr  2/10    Shoulder pain     Thoracic or lumbosacral neuritis or radiculitis, unspecified     Tobacco dependence syndrome          Social History:     Social History     Socioeconomic History    Marital status:      Spouse name: Not on file    Number of children: Not on file    Years of education: Not on file    Highest education level: Not on file   Tobacco Use    Smoking status: Current Every Day Smoker     Packs/day: 2.00     Types: Cigarettes    Smokeless tobacco: Never Used   Substance and Sexual Activity    Alcohol use: No     Comment: One drink per 3-4 months    Drug use: No     Comment: Marijuana use in the [de-identified]        Family History:     Family History   Problem Relation Age of Onset    Diabetes Mother     Cancer Mother         stomach Medications:   No Known Allergies     Current Facility-Administered Medications   Medication Dose Route Frequency    0.9% sodium chloride infusion 500 mL  500 mL IntraVENous CONTINUOUS    dilTIAZem (CARDIZEM) 100 mg in 0.9% sodium chloride 100 mL infusion  0-15 mg/hr IntraVENous TITRATE     Current Outpatient Medications   Medication Sig    HYDROcodone-acetaminophen (NORCO)  mg tablet Take 1 Tab by mouth every six (6) hours as needed for Pain.  amoxicillin-clavulanate (AUGMENTIN) 875-125 mg per tablet Take 1 Tab by mouth two (2) times a day for 10 days.  fenofibrate nanocrystallized (TRICOR) 145 mg tablet take 1 tablet by mouth once daily    traZODone (DESYREL) 50 mg tablet take 1 tablet by mouth NIGHTLY    lisinopril-hydroCHLOROthiazide (PRINZIDE, ZESTORETIC) 20-12.5 mg per tablet TAKE 2 TABLETS BY MOUTH DAILY    metFORMIN (GLUCOPHAGE) 1,000 mg tablet TAKE 1 TABLET BY MOUTH TWO TIMES A DAY WITH MEALS    VITAMIN D2 50,000 unit capsule take 1 capsule by mouth two times a week    atorvastatin (LIPITOR) 40 mg tablet take 1 tablet by mouth once daily    morphine CR (MS CONTIN) 15 mg CR tablet Take 1 Tab by mouth every twelve (12) hours. Max Daily Amount: 30 mg. (Patient taking differently: Take 15 mg by mouth daily.)    naloxone 4 mg/actuation spry 4 mg by Nasal route once as needed (opioid overdose) for up to 1 dose. May substitute 2 mg syringe if insurance requires    buPROPion SR (WELLBUTRIN SR) 150 mg SR tablet Take 1 Tab by mouth two (2) times a day. (Patient taking differently: Take 150 mg by mouth two (2) times a day. Indications: not taking)    tamsulosin (FLOMAX) 0.4 mg capsule Take 1 Cap by mouth daily. (Patient taking differently: Take  by mouth as needed.)    omeprazole (PRILOSEC) 20 mg capsule Take 20 mg by mouth daily.  ascorbic acid (VITAMIN C) 1,000 mg tablet Take 1,000 mg by mouth two (2) times a day.  MULTIVITAMIN PO Take  by mouth.          Physical Exam:     Visit Vitals  /84   Pulse (!) 154   Temp 98.7 °F (37.1 °C)   Resp 24   Ht 6' (1.829 m)   Wt 252 lb (114.3 kg)   SpO2 92%   BMI 34.18 kg/m²     BP Readings from Last 3 Encounters:   04/25/19 103/84   04/25/19 145/72   09/17/18 132/82     Pulse Readings from Last 3 Encounters:   04/25/19 (!) 154   04/25/19 89   09/17/18 74     Wt Readings from Last 3 Encounters:   04/25/19 252 lb (114.3 kg)   04/25/19 252 lb (114.3 kg)   10/22/18 250 lb (113.4 kg)       General:  alert, cooperative, no distress, appears stated age  Neck:  nontender  Lungs:  clear to auscultation bilaterally  Heart:  regular rate and rhythm, S1, S2 normal, no murmur, click, rub or gallop  Abdomen:  abdomen is soft without significant tenderness, masses, organomegaly or guarding  Extremities:  extremities normal, atraumatic, no cyanosis or edema  Skin: Warm and dry. no hyperpigmentation, vitiligo, or suspicious lesions  Neuro: alert, oriented x3, affect appropriate, no focal neurological deficits, moves all extremities well, no involuntary movements, reflexes at knee and ankle intact  Psych: non focal     Data Review:     Recent Labs     04/25/19  1351   WBC 15.9*   HGB 16.1*   HCT 48.8*        Recent Labs     04/25/19  1351      K 3.6      CO2 30   *   BUN 19*   CREA 1.37*   CA 9.5   MG 2.1       Results for orders placed or performed during the hospital encounter of 04/25/19   EKG, 12 LEAD, INITIAL   Result Value Ref Range    Ventricular Rate 174 BPM    Atrial Rate 159 BPM    QRS Duration 96 ms    Q-T Interval 294 ms    QTC Calculation (Bezet) 500 ms    Calculated R Axis -2 degrees    Calculated T Axis 3 degrees    Diagnosis       Atrial fibrillation with rapid ventricular response  Inferior infarct , age undetermined  Marked ST abnormality, possible anterolateral subendocardial injury  Abnormal ECG  When compared with ECG of 19-JUL-2014 07:00,  Atrial fibrillation has replaced Sinus rhythm  Vent.  rate has increased  BPM  ST now depressed in Inferior leads  ST now depressed in Anterolateral leads  T wave amplitude has increased in Anterior leads         All Cardiac Markers in the last 24 hours:    Lab Results   Component Value Date/Time     04/25/2019 01:51 PM    CKMB 2.5 04/25/2019 01:51 PM    CKND1 2.0 04/25/2019 01:51 PM    TROIQ 0.05 (H) 04/25/2019 01:51 PM       Last Lipid:    Lab Results   Component Value Date/Time    Cholesterol, total 168 02/17/2018 09:48 PM    HDL Cholesterol 26 (L) 02/17/2018 09:48 PM    LDL,Direct 72 02/17/2018 09:48 PM    LDL, calculated  02/17/2018 09:48 PM      Comment:      Triglyceride >400. LDL cannot be calculated. LDL Cholesterol Direct has been  ordered.       Triglyceride 501 (H) 02/17/2018 09:48 PM    CHOL/HDL Ratio 5.3 (H) 06/15/2010 10:02 AM       Signed By: Mallika Dumont, RAYMON     April 25, 2019

## 2019-04-25 NOTE — Clinical Note
Aspirin Registry Question:  
Aspirin was given and discussed with physician prior to the procedure. PT RECEIVED 81MG ASA PO THIS AM, SEE MAR.

## 2019-04-25 NOTE — ED TRIAGE NOTES
Patient was leaving PCP today when started having SOB, chest and left arm pain, and became diaphoretic.  Called office and was told to come to ED

## 2019-04-25 NOTE — PROGRESS NOTES
Kelton Oh is a 63 y.o.  male and presents with    Chief Complaint   Patient presents with    Cyst     large fluid filled bump on head, area is red and painful x 2 weeks Room 12    Ear Swelling     left ear swelling closed since 4/24/19       Subjective:  HPI  Mr. Mariela Trinidad presents today for large fluid collection to the top of the head started x 2 weeks. He reports has been draining purulent drainage. He reports left ear started swelling yesterday. He denies hearing loss, pain is controlled with Norco. He is with pressure to the head and the pain at the wound site is described as a \"sunburn\" controlled with chronic pain managment. He has been using peroxide and polysporin x 1 week. He reports does not feel sick but feels like starting to come down with something, felt feverish, did not check home temperature. Using Advil and Hydrocodone as needed with some relief. He continues to wear a hat and uses a hard hat at work and reports the strap rubs on the wound site. He is unsure if was bit by something, possible spider or tick exposure. He is a mills over construction site. He is a controlled type 2 DM, HTN. A1C 6.5 2/2018. Additional Concerns: none     ROS   Review of Systems   Skin:        Wound to top posterior head   All other systems reviewed and are negative.       No Known Allergies    Facility-Administered Medications Ordered in Other Visits   Medication Dose Route Frequency Provider Last Rate Last Dose    0.9% sodium chloride infusion 500 mL  500 mL IntraVENous CONTINUOUS Dottie Bone MD   Stopped at 04/25/19 1455    dilTIAZem (CARDIZEM) 100 mg in 0.9% sodium chloride 100 mL infusion  15 mg/hr IntraVENous CONTINUOUS Dottie Bone MD 15 mL/hr at 04/25/19 1936 15 mg/hr at 04/25/19 1936    atorvastatin (LIPITOR) tablet 40 mg  40 mg Oral QHS Steven Adkins MD   40 mg at 04/25/19 2252    [START ON 4/26/2019] buPROPion SR (WELLBUTRIN SR) tablet 150 mg  150 mg Oral BID Althea Diop MD David Dillon ON 4/26/2019] fenofibrate nanocrystallized (TRICOR) tablet 145 mg  145 mg Oral DAILY Cecilio Comer MD        [START ON 4/26/2019] morphine CR (MS CONTIN) tablet 15 mg  15 mg Oral DAILY Cecilio Comer MD        [START ON 4/26/2019] tamsulosin (FLOMAX) capsule 0.4 mg  0.4 mg Oral DAILY Cecilio Comer MD        traZODone (DESYREL) tablet 50 mg  50 mg Oral QHS Cecilio Comer MD   50 mg at 04/25/19 2252    heparin (porcine) injection 5,000 Units  5,000 Units SubCUTAneous Mikey Thomas MD   5,000 Units at 04/25/19 2252    metoprolol tartrate (LOPRESSOR) tablet 100 mg  100 mg Oral Q12H Cecilio Comer MD   100 mg at 04/25/19 2252       Social History     Socioeconomic History    Marital status:      Spouse name: Not on file    Number of children: Not on file    Years of education: Not on file    Highest education level: Not on file   Occupational History    Not on file   Social Needs    Financial resource strain: Not on file    Food insecurity:     Worry: Not on file     Inability: Not on file    Transportation needs:     Medical: Not on file     Non-medical: Not on file   Tobacco Use    Smoking status: Current Every Day Smoker     Packs/day: 2.00     Types: Cigarettes    Smokeless tobacco: Never Used   Substance and Sexual Activity    Alcohol use: No     Comment: One drink per 3-4 months    Drug use: No     Comment: Marijuana use in the [de-identified]    Sexual activity: Not on file   Lifestyle    Physical activity:     Days per week: Not on file     Minutes per session: Not on file    Stress: Not on file   Relationships    Social connections:     Talks on phone: Not on file     Gets together: Not on file     Attends [employer] service: Not on file     Active member of club or organization: Not on file     Attends meetings of clubs or organizations: Not on file     Relationship status: Not on file    Intimate partner violence:     Fear of current or ex partner: Not on file     Emotionally abused: Not on file     Physically abused: Not on file     Forced sexual activity: Not on file   Other Topics Concern    Not on file   Social History Narrative    Not on file       Past Medical History:   Diagnosis Date    Arthritis     Back problem     Colon polyp 08/2008    Dr Faby Martínez Depression 12/13    PHQ-9 was 6/27     Diabetes ([facility] [address] 3/15    on basis of elev hba1c; [facility]    Dyslipidemia     Enthesopathy of hip region     Fatty liver     on US w negative serologies 2009    Foot fracture, right     9/13 5th metatarsal    GERD (gastroesophageal reflux disease)     Hypertension     Hypogonadism male 03/2013    Dr Joseph Ellsworth; took androgel in past    Lumbago     Morbid obesity ([facility] [address]     peak weight 275 lbs, bmi 36.3 from 2/14; IF 2/18 start weight 253 lbs    Phymatous rosacea     Postlaminectomy syndrome, lumbar region     Prediabetes on metformin     2 hr  2/10    Shoulder pain     Thoracic or lumbosacral neuritis or radiculitis, unspecified     Tobacco dependence syndrome        Past Surgical History:   Procedure Laterality Date    HX BACK SURGERY      HX COLONOSCOPY      Dr Soy Arnold 8/22/08    HX ORTHOPAEDIC  12/06    left shoulder repair/rotator cuff    HX ORTHOPAEDIC      DEXA t score -1.0 spine, -0.4 hip (1/14) Dr. Magali Cody HX OTHER SURGICAL  7/02    L5-S1 hemilaminectomy and diskectomy    NEUROLOGICAL PROCEDURE UNLISTED  4/13    MRI pituitary normal    MA ANESTH,SURGERY OF SHOULDER         Family History   Problem Relation Age of Onset    Diabetes Mother     Cancer Mother         stomach       Objective:  Vitals:    04/25/19 0846   BP: 145/72   Pulse: 89   Resp: 14   Temp: 98.7 °F (37.1 °C)   TempSrc: Oral   SpO2: 97%   Weight: 252 lb (114.3 kg)   Height: 6' (1.829 m)   PainSc:   2   PainLoc: Generalized       LABS   Results for orders placed or performed during the hospital encounter of 10/22/18   POC CREATININE Result Value Ref Range    Creatinine, POC 0.7 0.6 - 1.3 MG/DL    GFRAA, POC >60 >60 ml/min/1.73m2    GFRNA, POC >60 >60 ml/min/1.73m2       TESTS  none    PE  Physical Exam   Constitutional: He is oriented to person, place, and time. He appears well-developed and well-nourished. No distress. HENT:   Head: Normocephalic and atraumatic. Musculoskeletal: Normal range of motion. Neurological: He is alert and oriented to person, place, and time. Skin: Skin is warm and dry. He is not diaphoretic. Psychiatric: He has a normal mood and affect. His behavior is normal. Judgment and thought content normal.   Vitals reviewed. Assessment/Plan:    1. Cellulitis to posterior head of parietal region- Unknown cause. Wound cultures sent. He denies specific constitutional s/s but reports not feeling well. His VS are stable in office. He is also with inflammation to the tragus of the left ear- possible Yadira's ear sign. Need to cover for streptococcus species as possible Erysipelas- placed on Augmentin BID x 10 days and instructed if feeling worse, develops fevers of 100.4 or greater, worsening pain then report to ED. Lab review: orders written for new lab studies as appropriate; see orders    Today's Visit:   Diagnoses and all orders for this visit:    1. Open wound of scalp, unspecified open wound type, initial encounter  -     CULTURE, WOUND W GRAM STAIN; Future  -     CULTURE, ANAEROBIC; Future    2. Cellulitis and abscess of head    Other orders  -     amoxicillin-clavulanate (AUGMENTIN) 875-125 mg per tablet; Take 1 Tab by mouth two (2) times a day for 10 days. Health Maintenance: Deferred to PCP    I have discussed the diagnosis with the patient and the intended plan as seen in the above orders. The patient has received an after-visit summary and questions were answered concerning future plans. I have discussed medication side effects and warnings with the patient as well.  I have reviewed the plan of care with the patient, accepted their input and they are in agreement with the treatment goals. More than 1/2 of this 15 minute visit was spent in counseling and coordination of care, as described above.     BASIM Medrano  Internist of [address], [address] [place] [address] Str.  Phone: [phone]  Fax: [phone]

## 2019-04-25 NOTE — Clinical Note
Lesion: Located in the Proximal Diag 2. Stent deployed. First inflation pressure = 13 fiona; inflation time: 38 sec.

## 2019-04-26 ENCOUNTER — APPOINTMENT (OUTPATIENT)
Dept: NON INVASIVE DIAGNOSTICS | Age: 59
DRG: 247 | End: 2019-04-26
Attending: INTERNAL MEDICINE
Payer: COMMERCIAL

## 2019-04-26 PROBLEM — R07.9 CHEST PAIN: Status: ACTIVE | Noted: 2019-04-26

## 2019-04-26 LAB
ANION GAP SERPL CALC-SCNC: 3 MMOL/L (ref 3–18)
APTT PPP: 31.1 SEC (ref 23–36.4)
APTT PPP: 34.3 SEC (ref 23–36.4)
BASOPHILS # BLD: 0 K/UL (ref 0–0.1)
BASOPHILS NFR BLD: 0 % (ref 0–2)
BUN SERPL-MCNC: 16 MG/DL (ref 7–18)
BUN/CREAT SERPL: 22 (ref 12–20)
CALCIUM SERPL-MCNC: 9.4 MG/DL (ref 8.5–10.1)
CHLORIDE SERPL-SCNC: 107 MMOL/L (ref 100–108)
CK MB CFR SERPL CALC: 5.3 % (ref 0–4)
CK MB SERPL-MCNC: 7.9 NG/ML (ref 5–25)
CK SERPL-CCNC: 148 U/L (ref 39–308)
CO2 SERPL-SCNC: 31 MMOL/L (ref 21–32)
CREAT SERPL-MCNC: 0.73 MG/DL (ref 0.6–1.3)
DIFFERENTIAL METHOD BLD: ABNORMAL
ECHO AO ROOT DIAM: 3.39 CM
ECHO LA AREA 4C: 20.9 CM2
ECHO LA VOL 2C: 79.95 ML (ref 18–58)
ECHO LA VOL 4C: 61.14 ML (ref 18–58)
ECHO LA VOL BP: 77.14 ML (ref 18–58)
ECHO LA VOL/BSA BIPLANE: 32.99 ML/M2 (ref 16–28)
ECHO LA VOLUME INDEX A2C: 34.19 ML/M2 (ref 16–28)
ECHO LA VOLUME INDEX A4C: 26.15 ML/M2 (ref 16–28)
ECHO LV INTERNAL DIMENSION DIASTOLIC: 5.44 CM (ref 4.2–5.9)
ECHO LV INTERNAL DIMENSION SYSTOLIC: 3.54 CM
ECHO LV IVSD: 1.46 CM (ref 0.6–1)
ECHO LV MASS 2D: 403.9 G (ref 88–224)
ECHO LV MASS INDEX 2D: 172.7 G/M2 (ref 49–115)
ECHO LV POSTERIOR WALL DIASTOLIC: 1.35 CM (ref 0.6–1)
ECHO LVOT DIAM: 2.31 CM
ECHO MV A VELOCITY: 108.78 CM/S
ECHO MV E DECELERATION TIME (DT): 211.3 MS
ECHO MV E VELOCITY: 90.66 CM/S
ECHO MV E/A RATIO: 0.83
ECHO PULMONARY ARTERY SYSTOLIC PRESSURE (PASP): 34 MMHG
ECHO TV REGURGITANT MAX VELOCITY: 277.11 CM/S
ECHO TV REGURGITANT PEAK GRADIENT: 30.7 MMHG
EOSINOPHIL # BLD: 0.2 K/UL (ref 0–0.4)
EOSINOPHIL NFR BLD: 1 % (ref 0–5)
ERYTHROCYTE [DISTWIDTH] IN BLOOD BY AUTOMATED COUNT: 13 % (ref 11.6–14.5)
GLUCOSE SERPL-MCNC: 137 MG/DL (ref 74–99)
HCT VFR BLD AUTO: 41.5 % (ref 36–48)
HGB BLD-MCNC: 13.9 G/DL (ref 13–16)
LYMPHOCYTES # BLD: 2.4 K/UL (ref 0.9–3.6)
LYMPHOCYTES NFR BLD: 18 % (ref 21–52)
MCH RBC QN AUTO: 29 PG (ref 24–34)
MCHC RBC AUTO-ENTMCNC: 33.5 G/DL (ref 31–37)
MCV RBC AUTO: 86.5 FL (ref 74–97)
MONOCYTES # BLD: 0.7 K/UL (ref 0.05–1.2)
MONOCYTES NFR BLD: 5 % (ref 3–10)
NEUTS SEG # BLD: 10.1 K/UL (ref 1.8–8)
NEUTS SEG NFR BLD: 76 % (ref 40–73)
PLATELET # BLD AUTO: 199 K/UL (ref 135–420)
PMV BLD AUTO: 10.2 FL (ref 9.2–11.8)
POTASSIUM SERPL-SCNC: 3.8 MMOL/L (ref 3.5–5.5)
RBC # BLD AUTO: 4.8 M/UL (ref 4.7–5.5)
RESULT: ABNORMAL
SODIUM SERPL-SCNC: 141 MMOL/L (ref 136–145)
T4 FREE SERPL-MCNC: 1 NG/DL (ref 0.7–1.5)
TROPONIN I SERPL-MCNC: 1.37 NG/ML (ref 0–0.04)
TROPONIN I SERPL-MCNC: 1.56 NG/ML (ref 0–0.04)
TROPONIN I SERPL-MCNC: 1.94 NG/ML (ref 0–0.04)
TSH SERPL DL<=0.05 MIU/L-ACNC: 0.48 UIU/ML (ref 0.36–3.74)
WBC # BLD AUTO: 13.4 K/UL (ref 4.6–13.2)

## 2019-04-26 PROCEDURE — 74011250637 HC RX REV CODE- 250/637: Performed by: INTERNAL MEDICINE

## 2019-04-26 PROCEDURE — 65660000000 HC RM CCU STEPDOWN

## 2019-04-26 PROCEDURE — 74011250637 HC RX REV CODE- 250/637: Performed by: NURSE PRACTITIONER

## 2019-04-26 PROCEDURE — 93005 ELECTROCARDIOGRAM TRACING: CPT

## 2019-04-26 PROCEDURE — 74011250636 HC RX REV CODE- 250/636: Performed by: NURSE PRACTITIONER

## 2019-04-26 PROCEDURE — 36415 COLL VENOUS BLD VENIPUNCTURE: CPT

## 2019-04-26 PROCEDURE — 80048 BASIC METABOLIC PNL TOTAL CA: CPT

## 2019-04-26 PROCEDURE — 84439 ASSAY OF FREE THYROXINE: CPT

## 2019-04-26 PROCEDURE — 84443 ASSAY THYROID STIM HORMONE: CPT

## 2019-04-26 PROCEDURE — 85730 THROMBOPLASTIN TIME PARTIAL: CPT

## 2019-04-26 PROCEDURE — 82550 ASSAY OF CK (CPK): CPT

## 2019-04-26 PROCEDURE — 93306 TTE W/DOPPLER COMPLETE: CPT

## 2019-04-26 PROCEDURE — 85025 COMPLETE CBC W/AUTO DIFF WBC: CPT

## 2019-04-26 RX ORDER — HYDROCODONE BITARTRATE AND ACETAMINOPHEN 10; 325 MG/1; MG/1
1 TABLET ORAL
Status: DISCONTINUED | OUTPATIENT
Start: 2019-04-26 | End: 2019-04-30 | Stop reason: HOSPADM

## 2019-04-26 RX ORDER — AMOXICILLIN AND CLAVULANATE POTASSIUM 875; 125 MG/1; MG/1
1 TABLET, FILM COATED ORAL 2 TIMES DAILY
Status: DISCONTINUED | OUTPATIENT
Start: 2019-04-26 | End: 2019-04-28

## 2019-04-26 RX ORDER — ASPIRIN 81 MG/1
81 TABLET ORAL DAILY
Status: DISCONTINUED | OUTPATIENT
Start: 2019-04-26 | End: 2019-04-30 | Stop reason: HOSPADM

## 2019-04-26 RX ORDER — HEPARIN SODIUM 10000 [USP'U]/100ML
8-25 INJECTION, SOLUTION INTRAVENOUS
Status: DISCONTINUED | OUTPATIENT
Start: 2019-04-26 | End: 2019-04-29

## 2019-04-26 RX ORDER — ACETAMINOPHEN 325 MG/1
650 TABLET ORAL
Status: DISCONTINUED | OUTPATIENT
Start: 2019-04-26 | End: 2019-04-30 | Stop reason: HOSPADM

## 2019-04-26 RX ADMIN — ASPIRIN 81 MG: 81 TABLET, COATED ORAL at 13:52

## 2019-04-26 RX ADMIN — HEPARIN SODIUM AND DEXTROSE 822.5 UNITS/HR: 10000; 5 INJECTION INTRAVENOUS at 15:53

## 2019-04-26 RX ADMIN — TRAZODONE HYDROCHLORIDE 50 MG: 50 TABLET ORAL at 21:00

## 2019-04-26 RX ADMIN — AMOXICILLIN AND CLAVULANATE POTASSIUM 1 TABLET: 875; 125 TABLET, FILM COATED ORAL at 17:33

## 2019-04-26 RX ADMIN — ATORVASTATIN CALCIUM 40 MG: 40 TABLET, FILM COATED ORAL at 21:00

## 2019-04-26 RX ADMIN — METOPROLOL TARTRATE 100 MG: 50 TABLET ORAL at 11:11

## 2019-04-26 RX ADMIN — AMOXICILLIN AND CLAVULANATE POTASSIUM 1 TABLET: 875; 125 TABLET, FILM COATED ORAL at 11:12

## 2019-04-26 RX ADMIN — FENOFIBRATE 145 MG: 145 TABLET ORAL at 11:05

## 2019-04-26 RX ADMIN — METOPROLOL TARTRATE 100 MG: 50 TABLET ORAL at 21:00

## 2019-04-26 NOTE — PROGRESS NOTES
[facility]  Progress Note    Patient: Lesley Brown Age: 63 y.o. : 1960 MR#: [id number] SSN: [id number]  Date: 2019     Subjective:     Denies CP, SOB, palpitations or GI distress;  Reports + chronic back pain and no other pain complaints    Assessment/Plan:   1. New onset afib w/ RVR - s/p cardiazem drip, now in SR; cont BB  2. Elevated troponin r/o ACS - heparin drip per cardiology; troponin up to 1.96. Plan for cardiac catheterization on  per cardiology  3. Boil to scalp - resume augmentin   4. Chronic pain - pt reports no use of MS contin recently thus d'maxi; cont norco PRN  5.   Tobacco abuse - counseled on cessation    Additional Notes:      Case discussed with:  [x]Patient  []Family  [x]Nursing  []Case Management  DVT Prophylaxis:  []Lovenox  []Hep SQ  []SCDs  []Coumadin   [x]On Heparin gtt    Objective:   VS:   Visit Vitals  /80 (BP 1 Location: Right arm, BP Patient Position: At rest)   Pulse 85   Temp 99 °F (37.2 °C)   Resp 22   Ht 6' (1.829 m)   Wt 112.9 kg (249 lb)   SpO2 95%   BMI 33.77 kg/m²      Tmax/24hrs: Temp (24hrs), Av.5 °F (36.9 °C), Min:98.1 °F (36.7 °C), Max:99 °F (37.2 °C)      Intake/Output Summary (Last 24 hours) at 2019 1634  Last data filed at 2019 1354  Gross per 24 hour   Intake 240 ml   Output --   Net 240 ml     General:  Alert, NAD  Cardiovascular:  RRR  Pulmonary:  LSC throughout  GI:  +BS in all four quadrants, soft, non-tender  Extremities:  No edema; 2+ dorsalis pedis pulses bilaterally  Neuro: alert and oriented x 3  Head: left posterior aspect + boil, crusted with + erythema    Labs:    Recent Results (from the past 24 hour(s))   TSH 3RD GENERATION    Collection Time: 19  4:11 AM   Result Value Ref Range    TSH 0.48 0.36 - 3.74 uIU/mL   T4, FREE    Collection Time: 19  4:11 AM   Result Value Ref Range    T4, Free 1.0 0.7 - 1.5 NG/DL   ECHO ADULT COMPLETE    Collection Time: 19 9:50 AM   Result Value Ref Range    LA Volume 77.14 (A) 18 - 58 mL    Ao Root D 3.39 cm    LVIDd 5.44 4.2 - 5.9 cm    LVPWd 1.35 (A) 0.6 - 1.0 cm    LVIDs 3.54 cm    IVSd 1.46 (A) 0.6 - 1.0 cm    LVOT d 2.31 cm    MV A Kalia 108.78 cm/s    MV E Kalia 90.66 cm/s    MV E/A 0.83     LA Vol 4C 61.14 (A) 18 - 58 mL    LA Vol 2C 79.95 (A) 18 - 58 mL    LA Area 4C 20.9 cm2    LV Mass .9 (A) 88 - 224 g    LV Mass AL Index 172.7 (A) 49 - 115 g/m2    Mitral Valve E Wave Deceleration Time 211.3 ms    Triscuspid Valve Regurgitation Peak Gradient 30.7 mmHg    TR Max Velocity 277.11 cm/s    LA Vol Index 32.99 16 - 28 ml/m2    PASP 34.0 mmHg    LA Vol Index 34.19 16 - 28 ml/m2    LA Vol Index 26.15 16 - 28 ml/m2   CBC WITH AUTOMATED DIFF    Collection Time: 04/26/19 10:45 AM   Result Value Ref Range    WBC 13.4 (H) 4.6 - 13.2 K/uL    RBC 4.80 4.70 - 5.50 M/uL    HGB 13.9 13.0 - 16.0 g/dL    HCT 41.5 36.0 - 48.0 %    MCV 86.5 74.0 - 97.0 FL    MCH 29.0 24.0 - 34.0 PG    MCHC 33.5 31.0 - 37.0 g/dL    RDW 13.0 11.6 - 14.5 %    PLATELET 838 291 - 421 K/uL    MPV 10.2 9.2 - 11.8 FL    NEUTROPHILS 76 (H) 40 - 73 %    LYMPHOCYTES 18 (L) 21 - 52 %    MONOCYTES 5 3 - 10 %    EOSINOPHILS 1 0 - 5 %    BASOPHILS 0 0 - 2 %    ABS. NEUTROPHILS 10.1 (H) 1.8 - 8.0 K/UL    ABS. LYMPHOCYTES 2.4 0.9 - 3.6 K/UL    ABS. MONOCYTES 0.7 0.05 - 1.2 K/UL    ABS. EOSINOPHILS 0.2 0.0 - 0.4 K/UL    ABS.  BASOPHILS 0.0 0.0 - 0.1 K/UL    DF AUTOMATED     METABOLIC PANEL, BASIC    Collection Time: 04/26/19 10:45 AM   Result Value Ref Range    Sodium 141 136 - 145 mmol/L    Potassium 3.8 3.5 - 5.5 mmol/L    Chloride 107 100 - 108 mmol/L    CO2 31 21 - 32 mmol/L    Anion gap 3 3.0 - 18 mmol/L    Glucose 137 (H) 74 - 99 mg/dL    BUN 16 7.0 - 18 MG/DL    Creatinine 0.73 0.6 - 1.3 MG/DL    BUN/Creatinine ratio 22 (H) 12 - 20      GFR est AA >60 >60 ml/min/1.73m2    GFR est non-AA >60 >60 ml/min/1.73m2    Calcium 9.4 8.5 - 10.1 MG/DL   CARDIAC PANEL,(CK, CKMB & TROPONIN)    Collection Time: 04/26/19 10:45 AM   Result Value Ref Range     39 - 308 U/L    CK - MB 7.9 (H) <3.6 ng/ml    CK-MB Index 5.3 (H) 0.0 - 4.0 %    Troponin-I, QT 1.94 (HH) 0.0 - 0.045 NG/ML   EKG, 12 LEAD, INITIAL    Collection Time: 04/26/19 12:54 PM   Result Value Ref Range    Ventricular Rate 72 BPM    Atrial Rate 72 BPM    P-R Interval 134 ms    QRS Duration 100 ms    Q-T Interval 416 ms    QTC Calculation (Bezet) 455 ms    Calculated P Axis 55 degrees    Calculated R Axis 5 degrees    Calculated T Axis 34 degrees    Diagnosis       Normal sinus rhythm  Possible Left atrial enlargement  Inferior infarct (cited on or before 25-APR-2019)  Abnormal ECG  When compared with ECG of 25-APR-2019 13:43,  Sinus rhythm has replaced Atrial fibrillation  Vent.  rate has decreased  BPM  ST no longer depressed in Inferior leads  ST no longer depressed in Anterolateral leads  T wave inversion no longer evident in Lateral leads     PTT    Collection Time: 04/26/19  2:16 PM   Result Value Ref Range    aPTT 31.1 23.0 - 36.4 SEC   TROPONIN I    Collection Time: 04/26/19  3:57 PM   Result Value Ref Range    Troponin-I, QT 1.56 (HH) 0.0 - 0.045 NG/ML     Signed By: Lyle Mcmahon NP     April 26, 2019

## 2019-04-26 NOTE — PROGRESS NOTES
The patient was seen and examined independently. Please read my note for additional detail. The plan was discussed with APC. Sitting up in chair. No chest or abdominal pain currently. Chronic back pain. No nausea or vomiting. No dizziness or SOB. Patient states he went to PCP office due to a boil on scalp and started having left sided chest discomfort on his way back to home. /77 (BP 1 Location: Right arm, BP Patient Position: At rest)   Pulse 79   Temp 98.9 °F (37.2 °C)   Resp 19   Ht 6' (1.829 m)   Wt 112.9 kg (249 lb)   SpO2 94%   BMI 33.77 kg/m²     General appearance - alert, well appearing, and in no distress  Head: boil present on left posterior scalp region  Eyes - sclera anicteric  Nose - no nasal discharge. Neck - supple, no significant adenopathy, trachea is midline  Chest - decreased air entry noted in bases, no wheezes  Heart - S1 and S2 normal  Abdomen - soft, nontender, nondistended, Bowel sounds present  Neurological - alert, oriented, normal speech, no focal findings noted  Musculoskeletal - no joint tenderness or swelling of knees  Extremities - no pedal edema noted    1. New onset Afib with RVR - cont current management, follow ECHO, add Aspirin  2. Scalp boil - cont Augmentin     Can be discharged home if repeat labs look stable and okay with cardiology.

## 2019-04-26 NOTE — ED NOTES
TRANSFER - OUT REPORT:    Verbal report given to Jessenia Wheeler RN (name) on [facility] Setting  [facility] [address] ([facility] for routine progression of care       Report consisted of patients Situation, Background, Assessment and   Recommendations(SBAR). Information from the following report(s) SBAR, Kardex, MAR, Recent Results and Cardiac Rhythm A-Fib with RVR. was reviewed with the receiving nurse. Lines:   Peripheral IV 04/25/19 Left Antecubital (Active)   Site Assessment Clean, dry, & intact 4/25/2019  2:08 PM   Phlebitis Assessment 0 4/25/2019  2:08 PM   Infiltration Assessment 0 4/25/2019  2:08 PM   Dressing Status Clean, dry, & intact 4/25/2019  2:08 PM   Dressing Type Transparent 4/25/2019  2:08 PM   Hub Color/Line Status Patent 4/25/2019  2:08 PM        Opportunity for questions and clarification was provided. Patient transported with:   Monitor  O2 @ 2 liters via nasal cannula  Cardizem 15mg/hr @ 15mls/hr.

## 2019-04-26 NOTE — ROUTINE PROCESS
Bedside and Verbal shift change report given to CELESTE Tellez (oncoming nurse) by Reyes Kells, RN (offgoing nurse). Report included the following information Kardex, Intake/Output, MAR and Recent Results.

## 2019-04-26 NOTE — ROUTINE PROCESS
Bedside shift report given to Kirsten Khan, RN oncoming nurse by Jenifer Frazier RN off going nurse including SBAR, KARDEX and [facility]

## 2019-04-26 NOTE — H&P
History & Physical    Patient: Aris Mari MRN: [id number]  CSN: [id number]    YOB: 1960  Age: 63 y.o. Sex: male      DOA: 4/25/2019    Chief Complaint:   Chief Complaint   Patient presents with    Rapid Heart Rate    Shortness of Breath    Arm Pain          HPI:     Aris Mari is a 63 y.o. with a PMH of HTN, chronic back pain came to the ER with Chest pain, SOB and left arm discomfort. patient states he went out to work today he got out of his truck he developed an acute onset of heaviness in the center of his chest, the heaviness radiated to his left shoulder and upper arm. Patient also experienced shortness of breath. He says that when he tried to walk around he became even more short of breath. He says he is never had these symptoms before. Patient got back into his truck and drove to the ER for further evaluation. Patient relates that early this morning he went to his PCP office for follow-up of a scalp infection and was feeling fine. Records from the PCPs office show that at that time his pulse was 89. Patient says for the past several days he has been otherwise feeling fine. He denies any history of cardiac problems. He denies any other symptoms or complaints.     He was found to have a FIB with RVR in the ER and patient was started on cardizem gtt   During this eval he was in sinus rhythm, in the 60s and has no symptoms     Past Medical History:   Diagnosis Date    Arthritis     Back problem     Colon polyp 08/2008    Dr Mack Alba Depression 12/13    PHQ-9 was 6/27     Diabetes ([address] 3/15    on basis of elev hba1c; [facility] for teaching    Dyslipidemia     Enthesopathy of hip region     Fatty liver     on US w negative serologies 2009    Foot fracture, right     9/13 5th metatarsal    GERD (gastroesophageal reflux disease)     Hypertension     Hypogonadism male 03/2013    Dr Arturo Stapleton; took androgel in past    Lumbago     Morbid obesity ([address]     peak weight 275 lbs, bmi 36.3 from 2/14; IF 2/18 start weight 253 lbs    Phymatous rosacea     Postlaminectomy syndrome, lumbar region     Prediabetes on metformin     2 hr  2/10    Shoulder pain     Thoracic or lumbosacral neuritis or radiculitis, unspecified     Tobacco dependence syndrome        Past Surgical History:   Procedure Laterality Date    HX BACK SURGERY      HX COLONOSCOPY      Dr Parker Ibarra 8/22/08    HX ORTHOPAEDIC  12/06    left shoulder repair/rotator cuff    HX ORTHOPAEDIC      DEXA t score -1.0 spine, -0.4 hip (1/14) Dr. Lauren Chin HX OTHER SURGICAL  7/02    L5-S1 hemilaminectomy and diskectomy    NEUROLOGICAL PROCEDURE UNLISTED  4/13    MRI pituitary normal    [place] ANESTH,SURGERY OF SHOULDER         Family History   Problem Relation Age of Onset    Diabetes Mother     Cancer Mother         stomach       Social History     Socioeconomic History    Marital status:      Spouse name: Not on file    Number of children: Not on file    Years of education: Not on file    Highest education level: Not on file   Tobacco Use    Smoking status: Current Every Day Smoker     Packs/day: 2.00     Types: Cigarettes    Smokeless tobacco: Never Used   Substance and Sexual Activity    Alcohol use: No     Comment: One drink per 3-4 months    Drug use: No     Comment: Marijuana use in the 80's       Prior to Admission medications    Medication Sig Start Date End Date Taking? Authorizing Provider   HYDROcodone-acetaminophen (NORCO)  mg tablet Take 1 Tab by mouth every six (6) hours as needed for Pain. Yes Provider, Historical   amoxicillin-clavulanate (AUGMENTIN) 875-125 mg per tablet Take 1 Tab by mouth two (2) times a day for 10 days.  4/25/19 5/5/19 Yes Dylan Pod, NP   fenofibrate nanocrystallized (TRICOR) 145 mg tablet take 1 tablet by mouth once daily 4/18/19  Yes Katy Adler MD   traZODone (DESYREL) 50 mg tablet take 1 tablet by mouth NIGHTLY 12/5/18  Yes Roxie Brian, Zeny Balderas MD   lisinopril-hydroCHLOROthiazide (PRINZIDE, ZESTORETIC) 20-12.5 mg per tablet TAKE 2 TABLETS BY MOUTH DAILY 9/13/18  Yes Rehan Galan MD   metFORMIN (GLUCOPHAGE) 1,000 mg tablet TAKE 1 TABLET BY MOUTH TWO TIMES A DAY WITH MEALS 8/28/18  Yes Rehan Galan MD   VITAMIN D2 50,000 unit capsule take 1 capsule by mouth two times a week 7/29/18  Yes Rehan Galan MD   atorvastatin (LIPITOR) 40 mg tablet take 1 tablet by mouth once daily 6/24/18  Yes Rehan Galan MD   morphine CR (MS CONTIN) 15 mg CR tablet Take 1 Tab by mouth every twelve (12) hours. Max Daily Amount: 30 mg. Patient taking differently: Take 15 mg by mouth daily. 6/14/18  Yes LUCILLE Keyes   naloxone 4 mg/actuation spry 4 mg by Nasal route once as needed (opioid overdose) for up to 1 dose. May substitute 2 mg syringe if insurance requires 9/5/17  Yes Ria Miller, KAT   buPROPion SR [facility] SR) 150 mg SR tablet Take 1 Tab by mouth two (2) times a day. Patient taking differently: Take 150 mg by mouth two (2) times a day. Indications: not taking 8/14/17  Yes Rehan Galan MD   tamsulosin (FLOMAX) 0.4 mg capsule Take 1 Cap by mouth daily. Patient taking differently: Take  by mouth as needed. 7/25/14  Yes Alena Avila MD   omeprazole (PRILOSEC) 20 mg capsule Take 20 mg by mouth daily. Yes Provider, Historical   ascorbic acid (VITAMIN C) 1,000 mg tablet Take 1,000 mg by mouth two (2) times a day. Yes Provider, Historical   MULTIVITAMIN PO Take  by mouth. Yes Provider, Historical       No Known Allergies      Review of Systems  GENERAL: Patient alert, awake and oriented times 3, able to communicate full sentences and not in distress. HEENT: No change in vision, no earache, tinnitus, sore throat or sinus congestion. NECK: No pain or stiffness. PULMONARY: No shortness of breath, cough or wheeze.    Cardiovascular: no pnd or orthopnea, no CP  GASTROINTESTINAL: No abdominal pain, nausea, vomiting or diarrhea, melena or bright red blood per rectum. GENITOURINARY: No urinary frequency, urgency, hesitancy or dysuria. MUSCULOSKELETAL: No joint or muscle pain, no back pain, no recent trauma. DERMATOLOGIC: No rash, no itching, no lesions. ENDOCRINE: No polyuria, polydipsia, no heat or cold intolerance. No recent change in weight. HEMATOLOGICAL: No anemia or easy bruising or bleeding. NEUROLOGIC: No headache, seizures, numbness, tingling or weakness. Physical Exam:     Physical Exam:  Visit Vitals  /72 (BP 1 Location: Right arm, BP Patient Position: At rest)   Pulse 62   Temp 98.4 °F (36.9 °C)   Resp 16   Ht 6' (1.829 m)   Wt 114.3 kg (252 lb)   SpO2 94%   BMI 34.18 kg/m²    O2 Flow Rate (L/min): 2 l/min(Continued.) O2 Device: Nasal cannula    Temp (24hrs), Av.4 °F (36.9 °C), Min:98.3 °F (36.8 °C), Max:98.7 °F (37.1 °C)    No intake/output data recorded.  0701 -  1900  In: 1000 [I.V.:1000]  Out: -     General:  Alert, cooperative, no distress, appears stated age. Head: Normocephalic, without obvious abnormality, atraumatic. Eyes:  Conjunctivae/corneas clear. PERRL, EOMs intact. Nose: Nares normal. No drainage or sinus tenderness. Neck: Supple, symmetrical, trachea midline, no adenopathy, thyroid: no enlargement, no carotid bruit and no JVD. Lungs:   Clear to auscultation bilaterally. Heart:  Regular rate and rhythm, S1, S2 normal.     Abdomen: Soft, non-tender. Bowel sounds normal.    Extremities: Extremities normal, atraumatic, no cyanosis or edema. Pulses: 2+ and symmetric all extremities. Skin:  No rashes or lesions   Neurologic: AAOx3, No focal motor or sensory deficit.        Labs Reviewed:    BMP:   Lab Results   Component Value Date/Time     2019 01:51 PM    K 3.6 2019 01:51 PM     2019 01:51 PM    CO2 30 2019 01:51 PM    AGAP 10 2019 01:51 PM     (H) 2019 01:51 PM    BUN 19 (H) 04/25/2019 01:51 PM    CREA 1.37 (H) 04/25/2019 01:51 PM    GFRAA >60 04/25/2019 01:51 PM    GFRNA 53 (L) 04/25/2019 01:51 PM     CMP:   Lab Results   Component Value Date/Time     04/25/2019 01:51 PM    K 3.6 04/25/2019 01:51 PM     04/25/2019 01:51 PM    CO2 30 04/25/2019 01:51 PM    AGAP 10 04/25/2019 01:51 PM     (H) 04/25/2019 01:51 PM    BUN 19 (H) 04/25/2019 01:51 PM    CREA 1.37 (H) 04/25/2019 01:51 PM    GFRAA >60 04/25/2019 01:51 PM    GFRNA 53 (L) 04/25/2019 01:51 PM    CA 9.5 04/25/2019 01:51 PM    MG 2.1 04/25/2019 01:51 PM     CBC:   Lab Results   Component Value Date/Time    WBC 15.9 (H) 04/25/2019 01:51 PM    HGB 16.1 (H) 04/25/2019 01:51 PM    HCT 48.8 (H) 04/25/2019 01:51 PM     04/25/2019 01:51 PM     COAGS: No results found for: APTT, PTP, INR      Procedures/imaging: see electronic medical records for all procedures/Xrays and details which were not copied into this note but were reviewed prior to creation of Plan      Assessment/Plan     Active Problems:    Atrial fibrillation with rapid ventricular response ([address] (4/25/2019)   Chest pain  Elevated troponin likely due to tachycardia      Start Metoprolol 100 mg BID   DC Cardizem gtt   Hold lisinopril and HTZ   Echo   TSH/ Free T4   Cardiology consulted by ER   Maricruz for back pain   Continue Wellbutrin and Trazodone     DVT/GI Prophylaxis: Hep SQ        Shilpi Sigala MD  4/26/2019 12:11 AM

## 2019-04-26 NOTE — PROGRESS NOTES
Patient is in great spirits and is waiting to be discharged.  conducted an initial consultation and Spiritual Assessment for Dodie Mistry, who is a 63 y. o.,male. Patients Primary Language is: [place]. According to the patients EMR Tenriism Affiliation is: Naun Ulloa. The reason the Patient came to the hospital is:   Patient Active Problem List    Diagnosis Date Noted    Chest pain 04/26/2019    Atrial fibrillation with rapid ventricular response ([address] 04/25/2019    Controlled type 2 diabetes mellitus without complication, without long-term current use of insulin ([address] 08/14/2017    Chronic pain syndrome 06/08/2017    Arthritis of right knee 03/16/2017    Chronic pain of right knee 03/16/2017    Colon polyp 8/08 Dr Rayne Ruiz 01/27/2017    Greater trochanteric bursitis of right hip 08/02/2016    Tobacco dependence syndrome 07/29/2016    Obesity (BMI 30-39.9) 03/14/2016    DDD (degenerative disc disease), cervical 02/16/2016    Cervical dystonia 11/20/2015    Chronic right shoulder pain 11/20/2015    Essential hypertension 07/16/2015    Insomnia secondary to chronic pain 12/12/2014    Brachial neuritis or radiculitis 06/27/2014    Enthesopathy of hip region 06/27/2014    Dyslipidemia 03/23/2013    Hypogonadism male Dr. Jennifer Stover 03/23/2013    Hypovitaminosis D 03/23/2013    Encounter for long-term (current) drug use         The  provided the following Interventions:  Initiated a relationship of care and support. Explored issues of tate, belief, spirituality and Mormonism/ritual needs while hospitalized. Listened empathically. Provided chaplaincy education. Provided information about Spiritual Care Services. Offered prayer and assurance of continued prayers on patient's behalf. Chart reviewed. The following outcomes where achieved:  Patient shared limited information about both their medical narrative and spiritual journey/beliefs.    confirmed Patient's [employer] Affiliation. Patient processed feeling about current hospitalization. Patient expressed gratitude for 's visit. Assessment:  Patient does not have any Religion/cultural needs that will affect patients preferences in health care. There are no spiritual or Religion issues which require intervention at this time. Plan:  Chaplains will continue to follow and will provide pastoral care on an as needed/requested basis.  recommends bedside caregivers page  on duty if patient shows signs of acute spiritual or emotional distress.     Chaplain Resident [address] [place] [address]   [phone]

## 2019-04-26 NOTE — ROUTINE PROCESS
Received patient from [facility] er via stretcher from [employer]. Patient currently stable with Cardizem infusing at 15ml/hr.

## 2019-04-26 NOTE — ROUTINE PROCESS
Report received from [facility]. Patient is alert, in bed, no distress noted. Cardizem drip stopped by previous RN. HR 72-84 SR. Continue to monitor.

## 2019-04-27 LAB
APTT PPP: 36 SEC (ref 23–36.4)
APTT PPP: 37.7 SEC (ref 23–36.4)
APTT PPP: 37.7 SEC (ref 23–36.4)
ATRIAL RATE: 72 BPM
CALCULATED P AXIS, ECG09: 55 DEGREES
CALCULATED R AXIS, ECG10: 5 DEGREES
CALCULATED T AXIS, ECG11: 34 DEGREES
CK MB CFR SERPL CALC: 2.4 % (ref 0–4)
CK MB SERPL-MCNC: 2.1 NG/ML (ref 5–25)
CK SERPL-CCNC: 87 U/L (ref 39–308)
DIAGNOSIS, 93000: NORMAL
P-R INTERVAL, ECG05: 134 MS
Q-T INTERVAL, ECG07: 416 MS
QRS DURATION, ECG06: 100 MS
QTC CALCULATION (BEZET), ECG08: 455 MS
RESULT: ABNORMAL
TROPONIN I SERPL-MCNC: 1.18 NG/ML (ref 0–0.04)
VENTRICULAR RATE, ECG03: 72 BPM

## 2019-04-27 PROCEDURE — 36415 COLL VENOUS BLD VENIPUNCTURE: CPT

## 2019-04-27 PROCEDURE — 74011250637 HC RX REV CODE- 250/637: Performed by: INTERNAL MEDICINE

## 2019-04-27 PROCEDURE — 82550 ASSAY OF CK (CPK): CPT

## 2019-04-27 PROCEDURE — 65660000000 HC RM CCU STEPDOWN

## 2019-04-27 PROCEDURE — 74011250636 HC RX REV CODE- 250/636: Performed by: NURSE PRACTITIONER

## 2019-04-27 PROCEDURE — 85730 THROMBOPLASTIN TIME PARTIAL: CPT

## 2019-04-27 PROCEDURE — 74011250637 HC RX REV CODE- 250/637: Performed by: NURSE PRACTITIONER

## 2019-04-27 PROCEDURE — 77030037875 HC DRSG MEPILEX <16IN BORD MOLN -A

## 2019-04-27 RX ORDER — HEPARIN SODIUM 1000 [USP'U]/ML
3000 INJECTION, SOLUTION INTRAVENOUS; SUBCUTANEOUS ONCE
Status: COMPLETED | OUTPATIENT
Start: 2019-04-27 | End: 2019-04-27

## 2019-04-27 RX ADMIN — ASPIRIN 81 MG: 81 TABLET, COATED ORAL at 09:47

## 2019-04-27 RX ADMIN — METOPROLOL TARTRATE 100 MG: 50 TABLET ORAL at 09:47

## 2019-04-27 RX ADMIN — HEPARIN SODIUM 3000 UNITS: 1000 INJECTION INTRAVENOUS; SUBCUTANEOUS at 00:18

## 2019-04-27 RX ADMIN — AMOXICILLIN AND CLAVULANATE POTASSIUM 1 TABLET: 875; 125 TABLET, FILM COATED ORAL at 09:46

## 2019-04-27 RX ADMIN — ATORVASTATIN CALCIUM 40 MG: 40 TABLET, FILM COATED ORAL at 22:26

## 2019-04-27 RX ADMIN — FENOFIBRATE 145 MG: 145 TABLET ORAL at 09:47

## 2019-04-27 RX ADMIN — METOPROLOL TARTRATE 100 MG: 50 TABLET ORAL at 22:27

## 2019-04-27 RX ADMIN — HEPARIN SODIUM 3000 UNITS: 1000 INJECTION INTRAVENOUS; SUBCUTANEOUS at 09:36

## 2019-04-27 RX ADMIN — TRAZODONE HYDROCHLORIDE 50 MG: 50 TABLET ORAL at 22:27

## 2019-04-27 RX ADMIN — AMOXICILLIN AND CLAVULANATE POTASSIUM 1 TABLET: 875; 125 TABLET, FILM COATED ORAL at 18:42

## 2019-04-27 NOTE — PROGRESS NOTES
[facility] - Progress Note Admit Date: 4/25/2019 follow up new AF, NSTEMI Assessment:  
 
Hospital Problems  Date Reviewed: 9/17/2018 Codes Class Noted POA Chest pain ICD-10-CM: R07.9 ICD-9-CM: 786.50  4/26/2019 Unknown Atrial fibrillation with rapid ventricular response (HCC) ICD-10-CM: I48.91 
ICD-9-CM: 427.31  4/25/2019 Unknown - New Onset Atrial Fibrillation - with RVR; possibly secondary to ACS (experienced sudden onset L shoulder/ arm pain/ heaviness and SOB 4/25/19). Presented with rates in the 170s, initially, placed on a cardizem gtt, and given PO beta blocker, rates improved to 100s and now converted to NSR. EKG reveals rapid AF, with inferior, anterolateral ST depressions. Pt reports associated arm heaviness, SOB; diaphoresis. Thyroid studies normal. Initial troponin minimally elevated (0.05), but bumped afternoon 4/26/19. Patient denies any previous history of CHF or MI 
- Arm Pain +Trops/ in setting of significant Cardiac RFs - Concerning for NSTEMI; Initial troponin 0.05, then 1,94. But also May be demand ischemia due to sustained rapid atrial fibrillation,  
- ANA MARIA, improved - Essential Hypertension, noted highs  
- Hyperlipidemia -  On Statin 
- Diabetes Mellitus, type II - On metformin - Tobacco Abuse - Stress cessation Cardiac RFs: HTN, HL, DM2 (~8 yrs), Tobacco, +FH premature CAD, age/M Plan:  
 
1.) Continue Hep gtt for NSTEMI/ +Trops 2.) BB for ACS + rate control 3.) High Intensity Statin for Dyslipidemia 4.) Will decide NOAC pending cath 
5.) Will also need ACEI after cath 
6.) Further recs to follow Subjective: No new complaints. In NSR, no recurrent SOB or arm heaviness/ comfortable. Objective:  
  
Patient Vitals for the past 8 hrs: 
 Temp Pulse Resp BP SpO2  
04/27/19 0800     96 % 04/27/19 0736 97.5 °F (36.4 °C) 72 20 148/77 96 % 04/27/19 0401 97.4 °F (36.3 °C) 71 20 167/84 94 % Patient Vitals for the past 96 hrs: 
 Weight 04/27/19 0438 113.7 kg (250 lb 9.6 oz) 04/26/19 0929 112.9 kg (249 lb) 04/26/19 0732 113.4 kg (249 lb 14.4 oz) 04/25/19 1348 114.3 kg (252 lb) Intake/Output Summary (Last 24 hours) at 4/27/2019 1032 Last data filed at 4/27/2019 9171 Gross per 24 hour Intake 846.31 ml Output 1050 ml Net -203.69 ml Physical Exam: 
General:  alert, cooperative, no distress, appears stated age Neck:  nontender Lungs:  clear to auscultation bilaterally Heart:  regular rate and rhythm, S1, S2 normal, no murmur, click, rub or gallop Abdomen:  abdomen is soft without significant tenderness, masses, organomegaly or guarding Extremities:  extremities normal, atraumatic, no cyanosis or edema Data Review:  
 
Labs: Results:  
   
Chemistry Recent Labs  
  04/26/19 
1045 04/25/19 
1351 * 196*  141  
K 3.8 3.6  101 CO2 31 30 BUN 16 19* CREA 0.73 1.37* CA 9.4 9.5 MG  --  2.1 AGAP 3 10 BUCR 22* 14  
  
CBC w/Diff Recent Labs  
  04/26/19 
1045 04/25/19 
1351 WBC 13.4* 15.9*  
RBC 4.80 5.58* HGB 13.9 16.1*  
HCT 41.5 48.8*  
 238 GRANS 76* 78* LYMPH 18* 14* EOS 1 1 Cardiac Enzymes Lab Results Component Value Date/Time CPK 87 04/27/2019 06:18 AM  
  04/26/2019 10:45 AM  
 CKMB 2.1 04/27/2019 06:18 AM  
 CKMB 7.9 (H) 04/26/2019 10:45 AM  
 CKND1 2.4 04/27/2019 06:18 AM  
 CKND1 5.3 (H) 04/26/2019 10:45 AM  
 TROIQ 1.18 (H) 04/27/2019 06:18 AM  
 TROIQ 1.37 (H) 04/26/2019 09:36 PM  
 TROIQ 1.56 (HH) 04/26/2019 03:57 PM  
 TROIQ 1.94 ([facility]) 04/26/2019 10:45 AM  
  
Coagulation Recent Labs  
  04/27/19 
0618 04/26/19 
2136 APTT 37.7* 34.3 Lipid Panel Lab Results Component Value Date/Time Cholesterol, total 168 02/17/2018 09:48 PM  
 HDL Cholesterol 26 (L) 02/17/2018 09:48 PM  
 LDL,Direct 72 02/17/2018 09:48 PM  
 LDL, calculated  02/17/2018 09:48 PM  
   Comment: Triglyceride >400. LDL cannot be calculated. LDL Cholesterol Direct has been 
ordered. VLDL, calculated 100 (H) 02/17/2018 09:48 PM  
 Triglyceride 501 (H) 02/17/2018 09:48 PM  
 CHOL/HDL Ratio 5.3 (H) 06/15/2010 10:02 AM  
  
BNP No results found for: BNP, BNPP, XBNPT Liver Enzymes No results for input(s): TP, ALB, TBIL, AP, SGOT, GPT in the last 72 hours. No lab exists for component: DBIL Digoxin Thyroid Studies Lab Results Component Value Date/Time TSH 0.48 04/26/2019 04:11 AM  
    
 
Lab Results Component Value Date/Time Hemoglobin A1c 6.5 (H) 02/17/2018 09:48 PM  
 Hemoglobin A1c, External 6.2 07/26/2016 02:00 PM  
 
 
 
Signed By: Sabina Olivia DO April 27, 2019

## 2019-04-27 NOTE — PROGRESS NOTES
The patient was seen and examined independently. Please read my note for additional detail. The plan was discussed with APC. Sitting up in chair. No chest or abdominal pain currently. Chronic back pain. No nausea or vomiting. No dizziness or SOB. /77 (BP 1 Location: Right arm, BP Patient Position: At rest)   Pulse 65   Temp 97.4 °F (36.3 °C)   Resp 18   Ht 6' (1.829 m)   Wt 113.7 kg (250 lb 9.6 oz)   SpO2 96%   BMI 33.99 kg/m²     General appearance - alert, well appearing, and in no distress  Head: boil present on left posterior scalp region  Chest - good air entry noted in bases, no wheezes  Heart - S1 and S2 normal  Abdomen - soft, nontender, nondistended, Bowel sounds present  Neurological - alert, oriented, normal speech, no focal findings noted  Musculoskeletal - no joint tenderness or swelling of knees  Extremities - no pedal edema noted    1. New onset Afib with RVR - cont current management. 2. Scalp boil - cont Augmentin   3.  NSTEMI - cont heparin drip, cardiac cath on Monday

## 2019-04-27 NOTE — ROUTINE PROCESS
Hospitalist paged regarding patient elevated blood pressure awaiting return call patient does not have any prn orders for blood pressure control

## 2019-04-27 NOTE — ROUTINE PROCESS
Bedside and Verbal shift change report given to Tereso davila RN (oncoming nurse) by Dorys Berg RN (offgoing nurse). Report included the following information Kardex, Intake/Output, MAR and Recent Results.

## 2019-04-28 LAB
ANION GAP SERPL CALC-SCNC: 3 MMOL/L (ref 3–18)
APTT PPP: 31.4 SEC (ref 23–36.4)
APTT PPP: 40.9 SEC (ref 23–36.4)
APTT PPP: 54.1 SEC (ref 23–36.4)
BASOPHILS # BLD: 0 K/UL (ref 0–0.1)
BASOPHILS NFR BLD: 0 % (ref 0–2)
BUN SERPL-MCNC: 9 MG/DL (ref 7–18)
BUN/CREAT SERPL: 15 (ref 12–20)
CALCIUM SERPL-MCNC: 8.9 MG/DL (ref 8.5–10.1)
CHLORIDE SERPL-SCNC: 110 MMOL/L (ref 100–108)
CO2 SERPL-SCNC: 30 MMOL/L (ref 21–32)
CREAT SERPL-MCNC: 0.6 MG/DL (ref 0.6–1.3)
DIFFERENTIAL METHOD BLD: ABNORMAL
EOSINOPHIL # BLD: 0.3 K/UL (ref 0–0.4)
EOSINOPHIL NFR BLD: 3 % (ref 0–5)
ERYTHROCYTE [DISTWIDTH] IN BLOOD BY AUTOMATED COUNT: 12.7 % (ref 11.6–14.5)
GLUCOSE SERPL-MCNC: 134 MG/DL (ref 74–99)
HCT VFR BLD AUTO: 39.2 % (ref 36–48)
HGB BLD-MCNC: 13.1 G/DL (ref 13–16)
LYMPHOCYTES # BLD: 2.3 K/UL (ref 0.9–3.6)
LYMPHOCYTES NFR BLD: 27 % (ref 21–52)
MCH RBC QN AUTO: 28.9 PG (ref 24–34)
MCHC RBC AUTO-ENTMCNC: 33.4 G/DL (ref 31–37)
MCV RBC AUTO: 86.3 FL (ref 74–97)
MONOCYTES # BLD: 0.7 K/UL (ref 0.05–1.2)
MONOCYTES NFR BLD: 8 % (ref 3–10)
NEUTS SEG # BLD: 5.1 K/UL (ref 1.8–8)
NEUTS SEG NFR BLD: 62 % (ref 40–73)
PLATELET # BLD AUTO: 165 K/UL (ref 135–420)
PMV BLD AUTO: 10.2 FL (ref 9.2–11.8)
POTASSIUM SERPL-SCNC: 4.1 MMOL/L (ref 3.5–5.5)
RBC # BLD AUTO: 4.54 M/UL (ref 4.7–5.5)
SODIUM SERPL-SCNC: 143 MMOL/L (ref 136–145)
WBC # BLD AUTO: 8.3 K/UL (ref 4.6–13.2)

## 2019-04-28 PROCEDURE — 74011250637 HC RX REV CODE- 250/637: Performed by: INTERNAL MEDICINE

## 2019-04-28 PROCEDURE — 74011250636 HC RX REV CODE- 250/636: Performed by: NURSE PRACTITIONER

## 2019-04-28 PROCEDURE — 65660000000 HC RM CCU STEPDOWN

## 2019-04-28 PROCEDURE — 85025 COMPLETE CBC W/AUTO DIFF WBC: CPT

## 2019-04-28 PROCEDURE — 80048 BASIC METABOLIC PNL TOTAL CA: CPT

## 2019-04-28 PROCEDURE — 85730 THROMBOPLASTIN TIME PARTIAL: CPT

## 2019-04-28 PROCEDURE — 36415 COLL VENOUS BLD VENIPUNCTURE: CPT

## 2019-04-28 PROCEDURE — 74011250637 HC RX REV CODE- 250/637: Performed by: NURSE PRACTITIONER

## 2019-04-28 RX ORDER — HEPARIN SODIUM 1000 [USP'U]/ML
3000 INJECTION, SOLUTION INTRAVENOUS; SUBCUTANEOUS ONCE
Status: COMPLETED | OUTPATIENT
Start: 2019-04-28 | End: 2019-04-28

## 2019-04-28 RX ORDER — SULFAMETHOXAZOLE AND TRIMETHOPRIM 800; 160 MG/1; MG/1
1 TABLET ORAL EVERY 12 HOURS
Status: DISCONTINUED | OUTPATIENT
Start: 2019-04-28 | End: 2019-04-30 | Stop reason: HOSPADM

## 2019-04-28 RX ADMIN — ATORVASTATIN CALCIUM 40 MG: 40 TABLET, FILM COATED ORAL at 23:04

## 2019-04-28 RX ADMIN — TRAZODONE HYDROCHLORIDE 50 MG: 50 TABLET ORAL at 23:03

## 2019-04-28 RX ADMIN — METOPROLOL TARTRATE 100 MG: 50 TABLET ORAL at 22:59

## 2019-04-28 RX ADMIN — FENOFIBRATE 145 MG: 145 TABLET ORAL at 10:20

## 2019-04-28 RX ADMIN — AMOXICILLIN AND CLAVULANATE POTASSIUM 1 TABLET: 875; 125 TABLET, FILM COATED ORAL at 10:20

## 2019-04-28 RX ADMIN — HEPARIN SODIUM 3000 UNITS: 1000 INJECTION INTRAVENOUS; SUBCUTANEOUS at 19:25

## 2019-04-28 RX ADMIN — HEPARIN SODIUM 3000 UNITS: 1000 INJECTION, SOLUTION INTRAVENOUS; SUBCUTANEOUS at 04:05

## 2019-04-28 RX ADMIN — ASPIRIN 81 MG: 81 TABLET, COATED ORAL at 10:20

## 2019-04-28 RX ADMIN — HEPARIN SODIUM 3000 UNITS: 1000 INJECTION INTRAVENOUS; SUBCUTANEOUS at 12:31

## 2019-04-28 RX ADMIN — METOPROLOL TARTRATE 100 MG: 50 TABLET ORAL at 10:19

## 2019-04-28 RX ADMIN — HEPARIN SODIUM AND DEXTROSE 1620 UNITS/HR: 10000; 5 INJECTION INTRAVENOUS at 12:33

## 2019-04-28 RX ADMIN — SULFAMETHOXAZOLE AND TRIMETHOPRIM 1 TABLET: 800; 160 TABLET ORAL at 23:03

## 2019-04-28 NOTE — ROUTINE PROCESS
Bedside and Verbal shift change report given to Frank Davenport (oncoming nurse) by Elian Rockwell (offgoing nurse). Report included the following information SBAR, Kardex, MAR, Recent Results and Cardiac Rhythm SR. Patient sitting up in chair, calllight in reach.

## 2019-04-28 NOTE — PROGRESS NOTES
The patient was seen and examined independently. Please read my note for additional detail. The plan was discussed with APC. Sitting up in chair. No chest or abdominal pain currently. Chronic back pain. No nausea or vomiting. No dizziness or SOB. /87 (BP 1 Location: Right arm, BP Patient Position: At rest)   Pulse 62   Temp 97.5 °F (36.4 °C)   Resp 10   Ht 6' (1.829 m)   Wt 114.3 kg (251 lb 14.4 oz)   SpO2 96%   BMI 34.16 kg/m²     General appearance - alert, well appearing, and in no distress  Chest - good air entry noted in bases, no wheezes  Heart - S1 and S2 normal  Abdomen - soft, nontender, nondistended, Bowel sounds present  Neurological - alert, oriented, normal speech, no focal findings noted  Musculoskeletal - no joint tenderness or swelling of knees  Extremities - no pedal edema noted    1. New onset Afib with RVR - rate controlled, cont current management. 2. Scalp boil sec to community acquired MRSA - change to Bactrim   3.  NSTEMI - cont heparin drip, cardiac cath on Monday

## 2019-04-28 NOTE — PROGRESS NOTES
[facility] Hospitalist Group  Progress Note    Patient: Mónica Khan Age: 63 y.o. : 1960 MR#: [id number] SSN: [id number]  Date: 2019     Subjective:     Denies CP, SOB, palpitations or GI distress;  Pain controlled and notes small amt of drainage to scalp w/o other complaints. Assessment/Plan:   1. New onset afib w/ RVR - s/p cardiazem drip, now in SR; cont BB  2. Elevated troponin r/o ACS - heparin drip per cardiology; peak troponin 1.94. Plan for cardiac catheterization on  per cardiology  3. Boil to scalp - noted to be MRSA +, d/c augmentin, start bactrim; WBC normalized  4. Chronic pain - pt reports no use of MS contin recently thus d'maxi; cont norco PRN  5.   Tobacco abuse - counseled on cessation    Additional Notes:      Case discussed with:  [x]Patient  [x]Family (wife at bedside)  [x]Nursing  []Case Management  DVT Prophylaxis:  []Lovenox  []Hep SQ  []SCDs  []Coumadin   [x]On Heparin gtt    Objective:   VS:   Visit Vitals  /87 (BP 1 Location: Right arm, BP Patient Position: At rest)   Pulse 62   Temp 97.5 °F (36.4 °C)   Resp 10   Ht 6' (1.829 m)   Wt 114.3 kg (251 lb 14.4 oz)   SpO2 96%   BMI 34.16 kg/m²      Tmax/24hrs: Temp (24hrs), Av.8 °F (36.6 °C), Min:97.3 °F (36.3 °C), Max:98.7 °F (37.1 °C)    No intake or output data in the 24 hours ending 19 1517  General:  Alert, NAD  Cardiovascular:  RRR  Pulmonary:  LSC throughout  GI:  +BS in all four quadrants, soft, non-tender  Extremities:  No edema; 2+ dorsalis pedis pulses bilaterally  Neuro: alert and oriented x 3  Head: left posterior aspect + boil, crusted with + erythema    Labs:    Recent Results (from the past 24 hour(s))   PTT    Collection Time: 19  6:41 PM   Result Value Ref Range    aPTT 36.0 23.0 - 36.4 SEC   CBC WITH AUTOMATED DIFF    Collection Time: 19  2:50 AM   Result Value Ref Range    WBC 8.3 4.6 - 13.2 K/uL    RBC 4.54 (L) 4.70 - 5.50 M/uL    HGB 13.1 13.0 - 16.0 g/dL    HCT 39.2 36.0 - 48.0 %    MCV 86.3 74.0 - 97.0 FL    MCH 28.9 24.0 - 34.0 PG    MCHC 33.4 31.0 - 37.0 g/dL    RDW 12.7 11.6 - 14.5 %    PLATELET 024 211 - 001 K/uL    MPV 10.2 9.2 - 11.8 FL    NEUTROPHILS 62 40 - 73 %    LYMPHOCYTES 27 21 - 52 %    MONOCYTES 8 3 - 10 %    EOSINOPHILS 3 0 - 5 %    BASOPHILS 0 0 - 2 %    ABS. NEUTROPHILS 5.1 1.8 - 8.0 K/UL    ABS. LYMPHOCYTES 2.3 0.9 - 3.6 K/UL    ABS. MONOCYTES 0.7 0.05 - 1.2 K/UL    ABS. EOSINOPHILS 0.3 0.0 - 0.4 K/UL    ABS.  BASOPHILS 0.0 0.0 - 0.1 K/UL    DF AUTOMATED     METABOLIC PANEL, BASIC    Collection Time: 04/28/19  2:50 AM   Result Value Ref Range    Sodium 143 136 - 145 mmol/L    Potassium 4.1 3.5 - 5.5 mmol/L    Chloride 110 (H) 100 - 108 mmol/L    CO2 30 21 - 32 mmol/L    Anion gap 3 3.0 - 18 mmol/L    Glucose 134 (H) 74 - 99 mg/dL    BUN 9 7.0 - 18 MG/DL    Creatinine 0.60 0.6 - 1.3 MG/DL    BUN/Creatinine ratio 15 12 - 20      GFR est AA >60 >60 ml/min/1.73m2    GFR est non-AA >60 >60 ml/min/1.73m2    Calcium 8.9 8.5 - 10.1 MG/DL   PTT    Collection Time: 04/28/19  2:50 AM   Result Value Ref Range    aPTT 31.4 23.0 - 36.4 SEC   PTT    Collection Time: 04/28/19 10:28 AM   Result Value Ref Range    aPTT 40.9 (H) 23.0 - 36.4 SEC     Signed By: Wayne Wright NP     April 28, 2019

## 2019-04-28 NOTE — ROUTINE PROCESS
Bedside verbal report received from [facility], reviewed SBAR, VS, MAR, labs and summary of care. Patient awake on rounds sitting up in chair. Heparin drip verified, site noted leakage, for restart.

## 2019-04-28 NOTE — PROGRESS NOTES
[facility] - Progress Note  Admit Date: 4/25/2019    Assessment:     Hospital Problems  Date Reviewed: 9/17/2018          Codes Class Noted POA    Chest pain ICD-10-CM: R07.9  ICD-9-CM: 786.50  4/26/2019 Unknown        Atrial fibrillation with rapid ventricular response [facility]) ICD-10-CM: I48.91  ICD-9-CM: 427.31  4/25/2019 Unknown            1.) s/p new AF RVR 170s, now NSR  2.) NSTEMI, with shoulder/ L arm tightness (4/25), currently pain free, trop peak 1.94, ST changes when in RVR but in NSR (chadsvasc 2) unchanged from prior ekgs  3.) HTN, above goal but holding his home ACEI/ HCTZ  4.) DM2 with assoc dyslipidemia, known  5.) Tobacco  6.) +Family hx premature CAD  7.) Normal LV function, no obvious WMA on echo this admission  8.) Stable kidney function  9.) On Abx for boil, known    Plan:     1.) on BB (for rate control + NSTEMI)  2.) Heparin gtt  3.) Statin  4.) Plan for cath Monday am, all questions answered    Subjective:     No new complaints.  Comfortable, still NSR and no recurrent discomfort/ SOB    Objective:      Patient Vitals for the past 8 hrs:   Temp Pulse Resp BP SpO2   04/28/19 0755 97.3 °F (36.3 °C) 68 18 170/83 98 %   04/28/19 0340 98.7 °F (37.1 °C) 76 20 164/82 96 %         Patient Vitals for the past 96 hrs:   Weight   04/28/19 0431 114.3 kg (251 lb 14.4 oz)   04/27/19 0438 113.7 kg (250 lb 9.6 oz)   04/26/19 0929 112.9 kg (249 lb)   04/26/19 0732 113.4 kg (249 lb 14.4 oz)   04/25/19 1348 114.3 kg (252 lb)                  No intake or output data in the 24 hours ending 04/28/19 0936    Physical Exam:  General:  alert, cooperative, no distress, appears stated age  Neck:  nontender  Lungs:  clear to auscultation bilaterally  Heart:  regular rate and rhythm, S1, S2 normal, +CHRIS, no click, rub or gallop  Abdomen:  abdomen is soft without significant tenderness, masses, organomegaly or guarding  Extremities:  extremities normal, atraumatic, no cyanosis or edema    Data Review: Labs: Results:       Chemistry Recent Labs     04/28/19  0250 04/26/19  1045 04/25/19  1351   * 137* 196*    141 141   K 4.1 3.8 3.6   * 107 101   CO2 30 31 30   BUN 9 16 19*   CREA 0.60 0.73 1.37*   CA 8.9 9.4 9.5   MG  --   --  2.1   AGAP 3 3 10   BUCR 15 22* 14      CBC w/Diff Recent Labs     04/28/19  0250 04/26/19  1045 04/25/19  1351   WBC 8.3 13.4* 15.9*   RBC 4.54* 4.80 5.58*   HGB 13.1 13.9 16.1*   HCT 39.2 41.5 48.8*    199 238   GRANS 62 76* 78*   LYMPH 27 18* 14*   EOS 3 1 1      Cardiac Enzymes No results found for: CPK, CK, CKMMB, CKMB, RCK3, CKMBT, CKNDX, CKND1, YVONNE, TROPT, TROIQ, ELIAZAR, TROPT, TNIPOC, BNP, BNPP   Coagulation Recent Labs     04/28/19  0250 04/27/19  1841   APTT 31.4 36.0       Lipid Panel Lab Results   Component Value Date/Time    Cholesterol, total 168 02/17/2018 09:48 PM    HDL Cholesterol 26 (L) 02/17/2018 09:48 PM    LDL,Direct 72 02/17/2018 09:48 PM    LDL, calculated  02/17/2018 09:48 PM      Comment:      Triglyceride >400. LDL cannot be calculated. LDL Cholesterol Direct has been  ordered. VLDL, calculated 100 (H) 02/17/2018 09:48 PM    Triglyceride 501 (H) 02/17/2018 09:48 PM    CHOL/HDL Ratio 5.3 (H) 06/15/2010 10:02 AM      BNP No results found for: BNP, BNPP, XBNPT   Liver Enzymes No results for input(s): TP, ALB, TBIL, AP, SGOT, GPT in the last 72 hours. No lab exists for component: DBIL   Digoxin    Thyroid Studies Lab Results   Component Value Date/Time    TSH 0.48 04/26/2019 04:11 AM          Lab Results   Component Value Date/Time    CK 87 04/27/2019 06:18 AM    CK - MB 2.1 04/27/2019 06:18 AM    CK-MB Index 2.4 04/27/2019 06:18 AM    Troponin-I, QT 1.18 (H) 04/27/2019 06:18 AM         04/25/19   ECHO ADULT COMPLETE 04/26/2019 4/26/2019    Narrative · Estimated left ventricular ejection fraction is 61 - 65%. Left   ventricular moderate concentric hypertrophy. No regional wall motion   abnormality noted.  Mild (grade 1) left ventricular diastolic dysfunction. · Mitral valve thickening. Mild mitral valve regurgitation. · Left atrium is upper limits of normal volume.         Signed by: Yas Garcia DO         Signed By: Mac Treviño DO     April 28, 2019

## 2019-04-29 ENCOUNTER — TELEPHONE (OUTPATIENT)
Dept: INTERNAL MEDICINE CLINIC | Age: 59
End: 2019-04-29

## 2019-04-29 LAB
ACT BLD: 153 SECS (ref 79–138)
ANION GAP SERPL CALC-SCNC: 4 MMOL/L (ref 3–18)
APTT PPP: 58.3 SEC (ref 23–36.4)
APTT PPP: 61 SEC (ref 23–36.4)
BASOPHILS # BLD: 0 K/UL (ref 0–0.1)
BASOPHILS NFR BLD: 0 % (ref 0–2)
BUN SERPL-MCNC: 8 MG/DL (ref 7–18)
BUN/CREAT SERPL: 12 (ref 12–20)
CALCIUM SERPL-MCNC: 8.9 MG/DL (ref 8.5–10.1)
CHLORIDE SERPL-SCNC: 107 MMOL/L (ref 100–108)
CO2 SERPL-SCNC: 31 MMOL/L (ref 21–32)
CREAT SERPL-MCNC: 0.66 MG/DL (ref 0.6–1.3)
DIFFERENTIAL METHOD BLD: ABNORMAL
END DIASTOLIC PRESSURE: 13
EOSINOPHIL # BLD: 0.3 K/UL (ref 0–0.4)
EOSINOPHIL NFR BLD: 3 % (ref 0–5)
ERYTHROCYTE [DISTWIDTH] IN BLOOD BY AUTOMATED COUNT: 12.6 % (ref 11.6–14.5)
GLUCOSE SERPL-MCNC: 128 MG/DL (ref 74–99)
HCT VFR BLD AUTO: 38.1 % (ref 36–48)
HGB BLD-MCNC: 12.4 G/DL (ref 13–16)
LYMPHOCYTES # BLD: 3.4 K/UL (ref 0.9–3.6)
LYMPHOCYTES NFR BLD: 37 % (ref 21–52)
MCH RBC QN AUTO: 28 PG (ref 24–34)
MCHC RBC AUTO-ENTMCNC: 32.5 G/DL (ref 31–37)
MCV RBC AUTO: 86 FL (ref 74–97)
MONOCYTES # BLD: 0.6 K/UL (ref 0.05–1.2)
MONOCYTES NFR BLD: 7 % (ref 3–10)
NEUTS SEG # BLD: 4.7 K/UL (ref 1.8–8)
NEUTS SEG NFR BLD: 53 % (ref 40–73)
PLATELET # BLD AUTO: 178 K/UL (ref 135–420)
PMV BLD AUTO: 10.3 FL (ref 9.2–11.8)
POTASSIUM SERPL-SCNC: 3.8 MMOL/L (ref 3.5–5.5)
RBC # BLD AUTO: 4.43 M/UL (ref 4.7–5.5)
SODIUM SERPL-SCNC: 142 MMOL/L (ref 136–145)
WBC # BLD AUTO: 9 K/UL (ref 4.6–13.2)

## 2019-04-29 PROCEDURE — C1769 GUIDE WIRE: HCPCS | Performed by: INTERNAL MEDICINE

## 2019-04-29 PROCEDURE — 99153 MOD SED SAME PHYS/QHP EA: CPT | Performed by: INTERNAL MEDICINE

## 2019-04-29 PROCEDURE — 74011250637 HC RX REV CODE- 250/637: Performed by: INTERNAL MEDICINE

## 2019-04-29 PROCEDURE — C1887 CATHETER, GUIDING: HCPCS | Performed by: INTERNAL MEDICINE

## 2019-04-29 PROCEDURE — 77030037875 HC DRSG MEPILEX <16IN BORD MOLN -A

## 2019-04-29 PROCEDURE — C1894 INTRO/SHEATH, NON-LASER: HCPCS | Performed by: INTERNAL MEDICINE

## 2019-04-29 PROCEDURE — C1874 STENT, COATED/COV W/DEL SYS: HCPCS | Performed by: INTERNAL MEDICINE

## 2019-04-29 PROCEDURE — 4A023N7 MEASUREMENT OF CARDIAC SAMPLING AND PRESSURE, LEFT HEART, PERCUTANEOUS APPROACH: ICD-10-PCS | Performed by: INTERNAL MEDICINE

## 2019-04-29 PROCEDURE — 99152 MOD SED SAME PHYS/QHP 5/>YRS: CPT | Performed by: INTERNAL MEDICINE

## 2019-04-29 PROCEDURE — C1725 CATH, TRANSLUMIN NON-LASER: HCPCS | Performed by: INTERNAL MEDICINE

## 2019-04-29 PROCEDURE — 85025 COMPLETE CBC W/AUTO DIFF WBC: CPT

## 2019-04-29 PROCEDURE — 85730 THROMBOPLASTIN TIME PARTIAL: CPT

## 2019-04-29 PROCEDURE — 74011636320 HC RX REV CODE- 636/320: Performed by: INTERNAL MEDICINE

## 2019-04-29 PROCEDURE — 36415 COLL VENOUS BLD VENIPUNCTURE: CPT

## 2019-04-29 PROCEDURE — 74011250636 HC RX REV CODE- 250/636: Performed by: NURSE PRACTITIONER

## 2019-04-29 PROCEDURE — 74011250637 HC RX REV CODE- 250/637: Performed by: NURSE PRACTITIONER

## 2019-04-29 PROCEDURE — 93458 L HRT ARTERY/VENTRICLE ANGIO: CPT | Performed by: INTERNAL MEDICINE

## 2019-04-29 PROCEDURE — 74011000258 HC RX REV CODE- 258: Performed by: INTERNAL MEDICINE

## 2019-04-29 PROCEDURE — 74011000250 HC RX REV CODE- 250: Performed by: INTERNAL MEDICINE

## 2019-04-29 PROCEDURE — 74011250636 HC RX REV CODE- 250/636: Performed by: INTERNAL MEDICINE

## 2019-04-29 PROCEDURE — 85347 COAGULATION TIME ACTIVATED: CPT

## 2019-04-29 PROCEDURE — 80048 BASIC METABOLIC PNL TOTAL CA: CPT

## 2019-04-29 PROCEDURE — B2151ZZ FLUOROSCOPY OF LEFT HEART USING LOW OSMOLAR CONTRAST: ICD-10-PCS | Performed by: INTERNAL MEDICINE

## 2019-04-29 PROCEDURE — 77030013519 HC DEV INFL BASIX MRTM -B: Performed by: INTERNAL MEDICINE

## 2019-04-29 PROCEDURE — 77030012468 HC VLV BLEEDBK CNTRL ABBT -B: Performed by: INTERNAL MEDICINE

## 2019-04-29 PROCEDURE — 74011250636 HC RX REV CODE- 250/636

## 2019-04-29 PROCEDURE — B2111ZZ FLUOROSCOPY OF MULTIPLE CORONARY ARTERIES USING LOW OSMOLAR CONTRAST: ICD-10-PCS | Performed by: INTERNAL MEDICINE

## 2019-04-29 PROCEDURE — 65660000004 HC RM CVT STEPDOWN

## 2019-04-29 PROCEDURE — 77030015766: Performed by: INTERNAL MEDICINE

## 2019-04-29 PROCEDURE — 92928 PRQ TCAT PLMT NTRAC ST 1 LES: CPT | Performed by: INTERNAL MEDICINE

## 2019-04-29 PROCEDURE — 027034Z DILATION OF CORONARY ARTERY, ONE ARTERY WITH DRUG-ELUTING INTRALUMINAL DEVICE, PERCUTANEOUS APPROACH: ICD-10-PCS | Performed by: INTERNAL MEDICINE

## 2019-04-29 PROCEDURE — 77030027845 HC BND COM RDL D-STAT TELE -B: Performed by: INTERNAL MEDICINE

## 2019-04-29 DEVICE — IMPLANTABLE DEVICE: Type: IMPLANTABLE DEVICE | Status: FUNCTIONAL

## 2019-04-29 RX ORDER — FENTANYL CITRATE 50 UG/ML
INJECTION, SOLUTION INTRAMUSCULAR; INTRAVENOUS AS NEEDED
Status: DISCONTINUED | OUTPATIENT
Start: 2019-04-29 | End: 2019-04-29 | Stop reason: HOSPADM

## 2019-04-29 RX ORDER — MIDAZOLAM HYDROCHLORIDE 1 MG/ML
INJECTION, SOLUTION INTRAMUSCULAR; INTRAVENOUS AS NEEDED
Status: DISCONTINUED | OUTPATIENT
Start: 2019-04-29 | End: 2019-04-29 | Stop reason: HOSPADM

## 2019-04-29 RX ORDER — LIDOCAINE HYDROCHLORIDE 10 MG/ML
INJECTION, SOLUTION EPIDURAL; INFILTRATION; INTRACAUDAL; PERINEURAL AS NEEDED
Status: DISCONTINUED | OUTPATIENT
Start: 2019-04-29 | End: 2019-04-29 | Stop reason: HOSPADM

## 2019-04-29 RX ORDER — NITROGLYCERIN 400 UG/1
1 SPRAY ORAL
Status: ACTIVE | OUTPATIENT
Start: 2019-04-29 | End: 2019-04-30

## 2019-04-29 RX ORDER — MUPIROCIN 20 MG/G
OINTMENT TOPICAL DAILY
Status: DISCONTINUED | OUTPATIENT
Start: 2019-04-29 | End: 2019-04-30 | Stop reason: HOSPADM

## 2019-04-29 RX ORDER — VERAPAMIL HYDROCHLORIDE 2.5 MG/ML
INJECTION, SOLUTION INTRAVENOUS AS NEEDED
Status: DISCONTINUED | OUTPATIENT
Start: 2019-04-29 | End: 2019-04-29 | Stop reason: HOSPADM

## 2019-04-29 RX ORDER — HEPARIN SODIUM 1000 [USP'U]/ML
3000 INJECTION, SOLUTION INTRAVENOUS; SUBCUTANEOUS ONCE
Status: COMPLETED | OUTPATIENT
Start: 2019-04-29 | End: 2019-04-29

## 2019-04-29 RX ORDER — SODIUM CHLORIDE 450 MG/100ML
150 INJECTION, SOLUTION INTRAVENOUS ONCE
Status: DISPENSED | OUTPATIENT
Start: 2019-04-29 | End: 2019-04-29

## 2019-04-29 RX ORDER — SODIUM CHLORIDE 0.9 % (FLUSH) 0.9 %
5-40 SYRINGE (ML) INJECTION EVERY 8 HOURS
Status: DISCONTINUED | OUTPATIENT
Start: 2019-04-29 | End: 2019-04-30 | Stop reason: HOSPADM

## 2019-04-29 RX ORDER — SODIUM CHLORIDE 0.9 % (FLUSH) 0.9 %
5-40 SYRINGE (ML) INJECTION AS NEEDED
Status: DISCONTINUED | OUTPATIENT
Start: 2019-04-29 | End: 2019-04-30 | Stop reason: HOSPADM

## 2019-04-29 RX ADMIN — METOPROLOL TARTRATE 100 MG: 50 TABLET ORAL at 15:46

## 2019-04-29 RX ADMIN — SULFAMETHOXAZOLE AND TRIMETHOPRIM 1 TABLET: 800; 160 TABLET ORAL at 15:48

## 2019-04-29 RX ADMIN — Medication 10 ML: at 15:49

## 2019-04-29 RX ADMIN — HYDROCODONE BITARTRATE AND ACETAMINOPHEN 1 TABLET: 10; 325 TABLET ORAL at 16:45

## 2019-04-29 RX ADMIN — MUPIROCIN: 20 OINTMENT TOPICAL at 15:46

## 2019-04-29 RX ADMIN — TRAZODONE HYDROCHLORIDE 50 MG: 50 TABLET ORAL at 22:05

## 2019-04-29 RX ADMIN — ATORVASTATIN CALCIUM 40 MG: 40 TABLET, FILM COATED ORAL at 22:05

## 2019-04-29 RX ADMIN — METOPROLOL TARTRATE 100 MG: 50 TABLET ORAL at 23:01

## 2019-04-29 RX ADMIN — SULFAMETHOXAZOLE AND TRIMETHOPRIM 1 TABLET: 800; 160 TABLET ORAL at 22:05

## 2019-04-29 RX ADMIN — Medication 10 ML: at 22:00

## 2019-04-29 RX ADMIN — HEPARIN SODIUM AND DEXTROSE 2020 UNITS/HR: 10000; 5 INJECTION INTRAVENOUS at 06:06

## 2019-04-29 RX ADMIN — HEPARIN SODIUM 3000 UNITS: 1000 INJECTION INTRAVENOUS; SUBCUTANEOUS at 08:06

## 2019-04-29 RX ADMIN — TICAGRELOR 90 MG: 90 TABLET ORAL at 19:17

## 2019-04-29 RX ADMIN — ASPIRIN 81 MG: 81 TABLET, COATED ORAL at 08:33

## 2019-04-29 RX ADMIN — FENOFIBRATE 145 MG: 145 TABLET ORAL at 15:48

## 2019-04-29 RX ADMIN — TAMSULOSIN HYDROCHLORIDE 0.4 MG: 0.4 CAPSULE ORAL at 15:48

## 2019-04-29 NOTE — TELEPHONE ENCOUNTER
----- Message from Charo Quiroz NP sent at 4/28/2019 10:35 PM EDT -----  +MRSA in wound, Augmentin appropriate

## 2019-04-29 NOTE — PROGRESS NOTES
Received patient from Cath holding,by bed. Patient alert and oriented. Able to ambulate w/ stable gait. Alert oriented x4. Telemetry box on. No bleeding,tenderness and hematoma noted on the cath site @ right wrist.left patient with wound care nurse @ beside. Will continue to monitor.    - dressing done. 1934- Bedside and Verbal shift change report given to Franny Camarena RN (oncoming nurse) by Felisha Perez RN (offgoing nurse).  Report included the following information SBAR, Kardex, MAR and Recent Results.

## 2019-04-29 NOTE — ROUTINE PROCESS
Bedside and Verbal shift change report given to Frank Davenport (oncoming nurse) by Janneth Rose (offgoing nurse). Report included the following information SBAR, Kardex, MAR, Recent Results and Cardiac Rhythm SR. Patient sitting up in chair, calllight in reach. Patient NPO for  Cardiac cath.

## 2019-04-29 NOTE — WOUND CARE
Physical Exam   HENT:   Head:       Seen by wound care per nursing, head injury assessment performed at this time. Tissue injury listed above noted during skin assessment. Treatment plan explained to [facility] and Pt agreeable to continue current treatment. Nursing to apply Bactroban ointment silicone border dressing and use application of warm compress to area twice daily. Wound care education provided to pt at this time. Plan of care reviewed with nursing. Will turn over care to nursing staff at this time  Jason Rob, Wound Care Department

## 2019-04-29 NOTE — PROGRESS NOTES
[facility] Hospitalist Group  Progress Note    Patient: Rafi Baker Age: 63 y.o. : 1960 MR#: [id number] SSN: [id number]  Date: 2019     Subjective:     Denies CP, SOB, palpitations or GI distress;  Pain controlled and no complaints    Assessment/Plan:   1. New onset afib w/ RVR - s/p cardiazem drip, now in SR; cont BB; consider NOAC on discharge  2. Elevated troponin r/o ACS - heparin drip per cardiology; peak troponin 1.94. S/p cardiac cath early today with ROSA MARIA placed  3. Boil to scalp - noted to be MRSA +, d/c augmentin, start bactrim  4. Chronic pain - pt reports no use of MS contin recently thus d'maxi; cont norco PRN  5.   Tobacco abuse - counseled on cessation    Additional Notes:      Case discussed with:  [x]Patient  [x]Family (wife at bedside)  [x]Nursing  []Case Management  DVT Prophylaxis:  []Lovenox  []Hep SQ  []SCDs  []Coumadin   [x]On Heparin gtt    Objective:   VS:   Visit Vitals  /81   Pulse 62   Temp 98.7 °F (37.1 °C)   Resp 22   Ht 6' (1.829 m)   Wt 114.3 kg (251 lb 14.4 oz)   SpO2 97%   BMI 34.16 kg/m²      Tmax/24hrs: Temp (24hrs), Av.9 °F (36.6 °C), Min:97.5 °F (36.4 °C), Max:98.7 °F (37.1 °C)      Intake/Output Summary (Last 24 hours) at 2019 1528  Last data filed at 2019 0800  Gross per 24 hour   Intake 1110.32 ml   Output --   Net 1110.32 ml     General:  Alert, NAD  Cardiovascular:  RRR  Pulmonary:  LSC throughout  GI:  +BS in all four quadrants, soft, non-tender  Extremities:  No edema; 2+ dorsalis pedis pulses bilaterally  Neuro: alert and oriented x 3  Head: left posterior aspect + boil, crusted with + erythema    Labs:    Recent Results (from the past 24 hour(s))   PTT    Collection Time: 19  4:50 PM   Result Value Ref Range    aPTT 54.1 (H) 23.0 - 21.3 SEC   METABOLIC PANEL, BASIC    Collection Time: 19  1:30 AM   Result Value Ref Range    Sodium 142 136 - 145 mmol/L    Potassium 3.8 3.5 - 5.5 mmol/L    Chloride 107 100 - 108 mmol/L    CO2 31 21 - 32 mmol/L    Anion gap 4 3.0 - 18 mmol/L    Glucose 128 (H) 74 - 99 mg/dL    BUN 8 7.0 - 18 MG/DL    Creatinine 0.66 0.6 - 1.3 MG/DL    BUN/Creatinine ratio 12 12 - 20      GFR est AA >60 >60 ml/min/1.73m2    GFR est non-AA >60 >60 ml/min/1.73m2    Calcium 8.9 8.5 - 10.1 MG/DL   CBC WITH AUTOMATED DIFF    Collection Time: 04/29/19  1:30 AM   Result Value Ref Range    WBC 9.0 4.6 - 13.2 K/uL    RBC 4.43 (L) 4.70 - 5.50 M/uL    HGB 12.4 (L) 13.0 - 16.0 g/dL    HCT 38.1 36.0 - 48.0 %    MCV 86.0 74.0 - 97.0 FL    MCH 28.0 24.0 - 34.0 PG    MCHC 32.5 31.0 - 37.0 g/dL    RDW 12.6 11.6 - 14.5 %    PLATELET 278 776 - 745 K/uL    MPV 10.3 9.2 - 11.8 FL    NEUTROPHILS 53 40 - 73 %    LYMPHOCYTES 37 21 - 52 %    MONOCYTES 7 3 - 10 %    EOSINOPHILS 3 0 - 5 %    BASOPHILS 0 0 - 2 %    ABS. NEUTROPHILS 4.7 1.8 - 8.0 K/UL    ABS. LYMPHOCYTES 3.4 0.9 - 3.6 K/UL    ABS. MONOCYTES 0.6 0.05 - 1.2 K/UL    ABS. EOSINOPHILS 0.3 0.0 - 0.4 K/UL    ABS.  BASOPHILS 0.0 0.0 - 0.1 K/UL    DF AUTOMATED     PTT    Collection Time: 04/29/19  1:30 AM   Result Value Ref Range    aPTT 61.0 (H) 23.0 - 36.4 SEC   PTT    Collection Time: 04/29/19  3:31 AM   Result Value Ref Range    aPTT 58.3 (H) 23.0 - 36.4 SEC   POC ACTIVATED CLOTTING TIME    Collection Time: 04/29/19  9:28 AM   Result Value Ref Range    Activated Clotting Time (POC) 153 (H) 79 - 138 SECS   CARDIAC PROCEDURE    Collection Time: 04/29/19  9:58 AM   Result Value Ref Range    EDP 13      Signed By: Nkechi San NP     April 29, 2019

## 2019-04-29 NOTE — PROGRESS NOTES
Call placed to 18 [facility] report received on pt from [facility] Cath  to transport pt to [facility], [facility] 7    3916 Pt admitted to Cath holding to [facility], [facility] 7. Pt placed on monitors. Reviewed pt hx, pt meds, pt allergies and assessment completed. PIV assessed see opassessment. Pt noted to be in sinus cardiac rhythm. Prepped delilah for procedure. MD notified of need of  consent. No acute distress noted No c/o verbalized. 0704 TRANSFER - OUT REPORT:    Verbal report given to Shyanne Leblanc on Sivakumar Linker  being transferred to cath lab(unit) for ordered procedure       Report consisted of patients Situation, Background, Assessment and   Recommendations(SBAR). Information from the following report(s) Pre Procedure Checklist was reviewed with the receiving nurse. Lines:   Peripheral IV 04/28/19 Left;Posterior Hand (Active)   Site Assessment Clean, dry, & intact 4/29/2019  9:17 AM   Phlebitis Assessment 0 4/29/2019  9:17 AM   Infiltration Assessment 0 4/29/2019  9:17 AM   Dressing Status Clean, dry, & intact 4/29/2019  9:17 AM   Dressing Type Transparent 4/29/2019  9:17 AM   Hub Color/Line Status Flushed 4/29/2019  9:17 AM   Action Taken Open ports on tubing capped 4/29/2019  8:00 AM   Alcohol Cap Used Yes 4/29/2019  8:00 AM       Peripheral IV 04/28/19 Left (Active)   Site Assessment Clean, dry, & intact 4/29/2019  8:00 AM   Phlebitis Assessment 0 4/29/2019  8:00 AM   Infiltration Assessment 0 4/29/2019  8:00 AM   Dressing Status Clean, dry, & intact 4/29/2019  8:00 AM   Dressing Type Elastic bandage;Tape;Transparent 4/29/2019  8:00 AM   Hub Color/Line Status Pink;Capped;Flushed 4/29/2019  8:00 AM   Action Taken Open ports on tubing capped 4/29/2019  8:00 AM   Alcohol Cap Used Yes 4/29/2019  8:00 AM        Opportunity for questions and clarification was provided.       Patient transported with:   [employer]    9291 call received from cath lab pt getting PCI call placed to JuliaRN on [facility] to make aware pt having PCI and will not return to room 214.    0918 attempted to call nursing supervisor re: need to transfer pt from [facility] to CVT stepdown post procedure due to PCI no answer left voicemail @ ext [phone].    7408 TRANSFER - IN REPORT:    Verbal report received from [facility] on Cinderella Cuff  being received from cath lab for routine post - op      Report consisted of patients Situation, Background, Assessment and   Recommendations(SBAR). Information from the following report(s) Procedure Summary was reviewed with the receiving nurse. Opportunity for questions and clarification was provided. Assessment completed upon patients arrival to unit and care assumed. DSTAT to right wrist no bleeding or hematoma noted. Will cont to monitor no c/o voiced no acute distress    1101 DSTAT removed no sign of bleeding or hematoma noted quickclot tegaderm and coban applied will cont to monitor    1115 pt eating sandwich and drinking coffee tolerating both no sign bleeding from site will cont to monitor    1148 no c/o voiced no acute distress site remains unremarkable will cont to monitor pt awaiting CVT stepdown bed pt aware and is understanding of wait    1240 pt bed ready on CVT stepdown room 2301 report called to Sylwia Garcia all questions answered     1250 pt ambulated to bathroom with assist no c/o verbalized no acute distress noted pt wrist without signs of bleeding or hematoma will cont to monitor until pt transferred to CVT stepdown    1312 pt transferred to CVT stepdown dressing CDI without bleeding or hematoma noted.

## 2019-04-29 NOTE — TELEPHONE ENCOUNTER
Patient admitted to [facility] on 4/25/19 for Afib, per most recent documentation by Dr. Araceli Lemus Scalp boil sec to community acquired MRSA - change to Bactrim.

## 2019-04-29 NOTE — PROGRESS NOTES
The patient was seen and examined independently. Please read my note for additional detail. The plan was discussed with APC. Sitting up in chair. Walking in room. No chest or abdominal pain currently. Chronic back pain. No nausea or vomiting. No dizziness or SOB. Family is at bedside. BP (!) 179/92   Pulse 71   Temp 98.3 °F (36.8 °C)   Resp 20   Ht 6' (1.829 m)   Wt 114.3 kg (251 lb 14.4 oz)   SpO2 98%   BMI 34.16 kg/m²     General appearance - alert, well appearing, and in no distress  Chest - good air entry noted in bases, no wheezes  Heart - S1 and S2 normal  Abdomen - soft, nontender, nondistended, Bowel sounds present  Neurological - alert, oriented, normal speech, no focal findings noted  Musculoskeletal - no joint tenderness or swelling of knees  Extremities - no pedal edema noted    1. New onset Afib with RVR - rate controlled, cont current management. 2. Scalp boil sec to community acquired MRSA - cont Bactrim   3. NSTEMI s/p Diagonal branch ROSA MARIA.    --> can be discharged once cleared by cardiology.

## 2019-04-30 VITALS
TEMPERATURE: 98.3 F | OXYGEN SATURATION: 97 % | HEART RATE: 65 BPM | RESPIRATION RATE: 20 BRPM | WEIGHT: 251.9 LBS | SYSTOLIC BLOOD PRESSURE: 141 MMHG | DIASTOLIC BLOOD PRESSURE: 75 MMHG | HEIGHT: 72 IN | BODY MASS INDEX: 34.12 KG/M2

## 2019-04-30 PROBLEM — Z95.5 S/P CORONARY ARTERY STENT PLACEMENT: Status: ACTIVE | Noted: 2019-04-29

## 2019-04-30 PROBLEM — I21.4 NSTEMI (NON-ST ELEVATED MYOCARDIAL INFARCTION) (HCC): Status: ACTIVE | Noted: 2019-04-26

## 2019-04-30 LAB
ANION GAP SERPL CALC-SCNC: 6 MMOL/L (ref 3–18)
ATRIAL RATE: 63 BPM
BUN SERPL-MCNC: 10 MG/DL (ref 7–18)
BUN/CREAT SERPL: 12 (ref 12–20)
CALCIUM SERPL-MCNC: 9.2 MG/DL (ref 8.5–10.1)
CALCULATED P AXIS, ECG09: 59 DEGREES
CALCULATED R AXIS, ECG10: 20 DEGREES
CALCULATED T AXIS, ECG11: 34 DEGREES
CHLORIDE SERPL-SCNC: 107 MMOL/L (ref 100–108)
CO2 SERPL-SCNC: 27 MMOL/L (ref 21–32)
CREAT SERPL-MCNC: 0.86 MG/DL (ref 0.6–1.3)
DIAGNOSIS, 93000: NORMAL
GLUCOSE SERPL-MCNC: 143 MG/DL (ref 74–99)
P-R INTERVAL, ECG05: 130 MS
POTASSIUM SERPL-SCNC: 3.9 MMOL/L (ref 3.5–5.5)
Q-T INTERVAL, ECG07: 422 MS
QRS DURATION, ECG06: 96 MS
QTC CALCULATION (BEZET), ECG08: 431 MS
RESULT: NORMAL
SODIUM SERPL-SCNC: 140 MMOL/L (ref 136–145)
VENTRICULAR RATE, ECG03: 63 BPM

## 2019-04-30 PROCEDURE — 74011250637 HC RX REV CODE- 250/637: Performed by: INTERNAL MEDICINE

## 2019-04-30 PROCEDURE — 77030037875 HC DRSG MEPILEX <16IN BORD MOLN -A

## 2019-04-30 PROCEDURE — 80048 BASIC METABOLIC PNL TOTAL CA: CPT

## 2019-04-30 PROCEDURE — 36415 COLL VENOUS BLD VENIPUNCTURE: CPT

## 2019-04-30 PROCEDURE — 93005 ELECTROCARDIOGRAM TRACING: CPT

## 2019-04-30 RX ORDER — NITROGLYCERIN 0.4 MG/1
0.4 TABLET SUBLINGUAL
Qty: 1 BOTTLE | Refills: 0 | Status: SHIPPED | OUTPATIENT
Start: 2019-04-30 | End: 2019-05-29 | Stop reason: SDUPTHER

## 2019-04-30 RX ORDER — SULFAMETHOXAZOLE AND TRIMETHOPRIM 800; 160 MG/1; MG/1
1 TABLET ORAL EVERY 12 HOURS
Qty: 10 TAB | Refills: 0 | Status: SHIPPED | OUTPATIENT
Start: 2019-04-30 | End: 2019-05-05

## 2019-04-30 RX ORDER — METOPROLOL TARTRATE 100 MG/1
100 TABLET ORAL EVERY 12 HOURS
Qty: 60 TAB | Refills: 0 | Status: SHIPPED | OUTPATIENT
Start: 2019-04-30 | End: 2019-05-29 | Stop reason: SDUPTHER

## 2019-04-30 RX ORDER — ASPIRIN 81 MG/1
81 TABLET ORAL DAILY
Qty: 30 TAB | Refills: 0 | Status: SHIPPED | OUTPATIENT
Start: 2019-05-01 | End: 2019-05-29 | Stop reason: SDUPTHER

## 2019-04-30 RX ORDER — NITROGLYCERIN 400 UG/1
1 SPRAY ORAL
Qty: 1 BOTTLE | Refills: 0 | Status: SHIPPED | OUTPATIENT
Start: 2019-04-30 | End: 2019-04-30

## 2019-04-30 RX ORDER — MUPIROCIN 20 MG/G
OINTMENT TOPICAL DAILY
Qty: 22 G | Refills: 0 | Status: SHIPPED | OUTPATIENT
Start: 2019-05-01 | End: 2019-05-29 | Stop reason: ALTCHOICE

## 2019-04-30 RX ADMIN — TICAGRELOR 90 MG: 90 TABLET ORAL at 08:45

## 2019-04-30 RX ADMIN — SULFAMETHOXAZOLE AND TRIMETHOPRIM 1 TABLET: 800; 160 TABLET ORAL at 08:46

## 2019-04-30 RX ADMIN — MUPIROCIN: 20 OINTMENT TOPICAL at 08:49

## 2019-04-30 RX ADMIN — METOPROLOL TARTRATE 100 MG: 50 TABLET ORAL at 08:46

## 2019-04-30 RX ADMIN — FENOFIBRATE 145 MG: 145 TABLET ORAL at 08:46

## 2019-04-30 RX ADMIN — ASPIRIN 81 MG: 81 TABLET, COATED ORAL at 08:46

## 2019-04-30 RX ADMIN — Medication 10 ML: at 08:50

## 2019-04-30 RX ADMIN — TAMSULOSIN HYDROCHLORIDE 0.4 MG: 0.4 CAPSULE ORAL at 08:46

## 2019-04-30 NOTE — DISCHARGE SUMMARY
Physician Discharge Summary       Patient: Aris Mari MRN: [id number]  SSN: [id number]    YOB: 1960  Age: 63 y.o. Sex: male    PCP: Sarah Singletary MD    Allergies: Patient has no known allergies. Admit date: 4/25/2019  Admitting Provider: Carolee Muñoz MD    Discharge date: 4/30/2019  Discharging Provider: Destini Cramer MD    * Admission Diagnoses: Atrial fibrillation with rapid ventricular response ([facility] [address] [address] [I48.91]    * Discharge Diagnoses:      1. New onset Afib with RVR - rate controlled, now in NSR.    2. Scalp boil sec to community acquired MRSA   3. NSTEMI and CAD s/p Diagonal branch ROSA MARIA. 4. HTN  5. DM    Hospital Problems as of 4/30/2019 Date Reviewed: 9/17/2018          Codes Class Noted - Resolved POA    Chest pain ICD-10-CM: R07.9  ICD-9-CM: 786.50  4/26/2019 - Present Unknown        Atrial fibrillation with rapid ventricular response [facility]) ICD-10-CM: I48.91  ICD-9-CM: 427.31  4/25/2019 - Present Unknown              * Hospital Course: The patient came to the hospital on 4/25/19 with left arm heaviness and palpitation. He was found out to have a new onset afib with RVR of 170s. He was started on BB with improvement in his HR and he converted in NSR. Patient was subsequently found out to have NSTEMI, treated with heparin drip and cardiology evaluated this patient. Patient underwent cath and had a stent placed in diagonal branch. Post-procedure, he was doing well. He was seen by dr. Henry Park and cleared for discharged on the following medications. Patient had a boil on his scalp prior to coming to hospital and c/s showed community acquired MRSA - will be treated with Bactrim that he is tolerating it. * Procedures:   Procedure(s):  LEFT HEART CATH  CORONARY ANGIOGRAPHY  Left Ventriculography  Percutaneous Coronary Intervention      Consults: Cardiology    Significant Diagnostic Studies:     1. CXR  2.  ECHO    Discharge Exam:    /70   Pulse 61   Temp 97.6 °F (36.4 °C)   Resp 20   Ht 6' (1.829 m)   Wt 114.3 kg (251 lb 14.4 oz)   SpO2 97%   BMI 34.16 kg/m²     General appearance - alert, well appearing, and in no distress  Chest - good air entry noted in bases, no wheezes  Heart - S1 and S2 normal  Abdomen - soft, nontender, nondistended, Bowel sounds present  Neurological - alert, oriented, normal speech, no focal findings noted  Musculoskeletal - no joint tenderness or swelling of knees  Extremities - no pedal edema noted      * Discharge Condition: improved  * Disposition: Home    Discharge Medications:  Current Discharge Medication List      START taking these medications    Details   ticagrelor (BRILINTA) 90 mg tablet Take 1 Tab by mouth two (2) times a day. Qty: 60 Tab, Refills: 0      metoprolol tartrate (LOPRESSOR) 100 mg IR tablet Take 1 Tab by mouth every twelve (12) hours for 30 days. Qty: 60 Tab, Refills: 0      aspirin delayed-release 81 mg tablet Take 1 Tab by mouth daily for 30 days. Qty: 30 Tab, Refills: 0      mupirocin (BACTROBAN) 2 % ointment Apply  to affected area daily. Indications: inflammation of a hair follicle  Qty: 22 g, Refills: 0      trimethoprim-sulfamethoxazole (BACTRIM DS, SEPTRA DS) 160-800 mg per tablet Take 1 Tab by mouth every twelve (12) hours for 5 days. Qty: 10 Tab, Refills: 0      nitroglycerin (NITROSTAT) 0.4 mg SL tablet 1 Tab by SubLINGual route every five (5) minutes as needed for Chest Pain. Up to 3 doses. Qty: 1 Bottle, Refills: 0         CONTINUE these medications which have NOT CHANGED    Details   HYDROcodone-acetaminophen (NORCO)  mg tablet Take 1 Tab by mouth every six (6) hours as needed for Pain.       fenofibrate nanocrystallized (TRICOR) 145 mg tablet take 1 tablet by mouth once daily  Qty: 30 Tab, Refills: 1      traZODone (DESYREL) 50 mg tablet take 1 tablet by mouth NIGHTLY  Qty: 90 Tab, Refills: 3    Associated Diagnoses: Insomnia, unspecified type      metFORMIN (GLUCOPHAGE) 1,000 mg tablet TAKE 1 TABLET BY MOUTH TWO TIMES A DAY WITH MEALS  Qty: 180 Tab, Refills: 3      VITAMIN D2 50,000 unit capsule take 1 capsule by mouth two times a week  Qty: 13 Cap, Refills: 4      atorvastatin (LIPITOR) 40 mg tablet take 1 tablet by mouth once daily  Qty: 30 Tab, Refills: 12      naloxone 4 mg/actuation spry 4 mg by Nasal route once as needed (opioid overdose) for up to 1 dose. May substitute 2 mg syringe if insurance requires  Qty: 1 Package, Refills: 0      tamsulosin (FLOMAX) 0.4 mg capsule Take 1 Cap by mouth daily. Qty: 30 Cap, Refills: 3      omeprazole (PRILOSEC) 20 mg capsule Take 20 mg by mouth daily. ascorbic acid (VITAMIN C) 1,000 mg tablet Take 1,000 mg by mouth two (2) times a day. MULTIVITAMIN PO Take  by mouth. buPROPion SR (WELLBUTRIN SR) 150 mg SR tablet Take 1 Tab by mouth two (2) times a day. Qty: 60 Tab, Refills: 5    Associated Diagnoses: Tobacco dependence syndrome         STOP taking these medications       amoxicillin-clavulanate (AUGMENTIN) 875-125 mg per tablet Comments:   Reason for Stopping:         lisinopril-hydroCHLOROthiazide (PRINZIDE, ZESTORETIC) 20-12.5 mg per tablet Comments:   Reason for Stopping:         morphine CR (MS CONTIN) 15 mg CR tablet Comments:   Reason for Stopping:               * Follow-up Care/Patient Instructions:   Activity: Activity as tolerated  Diet: Cardiac Diet and Diabetic Diet  Wound Care: As directed    Follow-up Information     Follow up With Specialties Details Why Contact Info    Nain Hung MD Internal Medicine In 1 week  [facility] [address] [place] [address]      Diego Tao MD Cardiology In 2 weeks  [address] [place] [address] Str.  [phone]          Follow-up Appointments   Procedures    FOLLOW UP VISIT Appointment in: Other ([address]) With PCP in 5 days With dr. Hortensia Ortiz and group in 10 days     With PCP in 5 days  With dr. Hortensia Ortiz and group in 10 days     Standing Status:   Standing     Number of Occurrences:   1     Order Specific Question:   Appointment in     Answer:    Other (Specify)       Signed:  Meir Hanson MD  4/30/2019  10:37 AM

## 2019-04-30 NOTE — NURSE NAVIGATOR
Reason for Admission:  Atrial fibrillation with rapid ventricular response ([facility] [address] [I48.91]                 RRAT Score:    4            Plan for utilizing home health: To be determined                      Likelihood of Readmission:   LOW                         Transition of Care Plan:              Initial assessment completed with patient. Cognitive status of patient: oriented to time, place, person and situation. Face sheet information confirmed:  yes. The patient designates wife, Erasmo Murillo,  to participate in his discharge plan and to receive any needed information. This patient lives in a single family home with wife. Patient is able to navigate steps as needed. Prior to hospitalization, patient was considered to be independent with ADLs/IADLS : yes . Patient has a current ACP document on file: no  The wife will be available to transport patient home upon discharge. The patient already has Cane, crutches medical equipment available in the home. Patient is not currently active with home health. Patient has not stayed in a skilled nursing facility or rehab. This patient is on dialysis :no    Currently, the discharge plan is Home. The patient states that he can obtain his medications from the pharmacy, and take his medications as directed. Patient's current insurance is [employer]       Care Management Interventions  PCP Verified by CM: Yes  Last Visit to PCP: 04/25/19  Mode of Transport at Discharge: Self  Physical Therapy Consult: No  Occupational Therapy Consult: No  Current Support Network: Lives with Spouse  Confirm Follow Up Transport: Family  Discharge Location  Discharge Placement: Home with family assistance        Marcin Sewell.  Manuel DORANTES, RN  Care Management  [phone]

## 2019-04-30 NOTE — PROGRESS NOTES
[facility]  -  Progress Note      Patient: Jessenia Romano MRN: [id number]  SSN: [id number]    YOB: 1960  Age: 63 y.o. Sex: male      Admit Date: 4/25/2019    Hospital Problem List:     Hospital Problems  Date Reviewed: 9/17/2018          Codes Class Noted POA    S/P coronary artery stent placement ICD-10-CM: Z95.5  ICD-9-CM: V45.82  4/29/2019 Unknown        Chest pain ICD-10-CM: R07.9  ICD-9-CM: 786.50  4/26/2019 Unknown        NSTEMI (non-ST elevated myocardial infarction) [facility]) ICD-10-CM: I21.4  ICD-9-CM: 410.70  4/26/2019 Unknown        Atrial fibrillation with rapid ventricular response [facility]) ICD-10-CM: I48.91  ICD-9-CM: 427.31  4/25/2019 Unknown               1.) s/p new AF RVR 170s, now NSR  2.) NSTEMI, with shoulder/ L arm tightness (4/25), currently pain free, trop peak 1.94, ST changes when in RVR but in NSR (chadsvasc 2) unchanged from prior ekgs  S/p cardiac cath with PCI/stent 4/29/19:   · Diffuse 50% dominant RCA disease. · Diffuse LAD and circumflex 30% stenosis. · Culprit diagonal 85 to 90% stenosis  · Diagonal branch stented to residual 0% using ROSA MARIA. · Overall normal left ventricular systolic function with EF 55 to 60%. 3.) HTN, above goal but holding his home ACEI/ HCTZ  4.) DM2 with assoc dyslipidemia, known  5.) Tobacco  6.) +Family hx premature CAD  7.) Normal LV function, no obvious WMA on echo this admission  8.) Stable kidney function  9.) On Abx for boil, known      Assessment & Plan:     S/p PCI/stenting of diagonal lesion with ROSA MARIA yesterday  Patient doing well this morning and ambulatory without difficulty  He remains in NSR on tele with HR in the 50's. Consider reducing BB to 50 mg BID    Continue DAPT with ASA and brilinta. No need for NOAC at this time  Continue statin, tricor  Stable for discharge from cardiac standpoint    Subjective:     No CV complaints. Patient doing well and ambulating without symptoms.  Radial site clean and dry, no TTP or hematoma. Ready to go home     Objective:      Patient Vitals for the past 8 hrs:   Temp Pulse Resp BP SpO2   04/30/19 0759 97.6 °F (36.4 °C) 61 20 129/70 97 %   04/30/19 0438 98.2 °F (36.8 °C) 72 18 153/83 99 %         Patient Vitals for the past 96 hrs:   Weight   04/28/19 0431 114.3 kg (251 lb 14.4 oz)   04/27/19 0438 113.7 kg (250 lb 9.6 oz)         Intake/Output Summary (Last 24 hours) at 4/30/2019 1052  Last data filed at 4/30/2019 0553  Gross per 24 hour   Intake 600 ml   Output 950 ml   Net -350 ml       Physical Exam:  General:  alert, cooperative, no distress, appears stated age  Neck:  nontender, no masses, no stridor, no JVD  Lungs:  clear to auscultation bilaterally  Heart:  regular rate and rhythm, S1, S2 normal, no murmur, click, rub or gallop  Abdomen:  abdomen is soft without significant tenderness, masses, organomegaly or guarding  Extremities:  extremities normal, atraumatic, no cyanosis or edema    Data Review:     Labs: Results:       Chemistry Recent Labs     04/30/19  0525 04/29/19  0130 04/28/19  0250   * 128* 134*    142 143   K 3.9 3.8 4.1    107 110*   CO2 27 31 30   BUN 10 8 9   CREA 0.86 0.66 0.60   CA 9.2 8.9 8.9   AGAP 6 4 3   BUCR 12 12 15      CBC w/Diff Recent Labs     04/29/19  0130 04/28/19  0250   WBC 9.0 8.3   RBC 4.43* 4.54*   HGB 12.4* 13.1   HCT 38.1 39.2    165   GRANS 53 62   LYMPH 37 27   EOS 3 3      Cardiac Enzymes No results found for: CPK, CK, CKMMB, CKMB, RCK3, CKMBT, CKNDX, CKND1, YVONNE, TROPT, TROIQ, ELIAZAR, TROPT, TNIPOC, BNP, BNPP   Coagulation Recent Labs     04/29/19  0331 04/29/19  0130   APTT 58.3* 61.0*       Lipid Panel Lab Results   Component Value Date/Time    Cholesterol, total 168 02/17/2018 09:48 PM    HDL Cholesterol 26 (L) 02/17/2018 09:48 PM    LDL,Direct 72 02/17/2018 09:48 PM    LDL, calculated  02/17/2018 09:48 PM      Comment:      Triglyceride >400. LDL cannot be calculated. LDL Cholesterol Direct has been  ordered. VLDL, calculated 100 (H) 02/17/2018 09:48 PM    Triglyceride 501 (H) 02/17/2018 09:48 PM    CHOL/HDL Ratio 5.3 (H) 06/15/2010 10:02 AM      BNP No results found for: BNP, BNPP, XBNPT   Liver Enzymes No results for input(s): TP, ALB, TBIL, AP, SGOT, GPT in the last 72 hours.     No lab exists for component: DBIL   Digoxin    Thyroid Studies Lab Results   Component Value Date/Time    TSH 0.48 04/26/2019 04:11 AM            Signed By: LUCILLE Goss     April 30, 2019

## 2019-04-30 NOTE — PROGRESS NOTES
0800:  Report received,care assumed. Complete assessment performed. AAOx4. Denies CP, pressure or any discomforts at present time. Post cardiac cath instructions reviewed with patient including care of right wrist cath site; pt verbalizes understanding all info. 1220: Wife at bedside. Discharge instructions reviewed at length with pt and wife, both verbalize understanding all info. Await outpatient pharmacy to bring filled prescriptions. 1250: Work release note given to patient. Filled prescriptions given to patient by pharmacist.  Discharged home via wheelchair to private vehicle with wife driving.

## 2019-04-30 NOTE — NURSE NAVIGATOR
D/C order noted for today. Orders reviewed. Rx sent to pharmacy to have filled here including [address]. No other needs identified at this time. Wife here to transport patient home. CM remains available if needed. Bob .  Cesario MATSONN, RN  Care Management  [phone]

## 2019-04-30 NOTE — DISCHARGE SUMMARY
[facility]  Discharge Summary       Patient: Aris Call Age: 63 y.o. : 1960 MR#: [id number] SSN: [id number]  PCP on record: Sarah Singletary MD  Admit date: 2019  Discharge date: 2019    Disposition:    [x]Home   []Home with Home Health   []SNF/NH   []Rehab   []Home with family   []Alternate Facility:____________________    Admission Diagnoses:  Atrial fibrillation with rapid ventricular response ([facility] [address] [I48.91]    Discharge Diagnoses:                             1. New onset Afib with RVR - rate controlled, now in NSR.    2. Scalp boil sec to community acquired MRSA   3. NSTEMI and CAD s/p Diagonal branch ROSA MARIA. 4. HTN  5. DM    Discharge Medications:     Current Discharge Medication List      START taking these medications    Details   ticagrelor (BRILINTA) 90 mg tablet Take 1 Tab by mouth two (2) times a day. Qty: 60 Tab, Refills: 0      metoprolol tartrate (LOPRESSOR) 100 mg IR tablet Take 1 Tab by mouth every twelve (12) hours for 30 days. Qty: 60 Tab, Refills: 0      aspirin delayed-release 81 mg tablet Take 1 Tab by mouth daily for 30 days. Qty: 30 Tab, Refills: 0      mupirocin (BACTROBAN) 2 % ointment Apply  to affected area daily. Indications: inflammation of a hair follicle  Qty: 22 g, Refills: 0      trimethoprim-sulfamethoxazole (BACTRIM DS, SEPTRA DS) 160-800 mg per tablet Take 1 Tab by mouth every twelve (12) hours for 5 days. Qty: 10 Tab, Refills: 0      nitroglycerin (NITROSTAT) 0.4 mg SL tablet 1 Tab by SubLINGual route every five (5) minutes as needed for Chest Pain. Up to 3 doses. Qty: 1 Bottle, Refills: 0         CONTINUE these medications which have NOT CHANGED    Details   HYDROcodone-acetaminophen (NORCO)  mg tablet Take 1 Tab by mouth every six (6) hours as needed for Pain.       fenofibrate nanocrystallized (TRICOR) 145 mg tablet take 1 tablet by mouth once daily  Qty: 30 Tab, Refills: 1      traZODone (DESYREL) 50 mg tablet take 1 tablet by mouth NIGHTLY  Qty: 90 Tab, Refills: 3    Associated Diagnoses: Insomnia, unspecified type      metFORMIN (GLUCOPHAGE) 1,000 mg tablet TAKE 1 TABLET BY MOUTH TWO TIMES A DAY WITH MEALS  Qty: 180 Tab, Refills: 3      VITAMIN D2 50,000 unit capsule take 1 capsule by mouth two times a week  Qty: 13 Cap, Refills: 4      atorvastatin (LIPITOR) 40 mg tablet take 1 tablet by mouth once daily  Qty: 30 Tab, Refills: 12      naloxone 4 mg/actuation spry 4 mg by Nasal route once as needed (opioid overdose) for up to 1 dose. May substitute 2 mg syringe if insurance requires  Qty: 1 Package, Refills: 0      tamsulosin (FLOMAX) 0.4 mg capsule Take 1 Cap by mouth daily. Qty: 30 Cap, Refills: 3      omeprazole (PRILOSEC) 20 mg capsule Take 20 mg by mouth daily. ascorbic acid (VITAMIN C) 1,000 mg tablet Take 1,000 mg by mouth two (2) times a day. MULTIVITAMIN PO Take  by mouth. buPROPion SR (WELLBUTRIN SR) 150 mg SR tablet Take 1 Tab by mouth two (2) times a day. Qty: 60 Tab, Refills: 5    Associated Diagnoses: Tobacco dependence syndrome         STOP taking these medications       amoxicillin-clavulanate (AUGMENTIN) 875-125 mg per tablet Comments:   Reason for Stopping:         lisinopril-hydroCHLOROthiazide (PRINZIDE, ZESTORETIC) 20-12.5 mg per tablet Comments:   Reason for Stopping:         morphine CR (MS CONTIN) 15 mg CR tablet Comments:   Reason for Stopping:               Consults:  Cardiology   -   Procedures: LEFT HEART CATH  CORONARY ANGIOGRAPHY  Left Ventriculography  Percutaneous Coronary Intervention    -     Significant Diagnostic Studies:   [facility] with PCI:  · Diffuse 50% dominant RCA disease. · Diffuse LAD and circumflex 30% stenosis. · Culprit diagonal 85 to 90% stenosis  · . Diagonal branch stented to residual 0% using ROSA MARIA. Overall normal left ventricular systolic function with EF 55 to 60%. Echocardiogram:  · Estimated left ventricular ejection fraction is 61 - 65%. Left ventricular moderate concentric hypertrophy. No regional wall motion abnormality noted. Mild (grade 1) left ventricular diastolic dysfunction. · Mitral valve thickening. Mild mitral valve regurgitation. · Left atrium is upper limits of normal volume. Hospital Course by Problem   HPI per admitting provider:  Stacey Mitchell is a 63 y.o. with a PMH of HTN, chronic back pain came to the ER with Chest pain, SOB and left arm discomfort.  patient states he went out to work today he got out of his truck he developed an acute onset of heaviness in the center of his chest, the heaviness radiated to his left shoulder and upper arm.  Patient also experienced shortness of breath.  He says that when he tried to walk around he became even more short of breath.  He says he is never had these symptoms before. Agatha Stoddard got back into his truck and drove to the ER for further evaluation. Agatha Stoddard relates that early this morning he went to his PCP office for follow-up of a scalp infection and was feeling fine.  Records from the PCPs office show that at that time his pulse was 80.  Patient says for the past several days he has been otherwise feeling fine.  He denies any history of cardiac problems.  He denies any other symptoms or complaints.     He was found to have a FIB with RVR in the ER and patient was started on cardizem gtt   During this eval he was in sinus rhythm, in the 60s and has no symptoms. \"    1. New onset Afib with RVR - admitted due to above, pt had left arm heaviness and palpitations. Pt had HR in 140-150s. Pt initially received cardizem gtt, and changed to PO metoprolol. Heart rate controlled, now in NSR.    2. Scalp boil sec to community acquired MRSA: managed with bactrim with script given at discharge. 3. NSTEMI and CAD s/p Diagonal branch ROSA MARIA. Troponin increased then slowly trended down. Pt was started on heparin drip which was laer discontinued.  Per cardiology, would to continue his dual antiplatelet therapy without any oral anticoagulation for now. Pt is to follow with cardiology as OP. No chest pain or OB prior to discharge. 4. HTN: managed with BB.   5. DM type 2: recent HgbA1C is 6.5. Controlled. Today's examination of the patient revealed:     Subjective:   Pt states he feels great. No chest pain, SOB, palpitations, arm heaviness. No fevers or chills.    Objective:   VS:   Visit Vitals  /75   Pulse 65   Temp 98.3 °F (36.8 °C)   Resp 20   Ht 6' (1.829 m)   Wt 114.3 kg (251 lb 14.4 oz)   SpO2 97%   BMI 34.16 kg/m²      Tmax/24hrs: Temp (24hrs), Av.2 °F (36.8 °C), Min:97.6 °F (36.4 °C), Max:98.7 °F (37.1 °C)     Input/Output:     Intake/Output Summary (Last 24 hours) at 2019 1206  Last data filed at 2019 0553  Gross per 24 hour   Intake 600 ml   Output 950 ml   Net -350 ml       General:  Alert, NAD  Cardiovascular:  RRR  Pulmonary:  LSC throughout; respiratory effort WNL  GI:  +BS in all four quadrants, soft, non-tender  Extremities:  No edema; 2+ dorsalis pedis pulses bilaterally  Additional:      Labs:    Recent Results (from the past 24 hour(s))   EKG, 12 LEAD, SUBSEQUENT    Collection Time: 19  4:29 AM   Result Value Ref Range    Ventricular Rate 63 BPM    Atrial Rate 63 BPM    P-R Interval 130 ms    QRS Duration 96 ms    Q-T Interval 422 ms    QTC Calculation (Bezet) 431 ms    Calculated P Axis 59 degrees    Calculated R Axis 20 degrees    Calculated T Axis 34 degrees    Diagnosis       Normal sinus rhythm  Normal ECG  When compared with ECG of 2019 12:54,  Criteria for Inferior infarct are no longer present     METABOLIC PANEL, BASIC    Collection Time: 19  5:25 AM   Result Value Ref Range    Sodium 140 136 - 145 mmol/L    Potassium 3.9 3.5 - 5.5 mmol/L    Chloride 107 100 - 108 mmol/L    CO2 27 21 - 32 mmol/L    Anion gap 6 3.0 - 18 mmol/L    Glucose 143 (H) 74 - 99 mg/dL    BUN 10 7.0 - 18 MG/DL    Creatinine 0.86 0.6 - 1.3 MG/DL    BUN/Creatinine ratio 12 12 - 20      GFR est AA >60 >60 ml/min/1.73m2    GFR est non-AA >60 >60 ml/min/1.73m2    Calcium 9.2 8.5 - 10.1 MG/DL     Additional Data Reviewed:     Condition: Stable  Follow-up Appointments:   1. Your PCP: Katy Adler MD, within 7-10days  2.  Your Cardiologist: Radha Arguello MD within 2 weeeks      >30 minutes spent coordinating this discharge (review instructions/follow-up, prescriptions, preparing report for sign off)    Signed:  Daquan Dunn PA-C  4/30/2019  12:06 PM

## 2019-04-30 NOTE — DISCHARGE INSTRUCTIONS
Patient Education   Patient Education        MRSA: Care Instructions  Your Care Instructions    MRSA stands for methicillin-resistant Staphylococcus aureus. It is a type of bacteria that can cause a staph infection. But it cannot be killed by the antibiotic methicillin and some other antibiotics. This sometimes makes it harder to treat. The bacteria are widespread on skin and in the nose. MRSA can cause infections of the skin, heart, blood, and bones. The bacteria can spread quickly in the body and cause serious problems. MRSA can also be spread from person to person. Depending on how serious your infection is, the doctor may drain your wound and you may get antibiotics through a small tube placed in a vein (IV). Your doctor may also give you an antibiotic ointment to use on sores or in your nose. Follow-up care is a key part of your treatment and safety. Be sure to make and go to all appointments, and call your doctor if you are having problems. It's also a good idea to know your test results and keep a list of the medicines you take. How can you care for yourself at home? · Take your antibiotics as directed. Do not stop taking them just because you feel better. You need to take the full course of antibiotics. · Keep any cuts or other wounds covered while they heal.  · Wash your hands often, especially after you touch elastic bandages or other dressings over a wound. This can keep the bacteria from spreading. Wrap bandages in a plastic bag before you throw them away. · Do not share towels, washcloths, razors, clothing, or other items that touched your wound or bandage. Wash your sheets, towels, and clothes with warm water and detergent. Dry them in a hot dryer, if possible. · Keep shared areas clean by wiping down surfaces (such as countertops, doorknobs, and light switches) with a disinfectant. When should you call for help?   Call your doctor now or seek immediate medical care if:    · You have worse symptoms of infection, such as:  ? Increased pain, swelling, warmth, or redness. ? Red streaks leading from the area. ? Pus draining from the area. ? A fever.    Watch closely for changes in your health, and be sure to contact your doctor if:    · You do not get better as expected. Where can you learn more? Go to http://maddie-madison.info/. Enter G763 in the search box to learn more about \"MRSA: Care Instructions. \"  Current as of: July 30, 2018  Content Version: 11.9  © 0684-8508 [employer]. Care instructions adapted under license by [employer] (which disclaims liability or warranty for this information). If you have questions about a medical condition or this instruction, always ask your healthcare professional. [address] any warranty or liability for your use of this information. Percutaneous Coronary Intervention: What to Expect at [facility]    Percutaneous coronary intervention (PCI) is the name for procedures that are used to open a narrowed or blocked coronary artery. The two most common PCI procedures are coronary angioplasty and coronary stent placement. Your groin or arm may have a bruise and feel sore for a day or two after a percutaneous coronary intervention (PCI). You can do light activities around the house, but nothing strenuous for several days. This care sheet gives you a general idea about how long it will take for you to recover. But each person recovers at a different pace. Follow the steps below to get better as quickly as possible. How can you care for yourself at home? Activity    · If the doctor gave you a sedative:  ? For 24 hours, don't do anything that requires attention to detail. It takes time for the medicine's effects to completely wear off.  ? For your safety, do not drive or operate any machinery that could be dangerous.  Wait until the medicine wears off and you can think clearly and react easily.     · Do not do strenuous exercise and do not lift, pull, or push anything heavy until your doctor says it is okay. This may be for a day or two. You can walk around the house and do light activity, such as cooking.     · If the catheter was placed in your groin, try not to walk up stairs for the first couple of days.     · If the catheter was placed in your arm near your wrist, do not bend your wrist deeply for the first couple of days. Be careful using your hand to get into and out of a chair or bed.     · Carry your stent identification card with you at all times.     · If your doctor recommends it, get more exercise. Walking is a good choice. Bit by bit, increase the amount you walk every day. Try for at least 30 minutes on most days of the week. Diet    · Drink plenty of fluids to help your body flush out the dye. If you have kidney, heart, or liver disease and have to limit fluids, talk with your doctor before you increase the amount of fluids you drink.     · Keep eating a heart-healthy diet that has lots of fruits, vegetables, and whole grains. If you have not been eating this way, talk to your doctor. You also may want to talk to a dietitian. This expert can help you to learn about healthy foods and plan meals. Medicines    · Your doctor will tell you if and when you can restart your medicines. He or she will also give you instructions about taking any new medicines.     · If you take blood thinners, such as warfarin (Coumadin), clopidogrel (Plavix), or aspirin, be sure to talk to your doctor. He or she will tell you if and when to start taking those medicines again. Make sure that you understand exactly what your doctor wants you to do.     · Your doctor will prescribe blood-thinning medicines. You will likely take aspirin plus another antiplatelet, such as clopidogrel (Plavix). It is very important that you take these medicines exactly as directed.  These medicines help keep the coronary artery open and reduce your risk of a heart attack.     · Call your doctor if you think you are having a problem with your medicine.    Care of the catheter site    · For 1 or 2 days, keep a bandage over the spot where the catheter was inserted. The bandage probably will fall off in this time.     · Put ice or a cold pack on the area for 10 to 20 minutes at a time to help with soreness or swelling. Put a thin cloth between the ice and your skin.     · You may shower 24 to 48 hours after the procedure, if your doctor okays it. Pat the incision dry.     · Do not soak the catheter site until it is healed. Don't take a bath for 1 week, or until your doctor tells you it is okay.     · If you are bleeding, lie down and press on the area for 15 minutes to try to make it stop. If the bleeding does not stop, call your doctor or seek immediate medical care. Follow-up care is a key part of your treatment and safety. Be sure to make and go to all appointments, and call your doctor if you are having problems. It's also a good idea to know your test results and keep a list of the medicines you take. When should you call for help? Call 911 anytime you think you may need emergency care. For example, call if:    · You passed out (lost consciousness).     · You have severe trouble breathing.     · You have sudden chest pain and shortness of breath, or you cough up blood.     · You have symptoms of a heart attack, such as:  ? Chest pain or pressure. ? Sweating. ? Shortness of breath. ? Nausea or vomiting. ? Pain that spreads from the chest to the neck, jaw, or one or both shoulders or arms. ? Dizziness or lightheadedness. ? A fast or uneven pulse. After calling 911, chew 1 adult-strength aspirin. Wait for an ambulance.  Do not try to drive yourself.     · You have been diagnosed with angina, and you have angina symptoms that do not go away with rest or are not getting better within 5 minutes after you take one dose of nitroglycerin.    Call your doctor now or seek immediate medical care if:    · You are bleeding from the area where the catheter was put in your artery.     · You have a fast-growing, painful lump at the catheter site.     · You have signs of infection, such as:  ? Increased pain, swelling, warmth, or redness. ? Red streaks leading from the catheter site. ? Pus draining from the catheter site. ? A fever.     · Your leg or arm looks blue or feels cold, numb, or tingly.    Watch closely for changes in your health, and be sure to contact your doctor if you have any problems. Where can you learn more? Go to http://maddie-madison.info/. Enter T644 in the search box to learn more about \"Percutaneous Coronary Intervention: What to Expect at Home. \"  Current as of: July 22, 2018  Content Version: 11.9  © 4179-9312 [employer]. Care instructions adapted under license by [employer] (which disclaims liability or warranty for this information). If you have questions about a medical condition or this instruction, always ask your healthcare professional. [address] any warranty or liability for your use of this information. Sulfamethoxazole/Trimethoprim (Bactrim, Bactrim DS, SMZ-TMP Pediatric, Septra) - (By mouth)   Why this medicine is used:   Treats or prevents infections. Contact a nurse or doctor right away if you have:  · Severe nausea, vomiting, or stomach pain  · Dark urine or pale stools  · Confusion, weakness  · Severe or bloody diarrhea  · Skin rash, purple spots on your skin, or very pale or yellow skin     Common side effects:  · Mild nausea or vomiting  · Loss of apetite  © 2017 [facility] [place] Information is for End User's use only and may not be sold, redistributed or otherwise used for commercial purposes.         Mupirocin (Bactroban, Centany, Centany AT, Dermawerx Surgical Plus Wade) - (On the skin)   Why this medicine is used:   Treats skin infections. Contact a nurse or doctor right away if you have:  · Skin rash, burning, or itching  · Diarrhea, stomach cramps or pain   © 2017 [address] is for End User's use only and may not be sold, redistributed or otherwise used for commercial purposes. Aspirin (By mouth)   Aspirin (AS-pir-in)  Treats pain, fever, and inflammation. May lower risk of heart attack and stroke. Brand Name(s): Ascriptin Regular Strength, Aspergum, Aspir Low, Aspirin Adult Low Dose, Aspirin Low Dose, Fani Aspirin Children's, Fani Aspirin Regimen, Fani Extra Strength, Fani Genuine Aspirin, Fani Low Dose, Bufferin, Bufferin Low Dose, Durlaza, Ecotrin, Ecpirin   There may be other brand names for this medicine. When This Medicine Should Not Be Used: This medicine is not right for everyone. Do not use it if you had an allergic reaction to aspirin or other NSAIDs, or if you have a history of asthma with nasal polyps and rhinitis. How to Use This Medicine:   Delayed Release Capsule, Long Acting Capsule, Gum, Tablet, Chewable Tablet, Fizzy Tablet, Coated Tablet, Long Acting Tablet, 24 Hour Capsule  · Your doctor will tell you how much medicine to use. Do not use more than directed. · It is best to take this medicine with food or milk. · Capsule, tablet, or coated tablet: Swallow whole. Do not crush, break, or chew it. · Chewable tablet: You may chew it completely or swallow it whole. · Gum: Chew completely to make sure you get as much medicine as possible. Drink a full glass (8 ounces) of water after chewing the gum. · Swallow the extended-release capsule whole. Do not crush, break, or chew it. Take the capsule with a full glass of water at the same time each day. · Follow the instructions on the medicine label if you are using this medicine without a prescription. · Missed dose:  If you miss a dose of Durlaza, skip the missed dose and go back to your regular dosing schedule. Do not take extra medicine to make up for a missed dose. · Store the medicine in a closed container at room temperature, away from heat, moisture, and direct light. Drugs and Foods to Avoid:   Ask your doctor or pharmacist before using any other medicine, including over-the-counter medicines, vitamins, and herbal products. · Some foods and medicines can affect how aspirin works. Tell your doctor if you are using any of the following:  ¨ Dipyridamole, methotrexate, probenecid, sulfinpyrazone, ticlopidine  ¨ Blood thinner (including clopidogrel, prasugrel, ticagrelor, warfarin)  ¨ Blood pressure medicine  ¨ Medicine to treat seizures (including phenytoin, valproic acid)  ¨ NSAID pain or arthritis medicine (including celecoxib, diclofenac, ibuprofen, naproxen)  ¨ Steroid medicine (including dexamethasone, hydrocortisone, methylprednisolone, prednisolone, prednisone)  · Do not take Durlaza 2 hours before or 1 hour after you drink alcohol or take medicines that contain alcohol. Warnings While Using This Medicine:   · Tell your doctor if you are pregnant or breastfeeding. Do not use this medicine during the later part of a pregnancy unless your doctor tells you to. · Tell your doctor if you have kidney disease, liver disease, high blood pressure, heart disease, or a history of stomach bleeding or ulcers. · This medicine may increase your risk for bleeding, including stomach ulcers. · Do not give aspirin to a child or teenager who has chickenpox or flu symptoms, unless the doctor says it is okay. Aspirin can cause a life-threatening reaction called Reye syndrome. · Tell any doctor or dentist who treats you that you are using this medicine. This medicine may affect certain medical test results. · Keep all medicine out of the reach of children. Never share your medicine with anyone.   Possible Side Effects While Using This Medicine:   Call your doctor right away if you notice any of these side effects:  · Allergic reaction: Itching or hives, swelling in your face or hands, swelling or tingling in your mouth or throat, chest tightness, trouble breathing  · Bloody or black stools, bloody vomit or vomit that looks like coffee grounds  · Chest tightness, wheezing  · Ringing in the ears  · Severe stomach pain  · Unusual bleeding, bruising, or weakness  If you notice other side effects that you think are caused by this medicine, tell your doctor. Call your doctor for medical advice about side effects. You may report side effects to FDA at 9-010-APK-0551  © 2017 [facility] Information is for End User's use only and may not be sold, redistributed or otherwise used for commercial purposes. The above information is an  only. It is not intended as medical advice for individual conditions or treatments. Talk to your doctor, nurse or pharmacist before following any medical regimen to see if it is safe and effective for you. Ticagrelor (By mouth)   Ticagrelor (zns-BX-uodh-or)  Helps prevent stroke, heart attack, and other heart problems. This medicine is a blood thinner. Brand Name(s): Brilinta   There may be other brand names for this medicine. When This Medicine Should Not Be Used: This medicine is not right for everyone. Do not use it if you had an allergic reaction to ticagrelor, or if you have bleeding problems (such as a bleeding stomach ulcer) or a history of bleeding in your brain. How to Use This Medicine:   Tablet  · Your doctor will tell you how much medicine to use. Do not use more than directed. Take this medicine at the same time every day. · Your doctor may tell you to take aspirin with this medicine. Do not use more than 100 milligrams of aspirin per day. Check the labels of other medicines to make sure they do not contain aspirin. · If you cannot swallow the tablet, you may do this:   ¨ Crush the tablet and mix it in a glass of water. Drink it right away. Rinse the glass with more water and drink that too, so you get all the medicine. ¨ You may give the tablet and water mixture through a nasogastric tube. Flush the tube with more water so you receive all the medicine. · This medicine should come with a Medication Guide. Ask your pharmacist for a copy if you do not have one. · Missed dose: Skip the missed dose and take your next dose as usual. Do not take extra medicine to make up for a missed dose. · Store the medicine in a closed container at room temperature, away from heat, moisture, and direct light. Drugs and Foods to Avoid:   Ask your doctor or pharmacist before using any other medicine, including over-the-counter medicines, vitamins, and herbal products. · Some medicines can affect how ticagrelor works. Tell your doctor if you are using any of the following:  ¨ Atazanavir, carbamazepine, clarithromycin, digoxin, indinavir, itraconazole, ketoconazole, lovastatin, nefazodone, nelfinavir, phenobarbital, phenytoin, rifampin, ritonavir, saquinavir, simvastatin, telithromycin, or voriconazole  ¨ Blood thinner (including warfarin or heparin)  ¨ NSAID pain or arthritis medicine (including celecoxib, diclofenac, ibuprofen, naproxen)  Warnings While Using This Medicine:   · Tell your doctor if you are pregnant or breastfeeding, or if you have liver disease, heart rhythm problems (including slow heartbeat), lung or breathing problems (such as asthma or COPD), or a history of bleeding problems. · This medicine may cause you to bleed and bruise more easily, and it may take longer than usual for bleeding to stop. Be careful to avoid injuries. · Do not stop using this medicine unless your doctor tells you to. To stop it may increase your risk of a heart attack, blood clot, or other serious problem. · Tell any doctor or dentist who treats you that you are using this medicine.  With your doctor's permission, you may need to stop using this medicine several days before you have surgery to reduce the risk of bleeding problems. Follow your doctor's instructions carefully. · Your doctor will do lab tests at regular visits to check on the effects of this medicine. Keep all appointments. · Keep all medicine out of the reach of children. Never share your medicine with anyone. Possible Side Effects While Using This Medicine:   Call your doctor right away if you notice any of these side effects:  · Allergic reaction: Itching or hives, swelling in your face or hands, swelling or tingling in your mouth or throat, chest tightness, trouble breathing  · Bloody or black, tarry stools, red or dark brown urine  · Fast, slow, or pounding heartbeat  · Trouble breathing  · Unusual bleeding, bruising, or weakness  · Vomiting of blood or material that looks like coffee grounds  If you notice other side effects that you think are caused by this medicine, tell your doctor. Call your doctor for medical advice about side effects. You may report side effects to FDA at 2-491-IPA-4646  © 2017 [facility] [place] Information is for End User's use only and may not be sold, redistributed or otherwise used for commercial purposes. The above information is an  only. It is not intended as medical advice for individual conditions or treatments. Talk to your doctor, nurse or pharmacist before following any medical regimen to see if it is safe and effective for you. Metoprolol (By mouth)   Metoprolol (met-oh-PROE-lol)  Treats high blood pressure, angina (chest pain), and heart failure. May lower the risk of death after a heart attack. This medicine is a beta-blocker. Brand Name(s): Lopressor, Toprol XL   There may be other brand names for this medicine. When This Medicine Should Not Be Used: This medicine is not right for everyone. Do not use if you had an allergic reaction to metoprolol or similar medicines.  Do not use this medicine if you have certain blood circulation or heart problems. Ask your doctor about these problems. How to Use This Medicine:   Tablet, Long Acting Tablet  · Take your medicine as directed. Your dose may need to be changed several times to find what works best for you. · Take this medicine with a meal or right after a meal. Take this medicine the same way every day, at the same time. · Swallow the tablet whole with a glass of water. You may break the extended-release tablet in half, but do not chew or crush it. · Missed dose: Take a dose as soon as you remember. If it is almost time for your next dose, wait until then and take a regular dose. Do not take extra medicine to make up for a missed dose. · Store the medicine in a closed container at room temperature, away from heat, moisture, and direct light. Drugs and Foods to Avoid:   Ask your doctor or pharmacist before using any other medicine, including over-the-counter medicines, vitamins, and herbal products. · Some medicines can affect how metoprolol works. Tell your doctor if you are taking any of the following:   ¨ Digoxin, dipyridamole, hydralazine, hydroxychloroquine, methyldopa, quinidine  ¨ Medicine to treat depression (such as bupropion, clomipramine, desipramine, fluoxetine, fluvoxamine, paroxetine, sertraline), medicine to treat mental illness (such as chlorpromazine, fluphenazine, haloperidol, thioridazine), medicine for heart rhythm problems (such as propafenone), HIV/AIDS medicine (such as ritonavir), medicine to treat a fungus infection (such as terbinafine), a monoamine oxidase inhibitor (MAOI), an ergot medicine for headaches, a calcium channel blocker (such as amlodipine, diltiazem, verapamil), or an alpha blocker (such as clonidine, prazosin, reserpine, guanethidine)  Warnings While Using This Medicine:   · Tell your doctor if you are pregnant or breastfeeding, or if you have blood vessel, heart, or circulation problems (such as heart failure, rhythm problems, or slow heartbeat).  Tell your doctor if you have kidney disease, liver disease, diabetes, lung disease (such as asthma), an overactive thyroid, or a history of allergies. · This medicine may cause worse symptoms of heart failure while the dose is being adjusted. · Do not stop using this medicine suddenly. Your doctor will need to slowly decrease your dose before you stop it completely. · Tell any doctor or dentist who treats you that you are using this medicine. You may need to stop using this medicine several days before you have surgery or medical tests. · This medicine could lower your blood pressure too much, especially when you first use it or if you are dehydrated. Stand or sit up slowly if you feel lightheaded or dizzy. · This medicine may make you dizzy or drowsy. Do not drive or do anything else that could be dangerous until you know how this medicine affects you. · Your doctor will check your progress and the effects of this medicine at regular visits. Keep all appointments. · Keep all medicine out of the reach of children. Never share your medicine with anyone. Possible Side Effects While Using This Medicine:   Call your doctor right away if you notice any of these side effects:  · Allergic reaction: Itching or hives, swelling in your face or hands, swelling or tingling in your mouth or throat, chest tightness, trouble breathing  · Lightheadedness, dizziness, or fainting  · Slow heartbeat  · Swelling in your hands, ankles, or feet, trouble breathing, tiredness  · Worsening chest pain  If you notice these less serious side effects, talk with your doctor:   · Diarrhea  · Mild dizziness or tiredness  If you notice other side effects that you think are caused by this medicine, tell your doctor. Call your doctor for medical advice about side effects.  You may report side effects to FDA at 5-598-FDA-7386  © 2017 [facility] Information is for End User's use only and may not be sold, redistributed or otherwise used for commercial purposes. The above information is an  only. It is not intended as medical advice for individual conditions or treatments. Talk to your doctor, nurse or pharmacist before following any medical regimen to see if it is safe and effective for you. Nitroglycerin (Nitro-Time) - (By mouth)   Why this medicine is used:   Treats or prevents angina (chest pain). Contact a nurse or doctor right away if you have:  · Throbbing, severe, or ongoing headache, low fever  · Confusion or trouble seeing  · Severe or ongoing dizziness, lightheadedness, or fainting     Common side effects:  · Feeling of warmth, redness of the face, neck, arms, and upper chest  · Headache  © 2017 [address] is for End User's use only and may not be sold, redistributed or otherwise used for commercial purposes. Heart-Healthy Diet: Care Instructions  Your Care Instructions    A heart-healthy diet has lots of vegetables, fruits, nuts, beans, and whole grains, and is low in salt. It limits foods that are high in saturated fat, such as meats, cheeses, and fried foods. It may be hard to change your diet, but even small changes can lower your risk of heart attack and heart disease. Follow-up care is a key part of your treatment and safety. Be sure to make and go to all appointments, and call your doctor if you are having problems. It's also a good idea to know your test results and keep a list of the medicines you take. How can you care for yourself at home? Watch your portions  · Learn what a serving is. A \"serving\" and a \"portion\" are not always the same thing. Make sure that you are not eating larger portions than are recommended. For example, a serving of pasta is ½ cup. A serving size of meat is 2 to 3 ounces. A 3-ounce serving is about the size of a deck of cards. Measure serving sizes until you are good at [place]" them.  Keep in mind that restaurants often serve portions that are 2 or 3 times the size of one serving. · To keep your energy level up and keep you from feeling hungry, eat often but in smaller portions. · Eat only the number of calories you need to stay at a healthy weight. If you need to lose weight, eat fewer calories than your body burns (through exercise and other physical activity). Eat more fruits and vegetables  · Eat a variety of fruit and vegetables every day. Dark green, deep orange, red, or yellow fruits and vegetables are especially good for you. Examples include spinach, carrots, peaches, and berries. · Keep carrots, celery, and other veggies handy for snacks. Buy fruit that is in season and store it where you can see it so that you will be tempted to eat it. · Cook dishes that have a lot of veggies in them, such as stir-fries and soups. Limit saturated and trans fat  · Read food labels, and try to avoid saturated and trans fats. They increase your risk of heart disease. Trans fat is found in many processed foods such as cookies and crackers. · Use olive or canola oil when you cook. Try cholesterol-lowering spreads, such as Benecol or Take Control. · Bake, broil, grill, or steam foods instead of frying them. · Choose lean meats instead of high-fat meats such as hot dogs and sausages. Cut off all visible fat when you prepare meat. · Eat fish, skinless poultry, and meat alternatives such as soy products instead of high-fat meats. Soy products, such as tofu, may be especially good for your heart. · Choose low-fat or fat-free milk and dairy products. Eat fish  · Eat at least two servings of fish a week. Certain fish, such as salmon and tuna, contain omega-3 fatty acids, which may help reduce your risk of heart attack. Eat foods high in fiber  · Eat a variety of grain products every day. Include whole-grain foods that have lots of fiber and nutrients. Examples of whole-grain foods include oats, whole wheat bread, and brown rice.   · Buy whole-grain breads and cereals, instead of white bread or pastries. Limit salt and sodium  · Limit how much salt and sodium you eat to help lower your blood pressure. · Taste food before you salt it. Add only a little salt when you think you need it. With time, your taste buds will adjust to less salt. · Eat fewer snack items, fast foods, and other high-salt, processed foods. Check food labels for the amount of sodium in packaged foods. · Choose low-sodium versions of canned goods (such as soups, vegetables, and beans). Limit sugar  · Limit drinks and foods with added sugar. These include candy, desserts, and soda pop. Limit alcohol  · Limit alcohol to no more than 2 drinks a day for men and 1 drink a day for women. Too much alcohol can cause health problems. When should you call for help? Watch closely for changes in your health, and be sure to contact your doctor if:    · You would like help planning heart-healthy meals. Where can you learn more? Go to http://maddie-madison.info/. Enter V137 in the search box to learn more about \"Heart-Healthy Diet: Care Instructions. \"  Current as of: July 22, 2018  Content Version: 11.9  © 4915-6847 [employer]. Care instructions adapted under license by [employer] (which disclaims liability or warranty for this information). If you have questions about a medical condition or this instruction, always ask your healthcare professional. [place] [address] any warranty or liability for your use of this information. Angina: Care Instructions  Your Care Instructions    You have a problem called angina. Angina happens when there is not enough blood flow to your heart muscle. Angina is a sign of coronary artery disease (CAD). CAD occurs when blood vessels that supply the heart become narrowed. Having CAD increases your risk of a heart attack. Chest pain or pressure is the most common symptom of angina.  But some people have other symptoms, like:  · Pain, pressure, or a strange feeling in the back, neck, jaw, or upper belly, or in one or both shoulders or arms. · Shortness of breath. · Nausea or vomiting. · Lightheadedness or sudden weakness. · Fast or irregular heartbeat. Women are somewhat more likely than men to have angina symptoms like shortness of breath, nausea, and back or jaw pain. Angina can be dangerous. That's why it is important to pay attention to your symptoms. Know what is typical for you, learn how to control your symptoms, and understand when you need to get treatment. A change in your usual pattern of symptoms is an emergency. It may mean that you are having a heart attack. The doctor has checked you carefully, but problems can develop later. If you notice any problems or new symptoms, get medical treatment right away. Follow-up care is a key part of your treatment and safety. Be sure to make and go to all appointments, and call your doctor if you are having problems. It's also a good idea to know your test results and keep a list of the medicines you take. How can you care for yourself at home? Medicines    · If your doctor has given you nitroglycerin for angina symptoms, keep it with you at all times. If you have symptoms, sit down and rest, and take the first dose of nitroglycerin as directed. If your symptoms get worse or are not getting better within 5 minutes, call 911 right away. Stay on the phone. The emergency  will give you further instructions.     · If your doctor advises it, take 1 low-dose aspirin a day to prevent heart attack.     · Be safe with medicines. Take your medicines exactly as prescribed. Call your doctor if you think you are having a problem with your medicine. You will get more details on the specific medicines your doctor prescribes.    Lifestyle changes    · Do not smoke. If you need help quitting, talk to your doctor about stop-smoking programs and medicines.  These can increase your chances of quitting for good.     · Eat a heart-healthy diet that is low in saturated fat and salt, and is high in fiber. Talk to your doctor or a dietitian about healthy eating.     · Stay at a healthy weight. Or lose weight if you need to. Activity    · Talk to your doctor about a level of activity that is safe for you.     · If an activity causes angina symptoms, stop and rest.   When should you call for help? Call 911 anytime you think you may need emergency care. For example, call if:    · You passed out (lost consciousness).     · You have symptoms of a heart attack. These may include:  ? Chest pain or pressure, or a strange feeling in the chest.  ? Sweating. ? Shortness of breath. ? Nausea or vomiting. ? Pain, pressure, or a strange feeling in the back, neck, jaw, or upper belly or in one or both shoulders or arms. ? Lightheadedness or sudden weakness. ? A fast or irregular heartbeat. After you call 911, the  may tell you to chew 1 adult-strength or 2 to 4 low-dose aspirin. Wait for an ambulance. Do not try to drive yourself.     · You have angina symptoms that do not go away with rest or are not getting better within 5 minutes after you take a dose of nitroglycerin.    Call your doctor now or seek immediate medical care if:    · You are having angina symptoms more often than usual, or they are different or worse than usual.     · You feel dizzy or lightheaded, or you feel like you may faint.    Watch closely for changes in your health, and be sure to contact your doctor if you have any problems. Where can you learn more? Go to http://maddie-madison.info/. Enter H129 in the search box to learn more about \"Angina: Care Instructions. \"  Current as of: July 22, 2018  Content Version: 11.9  © 5219-2114 [employer]. Care instructions adapted under license by [employer] (which disclaims liability or warranty for this information).  If you have questions about a medical condition or this instruction, always ask your healthcare professional. [address] any warranty or liability for your use of this information. Learning About Coronary Artery Disease (CAD)  What is coronary artery disease? Coronary artery disease (CAD) occurs when plaque builds up in the arteries that bring oxygen-rich blood to your heart. Plaque is a fatty substance made of cholesterol, calcium, and other substances in the blood. This process is called hardening of the arteries, or atherosclerosis. What happens when you have coronary artery disease? · Plaque may narrow the coronary arteries. Narrowed arteries cause poor blood flow. This can lead to angina symptoms such as chest pain or discomfort. If blood flow is completely blocked, you could have a heart attack. · You can slow CAD and reduce the risk of future problems by making changes in your lifestyle. These include quitting smoking and eating heart-healthy foods. · Treatments for CAD, along with changes in your lifestyle, can help you live a longer and healthier life. How can you prevent coronary artery disease? · Do not smoke. It may be the best thing you can do to prevent heart disease. If you need help quitting, talk to your doctor about stop-smoking programs and medicines. These can increase your chances of quitting for good. · Be active. Get at least 30 minutes of exercise on most days of the week. Walking is a good choice. You also may want to do other activities, such as running, swimming, cycling, or playing tennis or team sports. · Eat heart-healthy foods. Eat more fruits and vegetables and less foods that contain saturated and trans fats. Limit alcohol, sodium, and sweets. · Stay at a healthy weight. Lose weight if you need to. · Manage other health problems such as diabetes, high blood pressure, and high cholesterol. · Manage stress. Stress can hurt your heart.  To keep stress low, talk about your problems and feelings. Don't keep your feelings hidden. · If you have talked about it with your doctor, take a low-dose aspirin every day. Aspirin can help certain people lower their risk of a heart attack or stroke. But taking aspirin isn't right for everyone, because it can cause serious bleeding. Do not start taking daily aspirin unless your doctor knows about it. How is coronary artery disease treated? · Your doctor will suggest that you make lifestyle changes. For example, your doctor may ask you to eat healthy foods, quit smoking, lose extra weight, and be more active. · You will have to take medicines. · Your doctor may suggest a procedure to open narrowed or blocked arteries. This is called angioplasty. Or your doctor may suggest using healthy blood vessels to create detours around narrowed or blocked arteries. This is called bypass surgery. Follow-up care is a key part of your treatment and safety. Be sure to make and go to all appointments, and call your doctor if you are having problems. It's also a good idea to know your test results and keep a list of the medicines you take. Where can you learn more? Go to http://maddie-madison.info/. Enter [phone] [id number] in the search box to learn more about \"Learning About Coronary Artery Disease (CAD). \"  Current as of: July 22, 2018  Content Version: 11.9  © 1330-5752 [employer]. Care instructions adapted under license by [employer] (which disclaims liability or warranty for this information). If you have questions about a medical condition or this instruction, always ask your healthcare professional. [place] [address] any warranty or liability for your use of this information.

## 2019-04-30 NOTE — PROGRESS NOTES
4/30/2019      RE: Davida Zarco      To Whom it May Concern: This is to certify that Davida Zarco may return to work on 5/3/2019. Please feel free to contact my office if you have any questions or concerns. Thank you for your assistance in this matter.     Sincerely,      Brenna Elder PA-C

## 2019-04-30 NOTE — PROGRESS NOTES
visited Lesley Brown, who is a 63 y. o.,male. The  provided the following Interventions:  Continued the relationship of care and support. The following outcomes were achieved:  Patient is ready to go home and did not want an extended visit. Assessment:  There are no further spiritual or [employer] issues which require Spiritual Care Services interventions at this time. Plan:  Chaplains will continue to follow and will provide pastoral care on an as needed/requested basis.  recommends bedside caregivers page  on duty if patient shows signs of acute spiritual or emotional distress.        [address] [place] [address]   [phone]

## 2019-04-30 NOTE — ROUTINE PROCESS
Rec'd pt alert and oriented x4. Pt denies pain. Right radial pulse dressing dry and intact. No bleeding /no bruising noted to site. PP palpable. 0010 Pt is resting quietly in bed. Breathing regular and non labored. 0430 Up in chair. Pt denies pain. EKG done. 0640 Pt is up in chair. No C/O chest discomfort. Bedside shift change report given to Amy Alston RN (oncoming nurse) by Anali Nix (offgoing nurse). Report given with SBAR, Kardex, Intake/Output, MAR and Recent Results.

## 2019-05-02 NOTE — PROGRESS NOTES
Patient being seen today for transitional care management    Patient was admitted to [facility] from 4/25-30/19              Initial interactive contact was done by nurse navigator     I thoroughly reviewed the discharge summary, notes, consults, labs and imaging studies in the electronic record. Pertinent details are summarized below and the record has been updated to reflect recent events    He was admitted with cp and tachycardia. Was noted to be in afib w rvr. Cardiac biomarkers were positive. He was rate controlled and subsequently spontaneously converted. Underwent cath by Dr Hank Tapia which resulted in stenting of culprit DM1 with ROSA MARIA. Since dc, he's done well.   He plans on stopping smoking this weekend using nicotine replacement    Incidentally, he's been treated for MRSA scalp infection w bactrim    Past Medical History:   Diagnosis Date    Arthritis     Back problem     Brachial neuritis or radiculitis 6/27/2014    Colon polyp 08/2008    Dr Basilia Tolentino DDD (degenerative disc disease), cervical 2/16/2016    Depression 12/13    PHQ-9 was 6/27     Diabetes ([address] 3/15    on basis of elev hba1c; [facility]    Dyslipidemia     Enthesopathy of hip region     Fatty liver     on US w negative serologies 2009    Foot fracture, right     9/13 5th metatarsal    GERD (gastroesophageal reflux disease)     Greater trochanteric bursitis of right hip 8/2/2016    H/O echocardiogram 04/2019    nl lv, ef 61%, mild conc lvh, no wma, gr 1 dd, mild MR    Hypertension     Hypogonadism male 03/2013    Dr Essie Coffey; took androgel in past    Morbid obesity ([address]     peak weight 275 lbs, bmi 36.3 from 2/14; IF 2/18 start weight 253 lbs but inconsistent    NSTEMI (non-ST elevated myocardial infarction) ([address] 04/2019    s/p cath Dr Hank Tapia and ROSA MARIA to DM1    Paroxysmal atrial fibrillation ([address] 04/2019    in setting on ACS    Phymatous rosacea     Postlaminectomy syndrome, lumbar region     Prediabetes on metformin 2 hr  2/10    S/P cardiac cath 04/2019    Dr Leigh Liu; diffuse 50% RCA, diff LAD, 30% Cx, 85% DM1 culprit stented w ROSA MARIA    Thoracic or lumbosacral neuritis or radiculitis, unspecified     Tobacco dependence syndrome     failed chantix and wellbutrin     Past Surgical History:   Procedure Laterality Date    Arclarisa Andria HX COLONOSCOPY      Dr Art Rushing 8/22/08    HX ORTHOPAEDIC  12/06    left shoulder repair/rotator cuff    HX ORTHOPAEDIC      DEXA t score -1.0 spine, -0.4 hip (1/14) Dr. Sabine Valdez HX OTHER SURGICAL  7/02    L5-S1 hemilaminectomy and diskectomy    NEUROLOGICAL PROCEDURE UNLISTED  4/13    MRI pituitary normal    PA ANESTH,SURGERY OF SHOULDER       Social History     Socioeconomic History    Marital status:      Spouse name: Not on file    Number of children: Not on file    Years of education: Not on file    Highest education level: Not on file   Occupational History    Not on file   Social Needs    Financial resource strain: Not on file    Food insecurity:     Worry: Not on file     Inability: Not on file    Transportation needs:     Medical: Not on file     Non-medical: Not on file   Tobacco Use    Smoking status: Current Every Day Smoker     Packs/day: 2.00     Types: Cigarettes    Smokeless tobacco: Never Used   Substance and Sexual Activity    Alcohol use: No     Comment: One drink per 3-4 months    Drug use: No     Comment: Marijuana use in the [de-identified]    Sexual activity: Not on file   Lifestyle    Physical activity:     Days per week: Not on file     Minutes per session: Not on file    Stress: Not on file   Relationships    Social connections:     Talks on phone: Not on file     Gets together: Not on file     Attends [employer] service: Not on file     Active member of club or organization: Not on file     Attends meetings of clubs or organizations: Not on file     Relationship status: Not on file    Intimate partner violence:     Fear of current or ex partner: Not on file     Emotionally abused: Not on file     Physically abused: Not on file     Forced sexual activity: Not on file   Other Topics Concern    Not on file   Social History Narrative    Not on file     Current Outpatient Medications   Medication Sig    icosapent ethyl (VASCEPA) 1 gram capsule Take 2 Caps by mouth two (2) times daily (with meals).  ticagrelor (BRILINTA) 90 mg tablet Take 1 Tab by mouth two (2) times a day.  metoprolol tartrate (LOPRESSOR) 100 mg IR tablet Take 1 Tab by mouth every twelve (12) hours for 30 days.  aspirin delayed-release 81 mg tablet Take 1 Tab by mouth daily for 30 days.  mupirocin (BACTROBAN) 2 % ointment Apply  to affected area daily. Indications: inflammation of a hair follicle    trimethoprim-sulfamethoxazole (BACTRIM DS, SEPTRA DS) 160-800 mg per tablet Take 1 Tab by mouth every twelve (12) hours for 5 days.  nitroglycerin (NITROSTAT) 0.4 mg SL tablet 1 Tab by SubLINGual route every five (5) minutes as needed for Chest Pain. Up to 3 doses.  HYDROcodone-acetaminophen (NORCO)  mg tablet Take 1 Tab by mouth every six (6) hours as needed for Pain.  fenofibrate nanocrystallized (TRICOR) 145 mg tablet take 1 tablet by mouth once daily    traZODone (DESYREL) 50 mg tablet take 1 tablet by mouth NIGHTLY    metFORMIN (GLUCOPHAGE) 1,000 mg tablet TAKE 1 TABLET BY MOUTH TWO TIMES A DAY WITH MEALS    atorvastatin (LIPITOR) 40 mg tablet take 1 tablet by mouth once daily    naloxone 4 mg/actuation spry 4 mg by Nasal route once as needed (opioid overdose) for up to 1 dose. May substitute 2 mg syringe if insurance requires    omeprazole (PRILOSEC) 20 mg capsule Take 20 mg by mouth daily.  ascorbic acid (VITAMIN C) 1,000 mg tablet Take 1,000 mg by mouth two (2) times a day.  MULTIVITAMIN PO Take  by mouth. No current facility-administered medications for this visit.       No Known Allergies    REVIEW OF SYSTEMS: virgil 6/08  Glynn Hunter  Ophtho - no vision change or eye pain  Oral - no mouth pain, tongue or tooth problems  Ears - no hearing loss, ear pain, fullness, no swallowing problems  Cardiac - no CP, PND, orthopnea, edema, palpitations or syncope  Chest - no breast masses  Resp - no wheezing, chronic coughing, dyspnea  GI - no heartburn, nausea, vomiting, change in bowel habits, bleeding, hemorrhoids  Urinary - no dysuria, hematuria, flank pain, urgency, frequency  Constitutional - no wt loss, night sweats, unexplained fevers  Neuro - no focal weakness, numbness, paresthesias, incoordination, ataxia, involuntary movements  Endo - no polyuria, polydipsia, nocturia, hot flashes    Visit Vitals  /70   Pulse 66   Temp 98.3 °F (36.8 °C) (Oral)   Resp 14   Ht 6' (1.829 m)   Wt 245 lb (111.1 kg)   SpO2 97%   BMI 33.23 kg/m²   A&O x3  Affect is appropriate. Mood stable  No apparent distress  Anicteric, no JVD, adenopathy or thyromegaly. No carotid bruits or radiated murmur  Lungs clear to auscultation, no wheezes or rales  Heart showed regular rate and rhythm. No murmur, rubs, gallops  Abdomen soft nontender, no hepatosplenomegaly or masses. Extremities without edema.   Pulses 1-2+ symmetrically    LABS  From 9/09 showed   gluc 88,   cr 0.70,              alt 106,                   chol 183, tg 431,  hdl 32, ldl-c NA,  wbc 8.0, hb 15.8, plt 149,        From 2/10 showed   gluc 120, cr 0.60, gfr>60, alt 113,                   chol 306, tg 1135, hdl 31, ldl-c NA,                                ast 45, ap 95, tb 0.4  From 3/10 showed                                    2 hr gtt 165  From 6/10 showed   gluc 165,                               hba1c 5.6,                tg 406,  hdl 31, ldl-c NA   From 11/12 showed gluc 102, cr 0.60,      alt 43,   hba1c 5.9, chol 183, tg 489,  hdl 28, ldl-c NA, wbc 9.0, hb 15.8, plt 185,  tsh 3.0,    psa 0.09,   From 11/12 showed                                 vit d 15.2, test 145, james 5.5, acth 1.7, fsh 5.8, lh 1.5, prl 5.1, b12 878, RA neg, crp 0.83, %sat 31, ferritin 308, hep a/b/c neg,  gracie neg, lyme neg  From 8/13 showed           hba1c 5.8, chol 176, tg 521,  hdl 23, ldl-c NA  From 11/13 showed gluc 135, cr 0.80, gfr 52,   alt 30,   hba1c 5.8, chol 154, tg 323,  hdl 31, ldl-c 58,                      vit d 20.5, test 185  From 3/14 showed           hba1c 6.0, chol 177, tg 607,  hdl 26, ldl-c NA  From 6/14 showed   gluc 114, cr 0.80, gfr>60,  alt 32,   hba1c 5.8, chol 147, tg 360,  hdl 29, ldl-c 46  From 7/14 showed   gluc 148, cr 0.77, gfr>60,             wbc 9.3, hb 14.9, plt 214  From 2/15 showed   gluc 140, cr 0.72, gfr 107, alt 39,   hba1c 6.5, chol 180, tg 377,  hdl 31, ldl-c 74, wbc 7.5, hb 15.2, plt 205  From 7/15 showed           hba1c 6.2, chol 137, tg 293,  hdl 28, ldl-c 50,               umar neg  From 7/16 showed   gluc 125, cr 0.60, gfr>60, alt 29,    hba1c 6.2, chol 145, tg 247,  hdl 32, ldl-c 64, wbc 7.6, hb 15.2, plt 198, umar 11.8, psa 0.35  From 8/17 showed   gluc 129, cr 0.80, gfr>60, alt 22,    hba1c 6.5, chol 171, tg 479,  hdl 27, ldl-c na, wbc 9.4, hb 15.8, plt 204, umar 15.0, vit d 27.0  From 2/18 showed   gluc 121, cr 0.50, gfr>60, alt 44,    hba1c 6.5, chol 168, tg 501,  hdl 26, ldl-c na,           psa 0.11    Patient Active Problem List   Diagnosis Code    Encounter for long-term (current) drug use Z79.899    Dyslipidemia E78.5   Alberto Hock Hypogonadism male Dr. Laurel Suresh E29.1    Hypovitaminosis D E55.9    Insomnia secondary to chronic pain G89.29, G47.01    Essential hypertension I10    DDD (degenerative disc disease), cervical M50.30    Obesity (BMI 30-39. 9) E66.9    Tobacco dependence syndrome F17.200    Colon polyp 8/08 Dr Celina Thompson K63.5    Arthritis of right knee M17.11    Chronic pain syndrome G89.4    Controlled type 2 diabetes mellitus without complication, without long-term current use of insulin ([facility]) E11.9    Atrial fibrillation with rapid ventricular response ([address] I48.91    NSTEMI (non-ST elevated myocardial infarction) ([facility]) I21.4    S/P coronary artery stent placement Z95.5     Assessment and plan:  1. Chronic pain. F/U pain clinic  2. HTN. Continue current, inc exercise as tolerated, wt loss would be ideal  3. DM. Continue current, f/u ophth, he has f/u in 2 mos  4. Dyslipidemia. Continue current lipitor and tricor but will add vascepa considering recent REDUCE-IT data  5. Fatty liver. Wt loss would be ideal  6. Hypogonadism. Per [facility]  7. Hypovit d. Continue supplementation  8. Depression. Doing well  9. Podiatry. F/U Dr. Sotero Benítez  10. Smoking cessation again discussed  11. Obesity. He will try to be more strict with the fasting  12. CHD. F/u Dr Shlomo Cagle        RTC 7/19    Above conditions discussed at length and patient vocalized understanding.   All questions answered to patient satisfaction

## 2019-05-03 ENCOUNTER — OFFICE VISIT (OUTPATIENT)
Dept: INTERNAL MEDICINE CLINIC | Age: 59
End: 2019-05-03

## 2019-05-03 VITALS
WEIGHT: 245 LBS | RESPIRATION RATE: 14 BRPM | SYSTOLIC BLOOD PRESSURE: 142 MMHG | DIASTOLIC BLOOD PRESSURE: 70 MMHG | HEIGHT: 72 IN | HEART RATE: 66 BPM | OXYGEN SATURATION: 97 % | BODY MASS INDEX: 33.18 KG/M2 | TEMPERATURE: 98.3 F

## 2019-05-03 DIAGNOSIS — E78.5 DYSLIPIDEMIA: Primary | ICD-10-CM

## 2019-05-03 DIAGNOSIS — E11.9 CONTROLLED TYPE 2 DIABETES MELLITUS WITHOUT COMPLICATION, WITHOUT LONG-TERM CURRENT USE OF INSULIN (HCC): ICD-10-CM

## 2019-05-03 DIAGNOSIS — E66.9 OBESITY (BMI 30-39.9): ICD-10-CM

## 2019-05-03 DIAGNOSIS — I21.4 NSTEMI (NON-ST ELEVATED MYOCARDIAL INFARCTION) (HCC): ICD-10-CM

## 2019-05-03 DIAGNOSIS — F17.200 TOBACCO DEPENDENCE SYNDROME: ICD-10-CM

## 2019-05-03 DIAGNOSIS — I10 ESSENTIAL HYPERTENSION: ICD-10-CM

## 2019-05-03 PROBLEM — M25.561 CHRONIC PAIN OF RIGHT KNEE: Status: RESOLVED | Noted: 2017-03-16 | Resolved: 2019-05-03

## 2019-05-03 PROBLEM — G89.29 CHRONIC PAIN OF RIGHT KNEE: Status: RESOLVED | Noted: 2017-03-16 | Resolved: 2019-05-03

## 2019-05-03 PROBLEM — R07.9 CHEST PAIN: Status: RESOLVED | Noted: 2019-04-26 | Resolved: 2019-05-03

## 2019-05-03 RX ORDER — ICOSAPENT ETHYL 1000 MG/1
2 CAPSULE ORAL 2 TIMES DAILY WITH MEALS
Qty: 360 CAP | Refills: 3 | Status: SHIPPED | OUTPATIENT
Start: 2019-05-03 | End: 2019-05-29 | Stop reason: CLARIF

## 2019-05-03 NOTE — PROGRESS NOTES
Bertha Cano presents today for   Chief Complaint   Patient presents with   [facility] Follow Up     Atrial fibrillation with rapid ventricular response, NSTEMI (non-ST elevated myocardial infarction, and S/P coronary artery stent placement              Depression Screening:  3 most recent PHQ Screens 5/3/2019   Little interest or pleasure in doing things Not at all   Feeling down, depressed, irritable, or hopeless Not at all   Total Score PHQ 2 0       Learning Assessment:  Learning Assessment 5/31/2018   PRIMARY LEARNER Patient   HIGHEST LEVEL OF EDUCATION - PRIMARY LEARNER  [facility] [address] PRIMARY LEARNER -   CO-LEARNER CAREGIVER No   PRIMARY LANGUAGE ENGLISH   LEARNER PREFERENCE PRIMARY DEMONSTRATION     -   ANSWERED BY patient   RELATIONSHIP SELF       Abuse Screening:  Abuse Screening Questionnaire 5/3/2019   Do you ever feel afraid of your partner? N   Are you in a relationship with someone who physically or mentally threatens you? N   Is it safe for you to go home? Y       Fall Risk  Fall Risk Assessment, last 12 mths 5/3/2019   Able to walk? Yes   Fall in past 12 months? No           Coordination of Care:  1. Have you been to the ER, urgent care clinic since your last visit? Hospitalized since your last visit? 4/25/19 [facility]    2. Have you seen or consulted any other health care providers outside of the [address] [place] [address] since your last visit? Include any pap smears or colon screening.  no

## 2019-05-06 ENCOUNTER — TELEPHONE (OUTPATIENT)
Dept: CARDIAC REHAB | Age: 59
End: 2019-05-06

## 2019-05-06 NOTE — TELEPHONE ENCOUNTER
[facility] called patient and spoke to him about the program. He is interested and wants to see what his insurance will cover. Will follow up with benefit information.      Thank you,  Susanna German

## 2019-05-06 NOTE — TELEPHONE ENCOUNTER
[facility] called patient about benefit information and left a message. Additional attempts at contact will be made.      Thank you,  Souleymane Abad

## 2019-05-10 ENCOUNTER — TELEPHONE (OUTPATIENT)
Dept: INTERNAL MEDICINE CLINIC | Age: 59
End: 2019-05-10

## 2019-05-14 ENCOUNTER — TELEPHONE (OUTPATIENT)
Dept: CARDIAC REHAB | Age: 59
End: 2019-05-14

## 2019-05-14 NOTE — TELEPHONE ENCOUNTER
[facility] called patient and spoke to him about the program. He wants a call back in a month to talk about his insurance and options.      Thank you,  Lina Ortega

## 2019-05-29 ENCOUNTER — OFFICE VISIT (OUTPATIENT)
Dept: CARDIOLOGY CLINIC | Age: 59
End: 2019-05-29

## 2019-05-29 VITALS
HEIGHT: 72 IN | SYSTOLIC BLOOD PRESSURE: 192 MMHG | HEART RATE: 62 BPM | DIASTOLIC BLOOD PRESSURE: 90 MMHG | OXYGEN SATURATION: 97 % | BODY MASS INDEX: 34 KG/M2 | WEIGHT: 251 LBS

## 2019-05-29 DIAGNOSIS — I21.4 NSTEMI (NON-ST ELEVATED MYOCARDIAL INFARCTION) (HCC): ICD-10-CM

## 2019-05-29 DIAGNOSIS — E78.5 DYSLIPIDEMIA: ICD-10-CM

## 2019-05-29 DIAGNOSIS — F17.200 TOBACCO DEPENDENCE SYNDROME: ICD-10-CM

## 2019-05-29 DIAGNOSIS — Z95.5 S/P CORONARY ARTERY STENT PLACEMENT: ICD-10-CM

## 2019-05-29 DIAGNOSIS — I48.91 ATRIAL FIBRILLATION WITH RAPID VENTRICULAR RESPONSE (HCC): Primary | ICD-10-CM

## 2019-05-29 DIAGNOSIS — I10 ESSENTIAL HYPERTENSION: ICD-10-CM

## 2019-05-29 DIAGNOSIS — E11.9 CONTROLLED TYPE 2 DIABETES MELLITUS WITHOUT COMPLICATION, WITHOUT LONG-TERM CURRENT USE OF INSULIN (HCC): ICD-10-CM

## 2019-05-29 RX ORDER — LISINOPRIL AND HYDROCHLOROTHIAZIDE 12.5; 2 MG/1; MG/1
2 TABLET ORAL 2 TIMES DAILY
Qty: 60 TAB | Refills: 5 | Status: SHIPPED | OUTPATIENT
Start: 2019-05-29 | End: 2019-10-22 | Stop reason: SDUPTHER

## 2019-05-29 RX ORDER — NITROGLYCERIN 0.4 MG/1
0.4 TABLET SUBLINGUAL
Qty: 1 BOTTLE | Refills: 6 | Status: SHIPPED | OUTPATIENT
Start: 2019-05-29 | End: 2020-08-13 | Stop reason: SDUPTHER

## 2019-05-29 RX ORDER — ASPIRIN 81 MG/1
81 TABLET ORAL DAILY
Qty: 30 TAB | Refills: 6 | Status: SHIPPED | OUTPATIENT
Start: 2019-05-29 | End: 2019-06-28

## 2019-05-29 RX ORDER — METOPROLOL TARTRATE 100 MG/1
100 TABLET ORAL EVERY 12 HOURS
Qty: 60 TAB | Refills: 6 | Status: SHIPPED | OUTPATIENT
Start: 2019-05-29 | End: 2019-06-28

## 2019-05-29 NOTE — PROGRESS NOTES
Amna Hernandez presents today for   Chief Complaint   Patient presents with   [facility] Follow Up       Amna Hernandez preferred language for health care discussion is english/other. Is someone accompanying this pt? No     Is the patient using any DME equipment during OV? No     Depression Screening:  3 most recent PHQ Screens 5/3/2019   Little interest or pleasure in doing things Not at all   Feeling down, depressed, irritable, or hopeless Not at all   Total Score PHQ 2 0       Learning Assessment:  Learning Assessment 5/31/2018   PRIMARY LEARNER Patient   HIGHEST LEVEL OF EDUCATION - PRIMARY LEARNER  [facility] [address] PRIMARY LEARNER -   CO-LEARNER CAREGIVER No   PRIMARY LANGUAGE ENGLISH   LEARNER PREFERENCE PRIMARY DEMONSTRATION     -   ANSWERED BY patient   RELATIONSHIP SELF       Abuse Screening:  Abuse Screening Questionnaire 5/3/2019   Do you ever feel afraid of your partner? N   Are you in a relationship with someone who physically or mentally threatens you? N   Is it safe for you to go home? Y       Fall Risk  Fall Risk Assessment, last 12 mths 5/3/2019   Able to walk? Yes   Fall in past 12 months? No       Pt currently taking Anticoagulant therapy? Yes     Coordination of Care:  1. Have you been to the ER, urgent care clinic since your last visit? Hospitalized since your last visit? Yes      2. Have you seen or consulted any other health care providers outside of the [address] [place] [address] since your last visit? Include any pap smears or colon screening.  No

## 2019-05-29 NOTE — PROGRESS NOTES
HPI:  Aris Mari is a 63 y.o. male presenting for a post hospital follow up visit. He was admitted to [facility] from 4/25-4/30/19. He presented to the emergency department with chest pain, shortness of breath and left arm pain. He was found to be in atrial fibrillation with RVR with rates in the 170's. He converted to sinus rhythm with IV Cardizem. His troponin was elevated with a peak of 1.94, likely secondary to demand ischemia from the rapid atrial fib. He was started on heparin and subsequently underwent a cardiac catheterization on 4/29/19 with PCI and stenting to the first diagonal branch 85% lesion with ROSA MARIA. He was started on dual antiplatelet therapy with aspirin and Brilinta. He remained in sinus rhythm for the remainder of his hospitalization and his CHADSVASC score was 2-3. Ultimately, it was decided to not start him on anticoagulation for the episode of atrial fibrillation. He was discharged on metoprolol and DAPT and his ACEi was discontinued due to borderline blood pressures. He presents today stating that he has been doing well since his recent admission. He has slowly been increasing his activity level and just started back with using exercise bands. He also admits that he has been working on his diet as well as smoking cessation. He has had some periodic episodes of shortness of breath that are sudden in onset and last only a few seconds. He denies having these symptoms prior to his stent placement. He otherwise denies any chest discomfort, fatigue, palpitations, dyspnea on exertion, orthopnea, PND, or lower extremity edema. He has a past medical history significant for CAD with NSTEMI, s/p PCI/stent to 1st diagonal branch, hypertension, diabetes mellitus 2, tobacco use, one episode of atrial fib. Cardiac Imaging/Procedures:   Cardiac catheterization 4/29/19:   · Diffuse 50% dominant RCA disease. · Diffuse LAD and circumflex 30% stenosis.   · Culprit diagonal 85 to 90% stenosis  · Diagonal branch stented to residual 0% using ROSA MARIA. · Overall normal left ventricular systolic function with EF 55 to 60%. Echocardiogram from 4/26/19:    EF 61-65%, moderate LVH, no RWMA, mild grade 1 DDx      Impression/Plan:  1. CAD with NSTEMI, s/p PCI/stenting with ROSA MARIA to diagonal branch, stable   2. New onset atrial fibrillation, now in NSR. Not on anticoagulation   3. Essential hypertension, uncontrolled  4. Dyslipidemia, on atorvastatin 40mg  5. Diabetes mellitus, recommend Hgb A1c less than 7% from cardiac standpoint  6. Tobacco abuse     Patient presenting for a post hospital follow up visit for NSTEMI s/p PCI and stenting of his 1st diagonal branch, as well as new onset atrial fibrillation with RVR. His EKG reveals normal sinus rhythm today and he is not currently on anticoagulation as it was felt that his episode of a fib was likely an isolated event. He states that he has been doing very well since his admission and denies any chest pains or palpitations. He has had some brief episodes of SOB that have not had any specific triggers. I discussed with him that these episodes are more than likely secondary to the Brilinta that he is taking. He reports that they are not limiting and only happen occasionally, so he will continue to monitor them for the time being. He will remain on both aspirin and Brilinta at this time. His blood pressure is elevated today at 192/90 mmHg, so I will restart his lisinopril/HCTZ 20/12.5 mg BID and have him return for a BP check in about 2 weeks. He will follow his pressures and keep a log until our next visit. He will also continue with the metoprolol. He remains on atorvastatin 40 mg which is managed by Dr. Lynne Lobato. We did discuss the importance of diet, exercise and complete tobacco cessation given his new diagnosis of coronary artery disease. He admits that he has been working on diet and exercise but still struggles with tobacco use.  He agrees to work on this prior to our next visit. All questions were answered and he will follow up with me in 2 weeks for a BP check, and Dr. lAicia Pedro in 3 months. Lou Anaya PA-C        Physical:  Vitals:  Visit Vitals  /90   Pulse 62   Ht 6' (1.829 m)   Wt 113.9 kg (251 lb)   SpO2 97%   BMI 34.04 kg/m²       PMH:  Past Medical History:   Diagnosis Date    Arthritis     Back problem     Brachial neuritis or radiculitis 6/27/2014    Colon polyp 08/2008    Dr Basilia Tolentino DDD (degenerative disc disease), cervical 2/16/2016    Depression 12/13    PHQ-9 was 6/27     Diabetes ([facility] 3/15    on basis of elev hba1c; [facility] for [facility]    Dyslipidemia     Enthesopathy of hip region     Fatty liver     on US w negative serologies 2009    Foot fracture, right     9/13 5th metatarsal    GERD (gastroesophageal reflux disease)     Greater trochanteric bursitis of right hip 8/2/2016    H/O echocardiogram 04/2019    nl lv, ef 61%, mild conc lvh, no wma, gr 1 dd, mild MR    Hypertension     Hypogonadism male 03/2013    Dr Essie Coffey; took androgel in past    Morbid obesity ([address]     peak weight 275 lbs, bmi 36.3 from 2/14; IF 2/18 start weight 253 lbs but inconsistent    NSTEMI (non-ST elevated myocardial infarction) ([address] 04/2019    s/p cath Dr Hank Tapia and ROSA MARIA to DM1    Paroxysmal atrial fibrillation ([facility] 04/2019    in setting on ACS    Phymatous rosacea     Postlaminectomy syndrome, lumbar region     Prediabetes on metformin     2 hr  2/10    S/P cardiac cath 04/2019    Dr Hank Tapia; diffuse 50% RCA, diff LAD, 30% Cx, 85% DM1 culprit stented w ROSA MARIA    Thoracic or lumbosacral neuritis or radiculitis, unspecified     Tobacco dependence syndrome     failed chantix and wellbutrin       MEDS:  Current Outpatient Medications on File Prior to Visit   Medication Sig Dispense Refill    HYDROcodone-acetaminophen (NORCO)  mg tablet Take 1 Tab by mouth every six (6) hours as needed for Pain.       fenofibrate nanocrystallized (TRICOR) 145 mg tablet take 1 tablet by mouth once daily 30 Tab 1    traZODone (DESYREL) 50 mg tablet take 1 tablet by mouth NIGHTLY 90 Tab 3    metFORMIN (GLUCOPHAGE) 1,000 mg tablet TAKE 1 TABLET BY MOUTH TWO TIMES A DAY WITH MEALS 180 Tab 3    atorvastatin (LIPITOR) 40 mg tablet take 1 tablet by mouth once daily 30 Tab 12    naloxone 4 mg/actuation spry 4 mg by Nasal route once as needed (opioid overdose) for up to 1 dose. May substitute 2 mg syringe if insurance requires 1 Package 0    omeprazole (PRILOSEC) 20 mg capsule Take 20 mg by mouth daily.  ascorbic acid (VITAMIN C) 1,000 mg tablet Take 1,000 mg by mouth two (2) times a day.  MULTIVITAMIN PO Take  by mouth. No current facility-administered medications on file prior to visit. Allergies and Sensitivities:  No Known Allergies    Family History:  Family History   Problem Relation Age of Onset    Diabetes Mother     Cancer Mother         stomach       Social History:  He  reports that he has been smoking cigarettes. He has been smoking about 2.00 packs per day. He has never used smokeless tobacco.  He  reports that he does not drink alcohol. Review of Systems:    Constitutional: negative for fevers, chills, sweats, fatigue and malaise  Respiratory: negative for cough  Cardiovascular: negative for chest pain, palpitations, syncope, dyspnea on exertion,  PND, orthopnea. Positive for episodic SOB  Gastrointestinal: negative for nausea, vomiting, diarrhea, melena and abdominal pain  Genitourinary: negative for hematuria  Musculoskeletal: negative for lower extremity swelling or claudication   Neurological: negative for dizziness and weakness.    Behavioral/Psych: negative for anxiety         Physical Exam:     General appearance: no apparent distress  HEENT: normocephalic, atraumatic  Neck: supple, no JVD, no carotid bruits   Respiratory: clear to auscultation bilaterally  Cardiovascular: normal S1 and S2,  regular rate and rhythm, no murmurs  Abdomen: soft, non-tender, no hepatomegaly  Extremities: no lower extremity edema   Neurological: alert and oriented to person, place and time  Behavioral/Psych: normal mood and affect       Data:  EKG:    LABS:  Lab Results   Component Value Date/Time    Sodium 140 04/30/2019 05:25 AM    Potassium 3.9 04/30/2019 05:25 AM    Chloride 107 04/30/2019 05:25 AM    CO2 27 04/30/2019 05:25 AM    Glucose 143 (H) 04/30/2019 05:25 AM    BUN 10 04/30/2019 05:25 AM    Creatinine 0.86 04/30/2019 05:25 AM     Lab Results   Component Value Date/Time    Cholesterol, total 168 02/17/2018 09:48 PM    HDL Cholesterol 26 (L) 02/17/2018 09:48 PM    LDL,Direct 72 02/17/2018 09:48 PM    LDL, calculated  02/17/2018 09:48 PM      Comment:      Triglyceride >400. LDL cannot be calculated. LDL Cholesterol Direct has been  ordered.       Triglyceride 501 (H) 02/17/2018 09:48 PM    CHOL/HDL Ratio 5.3 (H) 06/15/2010 10:02 AM     Lab Results   Component Value Date/Time    ALT (SGPT) 44 (H) 02/17/2018 09:48 PM

## 2019-06-26 RX ORDER — ATORVASTATIN CALCIUM 40 MG/1
TABLET, FILM COATED ORAL
Qty: 30 TAB | Refills: 12 | Status: SHIPPED | OUTPATIENT
Start: 2019-06-26 | End: 2019-10-06 | Stop reason: DRUGHIGH

## 2019-06-26 RX ORDER — FENOFIBRATE 145 MG/1
TABLET, COATED ORAL
Qty: 30 TAB | Refills: 1 | Status: SHIPPED | OUTPATIENT
Start: 2019-06-26 | End: 2019-08-30 | Stop reason: SDUPTHER

## 2019-07-18 NOTE — PROGRESS NOTES
Saurav Esparza presents today for follow-up. He was last seen by Richie Riley PA-C, on 5/29/19 for a post-hospital follow-up after being hospitalized in May 2019, for a non-STEMI and PCI/stenting of the first diagonal.  During his visit, his blood pressure was elevated and he was restarted on lisinopril HCT 20/12.5 BID. He states that he has tolerated the reinstitution of this medication. He is a 63year old male with history of a lone episode of atrial fibrillation (during his hospitalization in May), hypertension, dyslipidemia, diabetes, tobacco use, and CAD. Overall, he states that he has been feeling well. He has not been monitoring his blood pressures at home and states that he has been doing well on the lisinopril HCT. Denies chest pain, tightness, heaviness, and palpitations. Denies shortness of breath at rest, dyspnea on exertion, orthopnea and PND. Denies abdominal bloating. Denies lightheadedness, dizziness, and syncope. Denies lower extremity edema and claudication. Denies nausea, vomiting, diarrhea, melena, hematochezia. Denies hematuria, urgency, frequency. Denies fever, chills. He continues to smoke up to 1-1/2 packs of cigarettes per day. However, his current work environment has kept him from smoking his normal amount of cigarettes. He tells me that he was able to quit smoking and had no urge to smoke while he was in the hospital.  However, as soon as he got home, he began smoking again.       PMH:  Past Medical History:   Diagnosis Date    Arthritis     Back problem     Brachial neuritis or radiculitis 6/27/2014    Colon polyp 08/2008    Dr Hernandez Ee DDD (degenerative disc disease), cervical 2/16/2016    Depression 12/13    PHQ-9 was 6/27     Diabetes ([facility] [address] 3/15    on basis of elev hba1c; [facility] for teaching    Dyslipidemia     Enthesopathy of hip region     Fatty liver     on US w negative serologies 2009    Foot fracture, right     9/13 5th metatarsal    GERD (gastroesophageal reflux disease)     Greater trochanteric bursitis of right hip 8/2/2016    H/O echocardiogram 04/2019    nl lv, ef 61%, mild conc lvh, no wma, gr 1 dd, mild MR    Hypertension     Hypogonadism male 03/2013    Dr Charles Paniagua; took androgel in past    Morbid obesity ([facility] [address]     peak weight 275 lbs, bmi 36.3 from 2/14; IF 2/18 start weight 253 lbs but inconsistent    NSTEMI (non-ST elevated myocardial infarction) ([facility] [address] 04/2019    s/p cath Dr Jameson Jacob and ROSA MARIA to DM1    Paroxysmal atrial fibrillation ([facility] [address] 04/2019    in setting on ACS    Phymatous rosacea     Postlaminectomy syndrome, lumbar region     Prediabetes on metformin     2 hr  2/10    S/P cardiac cath 04/2019    Dr Jameson Jacob; diffuse 50% RCA, diff LAD, 30% Cx, 85% DM1 culprit stented w ROSA MARIA    Thoracic or lumbosacral neuritis or radiculitis, unspecified     Tobacco dependence syndrome     failed chantix and wellbutrin       PSH:  Past Surgical History:   Procedure Laterality Date    HX BACK SURGERY      HX COLONOSCOPY      Dr Urban Sandoval 8/22/08    HX ORTHOPAEDIC  12/06    left shoulder repair/rotator cuff    HX ORTHOPAEDIC      DEXA t score -1.0 spine, -0.4 hip (1/14) Dr. Ingris Medrano HX OTHER SURGICAL  7/02    L5-S1 hemilaminectomy and diskectomy    NEUROLOGICAL PROCEDURE UNLISTED  4/13    MRI pituitary normal    NC ANESTH,SURGERY OF SHOULDER         MEDS:  Current Outpatient Medications   Medication Sig    aspirin 81 mg chewable tablet Take 81 mg by mouth daily.  metoprolol succinate (TOPROL-XL) 100 mg tablet Take  by mouth daily.  atorvastatin (LIPITOR) 40 mg tablet take 1 tablet by mouth once daily    fenofibrate nanocrystallized (TRICOR) 145 mg tablet take 1 tablet by mouth once daily    lisinopril-hydroCHLOROthiazide (PRINZIDE, ZESTORETIC) 20-12.5 mg per tablet Take 2 Tabs by mouth two (2) times a day.  ticagrelor (BRILINTA) 90 mg tablet Take 1 Tab by mouth two (2) times a day.     nitroglycerin (NITROSTAT) 0.4 mg SL tablet 1 Tab by SubLINGual route every five (5) minutes as needed for Chest Pain. Up to 3 doses.  HYDROcodone-acetaminophen (NORCO)  mg tablet Take 1 Tab by mouth every six (6) hours as needed for Pain.  traZODone (DESYREL) 50 mg tablet take 1 tablet by mouth NIGHTLY    metFORMIN (GLUCOPHAGE) 1,000 mg tablet TAKE 1 TABLET BY MOUTH TWO TIMES A DAY WITH MEALS    naloxone 4 mg/actuation spry 4 mg by Nasal route once as needed (opioid overdose) for up to 1 dose. May substitute 2 mg syringe if insurance requires    omeprazole (PRILOSEC) 20 mg capsule Take 20 mg by mouth daily.  MULTIVITAMIN PO Take  by mouth.  ascorbic acid (VITAMIN C) 1,000 mg tablet Take 1,000 mg by mouth two (2) times a day. No current facility-administered medications for this visit. Allergies and Sensitivities:  No Known Allergies    Family History:  Family History   Problem Relation Age of Onset    Diabetes Mother     Cancer Mother         stomach       Social History:  He  reports that he has been smoking cigarettes. He has been smoking about 2.00 packs per day. He has never used smokeless tobacco.  He  reports that he does not drink alcohol. Physical:  Visit Vitals  /84   Pulse 76   Ht 6' (1.829 m)   Wt 115.7 kg (255 lb)   SpO2 98%   BMI 34.58 kg/m²       Exam:  Neck:  Supple, no JVD, no carotid bruits  CV:  Normal S1 and  S2, no murmurs, rubs, or gallops noted  Lungs:  Clear to ausculation throughout, no wheezes or rales  Abd:  Soft, non-tender, non-distended with good bowel sounds.   No hepatosplenomegaly  Extremities:  No edema      Data:  EKG:      LABS:  Lab Results   Component Value Date/Time    Sodium 140 04/30/2019 05:25 AM    Potassium 3.9 04/30/2019 05:25 AM    Chloride 107 04/30/2019 05:25 AM    CO2 27 04/30/2019 05:25 AM    Glucose 143 (H) 04/30/2019 05:25 AM    BUN 10 04/30/2019 05:25 AM    Creatinine 0.86 04/30/2019 05:25 AM     Lab Results   Component Value Date/Time    Cholesterol, total 168 02/17/2018 09:48 PM    HDL Cholesterol 26 (L) 02/17/2018 09:48 PM    LDL,Direct 72 02/17/2018 09:48 PM    LDL, calculated  02/17/2018 09:48 PM      Comment:      Triglyceride >400. LDL cannot be calculated. LDL Cholesterol Direct has been  ordered. Triglyceride 501 (H) 02/17/2018 09:48 PM    CHOL/HDL Ratio 5.3 (H) 06/15/2010 10:02 AM     Lab Results   Component Value Date/Time    ALT (SGPT) 44 (H) 02/17/2018 09:48 PM         Impression/Plan:  1.  CAD, s/p non-STEMI and PCI/stent of a first diagonal, stable  2. Hypertension, blood pressure elevated and suboptimally controlled  3. Dyslipidemia, on atorvastatin 40 mg  4. Diabetes mellitus, recommend Hgb A1c less than 7% from cardiac standpoint  5. Lone episode of atrial fibrillation  6. Tobacco use, continues    Mr. Felisa Brandt was seen today for follow-up. When seen by Meron Ellsworth PA-C, in late May for a post hospital follow-up, his blood pressure was elevated and suboptimally controlled and he was restarted on lisinopril 20/12.5 mg twice a day. He states that he was previously taking this medication but it had been discontinued after being started on other medications during his hospital stay. Since restarting the medication, he states that he has been feeling well. He has not been monitoring his blood pressures at home. He denies any side effects from the medications. His blood pressure today remains slightly elevated but improved from his previous visit. Breath sounds are clear and he does not exhibit any signs of volume overload. At this time, he will be started on isosorbide mononitrate 30 mg daily to be taken in the evenings. Medication teaching done with him today. He was instructed to continue taking the remainder of his medications as prescribed.   His blood pressure will be reevaluated when he returns for follow-up with Dr. Madan Bains at the end of August.  In the meantime, he also has an appointment with his primary care physician on August 1, 2019 and his blood pressure will be evaluated at that time as well. Smoking cessation discussed at length today. He states that he normally smokes about 1-1/2 packs of cigarettes per day but his recent job environment is a no smoking facility and therefore he has not smoked as many as he normally would. He states that he was able to quit smoking during his hospitalization and he did not have any urges to smoke at that time. However, as soon as he was discharged home, he resumed smoking. Benefits of smoking cessation discussed at length with him. I reviewed cath findings with him and he is aware that he has diffuse CAD in his coronary arteries. He was highly encouraged to quit smoking and states that he will try to do so. He will follow-up with Dr. Addison Cortes as scheduled and as needed. Mayra Hagen MSN, FNP-BC    Please note:  Portions of this chart were created with Dragon medical speech to text program.  Unrecognized errors may be present.

## 2019-07-23 ENCOUNTER — OFFICE VISIT (OUTPATIENT)
Dept: CARDIOLOGY CLINIC | Age: 59
End: 2019-07-23

## 2019-07-23 VITALS
SYSTOLIC BLOOD PRESSURE: 150 MMHG | WEIGHT: 255 LBS | OXYGEN SATURATION: 98 % | HEIGHT: 72 IN | HEART RATE: 76 BPM | BODY MASS INDEX: 34.54 KG/M2 | DIASTOLIC BLOOD PRESSURE: 84 MMHG

## 2019-07-23 DIAGNOSIS — Z95.5 S/P CORONARY ARTERY STENT PLACEMENT: Primary | ICD-10-CM

## 2019-07-23 DIAGNOSIS — I10 ESSENTIAL HYPERTENSION: ICD-10-CM

## 2019-07-23 DIAGNOSIS — E78.5 DYSLIPIDEMIA: ICD-10-CM

## 2019-07-23 DIAGNOSIS — F17.200 TOBACCO DEPENDENCE SYNDROME: ICD-10-CM

## 2019-07-23 RX ORDER — GUAIFENESIN 100 MG/5ML
81 LIQUID (ML) ORAL DAILY
COMMUNITY
End: 2019-11-29 | Stop reason: SDUPTHER

## 2019-07-23 RX ORDER — METOPROLOL SUCCINATE 100 MG/1
TABLET, EXTENDED RELEASE ORAL DAILY
COMMUNITY
End: 2020-04-29

## 2019-07-23 RX ORDER — ISOSORBIDE MONONITRATE 30 MG/1
30 TABLET, EXTENDED RELEASE ORAL EVERY EVENING
Qty: 30 TAB | Refills: 6 | Status: SHIPPED | OUTPATIENT
Start: 2019-07-23 | End: 2020-02-17

## 2019-07-23 NOTE — PATIENT INSTRUCTIONS
Begin Imdur (isosorbide mononitrate) 30mg once a day in the evening  All other medications to remain the same  Smoking cessation discussed and highly encouraged  Follow-up with Slick Bennett as scheduled and as needed

## 2019-07-23 NOTE — PROGRESS NOTES
Michelle Yoo presents today for   Chief Complaint   Patient presents with    Irregular Heart Beat     2 MONTH F/U    Medication Evaluation     AFTER RESTARTING LISINOPRIL/HCTZ       Michelle Yoo preferred language for health care discussion is english/other. Is someone accompanying this pt? no    Is the patient using any DME equipment during [address]? no    Depression Screening:  3 most recent PHQ Screens 5/3/2019   Little interest or pleasure in doing things Not at all   Feeling down, depressed, irritable, or hopeless Not at all   Total Score PHQ 2 0       Learning Assessment:  Learning Assessment 5/31/2018   PRIMARY LEARNER Patient   HIGHEST LEVEL OF EDUCATION - PRIMARY LEARNER  [employer] [address] PRIMARY LEARNER -   CO-LEARNER CAREGIVER No   PRIMARY LANGUAGE ENGLISH   LEARNER PREFERENCE PRIMARY DEMONSTRATION     -   ANSWERED BY patient   RELATIONSHIP SELF       Abuse Screening:  Abuse Screening Questionnaire 5/3/2019   Do you ever feel afraid of your partner? N   Are you in a relationship with someone who physically or mentally threatens you? N   Is it safe for you to go home? Y       Fall Risk  Fall Risk Assessment, last 12 mths 5/3/2019   Able to walk? Yes   Fall in past 12 months? No       Pt currently taking Anticoagulant therapy? yes    Coordination of Care:  1. Have you been to the ER, urgent care clinic since your last visit? Hospitalized since your last visit? no    2. Have you seen or consulted any other health care providers outside of the [address] [place] [address] since your last visit? Include any pap smears or colon screening.  no

## 2019-09-04 RX ORDER — METFORMIN HYDROCHLORIDE 1000 MG/1
TABLET ORAL
Qty: 180 TAB | Refills: 3 | Status: SHIPPED | OUTPATIENT
Start: 2019-09-04 | End: 2020-08-12

## 2019-09-05 ENCOUNTER — TELEPHONE (OUTPATIENT)
Dept: INTERNAL MEDICINE CLINIC | Age: 59
End: 2019-09-05

## 2019-09-05 DIAGNOSIS — Z00.00 PHYSICAL EXAM: Primary | ICD-10-CM

## 2019-09-13 ENCOUNTER — OFFICE VISIT (OUTPATIENT)
Dept: CARDIOLOGY CLINIC | Age: 59
End: 2019-09-13

## 2019-09-13 VITALS
DIASTOLIC BLOOD PRESSURE: 78 MMHG | WEIGHT: 259 LBS | BODY MASS INDEX: 35.08 KG/M2 | SYSTOLIC BLOOD PRESSURE: 140 MMHG | HEART RATE: 89 BPM | OXYGEN SATURATION: 97 % | HEIGHT: 72 IN

## 2019-09-13 DIAGNOSIS — E66.01 SEVERE OBESITY (HCC): ICD-10-CM

## 2019-09-13 NOTE — PROGRESS NOTES
Luann Cooper    Chief Complaint   Patient presents with   [facility] Follow Up     Afib with RVR     Shortness of Breath     twice a month with and without exertion    pAFib RVR, NSTEMI, CAD s/p PCI 4/2019    HPI    Luann Cooper is a 63 y.o. male with CAD s/p NSTEMI here for routine follow up. As you know I met this extremely pleasant patient earlier this spring in April 2019. Patient was driving when he experienced sudden onset comfort in his left shoulder. The pain was actually quite severe and he became very short of breath and then experienced sudden onset racing palpitations. He ended up going to the emergency department was found to be in atrial fibrillation with rapid ventricular response as fast as 190 bpm.  It was in the setting he had a positive troponin but I suspected ischemia as the provoking trigger due to patient's risk factors and symptoms. We got him back in sinus rhythm and he subsequently had a left heart cath (please see report below)-and ultimately received percutaneous coronary intervention to the culprit lesion which was a diagonal.  He has done well since on medical therapy. He has not had any recurrent symptoms-chest pain no palpitations no shortness of breath. Despite the MI he has preserved LV function.       Past Medical History:   Diagnosis Date    Arthritis     Back problem     Brachial neuritis or radiculitis 6/27/2014    Colon polyp 08/2008    Dr Chepe Echevarria DDD (degenerative disc disease), cervical 2/16/2016    Depression 12/13    PHQ-9 was 6/27     Diabetes ([facility] [address] 3/15    on basis of elev hba1c; [facility] for teaching    Dyslipidemia     Enthesopathy of hip region     Fatty liver     on US w negative serologies 2009    Foot fracture, right     9/13 5th metatarsal    GERD (gastroesophageal reflux disease)     Greater trochanteric bursitis of right hip 8/2/2016    H/O echocardiogram 04/2019    nl lv, ef 61%, mild conc lvh, no wma, gr 1 dd, mild MR    Hypertension  Hypogonadism male 03/2013    Dr Franchesca Donald; took androgel in past    Morbid obesity ([facility] [address]     peak weight 275 lbs, bmi 36.3 from 2/14; IF 2/18 start weight 253 lbs but inconsistent    NSTEMI (non-ST elevated myocardial infarction) ([facility] [address] 04/2019    s/p cath Dr Zee Mcadams and ROSA MARIA to DM1    Paroxysmal atrial fibrillation ([facility] [address] 04/2019    in setting on ACS    Phymatous rosacea     Postlaminectomy syndrome, lumbar region     Prediabetes on metformin     2 hr  2/10    S/P cardiac cath 04/2019    Dr Zee Mcadams; diffuse 50% RCA, diff LAD, 30% Cx, 85% DM1 culprit stented w ROSA MARIA    Thoracic or lumbosacral neuritis or radiculitis, unspecified     Tobacco dependence syndrome     failed chantix and wellbutrin       Past Surgical History:   Procedure Laterality Date    HX BACK SURGERY      HX COLONOSCOPY      Dr Michael Garces 8/22/08    HX ORTHOPAEDIC  12/06    left shoulder repair/rotator cuff    HX ORTHOPAEDIC      DEXA t score -1.0 spine, -0.4 hip (1/14) Dr. Keven Gold HX OTHER SURGICAL  7/02    L5-S1 hemilaminectomy and diskectomy    NEUROLOGICAL PROCEDURE UNLISTED  4/13    MRI pituitary normal    [place] ANESTH,SURGERY OF SHOULDER         Current Outpatient Medications   Medication Sig Dispense Refill    metFORMIN (GLUCOPHAGE) 1,000 mg tablet take 1 tablet by mouth twice a day with food 180 Tab 3    fenofibrate nanocrystallized (TRICOR) 145 mg tablet take 1 tablet by mouth once daily 30 Tab 1    aspirin 81 mg chewable tablet Take 81 mg by mouth daily.  metoprolol succinate (TOPROL-XL) 100 mg tablet Take  by mouth daily.  isosorbide mononitrate ER (IMDUR) 30 mg tablet Take 1 Tab by mouth every evening. 30 Tab 6    atorvastatin (LIPITOR) 40 mg tablet take 1 tablet by mouth once daily 30 Tab 12    lisinopril-hydroCHLOROthiazide (PRINZIDE, ZESTORETIC) 20-12.5 mg per tablet Take 2 Tabs by mouth two (2) times a day. 60 Tab 5    ticagrelor (BRILINTA) 90 mg tablet Take 1 Tab by mouth two (2) times a day.  60 Tab 10    nitroglycerin (NITROSTAT) 0.4 mg SL tablet 1 Tab by SubLINGual route every five (5) minutes as needed for Chest Pain. Up to 3 doses. 1 Bottle 6    HYDROcodone-acetaminophen (NORCO)  mg tablet Take 1 Tab by mouth every six (6) hours as needed for Pain.  traZODone (DESYREL) 50 mg tablet take 1 tablet by mouth NIGHTLY 90 Tab 3    naloxone 4 mg/actuation spry 4 mg by Nasal route once as needed (opioid overdose) for up to 1 dose. May substitute 2 mg syringe if insurance requires 1 Package 0    omeprazole (PRILOSEC) 20 mg capsule Take 20 mg by mouth daily.  ascorbic acid (VITAMIN C) 1,000 mg tablet Take 1,000 mg by mouth two (2) times a day.  MULTIVITAMIN PO Take  by mouth.          No Known Allergies    Social History     Socioeconomic History    Marital status:      Spouse name: Not on file    Number of children: Not on file    Years of education: Not on file    Highest education level: Not on file   Occupational History    Not on file   Social Needs    Financial resource strain: Not on file    Food insecurity:     Worry: Not on file     Inability: Not on file    Transportation needs:     Medical: Not on file     Non-medical: Not on file   Tobacco Use    Smoking status: Current Every Day Smoker     Packs/day: 2.00     Types: Cigarettes    Smokeless tobacco: Never Used   Substance and Sexual Activity    Alcohol use: No     Comment: One drink per 3-4 months    Drug use: No     Comment: Marijuana use in the [de-identified]    Sexual activity: Not on file   Lifestyle    Physical activity:     Days per week: Not on file     Minutes per session: Not on file    Stress: Not on file   Relationships    Social connections:     Talks on phone: Not on file     Gets together: Not on file     Attends Muslim service: Not on file     Active member of club or organization: Not on file     Attends meetings of clubs or organizations: Not on file     Relationship status: Not on file    Intimate partner violence:     Fear of current or ex partner: Not on file     Emotionally abused: Not on file     Physically abused: Not on file     Forced sexual activity: Not on file   Other Topics Concern    Not on file   Social History Narrative    Not on file        Review of Systems    14 pt Review of Systems is negative unless otherwise mentioned in the HPI. Wt Readings from Last 3 Encounters:   09/13/19 117.5 kg (259 lb)   07/23/19 115.7 kg (255 lb)   05/29/19 113.9 kg (251 lb)     Temp Readings from Last 3 Encounters:   05/03/19 98.3 °F (36.8 °C) (Oral)   04/30/19 98.3 °F (36.8 °C)   04/25/19 98.7 °F (37.1 °C) (Oral)     BP Readings from Last 3 Encounters:   09/13/19 140/78   07/23/19 150/84   05/29/19 192/90     Pulse Readings from Last 3 Encounters:   09/13/19 89   07/23/19 76   05/29/19 62     Physical Exam:    Visit Vitals  /78   Pulse 89   Ht 6' (1.829 m)   Wt 117.5 kg (259 lb)   SpO2 97%   BMI 35.13 kg/m²      Physical Exam   Constitutional: He is oriented to person, place, and time. He appears well-developed and well-nourished. HENT:   Head: Normocephalic and atraumatic. Eyes: Pupils are equal, round, and reactive to light. EOM are normal. No scleral icterus. Neck: No JVD present. Cardiovascular: Normal rate, regular rhythm, normal heart sounds and intact distal pulses. Exam reveals no gallop and no friction rub. No murmur heard. Pulmonary/Chest: Effort normal and breath sounds normal. No respiratory distress. He has no wheezes. He has no rales. He exhibits no tenderness. Abdominal: Soft. Bowel sounds are normal.   Musculoskeletal: He exhibits no edema. Neurological: He is alert and oriented to person, place, and time. Skin: Skin is warm and dry. No rash noted. Psychiatric: He has a normal mood and affect.        EKG last: NSR, normal axis and intervals, no ST segment abnormalities    Lab Results   Component Value Date/Time    Cholesterol, total 168 02/17/2018 09:48 PM    HDL Cholesterol 26 (L) 02/17/2018 09:48 PM    LDL,Direct 72 02/17/2018 09:48 PM    LDL, calculated  02/17/2018 09:48 PM      Comment:      Triglyceride >400. LDL cannot be calculated. LDL Cholesterol Direct has been  ordered. VLDL, calculated 100 (H) 02/17/2018 09:48 PM    Triglyceride 501 (H) 02/17/2018 09:48 PM    CHOL/HDL Ratio 5.3 (H) 06/15/2010 10:02 AM     Lab Results   Component Value Date/Time    Hemoglobin A1c 6.5 (H) 02/17/2018 09:48 PM    Hemoglobin A1c, External 6.2 07/26/2016 02:00 PM     04/25/19   ECHO ADULT COMPLETE 04/26/2019 4/26/2019    Narrative · Estimated left ventricular ejection fraction is 61 - 65%. Left   ventricular moderate concentric hypertrophy. No regional wall motion   abnormality noted. Mild (grade 1) left ventricular diastolic dysfunction. · Mitral valve thickening. Mild mitral valve regurgitation. · Left atrium is upper limits of normal volume. Signed by: Rodrigue Agudelo DO     04/25/19   CARDIAC PROCEDURE 04/29/2019 4/29/2019    Narrative · Diffuse 50% dominant RCA disease. · Diffuse LAD and circumflex 30% stenosis. · Culprit diagonal 85 to 90% stenosis  · . Diagonal branch stented to residual 0% using ROSA MARIA. · Overall normal left ventricular systolic function with EF 55 to 60%.         Signed by: Mary Lara MD         Impression and Plan:  Saurav Esparza is a 63 y.o. with:    1.) pAF with RVR, likely in setting of NSTEMI/ ACS (only one episode, ChadsVasc ~2)  2.) NSTEMI s/p PCI diag 4/2019  3.) CAD with residual nonobstructive disease  4.) DM2, known  5.) Dyslipidemia, incl HyperTG, known  6.) Tobacco abuse  7.) +FH premature CAD  8.) Normal LV function  9.) HTN    1.) Continue DAPT for 1 yr  2.) Continue med tx- incl HI statin, BB, ACEI  3.) Can consider titrating off Imdur in future if SBP under good control (and no CP)  4.) Overall doing well/ asymptomatic CV standpoint, has upcoming wellness appt with PCP  5.) RTC ~6-8 months recheck, please call sooner if questions/ problems    After 1 yr of DAPT can reeval and discuss stroke risk and what best regimen for him  Will most likely chose  alone if no recurrent Afib   All questions answered    Thank you for allowing me to participate in the care of your patient, please do not hesitate to call with questions or concerns.     Kindest Regards,    Judith Harding, DO

## 2019-09-13 NOTE — PROGRESS NOTES
Kee Chacon presents today for   Chief Complaint   Patient presents with   [facility] Follow Up     Afib with RVR     Shortness of Breath     twice a month with and without exertion        Kee Chacon preferred language for health care discussion is english/other. Is someone accompanying this pt? no    Is the patient using any DME equipment during [address]? no    Depression Screening:  3 most recent PHQ Screens 5/3/2019   Little interest or pleasure in doing things Not at all   Feeling down, depressed, irritable, or hopeless Not at all   Total Score PHQ 2 0       Learning Assessment:  Learning Assessment 5/31/2018   PRIMARY LEARNER Patient   HIGHEST LEVEL OF EDUCATION - PRIMARY LEARNER  [facility]OME [address] PRIMARY LEARNER -   CO-LEARNER CAREGIVER No   PRIMARY LANGUAGE ENGLISH   LEARNER PREFERENCE PRIMARY DEMONSTRATION     -   ANSWERED BY patient   RELATIONSHIP SELF       Abuse Screening:  Abuse Screening Questionnaire 5/3/2019   Do you ever feel afraid of your partner? N   Are you in a relationship with someone who physically or mentally threatens you? N   Is it safe for you to go home? Y       Fall Risk  Fall Risk Assessment, last 12 mths 5/3/2019   Able to walk? Yes   Fall in past 12 months? No       Pt currently taking Anticoagulant therapy? yes    Coordination of Care:  1. Have you been to the ER, urgent care clinic since your last visit? Hospitalized since your last visit? no    2. Have you seen or consulted any other health care providers outside of the [address] [place] [address] since your last visit? Include any pap smears or colon screening.  no

## 2019-09-30 ENCOUNTER — HOSPITAL ENCOUNTER (OUTPATIENT)
Dept: LAB | Age: 59
Discharge: HOME | End: 2019-09-30

## 2019-09-30 LAB — SENTARA SPECIMEN COL,SENBCF: NORMAL

## 2019-09-30 PROCEDURE — 99001 SPECIMEN HANDLING PT-LAB: CPT

## 2019-09-30 NOTE — PROGRESS NOTES
63 y.o. white male who presents for f/u    He denies any cardiovascular complaints. Still no exercise program.  His bp has been running high when he checks, however    Denies polyuria, polydipsia, nocturia, vision change. Not checking sugars regularly.  Unable to lose weight as below, he's actually up some as the diet has not been great    Vitals 10/4/2019 9/13/2019 7/23/2019 7/23/2019 5/29/2019 5/3/2019   Weight 260 lb 259 lb  255 lb 251 lb 245 lb     Denies any gi or gu complaints    Past Medical History:   Diagnosis Date    Arthritis     Back problem     Brachial neuritis or radiculitis 6/27/2014    Colon polyp 08/2008    Dr Mami Brown DDD (degenerative disc disease), cervical 2/16/2016    Depression 12/13    PHQ-9 was 6/27     Diabetes ([address] 3/15    on basis of elev hba1c; [facility]; microalbumin 10/19    Dyslipidemia     Enthesopathy of hip region     Fatty liver     on US w negative serologies 2009    Foot fracture, right     9/13 5th metatarsal    GERD (gastroesophageal reflux disease)     Greater trochanteric bursitis of right hip 8/2/2016    H/O echocardiogram 04/2019    nl lv, ef 61%, mild conc lvh, no wma, gr 1 dd, mild MR    Hypertension     Hypogonadism male 03/2013    Dr Scot Green; took androgel in past    Morbid obesity ([address]     peak weight 275 lbs, bmi 36.3 from 2/14; IF 2/18 start weight 253 lbs but unable to do    NSTEMI (non-ST elevated myocardial infarction) ([address] 04/2019    s/p cath Dr Tamar Landon and ROSA MARIA to DM1    Paroxysmal atrial fibrillation ([address] 04/2019    in setting on ACS    Phymatous rosacea     Postlaminectomy syndrome, lumbar region     Prediabetes on metformin     2 hr  2/10    S/P cardiac cath 04/2019    Dr Tamar Landon; diffuse 50% RCA, diff LAD, 30% Cx, 85% DM1 culprit stented w ROSA MARIA    Thoracic or lumbosacral neuritis or radiculitis, unspecified     Tobacco dependence syndrome     failed chantix and wellbutrin     Past Surgical History:   Procedure Laterality Date    Zollie Fairly HX COLONOSCOPY      Dr Alma Barkley 8/22/08    HX ORTHOPAEDIC  12/06    left shoulder repair/rotator cuff    HX ORTHOPAEDIC      DEXA t score -1.0 spine, -0.4 hip (1/14) Dr. Marleni Mccall HX OTHER SURGICAL  7/02    L5-S1 hemilaminectomy and diskectomy    NEUROLOGICAL PROCEDURE UNLISTED  4/13    MRI pituitary normal    SC ANESTH,SURGERY OF SHOULDER       Social History     Socioeconomic History    Marital status:      Spouse name: Not on file    Number of children: Not on file    Years of education: Not on file    Highest education level: Not on file   Occupational History    Not on file   Social Needs    Financial resource strain: Not on file    Food insecurity:     Worry: Not on file     Inability: Not on file    Transportation needs:     Medical: Not on file     Non-medical: Not on file   Tobacco Use    Smoking status: Current Every Day Smoker     Packs/day: 2.00     Types: Cigarettes    Smokeless tobacco: Never Used   Substance and Sexual Activity    Alcohol use: No     Comment: One drink per 3-4 months    Drug use: No     Comment: Marijuana use in the [de-identified]    Sexual activity: Not on file   Lifestyle    Physical activity:     Days per week: Not on file     Minutes per session: Not on file    Stress: Not on file   Relationships    Social connections:     Talks on phone: Not on file     Gets together: Not on file     Attends Taoist service: Not on file     Active member of club or organization: Not on file     Attends meetings of clubs or organizations: Not on file     Relationship status: Not on file    Intimate partner violence:     Fear of current or ex partner: Not on file     Emotionally abused: Not on file     Physically abused: Not on file     Forced sexual activity: Not on file   Other Topics Concern    Not on file   Social History Narrative    Not on file     Current Outpatient Medications   Medication Sig    amLODIPine (NORVASC) 5 mg tablet Take 1 Tab by mouth daily.  atorvastatin (LIPITOR) 80 mg tablet Take 1 Tab by mouth daily.  metoprolol tartrate (LOPRESSOR) 100 mg IR tablet metoprolol tartrate 100 mg tabs    icosapent ethyl (VASCEPA) 1 gram capsule Take 2 Caps by mouth two (2) times daily (with meals).  metFORMIN (GLUCOPHAGE) 1,000 mg tablet take 1 tablet by mouth twice a day with food    fenofibrate nanocrystallized (TRICOR) 145 mg tablet take 1 tablet by mouth once daily    aspirin 81 mg chewable tablet Take 81 mg by mouth daily.  isosorbide mononitrate ER (IMDUR) 30 mg tablet Take 1 Tab by mouth every evening.  lisinopril-hydroCHLOROthiazide (PRINZIDE, ZESTORETIC) 20-12.5 mg per tablet Take 2 Tabs by mouth two (2) times a day.  ticagrelor (BRILINTA) 90 mg tablet Take 1 Tab by mouth two (2) times a day.  nitroglycerin (NITROSTAT) 0.4 mg SL tablet 1 Tab by SubLINGual route every five (5) minutes as needed for Chest Pain. Up to 3 doses.  HYDROcodone-acetaminophen (NORCO)  mg tablet Take 1 Tab by mouth every six (6) hours as needed for Pain.  traZODone (DESYREL) 50 mg tablet take 1 tablet by mouth NIGHTLY    omeprazole (PRILOSEC) 20 mg capsule Take 20 mg by mouth daily.  ascorbic acid (VITAMIN C) 1,000 mg tablet Take 1,000 mg by mouth two (2) times a day.  MULTIVITAMIN PO Take  by mouth.  metoprolol succinate (TOPROL-XL) 100 mg tablet Take  by mouth daily.  naloxone 4 mg/actuation spry 4 mg by Nasal route once as needed (opioid overdose) for up to 1 dose. May substitute 2 mg syringe if insurance requires     No current facility-administered medications for this visit.       No Known Allergies    REVIEW OF SYSTEMS: colo 6/08 Dr Lena Yeager - no vision change or eye pain  Oral - no mouth pain, tongue or tooth problems  Ears - no hearing loss, ear pain, fullness, no swallowing problems  Cardiac - no CP, PND, orthopnea, edema, palpitations or syncope  Chest - no breast masses  Resp - no wheezing, chronic coughing, dyspnea  GI - no heartburn, nausea, vomiting, change in bowel habits, bleeding, hemorrhoids  Urinary - no dysuria, hematuria, flank pain, urgency, frequency  Constitutional - no wt loss, night sweats, unexplained fevers  Neuro - no focal weakness, numbness, paresthesias, incoordination, ataxia, involuntary movements  Endo - no polyuria, polydipsia, nocturia, hot flashes    Visit Vitals  /84 (BP 1 Location: Left arm, BP Patient Position: Sitting)   Pulse 66   Temp 97.9 °F (36.6 °C) (Oral)   Resp 16   Ht 6' (1.829 m)   Wt 260 lb (117.9 kg)   SpO2 97%   BMI 35.26 kg/m²   A&O x3  Affect is appropriate. Mood stable  No apparent distress  Anicteric, no JVD, adenopathy or thyromegaly. No carotid bruits or radiated murmur  Lungs clear to auscultation, no wheezes or rales  Heart showed regular rate and rhythm. No murmur, rubs, gallops  Abdomen soft nontender, no hepatosplenomegaly or masses. Extremities without edema.   Pulses 1-2+ symmetrically    LABS  From 9/09 showed   gluc 88,   cr 0.70,              alt 106,                   chol 183, tg 431,  hdl 32, ldl-c NA,  wbc 8.0, hb 15.8, plt 149,        From 2/10 showed   gluc 120, cr 0.60, gfr>60, alt 113,                   chol 306, tg 1135, hdl 31, ldl-c NA,                                ast 45, ap 95, tb 0.4  From 3/10 showed                                    2 hr gtt 165  From 6/10 showed   gluc 165,                               hba1c 5.6,                tg 406,  hdl 31, ldl-c NA   From 11/12 showed gluc 102, cr 0.60,      alt 43,   hba1c 5.9, chol 183, tg 489,  hdl 28, ldl-c NA, wbc 9.0, hb 15.8, plt 185,  tsh 3.0,    psa 0.09,   From 11/12 showed                                 vit d 15.2, test 145, james 5.5, acth 1.7, fsh 5.8, lh 1.5, prl 5.1, b12 878, RA neg, crp 0.83, %sat 31, ferritin 308, hep a/b/c neg,  gracie neg, lyme neg  From 8/13 showed           hba1c 5.8, chol 176, tg 521,  hdl 23, ldl-c NA  From 11/13 showed gluc 135, cr 0.80, gfr 52,   alt 30,   hba1c 5.8, chol 154, tg 323,  hdl 31, ldl-c 58,                      vit d 20.5, test 185  From 3/14 showed           hba1c 6.0, chol 177, tg 607,  hdl 26, ldl-c NA  From 6/14 showed   gluc 114, cr 0.80, gfr>60,  alt 32,   hba1c 5.8, chol 147, tg 360,  hdl 29, ldl-c 46  From 7/14 showed   gluc 148, cr 0.77, gfr>60,             wbc 9.3, hb 14.9, plt 214  From 2/15 showed   gluc 140, cr 0.72, gfr 107, alt 39,   hba1c 6.5, chol 180, tg 377,  hdl 31, ldl-c 74, wbc 7.5, hb 15.2, plt 205  From 7/15 showed           hba1c 6.2, chol 137, tg 293,  hdl 28, ldl-c 50,               umar neg  From 7/16 showed   gluc 125, cr 0.60, gfr>60, alt 29,    hba1c 6.2, chol 145, tg 247,  hdl 32, ldl-c 64, wbc 7.6, hb 15.2, plt 198, umar 11.8, psa 0.35  From 8/17 showed   gluc 129, cr 0.80, gfr>60, alt 22,    hba1c 6.5, chol 171, tg 479,  hdl 27, ldl-c na, wbc 9.4, hb 15.8, plt 204, umar 15.0, vit d 27.0  From 2/18 showed   gluc 121, cr 0.50, gfr>60, alt 44,    hba1c 6.5, chol 168, tg 501,  hdl 26, ldl-c na,           psa 0.11  From 4/19 showed   gluc 143, cr 0.86, gfr>60,             wbc 9.0, hb 12.4, plt 178, tsh 0.48, ft4 1.00, trop+, ddimer neg  From 9/19 showed           hba1c 7.0, chol 160, tg 262, hdl 34, ldl-c 69,  wbc 10.2, hb 14.7, plt 231, umar 39.8, psa 0.09, test 357    Patient Active Problem List   Diagnosis Code    Encounter for long-term (current) drug use Z79.899    Dyslipidemia E78.5   [place] Hypogonadism male Dr. Doreen Macdonald E29.1    Hypovitaminosis D E55.9    Insomnia secondary to chronic pain G89.29, G47.01    Essential hypertension I10    DDD (degenerative disc disease), cervical M50.30    Tobacco dependence syndrome F17.200    Colon polyp 8/08 Dr Usama Hardin K63.5    Arthritis of right knee M17.11    Chronic pain syndrome G89.4    Controlled type 2 diabetes mellitus without complication, without long-term current use of insulin (HCC) E11.9    Atrial fibrillation with rapid ventricular response ([facility]) I48.91    S/P coronary artery stent placement Z95.5    Severe obesity ([facility]) E66.01     Assessment and plan:  1. Chronic pain. F/U pain clinic  2. HTN. Continue current and added amlodipine. Call in readings  3. DM. Continue current, f/u ophth, he has f/u in 2 mos  4. Dyslipidemia. Continue tricor but will add vascepa and inc lipitor to 80  5. Fatty liver. Wt loss would be ideal  6. Hypogonadism. Per [facility]  7. Hypovit d. Continue supplementation  8. Depression. Doing well  9. Podiatry. F/U Dr. Raf Mendez  10. Smoking cessation again discussed  11. Obesity. Lifestyle and dietary measures. Portion control reiterated. 12. CHD. F/u Dr Jimmy Chau  13. Microalbuminuria. Long discussion about this, he will endeavor to drop the a1c and bp         RTC 2/20    Above conditions discussed at length and patient vocalized understanding.   All questions answered to patient satisfaction      TOTAL time 40 min spent of which at least 30 min was on counseling, answering questions and coordination of care

## 2019-10-01 LAB
AVG GLU, 10930: 154 MG/DL (ref 91–123)
CHOLEST SERPL-MCNC: 160 MG/DL (ref 110–200)
CREATININE, URINE: 178 MG/DL
ERYTHROCYTE [DISTWIDTH] IN BLOOD BY AUTOMATED COUNT: 13.3 % (ref 10–15.5)
HBA1C MFR BLD HPLC: 7 % (ref 4.8–5.9)
HCT VFR BLD AUTO: 45.6 % (ref 39.3–51.6)
HDLC SERPL-MCNC: 34 MG/DL (ref 40–59)
HDLC SERPL-MCNC: 4.7 MG/DL (ref 0–5)
HGB BLD-MCNC: 14.7 G/DL (ref 13.1–17.2)
LDLC SERPL CALC-MCNC: 69 MG/DL (ref 50–99)
MCH RBC QN AUTO: 29 PG (ref 26–34)
MCHC RBC AUTO-ENTMCNC: 32 G/DL (ref 31–36)
MCV RBC AUTO: 90 FL (ref 80–95)
MICROALB/CREAT RATIO, 140286: 39.8 MCG/MG OF CREATININE (ref 0–30)
MICROALBUMIN,URINE RANDOM 140054: 70.9 MCG/ML (ref 0.1–17)
PLATELET # BLD AUTO: 231 K/UL (ref 140–440)
PMV BLD AUTO: 10.6 FL (ref 9–13)
PSA SERPL-MCNC: 0.09 NG/ML
RBC # BLD AUTO: 5.09 M/UL (ref 3.8–5.8)
TESTOSTERONE, SERUM (TOTAL) 500912: 357 NG/DL
TRIGL SERPL-MCNC: 282 MG/DL (ref 40–149)
VLDLC SERPL CALC-MCNC: 56 MG/DL (ref 8–30)
WBC # BLD AUTO: 10.2 K/UL (ref 4–11)

## 2019-10-04 ENCOUNTER — OFFICE VISIT (OUTPATIENT)
Dept: INTERNAL MEDICINE CLINIC | Age: 59
End: 2019-10-04

## 2019-10-04 VITALS
OXYGEN SATURATION: 97 % | HEART RATE: 66 BPM | DIASTOLIC BLOOD PRESSURE: 84 MMHG | SYSTOLIC BLOOD PRESSURE: 160 MMHG | TEMPERATURE: 97.9 F | WEIGHT: 260 LBS | HEIGHT: 72 IN | BODY MASS INDEX: 35.21 KG/M2 | RESPIRATION RATE: 16 BRPM

## 2019-10-04 DIAGNOSIS — E66.01 SEVERE OBESITY (HCC): ICD-10-CM

## 2019-10-04 DIAGNOSIS — Z95.5 S/P CORONARY ARTERY STENT PLACEMENT: ICD-10-CM

## 2019-10-04 DIAGNOSIS — E11.9 CONTROLLED TYPE 2 DIABETES MELLITUS WITHOUT COMPLICATION, WITHOUT LONG-TERM CURRENT USE OF INSULIN (HCC): Primary | ICD-10-CM

## 2019-10-04 DIAGNOSIS — G89.4 CHRONIC PAIN SYNDROME: ICD-10-CM

## 2019-10-04 DIAGNOSIS — E78.5 DYSLIPIDEMIA: ICD-10-CM

## 2019-10-04 DIAGNOSIS — Z23 ENCOUNTER FOR IMMUNIZATION: ICD-10-CM

## 2019-10-04 DIAGNOSIS — K63.5 HYPERPLASTIC COLONIC POLYP, UNSPECIFIED PART OF COLON: ICD-10-CM

## 2019-10-04 DIAGNOSIS — E66.9 OBESITY (BMI 30-39.9): ICD-10-CM

## 2019-10-04 DIAGNOSIS — I48.91 ATRIAL FIBRILLATION WITH RAPID VENTRICULAR RESPONSE (HCC): ICD-10-CM

## 2019-10-04 DIAGNOSIS — I10 ESSENTIAL HYPERTENSION: ICD-10-CM

## 2019-10-04 DIAGNOSIS — F17.200 TOBACCO DEPENDENCE SYNDROME: ICD-10-CM

## 2019-10-04 RX ORDER — ICOSAPENT ETHYL 1000 MG/1
2 CAPSULE ORAL 2 TIMES DAILY WITH MEALS
Qty: 120 CAP | Refills: 11 | Status: SHIPPED | OUTPATIENT
Start: 2019-10-04 | End: 2020-04-29

## 2019-10-04 RX ORDER — METOPROLOL TARTRATE 100 MG/1
TABLET ORAL
COMMUNITY
End: 2020-01-06

## 2019-10-04 NOTE — PATIENT INSTRUCTIONS
Vaccine Information Statement    Influenza (Flu) Vaccine (Inactivated or Recombinant): What You Need to Know    Many Vaccine Information Statements are available in Syriac and other languages. See www.immunize.org/vis  Hojas de información sobre vacunas están disponibles en español y en muchos otros idiomas. Visite www.immunize.org/vis    1. Why get vaccinated? Influenza vaccine can prevent influenza (flu). Flu is a contagious disease that spreads around the [facility] every year, usually between October and May. Anyone can get the flu, but it is more dangerous for some people. Infants and young children, people 72years of age and older, pregnant women, and people with certain health conditions or a weakened immune system are at greatest risk of flu complications. Pneumonia, bronchitis, sinus infections and ear infections are examples of flu-related complications. If you have a medical condition, such as heart disease, cancer or diabetes, flu can make it worse. Flu can cause fever and chills, sore throat, muscle aches, fatigue, cough, headache, and runny or stuffy nose. Some people may have vomiting and diarrhea, though this is more common in children than adults. Each year thousands of people in the [facility] die from flu, and many more are hospitalized. Flu vaccine prevents millions of illnesses and flu-related visits to the doctor each year. 2. Influenza vaccines     CDC recommends everyone 10months of age and older get vaccinated every flu season. Children 6 months through 6years of age may need 2 doses during a single flu season. Everyone else needs only 1 dose each flu season. It takes about 2 weeks for protection to develop after vaccination. There are many flu viruses, and they are always changing. Each year a new flu vaccine is made to protect against three or four viruses that are likely to cause disease in the upcoming flu season.  Even when the vaccine doesnt exactly match these viruses, it may still provide some protection. Influenza vaccine does not cause flu. Influenza vaccine may be given at the same time as other vaccines. 3. Talk with your health care provider    Tell your vaccine provider if the person getting the vaccine:   Has had an allergic reaction after a previous dose of influenza vaccine, or has any severe, life-threatening allergies.  Has ever had Guillain-Barré Syndrome (also called GBS). In some cases, your health care provider may decide to postpone influenza vaccination to a future visit. People with minor illnesses, such as a cold, may be vaccinated. People who are moderately or severely ill should usually wait until they recover before getting influenza vaccine. Your health care provider can give you more information. 4. Risks of a reaction     Soreness, redness, and swelling where shot is given, fever, muscle aches, and headache can happen after influenza vaccine.  There may be a very small increased risk of Guillain-Barré Syndrome (GBS) after inactivated influenza vaccine (the flu shot). [facility] children who get the flu shot along with pneumococcal vaccine (PCV13), and/or DTaP vaccine at the same time might be slightly more likely to have a seizure caused by fever. Tell your health care provider if a child who is getting flu vaccine has ever had a seizure. People sometimes faint after medical procedures, including vaccination. Tell your provider if you feel dizzy or have vision changes or ringing in the ears. As with any medicine, there is a very remote chance of a vaccine causing a severe allergic reaction, other serious injury, or death. 5. What if there is a serious problem? An allergic reaction could occur after the vaccinated person leaves the clinic.  If you see signs of a severe allergic reaction (hives, swelling of the face and throat, difficulty breathing, a fast heartbeat, dizziness, or weakness), call 9-1-1 and get the person to the nearest hospital.    For other signs that concern you, call your health care provider. Adverse reactions should be reported to the Vaccine Adverse Event Reporting System (VAERS). Your health care provider will usually file this report, or you can do it yourself. Visit the VAERS website at www.vaers. [facility].gov or call 6-662.958.5618. VAERS is only for reporting reactions, and VAERS staff do not give medical advice. 6. The National Vaccine Injury Compensation Program    The [facility] Vaccine Injury Compensation Program (VICP) is a federal program that was created to compensate people who may have been injured by certain vaccines. Visit the VICP website at www.hrsa.gov/vaccinecompensation or call 7-967.932.7047 to learn about the program and about filing a claim. There is a time limit to file a claim for compensation. 7. How can I learn more?  Ask your health care provider.  Call your local or state health department.  Contact the Centers for Disease Control and Prevention (CDC):  - Call 4-460.496.1431 (1-800-CDC-INFO) or  - Visit CDCs influenza website at www.cdc.gov/flu    Vaccine Information Statement (Interim)  Inactivated Influenza Vaccine   8/15/2019  42 ATUL Bradshaw [id number]   Department of Health and Human Services  Centers for Disease Control and Prevention    Office Use Only

## 2019-10-04 NOTE — PROGRESS NOTES
Michelle Keith presents today for   Chief Complaint   Patient presents with    Physical     labs done              Depression Screening:  3 most recent PHQ Screens 10/4/2019   Little interest or pleasure in doing things Not at all   Feeling down, depressed, irritable, or hopeless Not at all   Total Score PHQ 2 0       Learning Assessment:  Learning Assessment 5/31/2018   PRIMARY LEARNER Patient   HIGHEST LEVEL OF EDUCATION - PRIMARY LEARNER  [address] PRIMARY LEARNER -   CO-LEARNER CAREGIVER No   PRIMARY LANGUAGE ENGLISH   LEARNER PREFERENCE PRIMARY DEMONSTRATION     -   ANSWERED BY patient   RELATIONSHIP SELF       Abuse Screening:  Abuse Screening Questionnaire 10/4/2019   Do you ever feel afraid of your partner? N   Are you in a relationship with someone who physically or mentally threatens you? N   Is it safe for you to go home? Y       Fall Risk  Fall Risk Assessment, last 12 mths 10/4/2019   Able to walk? Yes   Fall in past 12 months? No           Coordination of Care:  1. Have you been to the ER, urgent care clinic since your last visit? Hospitalized since your last visit? no    2. Have you seen or consulted any other health care providers outside of the [address] [place] [address] since your last visit? Include any pap smears or colon screening. No    Michelle Keith 1960 male who presents for routine immunizations. Patient denies any symptoms , reactions or allergies that would exclude them from being immunized today. Risks and adverse reactions were discussed and the VIS was given to them. All questions were addressed. Order placed for Influenza- Left Deltoid,  per Verbal Order from Dr. Bhavya Diez with read back. Patient was observed for 15 min post injection. There were no reactions observed.     Jaimee Schwartz LPN

## 2019-10-06 PROBLEM — I21.4 NSTEMI (NON-ST ELEVATED MYOCARDIAL INFARCTION) (HCC): Status: RESOLVED | Noted: 2019-04-26 | Resolved: 2019-10-06

## 2019-10-06 RX ORDER — ATORVASTATIN CALCIUM 80 MG/1
80 TABLET, FILM COATED ORAL DAILY
Qty: 90 TAB | Refills: 3 | Status: SHIPPED | OUTPATIENT
Start: 2019-10-06 | End: 2020-09-20

## 2019-10-06 RX ORDER — AMLODIPINE BESYLATE 5 MG/1
5 TABLET ORAL DAILY
Qty: 30 TAB | Refills: 5 | Status: SHIPPED | OUTPATIENT
Start: 2019-10-06 | End: 2020-02-25

## 2019-10-18 DIAGNOSIS — G47.00 INSOMNIA, UNSPECIFIED TYPE: ICD-10-CM

## 2019-10-22 RX ORDER — LISINOPRIL AND HYDROCHLOROTHIAZIDE 12.5; 2 MG/1; MG/1
2 TABLET ORAL 2 TIMES DAILY
Qty: 360 TAB | Refills: 3 | Status: SHIPPED | OUTPATIENT
Start: 2019-10-22 | End: 2019-10-23 | Stop reason: SDUPTHER

## 2019-10-23 RX ORDER — TRAZODONE HYDROCHLORIDE 50 MG/1
TABLET ORAL
Qty: 90 TAB | Refills: 3 | Status: SHIPPED | OUTPATIENT
Start: 2019-10-23 | End: 2020-08-18

## 2019-10-23 RX ORDER — LISINOPRIL AND HYDROCHLOROTHIAZIDE 12.5; 2 MG/1; MG/1
TABLET ORAL
Qty: 60 TAB | Refills: 5 | Status: SHIPPED | OUTPATIENT
Start: 2019-10-23 | End: 2020-10-29

## 2019-11-29 RX ORDER — GUAIFENESIN 100 MG/5ML
81 LIQUID (ML) ORAL DAILY
Qty: 90 TAB | Refills: 5 | Status: SHIPPED | OUTPATIENT
Start: 2019-11-29 | End: 2020-12-23

## 2019-12-23 ENCOUNTER — HOSPITAL ENCOUNTER (OUTPATIENT)
Dept: LAB | Age: 59
Discharge: HOME | End: 2019-12-23

## 2019-12-23 LAB — XX-LABCORP SPECIMEN COL,LCBCF: NORMAL

## 2019-12-23 PROCEDURE — 99001 SPECIMEN HANDLING PT-LAB: CPT

## 2020-01-06 RX ORDER — METOPROLOL TARTRATE 100 MG/1
TABLET ORAL
Qty: 60 TAB | Refills: 11 | Status: SHIPPED | OUTPATIENT
Start: 2020-01-06 | End: 2020-12-17

## 2020-02-01 ENCOUNTER — HOSPITAL ENCOUNTER (OUTPATIENT)
Dept: GENERAL RADIOLOGY | Age: 60
Discharge: HOME | End: 2020-02-01
Payer: COMMERCIAL

## 2020-02-01 DIAGNOSIS — M54.2 NECK PAIN: ICD-10-CM

## 2020-02-01 PROCEDURE — 72040 X-RAY EXAM NECK SPINE 2-3 VW: CPT

## 2020-02-03 ENCOUNTER — TELEPHONE (OUTPATIENT)
Dept: INTERNAL MEDICINE CLINIC | Age: 60
End: 2020-02-03

## 2020-02-03 NOTE — TELEPHONE ENCOUNTER
----- Message from Inez Harrell MD sent at 1/31/2020  8:56 AM EST -----  Pls get lab results for this pt

## 2020-02-03 NOTE — TELEPHONE ENCOUNTER
Spoke with patient he stated he has not had labs drawn yet.  Patient also resheduled  upcoming appoinemrnt to march

## 2020-02-17 RX ORDER — ISOSORBIDE MONONITRATE 30 MG/1
TABLET, EXTENDED RELEASE ORAL
Qty: 30 TAB | Refills: 6 | Status: SHIPPED | OUTPATIENT
Start: 2020-02-17 | End: 2020-09-24

## 2020-04-04 DIAGNOSIS — E11.9 CONTROLLED TYPE 2 DIABETES MELLITUS WITHOUT COMPLICATION, WITHOUT LONG-TERM CURRENT USE OF INSULIN (HCC): ICD-10-CM

## 2020-04-05 DIAGNOSIS — E11.9 CONTROLLED TYPE 2 DIABETES MELLITUS WITHOUT COMPLICATION, WITHOUT LONG-TERM CURRENT USE OF INSULIN (HCC): ICD-10-CM

## 2020-04-06 DIAGNOSIS — E11.9 CONTROLLED TYPE 2 DIABETES MELLITUS WITHOUT COMPLICATION, WITHOUT LONG-TERM CURRENT USE OF INSULIN (HCC): ICD-10-CM

## 2020-04-29 ENCOUNTER — VIRTUAL VISIT (OUTPATIENT)
Dept: CARDIOLOGY CLINIC | Age: 60
End: 2020-04-29

## 2020-04-29 DIAGNOSIS — I10 ESSENTIAL HYPERTENSION: ICD-10-CM

## 2020-04-29 DIAGNOSIS — Z95.5 S/P CORONARY ARTERY STENT PLACEMENT: ICD-10-CM

## 2020-04-29 DIAGNOSIS — I21.4 NSTEMI (NON-ST ELEVATED MYOCARDIAL INFARCTION) (HCC): Primary | ICD-10-CM

## 2020-04-29 DIAGNOSIS — E78.1 HYPERTRIGLYCERIDEMIA: ICD-10-CM

## 2020-04-29 RX ORDER — BACLOFEN 10 MG/1
10 TABLET ORAL 3 TIMES DAILY
COMMUNITY

## 2020-04-29 NOTE — PROGRESS NOTES
Daryle Reno is a 62 y.o. male who was seen by synchronous (real-time) audio-video technology on 4/29/2020. Consent: Daryle Reno, who was seen by synchronous (real-time) audio-video technology, and/or his healthcare decision maker, is aware that this patient-initiated, Telehealth encounter on 4/29/2020 is a billable service, with coverage as determined by his insurance carrier. He is aware that he may receive a bill and has provided verbal consent to proceed: Yes. Pt was seen via DOXY, from the [facility] office for follow up of CAD/ NSTEMI, HyperTG, HTN    Assessment & Plan:      1.) pAF with RVR, likely in setting of NSTEMI/ ACS (only one episode, ChadsVasc ~2)  2.) NSTEMI s/p PCI diag 4/2019  3.) CAD with residual nonobstructive disease  4.) DM2, known  5.) Dyslipidemia, incl HyperTG, known  6.) Tobacco abuse  7.) +FH premature CAD  8.) Normal LV function  9.) HTN  10.) Signs and symptoms suggestive of sleep disordered breathing    1.) Stop Brillinta (can stop once bottle finished)  2.) Continue ASA 81 mg daily   3.) Buy OTC fish oil (to take +Tricor + Atorva)  4.) >20 mins spent explaining ideas for diet modification and importance of weight loss  5.) If still waking up \"gasping for air\" and no weight loss by next visit, would recommend sleep study  6.) Continue monitoring BP at home, SBP goal is 140s  7.) RTC ~6 months, if no FLP by then I will plan to order it    I spent at least 23 minutes on this visit with this established patient. ([id number])    Subjective:   Daryle Reno is a 62 y.o. male who was seen for CAD. As you know, I met this gentleman a year ago in the setting of an NSTEMI.   Patient was driving and went into A. fib with RVR with shoulder and subtle left chest discomfort eventually came to the ER due to the palpitations (HR was 190s.)    We got him back in sinus rhythm and he subsequently had a left heart cath (please see report below)-and ultimately received percutaneous coronary intervention to the culprit lesion which was a diagonal branch. He has done well since on medical therapy but his TG were still elevated when last checked in 9/2019 and BP meds adjusted. He tried to get RoboCV Coil but it wasn't affordable. He has not had any recurrent symptoms- no chest pain or palps. Overall doing well but does intermittently notice that he wakes up SOB about once a month. +Snoring but says he feels rested most of the time    Prior to Admission medications    Medication Sig Start Date End Date Taking? Authorizing Provider   ticagrelor (BRILINTA) 90 mg tablet Take 1 Tab by mouth two (2) times a day. 4/8/20   Ebony Couch MD   amLODIPine (NORVASC) 5 mg tablet take 1 tablet by mouth once daily 3/16/20   Andreas Balderas MD   isosorbide mononitrate ER (IMDUR) 30 mg tablet take 1 tablet by mouth every evening 2/17/20   Foreign George NP   metoprolol tartrate (LOPRESSOR) 100 mg IR tablet take 1 tablet by mouth every 12 hours 1/6/20   Foreign George NP   aspirin 81 mg chewable tablet Take 1 Tab by mouth daily. 11/29/19   Marce Bertrand DO   fenofibrate nanocrystallized (TRICOR) 145 mg tablet take 1 tablet by mouth once daily 10/24/19   Andreas Balderas MD   lisinopril-hydroCHLOROthiazide (PRINZIDE, ZESTORETIC) 20-12.5 mg per tablet take 2 tablets by mouth twice a day 10/23/19   Noe Jacob DO   traZODone (DESYREL) 50 mg tablet TAKE 1 TABLET BY MOUTH NIGHTLY 10/23/19   Andreas Balderas MD   atorvastatin (LIPITOR) 80 mg tablet Take 1 Tab by mouth daily. 10/6/19   Andreas Balderas MD   metFORMIN (GLUCOPHAGE) 1,000 mg tablet take 1 tablet by mouth twice a day with food 9/4/19   Andreas Balderas MD   metoprolol succinate (TOPROL-XL) 100 mg tablet Take  by mouth daily. Provider, Historical   nitroglycerin (NITROSTAT) 0.4 mg SL tablet 1 Tab by SubLINGual route every five (5) minutes as needed for Chest Pain. Up to 3 doses.  5/29/19   Nikiaa Harsh JARON PA   HYDROcodone-acetaminophen (NORCO)  mg tablet Take 1 Tab by mouth every six (6) hours as needed for Pain. Provider, Historical   naloxone 4 mg/actuation spry 4 mg by Nasal route once as needed (opioid overdose) for up to 1 dose. May substitute 2 mg syringe if insurance requires 9/5/17   LUCILLE Manriquez-C   omeprazole (PRILOSEC) 20 mg capsule Take 20 mg by mouth daily. Provider, Historical   ascorbic acid (VITAMIN C) 1,000 mg tablet Take 1,000 mg by mouth two (2) times a day. Provider, Historical   MULTIVITAMIN PO Take  by mouth. Provider, Historical     No Known Allergies      ROS- 14 neg except HPI    Objective:   Vital Signs: (As obtained by patient/caregiver at home)  There were no vitals taken for this visit.        Constitutional: [x] Appears well-developed and well-nourished [x] No apparent distress      [] Abnormal -     Mental status: [x] Alert and awake  [x] Oriented to person/place/time [x] Able to follow commands    [] Abnormal -     Eyes:   EOM    [x]  Normal    [] Abnormal -   Sclera  [x]  Normal    [] Abnormal -          Discharge [x]  None visible   [] Abnormal -     HENT: [x] Normocephalic, atraumatic  [] Abnormal -   [x] Mouth/Throat: Mucous membranes are moist    External Ears [x] Normal  [] Abnormal -    Neck: [x] No visualized mass [] Abnormal -     Pulmonary/Chest: [x] Respiratory effort normal   [x] No visualized signs of difficulty breathing or respiratory distress        [] Abnormal -      Musculoskeletal:   [x] Normal gait with no signs of ataxia         [x] Normal range of motion of neck        [] Abnormal -     Neurological:        [x] No Facial Asymmetry (Cranial nerve 7 motor function) (limited exam due to video visit)          [x] No gaze palsy        [] Abnormal -          Skin:        [x] No significant exanthematous lesions or discoloration noted on facial skin         [] Abnormal -            Psychiatric:       [x] Normal Affect [] Abnormal -        [x] No Hallucinations    Labs and studies reviewed:  Lab Results   Component Value Date/Time    Cholesterol, total 160 09/30/2019 07:39 AM    HDL Cholesterol 34 (L) 09/30/2019 07:39 AM    LDL,Direct 72 02/17/2018 09:48 PM    LDL, calculated 69 09/30/2019 07:39 AM    VLDL, calculated 56 (H) 09/30/2019 07:39 AM    Triglyceride 282 (H) 09/30/2019 07:39 AM    CHOL/HDL Ratio 5.3 (H) 06/15/2010 10:02 AM     Lab Results   Component Value Date/Time    Hemoglobin A1c 7.0 (H) 09/30/2019 07:39 AM    Hemoglobin A1c, External 6.2 07/26/2016 02:00 PM     04/25/19   ECHO ADULT COMPLETE 04/26/2019 4/26/2019    Narrative · Estimated left ventricular ejection fraction is 61 - 65%. Left   ventricular moderate concentric hypertrophy. No regional wall motion   abnormality noted. Mild (grade 1) left ventricular diastolic dysfunction. · Mitral valve thickening. Mild mitral valve regurgitation. · Left atrium is upper limits of normal volume. Signed by: Sonia Gorman DO     04/25/19   CARDIAC PROCEDURE 04/29/2019 4/29/2019    Narrative · Diffuse 50% dominant RCA disease. · Diffuse LAD and circumflex 30% stenosis. · Culprit diagonal 85 to 90% stenosis  · . Diagonal branch stented to residual 0% using ROSA MARIA. · Overall normal left ventricular systolic function with EF 55 to 60%. Signed by: Lila Mcguire MD       We discussed the expected course, resolution and complications of the diagnosis(es) in detail. Medication risks, benefits, costs, interactions, and alternatives were discussed as indicated. I advised him to contact the office if his condition worsens, changes or fails to improve as anticipated. He expressed understanding with the diagnosis(es) and plan. Carolin Wilkerson is a 62 y.o. male who was evaluated by a video visit encounter for concerns as above. Patient identification was verified prior to start of the visit. A caregiver was present when appropriate.  Due to this being a TeleHealth encounter (During ODNUB-91 public health emergency), evaluation of the following organ systems was limited: Vitals/Constitutional/EENT/Resp/CV/GI//MS/Neuro/Skin/Heme-Lymph-Imm. Pursuant to the emergency declaration under the [facility], [facility] waiver authority and the [employer] and [employer] Act, this Virtual  Visit was conducted, with patient's (and/or legal guardian's) consent, to reduce the patient's risk of exposure to COVID-19 and provide necessary medical care. Services were provided through a video synchronous discussion virtually to substitute for in-person clinic visit. Patient and provider were located at their individual homes.       John Tyler DO

## 2020-04-29 NOTE — PROGRESS NOTES
Srinivas Bella presents today for No chief complaint on file. Srinivas Bella preferred language for health care discussion is english/other. Depression Screening:  3 most recent PHQ Screens 10/4/2019   Little interest or pleasure in doing things Not at all   Feeling down, depressed, irritable, or hopeless Not at all   Total Score PHQ 2 0       Learning Assessment:  Learning Assessment 5/31/2018   PRIMARY LEARNER Patient   HIGHEST LEVEL OF EDUCATION - PRIMARY LEARNER  [facility] [address] PRIMARY LEARNER -   CO-LEARNER CAREGIVER No   PRIMARY LANGUAGE ENGLISH   LEARNER PREFERENCE PRIMARY DEMONSTRATION     -   ANSWERED BY patient   RELATIONSHIP SELF       Abuse Screening:  Abuse Screening Questionnaire 10/4/2019   Do you ever feel afraid of your partner? N   Are you in a relationship with someone who physically or mentally threatens you? N   Is it safe for you to go home? Y       Fall Risk  Fall Risk Assessment, last 12 mths 10/4/2019   Able to walk? Yes   Fall in past 12 months? No           Coordination of Care:  1. Have you been to the ER, urgent care clinic since your last visit? Hospitalized since your last visit? no    2. Have you seen or consulted any other health care providers outside of the [address] [place] [address] since your last visit? Include any pap smears or colon screening.  no

## 2020-06-25 ENCOUNTER — HOSPITAL ENCOUNTER (OUTPATIENT)
Dept: LAB | Age: 60
Discharge: HOME | End: 2020-06-25

## 2020-06-25 LAB — SENTARA SPECIMEN COL,SENBCF: NORMAL

## 2020-06-25 PROCEDURE — 99001 SPECIMEN HANDLING PT-LAB: CPT

## 2020-06-26 LAB
A-G RATIO,AGRAT: 2.6 RATIO (ref 1.1–2.6)
ALBUMIN SERPL-MCNC: 4.5 G/DL (ref 3.5–5)
ALP SERPL-CCNC: 61 U/L (ref 25–115)
ALT SERPL-CCNC: 32 U/L (ref 5–40)
ANION GAP SERPL CALC-SCNC: 15.1 MMOL/L
AST SERPL W P-5'-P-CCNC: 27 U/L (ref 10–37)
AVG GLU, 10930: 167 MG/DL (ref 91–123)
BILIRUB SERPL-MCNC: 0.3 MG/DL (ref 0.2–1.2)
BUN SERPL-MCNC: 14 MG/DL (ref 6–22)
CALCIUM SERPL-MCNC: 9.6 MG/DL (ref 8.4–10.5)
CHLORIDE SERPL-SCNC: 98 MMOL/L (ref 98–110)
CHOLEST SERPL-MCNC: 136 MG/DL (ref 110–200)
CO2 SERPL-SCNC: 26 MMOL/L (ref 20–32)
CREAT SERPL-MCNC: 0.6 MG/DL (ref 0.5–1.2)
CREATININE, URINE: 146 MG/DL
GFRAA, 66117: >60
GFRNA, 66118: >60
GLOBULIN,GLOB: 1.7 G/DL (ref 2–4)
GLUCOSE SERPL-MCNC: 157 MG/DL (ref 70–99)
HBA1C MFR BLD HPLC: 7.5 % (ref 4.8–5.6)
HDLC SERPL-MCNC: 31 MG/DL
HDLC SERPL-MCNC: 4.4 MG/DL (ref 0–5)
LDL/HDL RATIO,LDHD: 2.2
LDLC SERPL CALC-MCNC: 70 MG/DL (ref 50–99)
MICROALB/CREAT RATIO, 140286: 17.8 (ref 0–30)
MICROALBUMIN,URINE RANDOM 140054: 26 MG/L (ref 0.1–17)
NON-HDL CHOLESTEROL, 011976: 105 MG/DL
POTASSIUM SERPL-SCNC: 3.9 MMOL/L (ref 3.5–5.5)
PROT SERPL-MCNC: 6.2 G/DL (ref 6.4–8.3)
SODIUM SERPL-SCNC: 139 MMOL/L (ref 133–145)
TRIGL SERPL-MCNC: 173 MG/DL (ref 40–149)
VLDLC SERPL CALC-MCNC: 35 MG/DL (ref 8–30)

## 2020-06-26 NOTE — PROGRESS NOTES
Omar Cardoso is a 62 y.o. male who was seen by synchronous (real-time) audio-video technology on 7/1/2020 for No chief complaint on file. Assessment & Plan:   Diagnoses and all orders for this visit:    1. Controlled type 2 diabetes mellitus without complication, without long-term current use of insulin (HCC)  -     empagliflozin (JARDIANCE) 25 mg tablet; Take 1 Tab by mouth daily.  -     HEMOGLOBIN A1C W/O EAG; Future  -     METABOLIC PANEL, BASIC; Future    2. Essential hypertension    3. Atrial fibrillation with rapid ventricular response ([address]    4. Hyperplastic colonic polyp, unspecified part of colon  -     REFERRAL TO GASTROENTEROLOGY    5. S/P coronary artery stent placement    6. Dyslipidemia    7. Chronic pain syndrome      I spent at least 25 minutes on this visit with this established patient. 712  Subjective:         Objective:   No flowsheet data found. General: alert, cooperative, no distress   Mental  status: normal mood, behavior, speech, dress, motor activity, and thought processes, able to follow commands   HENT: NCAT   Neck: no visualized mass   Resp: no respiratory distress   Neuro: no gross deficits   Skin: no discoloration or lesions of concern on visible areas   Psychiatric: normal affect, consistent with stated mood, no evidence of hallucinations     Additional exam findings: We discussed the expected course, resolution and complications of the diagnosis(es) in detail. Medication risks, benefits, costs, interactions, and alternatives were discussed as indicated. I advised him to contact the office if his condition worsens, changes or fails to improve as anticipated. He expressed understanding with the diagnosis(es) and plan. Omar Cardoso, who was evaluated through a patient-initiated, synchronous (real-time) audio-video encounter, and/or his healthcare decision maker, is aware that it is a billable service, with coverage as determined by his insurance carrier. He provided verbal consent to proceed: Yes, and patient identification was verified. It was conducted pursuant to the emergency declaration under the [facility], [address] [place] [address] and the [employer] and [employer] Act. A caregiver was present when appropriate. Ability to conduct physical exam was limited. I was in the office. The patient was at home. Nesha Fine, MD    Seems to be doing well, he's working through the pandemic    No cardiovascular complaints. Still no exercise program but active with work. His bp has been controlled. He saw Dr Lakshmi Gardner and no changes by report    Denies polyuria, polydipsia, nocturia, vision change. Not checking sugars regularly.  Unable to lose weight as below, he's doing better with the diet and weight down to 255 lbs on his scale    Vitals 10/4/2019 9/13/2019 7/23/2019 7/23/2019 5/29/2019 5/3/2019   Weight 260 lb 259 lb  255 lb 251 lb 245 lb     Denies any gi or gu complaints    Continues to see pain mgmt     vascepa was too expensive so he's taking some otc preparation    Past Medical History:   Diagnosis Date    Arthritis     Back problem     Brachial neuritis or radiculitis 6/27/2014    Colon polyp 08/2008    Dr Pina Lands DDD (degenerative disc disease), cervical 2/16/2016    Depression 12/13    PHQ-9 was 6/27     Diabetes ([address] 3/15    on basis of elev hba1c; [facility]; microalbumin 10/19    Dyslipidemia     Fatty liver     on US w negative serologies 2009    Foot fracture, right     9/13 5th metatarsal    GERD (gastroesophageal reflux disease)     Greater trochanteric bursitis of right hip 8/2/2016    H/O echocardiogram 04/2019    nl lv, ef 61%, mild conc lvh, no wma, gr 1 dd, mild MR    Hypertension     Hypogonadism male 03/2013    Dr Shauna Tinajero; took androgel in past    Morbid obesity ([address]     peak weight 275 lbs, bmi 36.3 from 2/14; IF 2/18 start weight 253 lbs but unable to do    NSTEMI (non-ST elevated myocardial infarction) [facility] 04/2019    s/p cath Dr Robles Barth and ROSA MARIA to DM1    Paroxysmal atrial fibrillation ([facility] 04/2019    in setting on ACS    Phymatous rosacea     Postlaminectomy syndrome, lumbar region     S/P cardiac cath 04/2019    Dr Robles Barth; diffuse 50% RCA, diff LAD, 30% Cx, 85% DM1 culprit stented w ROSA MARIA    Thoracic or lumbosacral neuritis or radiculitis, unspecified     Tobacco dependence syndrome     failed chantix and wellbutrin     Past Surgical History:   Procedure Laterality Date    HX BACK SURGERY      HX COLONOSCOPY      Dr Micky Ellis 8/22/08    HX ORTHOPAEDIC  12/2006    LEFT shoulder repair/rotator cuff    HX ORTHOPAEDIC      DEXA t score -1.0 spine, -0.4 hip (1/14) Dr. Francisco Javier Yuan HX OTHER SURGICAL  7/02    L5-S1 hemilaminectomy and diskectomy    NEUROLOGICAL PROCEDURE UNLISTED  4/13    MRI pituitary normal    [place] ANESTH,SURGERY OF SHOULDER       Social History     Socioeconomic History    Marital status:      Spouse name: Not on file    Number of children: Not on file    Years of education: Not on file    Highest education level: Not on file   Occupational History    Not on file   Social Needs    Financial resource strain: Not on file    Food insecurity     Worry: Not on file     Inability: Not on file    Transportation needs     Medical: Not on file     Non-medical: Not on file   Tobacco Use    Smoking status: Current Every Day Smoker     Packs/day: 2.00     Types: Cigarettes    Smokeless tobacco: Never Used   Substance and Sexual Activity    Alcohol use: No     Comment: One drink per 3-4 months    Drug use: No     Comment: Marijuana use in the [de-identified]    Sexual activity: Not on file   Lifestyle    Physical activity     Days per week: Not on file     Minutes per session: Not on file    Stress: Not on file   Relationships    Social connections     Talks on phone: Not on file     Gets together: Not on file     Attends [employer] service: Not on file     Active member of club or organization: Not on file     Attends meetings of clubs or organizations: Not on file     Relationship status: Not on file    Intimate partner violence     Fear of current or ex partner: Not on file     Emotionally abused: Not on file     Physically abused: Not on file     Forced sexual activity: Not on file   Other Topics Concern    Not on file   Social History Narrative    Not on file     Current Outpatient Medications   Medication Sig    empagliflozin (JARDIANCE) 25 mg tablet Take 1 Tab by mouth daily.  baclofen (LIORESAL) 10 mg tablet Take 10 mg by mouth three (3) times daily.  ticagrelor (BRILINTA) 90 mg tablet Take 1 Tab by mouth two (2) times a day.  amLODIPine (NORVASC) 5 mg tablet take 1 tablet by mouth once daily    isosorbide mononitrate ER (IMDUR) 30 mg tablet take 1 tablet by mouth every evening    metoprolol tartrate (LOPRESSOR) 100 mg IR tablet take 1 tablet by mouth every 12 hours    aspirin 81 mg chewable tablet Take 1 Tab by mouth daily.  fenofibrate nanocrystallized (TRICOR) 145 mg tablet take 1 tablet by mouth once daily    lisinopril-hydroCHLOROthiazide (PRINZIDE, ZESTORETIC) 20-12.5 mg per tablet take 2 tablets by mouth twice a day    traZODone (DESYREL) 50 mg tablet TAKE 1 TABLET BY MOUTH NIGHTLY    atorvastatin (LIPITOR) 80 mg tablet Take 1 Tab by mouth daily.  metFORMIN (GLUCOPHAGE) 1,000 mg tablet take 1 tablet by mouth twice a day with food    nitroglycerin (NITROSTAT) 0.4 mg SL tablet 1 Tab by SubLINGual route every five (5) minutes as needed for Chest Pain. Up to 3 doses.  HYDROcodone-acetaminophen (NORCO)  mg tablet Take 1 Tab by mouth every six (6) hours as needed for Pain.  naloxone 4 mg/actuation spry 4 mg by Nasal route once as needed (opioid overdose) for up to 1 dose. May substitute 2 mg syringe if insurance requires    omeprazole (PRILOSEC) 20 mg capsule Take 20 mg by mouth daily.  ascorbic acid (VITAMIN C) 1,000 mg tablet Take 1,000 mg by mouth two (2) times a day.  MULTIVITAMIN PO Take  by mouth. No current facility-administered medications for this visit.       No Known Allergies    REVIEW OF SYSTEMS: colo 6/08 Dr Aguilar Mcdaniels  no vision change or eye pain  Oral  no mouth pain, tongue or tooth problems  Ears  no hearing loss, ear pain, fullness, no swallowing problems  Cardiac  no CP, PND, orthopnea, edema, palpitations or syncope  Chest  no breast masses  Resp  no wheezing, chronic coughing, dyspnea  GI  no heartburn, nausea, vomiting, change in bowel habits, bleeding, hemorrhoids  Urinary  no dysuria, hematuria, flank pain, urgency, frequency    LABS  From 9/09 showed   gluc 88,   cr 0.70,              alt 106,                   chol 183, tg 431,  hdl 32, ldl-c NA,  wbc 8.0, hb 15.8, plt 149,        From 2/10 showed   gluc 120, cr 0.60, gfr>60, alt 113,                   chol 306, tg 1135, hdl 31, ldl-c NA,                                ast 45, ap 95, tb 0.4  From 3/10 showed                                    2 hr gtt 165  From 6/10 showed   gluc 165,                               hba1c 5.6,                tg 406,  hdl 31, ldl-c NA   From 11/12 showed gluc 102, cr 0.60,      alt 43,   hba1c 5.9, chol 183, tg 489,  hdl 28, ldl-c NA, wbc 9.0, hb 15.8, plt 185,  tsh 3.0,    psa 0.09,   From 11/12 showed                                 vit d 15.2, test 145, james 5.5, acth 1.7, fsh 5.8, lh 1.5, prl 5.1, b12 878, RA neg, crp 0.83, %sat 31, ferritin 308, hep a/b/c neg,  gracie neg, lyme neg  From 8/13 showed           hba1c 5.8, chol 176, tg 521,  hdl 23, ldl-c NA  From 11/13 showed gluc 135, cr 0.80, gfr 52,   alt 30,   hba1c 5.8, chol 154, tg 323,  hdl 31, ldl-c 58,                      vit d 20.5, test 185  From 3/14 showed           hba1c 6.0, chol 177, tg 607,  hdl 26, ldl-c NA  From 6/14 showed   gluc 114, cr 0.80, gfr>60,  alt 32,   hba1c 5.8, chol 147, tg 360,  hdl 29, ldl-c 46  From 7/14 showed   gluc 148, cr 0.77, gfr>60,             wbc 9.3, hb 14.9, plt 214  From 2/15 showed   gluc 140, cr 0.72, gfr 107, alt 39,   hba1c 6.5, chol 180, tg 377,  hdl 31, ldl-c 74, wbc 7.5, hb 15.2, plt 205  From 7/15 showed           hba1c 6.2, chol 137, tg 293,  hdl 28, ldl-c 50,               umar neg  From 7/16 showed   gluc 125, cr 0.60, gfr>60, alt 29,    hba1c 6.2, chol 145, tg 247,  hdl 32, ldl-c 64, wbc 7.6, hb 15.2, plt 198, umar 11.8, psa 0.35  From 8/17 showed   gluc 129, cr 0.80, gfr>60, alt 22,    hba1c 6.5, chol 171, tg 479,  hdl 27, ldl-c na, wbc 9.4, hb 15.8, plt 204, umar 15.0, vit d 27.0  From 2/18 showed   gluc 121, cr 0.50, gfr>60, alt 44,    hba1c 6.5, chol 168, tg 501,  hdl 26, ldl-c na,           psa 0.11  From 4/19 showed   gluc 143, cr 0.86, gfr>60,             wbc 9.0, hb 12.4, plt 178, tsh 0.48, ft4 1.00, trop+, ddimer neg  From 9/19 showed           hba1c 7.0, chol 160, tg 262, hdl 34, ldl-c 69,  wbc 10.2, hb 14.7, plt 231, umar 39.8, psa 0.09, test 357    Results for orders placed or performed in visit on 06/25/20   LIPID PANEL   Result Value Ref Range    Triglyceride 173 (H) 40 - 149 mg/dL    HDL Cholesterol 31 (L) >=40 mg/dL    Cholesterol, total 136 110 - 200 mg/dL    CHOLESTEROL/HDL 4.4 0.0 - 5.0    Non-HDL Cholesterol 105 <130 mg/dL    LDL, calculated 70 50 - 99 mg/dL    VLDL, calculated 35 (H) 8 - 30 mg/dL    LDL/HDL Ratio 2.2    METABOLIC PANEL, COMPREHENSIVE   Result Value Ref Range    Glucose 157 (H) 70 - 99 mg/dL    BUN 14 6 - 22 mg/dL    Creatinine 0.6 0.5 - 1.2 mg/dL    Sodium 139 133 - 145 mmol/L    Potassium 3.9 3.5 - 5.5 mmol/L    Chloride 98 98 - 110 mmol/L    CO2 26 20 - 32 mmol/L    AST (SGOT) 27 10 - 37 U/L    ALT (SGPT) 32 5 - 40 U/L    Alk.  phosphatase 61 25 - 115 U/L    Bilirubin, total 0.3 0.2 - 1.2 mg/dL    Calcium 9.6 8.4 - 10.5 mg/dL    Protein, total 6.2 (L) 6.4 - 8.3 g/dL    Albumin 4.5 3.5 - 5.0 g/dL    A-G Ratio 2.6 1.1 - 2.6 ratio Globulin 1.7 (L) 2.0 - 4.0 g/dL    Anion gap 15.1 mmol/L    GFRAA >60.0 >60.0    GFRNA >60.0 >60.0   HEMOGLOBIN A1C W/O EAG   Result Value Ref Range    Hemoglobin A1c 7.5 (H) 4.8 - 5.6 %    AVG  (H) 91 - 123 mg/dL   MICROALBUMIN, UR, RAND W/ MICROALB/CREAT RATIO   Result Value Ref Range    Creatinine, urine 146 mg/dL    Microalbumin, urine 26.0 (H) 0.1 - 17.0 mg/L    Microalb/Creat ratio (ug/mg creat.) 17.8 0.0 - 30.0     Patient Active Problem List   Diagnosis Code    Encounter for long-term (current) drug use Z79.899    Dyslipidemia E78.5   Breny Hoose Hypogonadism male Dr. [place] Del [place] E29.1    Hypovitaminosis D E55.9    Insomnia secondary to chronic pain G89.29, G47.01    Essential hypertension I10    DDD (degenerative disc disease), cervical M50.30    Tobacco dependence syndrome F17.200    Colon polyp 8/08 Dr Isai Suarez K63.5    Arthritis of right knee M17.11    Chronic pain syndrome G89.4    Controlled type 2 diabetes mellitus without complication, without long-term current use of insulin (HCC) E11.9    Atrial fibrillation with rapid ventricular response (HCC) I48.91    S/P coronary artery stent placement Z95.5    Severe obesity (HCC) E66.01     Assessment and plan:  1. Chronic pain. F/U pain clinic  2. HTN. Continue current   3. DM.  F/u ophth. Long discussion about glp vs sglt2 as next drug option, settled on the latter. sfx discussed, will start jardiance assuming covered by inurance  4. Dyslipidemia. Continue current, add vascepa once covered  5. Fatty liver. Wt loss would be ideal  6. Hypogonadism. Per [facility]  7. Hypovit d. Continue supplementation  8. Depression. Doing well  9. Colon polyp. Fiber, will send referral  10. Smoking cessation emphasized  11. Obesity. Lifestyle and dietary measures. Portion control reiterated. 12. CHD. F/u Dr Lou Curtis        RTC 10/20    Above conditions discussed at length and patient vocalized understanding.   All questions answered to patient satisfaction

## 2020-07-01 ENCOUNTER — VIRTUAL VISIT (OUTPATIENT)
Dept: INTERNAL MEDICINE CLINIC | Age: 60
End: 2020-07-01

## 2020-07-01 DIAGNOSIS — I10 ESSENTIAL HYPERTENSION: ICD-10-CM

## 2020-07-01 DIAGNOSIS — I48.91 ATRIAL FIBRILLATION WITH RAPID VENTRICULAR RESPONSE (HCC): ICD-10-CM

## 2020-07-01 DIAGNOSIS — Z95.5 S/P CORONARY ARTERY STENT PLACEMENT: ICD-10-CM

## 2020-07-01 DIAGNOSIS — K63.5 HYPERPLASTIC COLONIC POLYP, UNSPECIFIED PART OF COLON: ICD-10-CM

## 2020-07-01 DIAGNOSIS — E78.5 DYSLIPIDEMIA: ICD-10-CM

## 2020-07-01 DIAGNOSIS — G89.4 CHRONIC PAIN SYNDROME: ICD-10-CM

## 2020-07-01 DIAGNOSIS — E11.9 CONTROLLED TYPE 2 DIABETES MELLITUS WITHOUT COMPLICATION, WITHOUT LONG-TERM CURRENT USE OF INSULIN (HCC): Primary | ICD-10-CM

## 2020-07-07 ENCOUNTER — TELEPHONE (OUTPATIENT)
Dept: INTERNAL MEDICINE CLINIC | Age: 60
End: 2020-07-07

## 2020-08-12 RX ORDER — METFORMIN HYDROCHLORIDE 1000 MG/1
TABLET ORAL
Qty: 180 TAB | Refills: 3 | Status: SHIPPED | OUTPATIENT
Start: 2020-08-12 | End: 2021-07-15

## 2020-08-13 RX ORDER — NITROGLYCERIN 0.4 MG/1
0.4 TABLET SUBLINGUAL
Qty: 1 BOTTLE | Refills: 6 | Status: SHIPPED | OUTPATIENT
Start: 2020-08-13 | End: 2021-12-02 | Stop reason: SDUPTHER

## 2020-08-16 DIAGNOSIS — G47.00 INSOMNIA, UNSPECIFIED TYPE: ICD-10-CM

## 2020-08-18 RX ORDER — TRAZODONE HYDROCHLORIDE 50 MG/1
TABLET ORAL
Qty: 90 TAB | Refills: 3 | Status: SHIPPED | OUTPATIENT
Start: 2020-08-18 | End: 2021-06-03 | Stop reason: SDUPTHER

## 2020-08-26 ENCOUNTER — TELEPHONE (OUTPATIENT)
Dept: INTERNAL MEDICINE CLINIC | Age: 60
End: 2020-08-26

## 2020-08-26 NOTE — TELEPHONE ENCOUNTER
Thurmon Form to give  Just have to warn the patient that he might see blood sugar in the 300+ range or higher while on the high doses  should go back down to baseline as he tapers down the steroid

## 2020-08-26 NOTE — TELEPHONE ENCOUNTER
Pt calling re: drug interaction review.     Stated LUCILLE Wong with Dr Tim Mccall office asking him to check with his pcp for any drug interaction innues between his current medications and the 2 medications he is considering prescribing him    He is considering:  Medrol 4 mg dose pack or presnisone 5 mg dose pack

## 2020-09-11 DIAGNOSIS — I10 ESSENTIAL HYPERTENSION: ICD-10-CM

## 2020-09-13 RX ORDER — AMLODIPINE BESYLATE 5 MG/1
TABLET ORAL
Qty: 90 TAB | Refills: 1 | Status: SHIPPED | OUTPATIENT
Start: 2020-09-13 | End: 2021-03-11

## 2020-09-17 DIAGNOSIS — E78.5 DYSLIPIDEMIA: ICD-10-CM

## 2020-09-20 RX ORDER — ATORVASTATIN CALCIUM 80 MG/1
TABLET, FILM COATED ORAL
Qty: 90 TAB | Refills: 3 | Status: SHIPPED | OUTPATIENT
Start: 2020-09-20 | End: 2021-09-09

## 2020-09-24 RX ORDER — ISOSORBIDE MONONITRATE 30 MG/1
TABLET, EXTENDED RELEASE ORAL
Qty: 30 TAB | Refills: 6 | Status: SHIPPED | OUTPATIENT
Start: 2020-09-24 | End: 2021-04-12

## 2020-10-15 NOTE — TELEPHONE ENCOUNTER
Patient stating the Trazodone 50 mg doesn't seem to be helping him sleep. Wants to know if you can increase the dosage.

## 2020-10-16 NOTE — TELEPHONE ENCOUNTER
Pt stated he already increased medication and it does it help.  Wants script sent to [facility]

## 2020-10-19 ENCOUNTER — TELEPHONE (OUTPATIENT)
Dept: INTERNAL MEDICINE CLINIC | Age: 60
End: 2020-10-19

## 2020-10-19 NOTE — TELEPHONE ENCOUNTER
Patient is checking to see if his Trazodone has been increased per phone call last week and looking for a new prescription to be sent to his pharmacy  Please advise.

## 2020-10-20 RX ORDER — TRAZODONE HYDROCHLORIDE 100 MG/1
100 TABLET ORAL
Qty: 90 TAB | Refills: 1 | Status: SHIPPED | OUTPATIENT
Start: 2020-10-20 | End: 2021-03-24

## 2020-10-21 NOTE — TELEPHONE ENCOUNTER
Please update labs for upcoming pe. [facility] Depth Of Biopsy: dermis Consent: Written consent was obtained and risks were reviewed including but not limited to scarring, infection, bleeding, scabbing, incomplete removal, nerve damage and allergy to anesthesia. Cryotherapy Text: The wound bed was treated with cryotherapy after the biopsy was performed. Hide Additional Size Dimension?: No Hemostasis: Drysol Notification Instructions: Patient will be notified of biopsy results. However, patient instructed to call the office if not contacted within 2 weeks. Information: Selecting Yes will display possible errors in your note based on the variables you have selected. This validation is only offered as a suggestion for you. PLEASE NOTE THAT THE VALIDATION TEXT WILL BE REMOVED WHEN YOU FINALIZE YOUR NOTE. IF YOU WANT TO FAX A PRELIMINARY NOTE YOU WILL NEED TO TOGGLE THIS TO 'NO' IF YOU DO NOT WANT IT IN YOUR FAXED NOTE. Dressing: bandage Anesthesia Volume In Cc: 0.5 Silver Nitrate Text: The wound bed was treated with silver nitrate after the biopsy was performed. Curettage Text: The wound bed was treated with curettage after the biopsy was performed. Post-Care Instructions: I reviewed with the patient in detail post-care instructions. Patient is to keep the biopsy site dry overnight, and then apply bacitracin twice daily until healed. Patient may apply hydrogen peroxide soaks to remove any crusting. Billing Type: Third-Party Bill Additional Anesthesia Volume In Cc (Will Not Render If 0): 0 Was A Bandage Applied: Yes Electrodesiccation And Curettage Text: The wound bed was treated with electrodesiccation and curettage after the biopsy was performed. Biopsy Type: H and E Type Of Destruction Used: Curettage Anesthesia Type: 1% lidocaine with epinephrine Electrodesiccation Text: The wound bed was treated with electrodesiccation after the biopsy was performed. Wound Care: Petrolatum Detail Level: Detailed Biopsy Method: Dermablade

## 2020-10-26 RX ORDER — FENOFIBRATE 145 MG/1
TABLET, COATED ORAL
Qty: 30 TAB | Refills: 11 | Status: SHIPPED | OUTPATIENT
Start: 2020-10-26 | End: 2021-11-08

## 2020-10-30 RX ORDER — LISINOPRIL AND HYDROCHLOROTHIAZIDE 12.5; 2 MG/1; MG/1
TABLET ORAL
Qty: 120 TAB | Refills: 5 | Status: SHIPPED | OUTPATIENT
Start: 2020-10-30 | End: 2021-05-17 | Stop reason: SDUPTHER

## 2020-11-26 NOTE — PROGRESS NOTES
Marcela Baires is a 62 y.o. male who was seen by synchronous (real-time) audio-video technology on 12/3/2020 for Diabetes and Weight Management        Assessment & Plan:   Diagnoses and all orders for this visit:    1. Atrial fibrillation with rapid ventricular response ([facility]    2. Controlled type 2 diabetes mellitus without complication, without long-term current use of insulin (HCC)  -     CBC W/O DIFF; Future  -     LIPID PANEL; Future  -     METABOLIC PANEL, COMPREHENSIVE; Future  -     MICROALBUMIN, UR, RAND W/ MICROALB/CREAT RATIO; Future  -     HEMOGLOBIN A1C W/O EAG; Future  -     PSA SCREENING (SCREENING); Future    3. Dyslipidemia    4. Essential hypertension    5. Severe obesity ([facility]    6. S/P coronary artery stent placement        I spent at least 21 minutes on this visit with this established patient. 712  Subjective:         Objective:   No flowsheet data found. General: alert, cooperative, no distress   Mental  status: normal mood, behavior, speech, dress, motor activity, and thought processes, able to follow commands   HENT: NCAT   Neck: no visualized mass   Resp: no respiratory distress   Neuro: no gross deficits   Skin: no discoloration or lesions of concern on visible areas   Psychiatric: normal affect, consistent with stated mood, no evidence of hallucinations     Additional exam findings: We discussed the expected course, resolution and complications of the diagnosis(es) in detail. Medication risks, benefits, costs, interactions, and alternatives were discussed as indicated. I advised him to contact the office if his condition worsens, changes or fails to improve as anticipated. He expressed understanding with the diagnosis(es) and plan. Marcela Baires, who was evaluated through a patient-initiated, synchronous (real-time) audio-video encounter, and/or his healthcare decision maker, is aware that it is a billable service, with coverage as determined by his insurance carrier. He provided verbal consent to proceed: Yes, and patient identification was verified. It was conducted pursuant to the emergency declaration under the [facility], [address] [place] [address] and the [employer] Act. A caregiver was present when appropriate. Ability to conduct physical exam was limited. I was in the office. The patient was at home. Zaina Pruett MD    No cardiovascular complaints. Still no exercise program due to busy work schedule but his work is active. Sees Dr Jonatan Dyson    Denies polyuria, polydipsia, nocturia, vision change. Not checking sugars regularly. We started jardiance at the last visit and he reports weight going down from 258 to 238 on his scale!!   He's very pleased and it's motivated him to do better with the diet and no focusing on cutting out sodas, watching portions more closely, etc    Vitals 10/4/2019 9/13/2019 7/23/2019 7/23/2019 5/29/2019 5/3/2019   Weight 260 lb 259 lb  255 lb 251 lb 245 lb     Denies any gi or gu complaints    Continues to see pain mgmt and he was told he may need to have c spine surgery at some point in the future    vascepa is not covered    Past Medical History:   Diagnosis Date    Arthritis     Back problem     Brachial neuritis or radiculitis 6/27/2014    Colon polyp 08/2008    Dr Luna Castellanos DDD (degenerative disc disease), cervical 2/16/2016    Depression 12/13    PHQ-9 was 6/27     Diabetes ([facility] 3/15    on basis of elev hba1c; [facility] for teaching; microalbumin 10/19    Dyslipidemia     Fatty liver     on US w negative serologies 2009    Foot fracture, right     9/13 5th metatarsal    GERD (gastroesophageal reflux disease)     Greater trochanteric bursitis of right hip 8/2/2016    H/O echocardiogram 04/2019    nl lv, ef 61%, mild conc lvh, no wma, gr 1 dd, mild MR    Hypertension     Hypogonadism male 03/2013    Dr Jen Kumar; took androgel in past    Morbid obesity ([facility]     peak weight 275 lbs, bmi 36.3 from 2/14; IF 2/18 start weight 253 lbs but unable to do    NSTEMI (non-ST elevated myocardial infarction) ([facility] 04/2019    s/p cath Dr Licha Locke and ROSA MARIA to DM1    [facility]ox[facility] 04/2019    in setting on ACS    Phymatous rosacea     Postlaminectomy syndrome, lumbar region     S/P cardiac cath 04/2019    Dr Licha Locke; diffuse 50% RCA, diff LAD, 30% Cx, 85% DM1 culprit stented w ROSA MARIA    Thoracic or lumbosacral neuritis or radiculitis, unspecified     Tobacco dependence syndrome     failed chantix and wellbutrin     Past Surgical History:   Procedure Laterality Date    HX BACK SURGERY      HX COLONOSCOPY      Dr Rene Amaya 8/22/08    HX ORTHOPAEDIC  12/2006    LEFT shoulder repair/rotator cuff    HX ORTHOPAEDIC      DEXA t score -1.0 spine, -0.4 hip (1/14) Dr. Brasher Floor HX OTHER SURGICAL  7/02    L5-S1 hemilaminectomy and diskectomy    NEUROLOGICAL PROCEDURE UNLISTED  4/13    MRI pituitary normal    AR ANESTH,SURGERY OF SHOULDER       Social History     Socioeconomic History    Marital status:      Spouse name: Not on file    Number of children: Not on file    Years of education: Not on file    Highest education level: Not on file   Occupational History    Not on file   Social Needs    Financial resource strain: Not on file    Food insecurity     Worry: Not on file     Inability: Not on file    Transportation needs     Medical: Not on file     Non-medical: Not on file   Tobacco Use    Smoking status: Current Every Day Smoker     Packs/day: 2.00     Types: Cigarettes    Smokeless tobacco: Never Used   Substance and Sexual Activity    Alcohol use: No     Comment: One drink per 3-4 months    Drug use: No     Comment: Marijuana use in the [de-identified]    Sexual activity: Not on file   Lifestyle    Physical activity     Days per week: Not on file     Minutes per session: Not on file    Stress: Not on file   Relationships    Social connections     Talks on phone: Not on file     Gets together: Not on file     Attends Faith service: Not on file     Active member of club or organization: Not on file     Attends meetings of clubs or organizations: Not on file     Relationship status: Not on file    Intimate partner violence     Fear of current or ex partner: Not on file     Emotionally abused: Not on file     Physically abused: Not on file     Forced sexual activity: Not on file   Other Topics Concern    Not on file   Social History Narrative    Not on file     Current Outpatient Medications   Medication Sig    lisinopril-hydroCHLOROthiazide (PRINZIDE, ZESTORETIC) 20-12.5 mg per tablet take 2 tablets by mouth twice a day    fenofibrate nanocrystallized (TRICOR) 145 mg tablet take 1 tablet by mouth once daily    traZODone (DESYREL) 100 mg tablet Take 1 Tab by mouth nightly as needed for Sleep.  isosorbide mononitrate ER (IMDUR) 30 mg tablet take 1 tablet by mouth every evening    atorvastatin (LIPITOR) 80 mg tablet take 1 tablet by mouth once daily    amLODIPine (NORVASC) 5 mg tablet take 1 tablet by mouth once daily    traZODone (DESYREL) 50 mg tablet take 1 tablet by mouth nightly    nitroglycerin (NITROSTAT) 0.4 mg SL tablet 1 Tab by SubLINGual route every five (5) minutes as needed for Chest Pain. Up to 3 doses.  metFORMIN (GLUCOPHAGE) 1,000 mg tablet take 1 tablet by mouth twice a day with food    empagliflozin (JARDIANCE) 25 mg tablet Take 1 Tab by mouth daily.  baclofen (LIORESAL) 10 mg tablet Take 10 mg by mouth three (3) times daily.  ticagrelor (BRILINTA) 90 mg tablet Take 1 Tab by mouth two (2) times a day.  metoprolol tartrate (LOPRESSOR) 100 mg IR tablet take 1 tablet by mouth every 12 hours    aspirin 81 mg chewable tablet Take 1 Tab by mouth daily.  HYDROcodone-acetaminophen (NORCO)  mg tablet Take 1 Tab by mouth every six (6) hours as needed for Pain.     naloxone 4 mg/actuation spry 4 mg by Nasal route once as needed (opioid overdose) for up to 1 dose. May substitute 2 mg syringe if insurance requires    omeprazole (PRILOSEC) 20 mg capsule Take 20 mg by mouth daily.  ascorbic acid (VITAMIN C) 1,000 mg tablet Take 1,000 mg by mouth two (2) times a day.  MULTIVITAMIN PO Take  by mouth. No current facility-administered medications for this visit.       No Known Allergies    REVIEW OF SYSTEMS: colo 6/08 Dr Swetha Lincoln  no vision change or eye pain  Oral  no mouth pain, tongue or tooth problems  Ears  no hearing loss, ear pain, fullness, no swallowing problems  Cardiac  no CP, PND, orthopnea, edema, palpitations or syncope  Chest  no breast masses  Resp  no wheezing, chronic coughing, dyspnea  GI  no heartburn, nausea, vomiting, change in bowel habits, bleeding, hemorrhoids  Urinary  no dysuria, hematuria, flank pain, urgency, frequency    LABS  From 9/09 showed   gluc 88,   cr 0.70,              alt 106,                   chol 183, tg 431,  hdl 32, ldl-c NA,  wbc 8.0, hb 15.8, plt 149,        From 2/10 showed   gluc 120, cr 0.60, gfr>60, alt 113,                   chol 306, tg 1135, hdl 31, ldl-c NA,                                 ast 45, ap 95, tb 0.4  From 3/10 showed                                     2 hr gtt 165  From 6/10 showed   gluc 165,                               hba1c 5.6,                tg 406,  hdl 31, ldl-c NA   From 11/12 showed gluc 102, cr 0.60,      alt 43,   hba1c 5.9, chol 183, tg 489,  hdl 28, ldl-c NA, wbc 9.0, hb 15.8, plt 185,  tsh 3.0,     psa 0.09,   From 11/12 showed                                  vit d 15.2, test 145, james 5.5, acth 1.7, fsh 5.8, lh 1.5, prl 5.1, b12 878, RA neg, crp 0.83, %sat 31, ferritin 308, hep a/b/c neg,  gracie neg, lyme neg  From 8/13 showed           hba1c 5.8, chol 176, tg 521,  hdl 23, ldl-c NA  From 11/13 showed gluc 135, cr 0.80, gfr 52,   alt 30,   hba1c 5.8, chol 154, tg 323,  hdl 31, ldl-c 58, vit d 20.5, test 185  From 3/14 showed           hba1c 6.0, chol 177, tg 607,  hdl 26, ldl-c NA  From 6/14 showed   gluc 114, cr 0.80, gfr>60,  alt 32,   hba1c 5.8, chol 147, tg 360,  hdl 29, ldl-c 46  From 7/14 showed   gluc 148, cr 0.77, gfr>60,             wbc 9.3, hb 14.9, plt 214  From 2/15 showed   gluc 140, cr 0.72, gfr 107, alt 39,   hba1c 6.5, chol 180, tg 377,  hdl 31, ldl-c 74, wbc 7.5, hb 15.2, plt 205  From 7/15 showed           hba1c 6.2, chol 137, tg 293,  hdl 28, ldl-c 50,               umar neg  From 7/16 showed   gluc 125, cr 0.60, gfr>60, alt 29,    hba1c 6.2, chol 145, tg 247,  hdl 32, ldl-c 64, wbc 7.6, hb 15.2, plt 198, umar 11.8, psa 0.35  From 8/17 showed   gluc 129, cr 0.80, gfr>60, alt 22,    hba1c 6.5, chol 171, tg 479,  hdl 27, ldl-c na, wbc 9.4, hb 15.8, plt 204, umar 15.0, vit d 27.0  From 2/18 showed   gluc 121, cr 0.50, gfr>60, alt 44,    hba1c 6.5, chol 168, tg 501,  hdl 26, ldl-c na,           psa 0.11  From 4/19 showed   gluc 143, cr 0.86, gfr>60,             wbc 9.0, hb 12.4, plt 178, tsh 0.48,     ft4 1.00, trop+, ddimer neg  From 9/19 showed           hba1c 7.0, chol 160, tg 262, hdl 34, ldl-c 69,  wb 10.2, hb 14.7, plt 231, umar 39.8, psa 0.09, test 357  From 6/20 showed   gluc 157, cr 0.60, gfr>60,  alt 32,   hba1c 7.5, chol 136, tg 173, hdl 31, ldl-c 70,               umar 17.8    Results for orders placed or performed during the hospital encounter of 11/28/20   [address] [place] [address].    Result Value Ref Range    [facility] Specimens collected/sent to [facility]       We reviewed the patient's labs from the last several visits to point out trends in the numbers        Patient Active Problem List   Diagnosis Code    Encounter for long-term (current) drug use Z79.899    Dyslipidemia E78.5   [facility] Hypogonadism male Dr. Rodríguez Silverman E29.1    Hypovitaminosis D E55.9    Insomnia secondary to chronic pain G89.29, G47.01    Essential hypertension I10    DDD (degenerative disc disease), cervical M50.30    Tobacco dependence syndrome F17.200    Colon polyp 8/08 Dr Lew Downey K63.5    Arthritis of right knee M17.11    Chronic pain syndrome G89.4    Controlled type 2 diabetes mellitus without complication, without long-term current use of insulin (HCC) E11.9    Atrial fibrillation with rapid ventricular response (HCC) I48.91    S/P coronary artery stent placement Z95.5    Severe obesity (HCC) E66.01     Assessment and plan:  1. Chronic pain. F/U pain clinic  2. HTN. Continue current   3. DM.  F/u ophth. Continue current regimen. 4. Dyslipidemia. Continue current, add vascepa once covered  5. Fatty liver. Wt loss would be ideal  6. Hypogonadism. Per [facility]  7. Hypovit d. Continue supplementation  8. Depression. Doing well  9. Colon polyp. Fiber, will send referral  10. Smoking cessation emphasized  11. Obesity. Lifestyle and dietary measures. Portion control reiterated. 12. CHD. F/u Dr Malou Smith        RTC 6/12    Above conditions discussed at length and patient vocalized understanding.   All questions answered to patient satisfaction

## 2020-11-28 ENCOUNTER — HOSPITAL ENCOUNTER (OUTPATIENT)
Dept: LAB | Age: 60
Discharge: HOME | End: 2020-11-28

## 2020-11-28 LAB
ANION GAP SERPL CALC-SCNC: 12.7 MMOL/L (ref 3–15)
BUN SERPL-MCNC: 20 MG/DL (ref 6–22)
CALCIUM SERPL-MCNC: 9.6 MG/DL (ref 8.4–10.5)
CHLORIDE SERPL-SCNC: 100 MMOL/L (ref 98–110)
CO2 SERPL-SCNC: 29 MMOL/L (ref 20–32)
CREAT SERPL-MCNC: 0.9 MG/DL (ref 0.5–1.2)
GFRAA, 66117: >60
GFRNA, 66118: >60
GLUCOSE SERPL-MCNC: 122 MG/DL (ref 70–99)
POTASSIUM SERPL-SCNC: 3.7 MMOL/L (ref 3.5–5.5)
SENTARA SPECIMEN COL,SENBCF: NORMAL
SODIUM SERPL-SCNC: 142 MMOL/L (ref 133–145)

## 2020-11-28 PROCEDURE — 99001 SPECIMEN HANDLING PT-LAB: CPT

## 2020-11-29 LAB
AVG GLU, 10930: 163 MG/DL (ref 91–123)
HBA1C MFR BLD HPLC: 7.3 % (ref 4.8–5.6)

## 2020-12-03 ENCOUNTER — VIRTUAL VISIT (OUTPATIENT)
Dept: INTERNAL MEDICINE CLINIC | Age: 60
End: 2020-12-03
Payer: COMMERCIAL

## 2020-12-03 DIAGNOSIS — I10 ESSENTIAL HYPERTENSION: ICD-10-CM

## 2020-12-03 DIAGNOSIS — E66.01 SEVERE OBESITY (HCC): ICD-10-CM

## 2020-12-03 DIAGNOSIS — E78.5 DYSLIPIDEMIA: ICD-10-CM

## 2020-12-03 DIAGNOSIS — E11.9 CONTROLLED TYPE 2 DIABETES MELLITUS WITHOUT COMPLICATION, WITHOUT LONG-TERM CURRENT USE OF INSULIN (HCC): ICD-10-CM

## 2020-12-03 DIAGNOSIS — I48.91 ATRIAL FIBRILLATION WITH RAPID VENTRICULAR RESPONSE (HCC): Primary | ICD-10-CM

## 2020-12-03 DIAGNOSIS — Z95.5 S/P CORONARY ARTERY STENT PLACEMENT: ICD-10-CM

## 2020-12-03 PROCEDURE — 3051F HG A1C>EQUAL 7.0%<8.0%: CPT | Performed by: INTERNAL MEDICINE

## 2020-12-03 PROCEDURE — 99214 OFFICE O/P EST MOD 30 MIN: CPT | Performed by: INTERNAL MEDICINE

## 2020-12-17 DIAGNOSIS — E11.9 CONTROLLED TYPE 2 DIABETES MELLITUS WITHOUT COMPLICATION, WITHOUT LONG-TERM CURRENT USE OF INSULIN (HCC): ICD-10-CM

## 2020-12-17 RX ORDER — METOPROLOL TARTRATE 100 MG/1
TABLET ORAL
Qty: 60 TAB | Refills: 11 | Status: SHIPPED | OUTPATIENT
Start: 2020-12-17 | End: 2021-12-02 | Stop reason: SDUPTHER

## 2020-12-18 RX ORDER — EMPAGLIFLOZIN 25 MG/1
TABLET, FILM COATED ORAL
Qty: 30 TAB | Refills: 5 | Status: SHIPPED | OUTPATIENT
Start: 2020-12-18 | End: 2021-06-07

## 2020-12-23 RX ORDER — GUAIFENESIN 100 MG/5ML
LIQUID (ML) ORAL
Qty: 90 TAB | Refills: 5 | Status: SHIPPED | OUTPATIENT
Start: 2020-12-23 | End: 2021-06-03

## 2020-12-29 DIAGNOSIS — E11.9 CONTROLLED TYPE 2 DIABETES MELLITUS WITHOUT COMPLICATION, WITHOUT LONG-TERM CURRENT USE OF INSULIN (HCC): ICD-10-CM

## 2021-01-01 DIAGNOSIS — E11.9 CONTROLLED TYPE 2 DIABETES MELLITUS WITHOUT COMPLICATION, WITHOUT LONG-TERM CURRENT USE OF INSULIN (HCC): ICD-10-CM

## 2021-04-12 RX ORDER — ISOSORBIDE MONONITRATE 30 MG/1
TABLET, EXTENDED RELEASE ORAL
Qty: 30 TAB | Refills: 6 | Status: SHIPPED | OUTPATIENT
Start: 2021-04-12 | End: 2021-06-03

## 2021-05-17 NOTE — TELEPHONE ENCOUNTER
Received call from patient needing refill for this medication. Returned his call and left message left that he needs to make a follow up appt with Dr. Kenisha Flanagan. Last appt 4/2020.

## 2021-05-18 RX ORDER — LISINOPRIL AND HYDROCHLOROTHIAZIDE 12.5; 2 MG/1; MG/1
TABLET ORAL
Qty: 120 TAB | Refills: 5 | Status: SHIPPED | OUTPATIENT
Start: 2021-05-18 | End: 2021-06-03 | Stop reason: SDUPTHER

## 2021-06-02 DIAGNOSIS — E11.9 CONTROLLED TYPE 2 DIABETES MELLITUS WITHOUT COMPLICATION, WITHOUT LONG-TERM CURRENT USE OF INSULIN (HCC): ICD-10-CM

## 2021-06-03 ENCOUNTER — OFFICE VISIT (OUTPATIENT)
Dept: CARDIOLOGY CLINIC | Age: 61
End: 2021-06-03
Payer: COMMERCIAL

## 2021-06-03 VITALS
HEART RATE: 59 BPM | SYSTOLIC BLOOD PRESSURE: 122 MMHG | HEIGHT: 72 IN | OXYGEN SATURATION: 98 % | WEIGHT: 231 LBS | BODY MASS INDEX: 31.29 KG/M2 | DIASTOLIC BLOOD PRESSURE: 80 MMHG

## 2021-06-03 DIAGNOSIS — E11.9 CONTROLLED TYPE 2 DIABETES MELLITUS WITHOUT COMPLICATION, WITHOUT LONG-TERM CURRENT USE OF INSULIN (HCC): ICD-10-CM

## 2021-06-03 DIAGNOSIS — I21.4 NSTEMI (NON-ST ELEVATED MYOCARDIAL INFARCTION) (HCC): Primary | ICD-10-CM

## 2021-06-03 PROCEDURE — 99214 OFFICE O/P EST MOD 30 MIN: CPT | Performed by: INTERNAL MEDICINE

## 2021-06-03 PROCEDURE — 93000 ELECTROCARDIOGRAM COMPLETE: CPT | Performed by: INTERNAL MEDICINE

## 2021-06-03 RX ORDER — LISINOPRIL AND HYDROCHLOROTHIAZIDE 12.5; 2 MG/1; MG/1
TABLET ORAL
Qty: 120 TABLET | Refills: 5 | Status: SHIPPED | OUTPATIENT
Start: 2021-06-03 | End: 2021-12-02 | Stop reason: SDUPTHER

## 2021-06-03 RX ORDER — GUAIFENESIN 100 MG/5ML
325 LIQUID (ML) ORAL DAILY
Qty: 120 TABLET | Refills: 5 | Status: SHIPPED | OUTPATIENT
Start: 2021-06-03 | End: 2021-12-02 | Stop reason: SDUPTHER

## 2021-06-03 NOTE — PROGRESS NOTES
Bryan Asencio    pAFib RVR, NSTEMI, CAD s/p PCI 4/2019    HPI    Bryan Asencio is a 62 y.o. male with CAD s/p NSTEMI here for routine follow up. As you know I met this extremely pleasant patient earlier this spring in April 2019. Patient was driving when he experienced sudden onset comfort in his left shoulder. The pain was actually quite severe and he became very short of breath and then experienced sudden onset racing palpitations. He ended up going to the emergency department was found to be in atrial fibrillation with rapid ventricular response as fast as 190 bpm.  It was in the setting he had a positive troponin but I suspected ischemia as the provoking trigger due to patient's risk factors and symptoms. We got him back in sinus rhythm and he subsequently had a left heart cath (please see report below)-and ultimately received percutaneous coronary intervention to the culprit lesion which was a diagonal.  He has done well since on medical therapy. He has not had any recurrent symptoms-chest pain no palpitations no shortness of breath. Despite the MI he has preserved LV function. No recurrent AFib or chest pressure. Says his finger tips can turn white when its cold outside and can wake up with numbness in his right arm.     Past Medical History:   Diagnosis Date    Arthritis     Back problem     Brachial neuritis or radiculitis 6/27/2014    Colon polyp 08/2008    Dr Silvia Johns DDD (degenerative disc disease), cervical 2/16/2016    Depression 12/13    PHQ-9 was 6/27     Diabetes ([facility] 3/15    on basis of elev hba1c; [facility]; microalbumin 10/19    Dyslipidemia     Fatty liver     on US w negative serologies 2009    Foot fracture, right     9/13 5th metatarsal    GERD (gastroesophageal reflux disease)     Greater trochanteric bursitis of right hip 8/2/2016    H/O echocardiogram 04/2019    nl lv, ef 61%, mild conc lvh, no wma, gr 1 dd, mild MR    Hypertension     Hypogonadism male 03/2013    Dr Yasir Jarrell; took androgel in past    Morbid obesity ([facility]     peak weight 275 lbs, bmi 36.3 from 2/14; IF 2/18 start weight 253 lbs but unable to do    NSTEMI (non-ST elevated myocardial infarction) ([facility] 04/2019    s/p cath Dr Adolfo Whitehead and ROSA MARIA to DM1    Paroxysmal atrial fibrillation ([facility] 04/2019    in setting on ACS    Phymatous rosacea     Postlaminectomy syndrome, lumbar region     S/P cardiac cath 04/2019    Dr Adolfo Whitehead; diffuse 50% RCA, diff LAD, 30% Cx, 85% DM1 culprit stented w ROSA MARIA    Thoracic or lumbosacral neuritis or radiculitis, unspecified     Tobacco dependence syndrome     failed chantix and wellbutrin       Past Surgical History:   Procedure Laterality Date    HX BACK SURGERY      HX COLONOSCOPY      Dr Gricelda Lucas 8/22/08    HX ORTHOPAEDIC  12/2006    LEFT shoulder repair/rotator cuff    HX ORTHOPAEDIC      DEXA t score -1.0 spine, -0.4 hip (1/14) Dr. Flavio Grace HX OTHER SURGICAL  7/02    L5-S1 hemilaminectomy and diskectomy    NEUROLOGICAL PROCEDURE UNLISTED  4/13    MRI pituitary normal    IA ANESTH,SURGERY OF SHOULDER         Current Outpatient Medications   Medication Sig Dispense Refill    lisinopril-hydroCHLOROthiazide (PRINZIDE, ZESTORETIC) 20-12.5 mg per tablet take 2 tablets by mouth twice a day 120 Tab 5    traZODone (DESYREL) 100 mg tablet take 1 tablet by mouth nightly if needed for sleep 90 Tab 3    amLODIPine (NORVASC) 5 mg tablet take 1 tablet by mouth once daily 90 Tab 3    aspirin 81 mg chewable tablet chew and swallow 1 tablet by mouth once daily 90 Tab 5    Jardiance 25 mg tablet take 1 tablet by mouth once daily 30 Tab 5    metoprolol tartrate (LOPRESSOR) 100 mg IR tablet take 1 tablet by mouth every 12 hours 60 Tab 11    fenofibrate nanocrystallized (TRICOR) 145 mg tablet take 1 tablet by mouth once daily 30 Tab 11    atorvastatin (LIPITOR) 80 mg tablet take 1 tablet by mouth once daily 90 Tab 3    nitroglycerin (NITROSTAT) 0.4 mg SL tablet 1 Tab by SubLINGual route every five (5) minutes as needed for Chest Pain. Up to 3 doses. 1 Bottle 6    metFORMIN (GLUCOPHAGE) 1,000 mg tablet take 1 tablet by mouth twice a day with food 180 Tab 3    baclofen (LIORESAL) 10 mg tablet Take 10 mg by mouth three (3) times daily.  HYDROcodone-acetaminophen (NORCO)  mg tablet Take 1 Tab by mouth every six (6) hours as needed for Pain.  naloxone 4 mg/actuation spry 4 mg by Nasal route once as needed (opioid overdose) for up to 1 dose. May substitute 2 mg syringe if insurance requires 1 Package 0    omeprazole (PRILOSEC) 20 mg capsule Take 20 mg by mouth daily.  ascorbic acid (VITAMIN C) 1,000 mg tablet Take 1,000 mg by mouth two (2) times a day.  MULTIVITAMIN PO Take  by mouth. No Known Allergies    Social History     Socioeconomic History    Marital status:      Spouse name: Not on file    Number of children: Not on file    Years of education: Not on file    Highest education level: Not on file   Occupational History    Not on file   Tobacco Use    Smoking status: Current Every Day Smoker     Packs/day: 2.00     Types: Cigarettes    Smokeless tobacco: Never Used   Substance and Sexual Activity    Alcohol use: No     Comment: One drink per 3-4 months    Drug use: No     Comment: Marijuana use in the [de-identified]    Sexual activity: Not on file   Other Topics Concern    Not on file   Social History Narrative    Not on file     Social Determinants of Health     Financial Resource Strain:     Difficulty of Paying Living Expenses:    Food Insecurity:     Worried About Running Out of Food in the Last Year:     Ran Out of Food in the Last Year:    Transportation Needs:     Lack of Transportation (Medical):      Lack of Transportation (Non-Medical):    Physical Activity:     Days of Exercise per Week:     Minutes of Exercise per Session:    Stress:     Feeling of Stress :    Social Connections:     Frequency of Communication with Friends and Family:     Frequency of Social Gatherings with Friends and Family:     Attends Sikhism Services:     Active Member of Clubs or Organizations:     Attends Club or Organization Meetings:     Marital Status:    Intimate Partner Violence:     Fear of Current or Ex-Partner:     Emotionally Abused:     Physically Abused:     Sexually Abused:         Review of Systems    14 pt Review of Systems is negative unless otherwise mentioned in the HPI. Wt Readings from Last 3 Encounters:   06/03/21 104.8 kg (231 lb)   10/04/19 117.9 kg (260 lb)   09/13/19 117.5 kg (259 lb)     Temp Readings from Last 3 Encounters:   10/04/19 97.9 °F (36.6 °C) (Oral)   05/03/19 98.3 °F (36.8 °C) (Oral)   04/30/19 98.3 °F (36.8 °C)     BP Readings from Last 3 Encounters:   06/03/21 122/80   10/04/19 160/84   09/13/19 140/78     Pulse Readings from Last 3 Encounters:   06/03/21 (!) 59   10/04/19 66   09/13/19 89     Physical Exam:    Visit Vitals  /80 (BP 1 Location: Left upper arm, BP Patient Position: Sitting, BP Cuff Size: Adult)   Pulse (!) 59   Ht 6' (1.829 m)   Wt 104.8 kg (231 lb)   SpO2 98%   BMI 31.33 kg/m²      Physical Exam  Constitutional:       Appearance: He is well-developed. HENT:      Head: Normocephalic and atraumatic. Eyes:      General: No scleral icterus. Pupils: Pupils are equal, round, and reactive to light. Neck:      Vascular: No JVD. Cardiovascular:      Rate and Rhythm: Normal rate and regular rhythm. Heart sounds: Normal heart sounds. No murmur heard. No friction rub. No gallop. Pulmonary:      Effort: Pulmonary effort is normal. No respiratory distress. Breath sounds: Normal breath sounds. No wheezing or rales. Chest:      Chest wall: No tenderness. Abdominal:      General: Bowel sounds are normal.      Palpations: Abdomen is soft. Skin:     General: Skin is warm and dry. Findings: No rash.    Neurological:      Mental Status: He is alert and oriented to person, place, and time. EKG last: NSR, normal axis and intervals, no ST segment abnormalities    Lab Results   Component Value Date/Time    Cholesterol, total 136 06/25/2020 09:20 AM    HDL Cholesterol 31 (L) 06/25/2020 09:20 AM    LDL,Direct 72 02/17/2018 09:48 PM    LDL, calculated 70 06/25/2020 09:20 AM    VLDL, calculated 35 (H) 06/25/2020 09:20 AM    Triglyceride 173 (H) 06/25/2020 09:20 AM    CHOL/HDL Ratio 5.3 (H) 06/15/2010 10:02 AM     Lab Results   Component Value Date/Time    Hemoglobin A1c 7.3 (H) 11/28/2020 10:00 AM    Hemoglobin A1c, External 6.2 07/26/2016 02:00 PM     04/25/19   ECHO ADULT COMPLETE 04/26/2019 4/26/2019    Narrative · Estimated left ventricular ejection fraction is 61 - 65%. Left   ventricular moderate concentric hypertrophy. No regional wall motion   abnormality noted. Mild (grade 1) left ventricular diastolic dysfunction. · Mitral valve thickening. Mild mitral valve regurgitation. · Left atrium is upper limits of normal volume. Signed by: Hugo Chandra DO     04/25/19   CARDIAC PROCEDURE 04/29/2019 4/29/2019    Narrative · Diffuse 50% dominant RCA disease. · Diffuse LAD and circumflex 30% stenosis. · Culprit diagonal 85 to 90% stenosis  · . Diagonal branch stented to residual 0% using ROSA MARIA. · Overall normal left ventricular systolic function with EF 55 to 60%.         Signed by: Lakeshia Valdez MD         Impression and Plan:  Mitra Nieves is a 62 y.o. with:    1.) pAF with RVR, likely in setting of NSTEMI/ ACS (only one episode, ChadsVasc ~2)  2.) NSTEMI s/p PCI diag 4/2019  3.) CAD with residual nonobstructive disease  4.) DM2, known  5.) Dyslipidemia, incl HyperTG, known  6.) Tobacco abuse  7.) +FH premature CAD  8.) Normal LV function  9.) HTN    1.) Stopped Brillinta (can stop once bottle finished) 5/2/21  2.) Increase ASA to 325 mg daily   3.) Buy OTC fish oil (to take +Tricor + Atorva)  4.) Can stop Imdur today  5.) If still waking up \"gasping for air\" and no weight loss by next visit, would recommend sleep study  6.) Continue monitoring BP at home, SBP goal is 140s  7.) RTC ~6 months, if no FLP by then I will plan to order it then can see me yearly    Needs to improve DM2 control and tobacco  All questions answered    Thank you for allowing me to participate in the care of your patient, please do not hesitate to call with questions or concerns.     Kindest Regards,    Alissa Stokes, DO

## 2021-06-03 NOTE — PROGRESS NOTES
Eloise Braun presents today for No chief complaint on file. Eloise Braun preferred language for health care discussion is english/other. Is someone accompanying this pt? no    Is the patient using any DME equipment during [address]? no    Depression Screening:  3 most recent PHQ Screens 6/3/2021   Little interest or pleasure in doing things Not at all   Feeling down, depressed, irritable, or hopeless Not at all   Total Score PHQ 2 0       Learning Assessment:  Learning Assessment 5/31/2018   PRIMARY LEARNER Patient   HIGHEST LEVEL OF EDUCATION - PRIMARY LEARNER  [facility] [address] PRIMARY LEARNER -   CO-LEARNER CAREGIVER No   PRIMARY LANGUAGE ENGLISH   LEARNER PREFERENCE PRIMARY DEMONSTRATION     -   ANSWERED BY patient   RELATIONSHIP SELF       Abuse Screening:  Abuse Screening Questionnaire 6/3/2021   Do you ever feel afraid of your partner? N   Are you in a relationship with someone who physically or mentally threatens you? N   Is it safe for you to go home? Y       Fall Risk  Fall Risk Assessment, last 12 mths 10/4/2019   Able to walk? Yes   Fall in past 12 months? No       Pt currently taking Anticoagulant therapy? Brilinta 90mg    Coordination of Care:  1. Have you been to the ER, urgent care clinic since your last visit? Hospitalized since your last visit? no    2. Have you seen or consulted any other health care providers outside of the [address] [place] [address] since your last visit? Include any pap smears or colon screening.  no

## 2021-06-03 NOTE — PATIENT INSTRUCTIONS
Stop imdur  Increase Aspirin to 325 mg once daily   Follow up in 6 months with EKG or sooner if needed.

## 2021-06-06 DIAGNOSIS — E11.9 CONTROLLED TYPE 2 DIABETES MELLITUS WITHOUT COMPLICATION, WITHOUT LONG-TERM CURRENT USE OF INSULIN (HCC): ICD-10-CM

## 2021-06-07 RX ORDER — EMPAGLIFLOZIN 25 MG/1
TABLET, FILM COATED ORAL
Qty: 30 TABLET | Refills: 5 | Status: SHIPPED | OUTPATIENT
Start: 2021-06-07 | End: 2021-12-06

## 2021-06-12 ENCOUNTER — HOSPITAL ENCOUNTER (OUTPATIENT)
Dept: LAB | Age: 61
Discharge: HOME | End: 2021-06-12

## 2021-06-12 LAB
AVG GLU, 10930: 128 MG/DL (ref 91–123)
ERYTHROCYTE [DISTWIDTH] IN BLOOD BY AUTOMATED COUNT: 13.4 % (ref 10–15.5)
HBA1C MFR BLD HPLC: 6.1 % (ref 4.8–5.6)
HCT VFR BLD AUTO: 53.6 % (ref 39.3–51.6)
HGB BLD-MCNC: 17.1 G/DL (ref 13.1–17.2)
MCH RBC QN AUTO: 28 PG (ref 26–34)
MCHC RBC AUTO-ENTMCNC: 32 G/DL (ref 31–36)
MCV RBC AUTO: 88 FL (ref 80–95)
PLATELET # BLD AUTO: 216 K/UL (ref 140–440)
PMV BLD AUTO: 11.2 FL (ref 9–13)
RBC # BLD AUTO: 6.1 M/UL (ref 3.8–5.8)
SENTARA SPECIMEN COL,SENBCF: NORMAL
WBC # BLD AUTO: 10.3 K/UL (ref 4–11)

## 2021-06-12 PROCEDURE — 99001 SPECIMEN HANDLING PT-LAB: CPT

## 2021-06-13 LAB
A-G RATIO,AGRAT: 2.2 RATIO (ref 1.1–2.6)
ALBUMIN SERPL-MCNC: 4.4 G/DL (ref 3.5–5)
ALP SERPL-CCNC: 70 U/L (ref 40–125)
ALT SERPL-CCNC: 20 U/L (ref 5–40)
ANION GAP SERPL CALC-SCNC: 13 MMOL/L (ref 3–15)
AST SERPL W P-5'-P-CCNC: 18 U/L (ref 10–37)
BILIRUB SERPL-MCNC: 0.3 MG/DL (ref 0.2–1.2)
BUN SERPL-MCNC: 18 MG/DL (ref 6–22)
CALCIUM SERPL-MCNC: 10.2 MG/DL (ref 8.4–10.5)
CHLORIDE SERPL-SCNC: 100 MMOL/L (ref 98–110)
CHOLEST SERPL-MCNC: 137 MG/DL (ref 110–200)
CO2 SERPL-SCNC: 29 MMOL/L (ref 20–32)
CREAT SERPL-MCNC: 0.8 MG/DL (ref 0.8–1.6)
GFRAA, 66117: >60
GFRNA, 66118: >60
GLOBULIN,GLOB: 2 G/DL (ref 2–4)
GLUCOSE SERPL-MCNC: 131 MG/DL (ref 70–99)
HDLC SERPL-MCNC: 32 MG/DL
HDLC SERPL-MCNC: 4.3 MG/DL (ref 0–5)
LDL/HDL RATIO,LDHD: 2.2
LDLC SERPL CALC-MCNC: 70 MG/DL (ref 50–99)
NON-HDL CHOLESTEROL, 011976: 105 MG/DL
POTASSIUM SERPL-SCNC: 3.9 MMOL/L (ref 3.5–5.5)
PROT SERPL-MCNC: 6.4 G/DL (ref 6.2–8.1)
PSA SERPL-MCNC: 0.08 NG/ML
SODIUM SERPL-SCNC: 142 MMOL/L (ref 133–145)
TRIGL SERPL-MCNC: 178 MG/DL (ref 40–149)
VLDLC SERPL CALC-MCNC: 36 MG/DL (ref 8–30)

## 2021-06-15 ENCOUNTER — OFFICE VISIT (OUTPATIENT)
Dept: INTERNAL MEDICINE CLINIC | Age: 61
End: 2021-06-15
Payer: COMMERCIAL

## 2021-06-15 VITALS
SYSTOLIC BLOOD PRESSURE: 114 MMHG | TEMPERATURE: 97 F | BODY MASS INDEX: 30.88 KG/M2 | HEART RATE: 71 BPM | DIASTOLIC BLOOD PRESSURE: 69 MMHG | RESPIRATION RATE: 14 BRPM | HEIGHT: 72 IN | OXYGEN SATURATION: 97 % | WEIGHT: 228 LBS

## 2021-06-15 DIAGNOSIS — F32.A DEPRESSION, UNSPECIFIED DEPRESSION TYPE: ICD-10-CM

## 2021-06-15 DIAGNOSIS — E78.5 DYSLIPIDEMIA: ICD-10-CM

## 2021-06-15 DIAGNOSIS — E11.9 CONTROLLED TYPE 2 DIABETES MELLITUS WITHOUT COMPLICATION, WITHOUT LONG-TERM CURRENT USE OF INSULIN (HCC): ICD-10-CM

## 2021-06-15 DIAGNOSIS — I48.91 ATRIAL FIBRILLATION WITH RAPID VENTRICULAR RESPONSE (HCC): ICD-10-CM

## 2021-06-15 DIAGNOSIS — I10 ESSENTIAL HYPERTENSION: ICD-10-CM

## 2021-06-15 DIAGNOSIS — E11.9 CONTROLLED TYPE 2 DIABETES MELLITUS WITHOUT COMPLICATION, WITHOUT LONG-TERM CURRENT USE OF INSULIN (HCC): Primary | ICD-10-CM

## 2021-06-15 DIAGNOSIS — Z95.5 S/P CORONARY ARTERY STENT PLACEMENT: ICD-10-CM

## 2021-06-15 DIAGNOSIS — K63.5 HYPERPLASTIC COLONIC POLYP, UNSPECIFIED PART OF COLON: ICD-10-CM

## 2021-06-15 PROCEDURE — 99214 OFFICE O/P EST MOD 30 MIN: CPT | Performed by: INTERNAL MEDICINE

## 2021-06-15 NOTE — PROGRESS NOTES
Tara Osuna presents today for   Chief Complaint   Patient presents with    Follow-up    Hypertension    Diabetes    Cholesterol Problem    Labs     6-12-21              Coordination of Care:  1. Have you been to the ER, urgent care clinic since your last visit? Hospitalized since your last visit? NO    2. Have you seen or consulted any other health care providers outside of the [address] [place] [address] since your last visit? Include any pap smears or colon screening.  YES

## 2021-06-15 NOTE — PROGRESS NOTES
62 y.o. WHITE male who presents for evaluation. No cardiovascular complaints. Sees Dr Polina Leo     Denies polyuria, polydipsia, nocturia, vision change. Not checking sugars regularly. The jardiance has helped him lose weight and also doing better with the diet and doing a form of IF only eating dinner some days.   No hypoglycemia thus far and he's happy and comfortable to continue    Vitals 6/15/2021 6/3/2021 10/4/2019 9/13/2019   Weight 228 lb 231 lb 260 lb 259 lb     Denies any gi or gu complaints    vascepa is not covered    Past Medical History:   Diagnosis Date    Brachial neuritis or radiculitis 6/27/2014    Bursitis, hip 08/02/2016    Colon polyp 08/2008    Dr Fidelia Bland DDD (degenerative disc disease), cervical 02/16/2016    Depression 12/13    PHQ-9 was 6/27     Diabetes ([facility] 3/15    on basis of elev hba1c; [facility]; microalbumin 10/19    Dyslipidemia     Fatty liver     on US w negative serologies 2009    Foot fracture, right 09/2013    5th metatarsal    GERD (gastroesophageal reflux disease)     H/O echocardiogram 04/2019    nl lv, ef 61%, mild conc lvh, no wma, gr 1 dd, mild MR    Hypertension     Hypogonadism male 03/2013    Dr Domingo Barker; took androgel in past    Morbid obesity ([address]     peak weight 275 lbs, bmi 36.3 from 2/14; IF 2/18 start weight 253 lbs but unable to do    NSTEMI (non-ST elevated myocardial infarction) ([address] 04/2019    s/p cath Dr Shelby Abrams and ROSA MARIA to DM1    Paroxysmal atrial fibrillation ([address] 04/2019    in setting on ACS    Phymatous rosacea     Postlaminectomy syndrome, lumbar region     S/P cardiac cath 04/2019    Dr Shelby Abrams; diffuse 50% RCA, diff LAD, 30% Cx, 85% DM1 culprit stented w ROSA MARIA    Thoracic or lumbosacral neuritis or radiculitis, unspecified     Tobacco dependence syndrome     failed chantix and wellbutrin     Past Surgical History:   Procedure Laterality Date    HX COLONOSCOPY      Dr Ghassan Wright 8/22/08    HX LUMBAR LAMINECTOMY  7/02    L5-S1 hemilaminectomy and diskectomy    HX ORTHOPAEDIC      DEXA t score -1.0 spine, -0.4 hip (1/14) Dr. Sheng Browne Left 12/2006    NEUROLOGICAL PROCEDURE UNLISTED  4/13    MRI pituitary normal     Social History     Socioeconomic History    Marital status:      Spouse name: Not on file    Number of children: Not on file    Years of education: Not on file    Highest education level: Not on file   Occupational History    Not on file   Tobacco Use    Smoking status: Current Every Day Smoker     Packs/day: 2.00     Types: Cigarettes    Smokeless tobacco: Never Used   Substance and Sexual Activity    Alcohol use: No     Comment: One drink per 3-4 months    Drug use: No     Comment: Marijuana use in the [de-identified]    Sexual activity: Not on file   Other Topics Concern    Not on file   Social History Narrative    Not on file     Social Determinants of Health     Financial Resource Strain:     Difficulty of Paying Living Expenses:    Food Insecurity:     Worried About Running Out of Food in the Last Year:     Ran Out of Food in the Last Year:    Transportation Needs:     Lack of Transportation (Medical):  Lack of Transportation (Non-Medical):    Physical Activity:     Days of Exercise per Week:     Minutes of Exercise per Session:    Stress:     Feeling of Stress :    Social Connections:     Frequency of Communication with Friends and Family:     Frequency of Social Gatherings with Friends and Family:     Attends Jew Services:     Active Member of Clubs or Organizations:     Attends Club or Organization Meetings:     Marital Status:    Intimate Partner Violence:     Fear of Current or Ex-Partner:     Emotionally Abused:     Physically Abused:     Sexually Abused:      Current Outpatient Medications   Medication Sig    fish oil-omega-3 fatty acids (Fish Oil) 340-1,000 mg capsule Take 1 Capsule by mouth daily.     Jardiance 25 mg tablet take 1 tablet by mouth once daily    lisinopril-hydroCHLOROthiazide (PRINZIDE, ZESTORETIC) 20-12.5 mg per tablet take 2 tablets by mouth twice a day    aspirin 81 mg chewable tablet Take 4 Tablets by mouth daily. (Patient taking differently: Take 325 mg by mouth daily. 325 daily)    traZODone (DESYREL) 100 mg tablet take 1 tablet by mouth nightly if needed for sleep    amLODIPine (NORVASC) 5 mg tablet take 1 tablet by mouth once daily    metoprolol tartrate (LOPRESSOR) 100 mg IR tablet take 1 tablet by mouth every 12 hours    fenofibrate nanocrystallized (TRICOR) 145 mg tablet take 1 tablet by mouth once daily    atorvastatin (LIPITOR) 80 mg tablet take 1 tablet by mouth once daily    nitroglycerin (NITROSTAT) 0.4 mg SL tablet 1 Tab by SubLINGual route every five (5) minutes as needed for Chest Pain. Up to 3 doses.  metFORMIN (GLUCOPHAGE) 1,000 mg tablet take 1 tablet by mouth twice a day with food    baclofen (LIORESAL) 10 mg tablet Take 10 mg by mouth three (3) times daily.  HYDROcodone-acetaminophen (NORCO)  mg tablet Take 1 Tab by mouth every six (6) hours as needed for Pain.  naloxone 4 mg/actuation spry 4 mg by Nasal route once as needed (opioid overdose) for up to 1 dose. May substitute 2 mg syringe if insurance requires    omeprazole (PRILOSEC) 20 mg capsule Take 20 mg by mouth daily.  ascorbic acid (VITAMIN C) 1,000 mg tablet Take 1,000 mg by mouth two (2) times a day.  MULTIVITAMIN PO Take  by mouth. No current facility-administered medications for this visit.      No Known Allergies    REVIEW OF SYSTEMS: colo 6/08 Dr Black Hunger - no vision change or eye pain  Oral - no mouth pain, tongue or tooth problems  Ears - no hearing loss, ear pain, fullness, no swallowing problems  Cardiac - no CP, PND, orthopnea, edema, palpitations or syncope  Chest - no breast masses  Resp - no wheezing, chronic coughing, dyspnea  GI - no heartburn, nausea, vomiting, change in bowel habits, bleeding, hemorrhoids  Urinary - no dysuria, hematuria, flank pain, urgency, frequency    Visit Vitals  /69   Pulse 71   Temp 97 °F (36.1 °C) (Temporal)   Resp 14   Ht 6' (1.829 m)   Wt 228 lb (103.4 kg)   SpO2 97%   BMI 30.92 kg/m²   A&O x3  Affect is appropriate. Mood stable  No apparent distress  Anicteric, no JVD, adenopathy or thyromegaly. No carotid bruits or radiated murmur  Lungs clear to auscultation, no wheezes or rales  Heart showed regular rate and rhythm. No murmur, rubs, gallops  Abdomen soft nontender, no hepatosplenomegaly or masses. Extremities without edema.   Pulses 1-2+ symmetrically    LABS  From 9/09 showed   gluc 88,   cr 0.70,              alt 106,                   chol 183, tg 431,  hdl 32, ldl-c NA,  wbc 8.0, hb 15.8, plt 149,        From 2/10 showed   gluc 120, cr 0.60, gfr>60, alt 113,                   chol 306, tg 1135, hdl 31, ldl-c NA,                                 ast 45, ap 95, tb 0.4  From 3/10 showed                                     2 hr gtt 165  From 6/10 showed   gluc 165,                               hba1c 5.6,                tg 406,  hdl 31, ldl-c NA   From 11/12 showed gluc 102, cr 0.60,      alt 43,   hba1c 5.9, chol 183, tg 489,  hdl 28, ldl-c NA, wbc 9.0, hb 15.8, plt 185,  tsh 3.0,     psa 0.09,   From 11/12 showed                                  vit d 15.2, test 145, james 5.5, acth 1.7, fsh 5.8, lh 1.5, prl 5.1, b12 878, RA neg, crp 0.83, %sat 31, ferritin 308, hep a/b/c neg,  gracie neg, lyme neg  From 8/13 showed           hba1c 5.8, chol 176, tg 521,  hdl 23, ldl-c NA  From 11/13 showed gluc 135, cr 0.80, gfr 52,   alt 30,   hba1c 5.8, chol 154, tg 323,  hdl 31, ldl-c 58,                      vit d 20.5, test 185  From 3/14 showed           hba1c 6.0, chol 177, tg 607,  hdl 26, ldl-c NA  From 6/14 showed   gluc 114, cr 0.80, gfr>60,  alt 32,   hba1c 5.8, chol 147, tg 360,  hdl 29, ldl-c 46  From 7/14 showed   gluc 148, cr 0.77, gfr>60,             wbc 9.3, hb 14.9, plt 214  From 2/15 showed   gluc 140, cr 0.72, gfr 107, alt 39,   hba1c 6.5, chol 180, tg 377,  hdl 31, ldl-c 74, wbc 7.5, hb 15.2, plt 205  From 7/15 showed           hba1c 6.2, chol 137, tg 293,  hdl 28, ldl-c 50,               umar neg  From 7/16 showed   gluc 125, cr 0.60, gfr>60, alt 29,    hba1c 6.2, chol 145, tg 247,  hdl 32, ldl-c 64, wbc 7.6, hb 15.2, plt 198, umar 11.8, psa 0.35  From 8/17 showed   gluc 129, cr 0.80, gfr>60, alt 22,    hba1c 6.5, chol 171, tg 479,  hdl 27, ldl-c na, wbc 9.4, hb 15.8, plt 204, umar 15.0, vit d 27.0  From 2/18 showed   gluc 121, cr 0.50, gfr>60, alt 44,    hba1c 6.5, chol 168, tg 501,  hdl 26, ldl-c na,           psa 0.11  From 4/19 showed   gluc 143, cr 0.86, gfr>60,             wbc 9.0, hb 12.4, plt 178, tsh 0.48,     ft4 1.00, trop+, ddimer neg  From 9/19 showed           hba1c 7.0, chol 160, tg 262, hdl 34, ldl-c 69,  wb 10.2, hb 14.7, plt 231, umar 39.8, psa 0.09, test 357  From 6/20 showed   gluc 157, cr 0.60, gfr>60,  alt 32,   hba1c 7.5, chol 136, tg 173, hdl 31, ldl-c 70,               umar 17.8  Form 11/20 showed gluc 122, cr 0.90, gfr>60,       hba1c 7.3  From 6/21 showed   gluc 131, cr 0.80, gfr>60,  alt 20,   hba1c 6.1, chol 137, tg 178, hdl 32, ldl-c 70, wbc 10.3, hb 17.1, plt 216,               psa 0.08    We reviewed the patient's labs from the last several visits to point out trends in the numbers        Patient Active Problem List   Diagnosis Code    Encounter for long-term (current) drug use Z79.899    Dyslipidemia E78.5   [facility] Hypogonadism male Dr. Domingo Barker E29.1    Hypovitaminosis D E55.9    Insomnia secondary to chronic pain G89.29, G47.01    Essential hypertension I10    DDD (degenerative disc disease), cervical M50.30    Tobacco dependence syndrome F17.200    Colon polyp 8/08 Dr Ghassan Wright K63.5    Arthritis of right knee M17.11    Chronic pain syndrome G89.4    Controlled type 2 diabetes mellitus without complication, without long-term current use of insulin (HCC) E11.9    Atrial fibrillation with rapid ventricular response (HCC) I48.91    S/P coronary artery stent placement Z95.5    Severe obesity (HCC) E66.01    Depression F32.9     Assessment and plan:  1. Chronic pain. F/U pain clinic  2. HTN. Continue current   3. DM.  F/u ophth. Continue current regimen and watch for hypo episodes with the wt loss  4. Dyslipidemia. Continue current, add vascepa once covered  5. Fatty liver. Wt loss would be ideal  6. Polycythemia. Hydrate and recheck next visit  7. Hypovit d. Continue supplementation  8. Depression. Doing well  9. Colon polyp. Fiber, will send referral  10. Smoking cessation emphasized  11. Obesity. Lifestyle and dietary measures. Congratulated him on the wt loss thus far  12. CHD. F/u Dr Karissa Aviles        RTC 12/21    Above conditions discussed at length and patient vocalized understanding.   All questions answered to patient satisfaction

## 2021-06-15 NOTE — PROGRESS NOTES
Joel Henry presents today for No chief complaint on file. Follow up Htn DM 
CHOL 
 6-12-21 Coordination of Care: 1. Have you been to the ER, urgent care clinic since your last visit? Hospitalized since your last visit? NO 
 
2. Have you seen or consulted any other health care providers outside of the [address] [place] [address] since your last visit? Include any pap smears or colon screening.  YES

## 2021-07-15 RX ORDER — METFORMIN HYDROCHLORIDE 1000 MG/1
TABLET ORAL
Qty: 180 TABLET | Refills: 3 | Status: SHIPPED | OUTPATIENT
Start: 2021-07-15 | End: 2022-08-10

## 2021-09-08 DIAGNOSIS — E78.5 DYSLIPIDEMIA: ICD-10-CM

## 2021-09-09 RX ORDER — ATORVASTATIN CALCIUM 80 MG/1
TABLET, FILM COATED ORAL
Qty: 90 TABLET | Refills: 3 | Status: SHIPPED | OUTPATIENT
Start: 2021-09-09 | End: 2021-12-02 | Stop reason: SDUPTHER

## 2021-10-18 ENCOUNTER — DOCUMENTATION ONLY (OUTPATIENT)
Dept: INTERNAL MEDICINE CLINIC | Age: 61
End: 2021-10-18

## 2021-11-08 RX ORDER — FENOFIBRATE 145 MG/1
TABLET, COATED ORAL
Qty: 30 TABLET | Refills: 11 | Status: SHIPPED | OUTPATIENT
Start: 2021-11-08 | End: 2022-11-02

## 2021-12-02 ENCOUNTER — OFFICE VISIT (OUTPATIENT)
Dept: CARDIOLOGY CLINIC | Age: 61
End: 2021-12-02
Payer: COMMERCIAL

## 2021-12-02 VITALS
SYSTOLIC BLOOD PRESSURE: 134 MMHG | HEART RATE: 75 BPM | BODY MASS INDEX: 32.37 KG/M2 | WEIGHT: 239 LBS | HEIGHT: 72 IN | OXYGEN SATURATION: 98 % | DIASTOLIC BLOOD PRESSURE: 62 MMHG

## 2021-12-02 DIAGNOSIS — I10 ESSENTIAL HYPERTENSION: ICD-10-CM

## 2021-12-02 DIAGNOSIS — E78.5 DYSLIPIDEMIA: ICD-10-CM

## 2021-12-02 PROCEDURE — 99214 OFFICE O/P EST MOD 30 MIN: CPT | Performed by: INTERNAL MEDICINE

## 2021-12-02 RX ORDER — GUAIFENESIN 100 MG/5ML
325 LIQUID (ML) ORAL DAILY
Qty: 120 TABLET | Refills: 11 | Status: SHIPPED | OUTPATIENT
Start: 2021-12-02

## 2021-12-02 RX ORDER — AMLODIPINE BESYLATE 5 MG/1
5 TABLET ORAL DAILY
Qty: 90 TABLET | Refills: 3 | Status: SHIPPED | OUTPATIENT
Start: 2021-12-02

## 2021-12-02 RX ORDER — ATORVASTATIN CALCIUM 80 MG/1
80 TABLET, FILM COATED ORAL DAILY
Qty: 90 TABLET | Refills: 3 | Status: SHIPPED | OUTPATIENT
Start: 2021-12-02

## 2021-12-02 RX ORDER — NITROGLYCERIN 0.4 MG/1
0.4 TABLET SUBLINGUAL
Qty: 25 TABLET | Refills: 6 | Status: SHIPPED | OUTPATIENT
Start: 2021-12-02 | End: 2021-12-02 | Stop reason: SDUPTHER

## 2021-12-02 RX ORDER — NITROGLYCERIN 0.4 MG/1
0.4 TABLET SUBLINGUAL
Qty: 25 TABLET | Refills: 6 | Status: SHIPPED | OUTPATIENT
Start: 2021-12-02

## 2021-12-02 RX ORDER — LISINOPRIL AND HYDROCHLOROTHIAZIDE 12.5; 2 MG/1; MG/1
TABLET ORAL
Qty: 120 TABLET | Refills: 11 | Status: SHIPPED | OUTPATIENT
Start: 2021-12-02 | End: 2022-05-18 | Stop reason: SDUPTHER

## 2021-12-02 RX ORDER — METOPROLOL TARTRATE 100 MG/1
100 TABLET ORAL EVERY 12 HOURS
Qty: 60 TABLET | Refills: 11 | Status: SHIPPED | OUTPATIENT
Start: 2021-12-02

## 2021-12-02 NOTE — PROGRESS NOTES
[employer]    pAFib RVR, NSTEMI, CAD s/p PCI 4/2019    HPI    [employer] is a 62 y.o. male with CAD s/p NSTEMI here for routine follow up. As you know I met this extremely pleasant patient earlier this spring in April 2019. Patient was driving when he experienced sudden onset comfort in his left shoulder. The pain was actually quite severe and he became very short of breath and then experienced sudden onset racing palpitations. He ended up going to the emergency department was found to be in atrial fibrillation with rapid ventricular response as fast as 190 bpm.  It was in the setting he had a positive troponin but I suspected ischemia as the provoking trigger due to patient's risk factors and symptoms. We got him back in sinus rhythm and he subsequently had a left heart cath (please see report below)-and ultimately received percutaneous coronary intervention to the culprit lesion which was a diagonal.  He has done well since on medical therapy. He has not had any recurrent symptoms-chest pain no palpitations no shortness of breath. Despite the MI he has preserved LV function. No recurrent AFib or chest pressure. Says his finger tips can turn white when its cold outside and can wake up with numbness in his right arm.     Past Medical History:   Diagnosis Date    Brachial neuritis or radiculitis 6/27/2014    Bursitis, hip 08/02/2016    Colon polyp 08/2008    Dr David Blackman DDD (degenerative disc disease), cervical 02/16/2016    Depression 12/13    PHQ-9 was 6/27     Diabetes ([facility] [address] 3/15    on basis of elev hba1c; [facility]; microalbumin 10/19    Dyslipidemia     Fatty liver     on US w negative serologies 2009    Foot fracture, right 09/2013    5th metatarsal    GERD (gastroesophageal reflux disease)     H/O echocardiogram 04/2019    nl lv, ef 61%, mild conc lvh, no wma, gr 1 dd, mild MR    Hypertension     Hypogonadism male 03/2013    Dr Caty Boo; took androgel in past    Morbid obesity ([facility] [address]     peak weight 275 lbs, bmi 36.3 from 2/14; IF 2/18 start weight 253 lbs but unable to do    NSTEMI (non-ST elevated myocardial infarction) ([facility] [address] 04/2019    s/p cath Dr Bessy Dietrich and ROSA MARIA to DM1    Paroxysmal atrial fibrillation ([facility] [address] 04/2019    in setting on ACS    Phymatous rosacea     Postlaminectomy syndrome, lumbar region     S/P cardiac cath 04/2019    Dr Bessy Dietrich; diffuse 50% RCA, diff LAD, 30% Cx, 85% DM1 culprit stented w ROSA MARIA    Thoracic or lumbosacral neuritis or radiculitis, unspecified     Tobacco dependence syndrome     failed chantix and wellbutrin       Past Surgical History:   Procedure Laterality Date    HX COLONOSCOPY      Dr Zen Stanley 8/22/08    HX LUMBAR LAMINECTOMY  7/02    L5-S1 hemilaminectomy and diskectomy    HX ORTHOPAEDIC      DEXA t score -1.0 spine, -0.4 hip (1/14) Dr. Bessy Dietrich Left 12/2006    NEUROLOGICAL PROCEDURE UNLISTED  4/13    MRI pituitary normal       Current Outpatient Medications   Medication Sig Dispense Refill    nitroglycerin (NITROSTAT) 0.4 mg SL tablet 1 Tablet by SubLINGual route every five (5) minutes as needed for Chest Pain. Up to 3 doses. 25 Tablet 6    fenofibrate nanocrystallized (TRICOR) 145 mg tablet take 1 tablet by mouth once daily 30 Tablet 11    atorvastatin (LIPITOR) 80 mg tablet take 1 tablet by mouth once daily 90 Tablet 3    metFORMIN (GLUCOPHAGE) 1,000 mg tablet take 1 tablet by mouth twice a day with food 180 Tablet 3    fish oil-omega-3 fatty acids (Fish Oil) 340-1,000 mg capsule Take 1 Capsule by mouth daily.  Jardiance 25 mg tablet take 1 tablet by mouth once daily 30 Tablet 5    lisinopril-hydroCHLOROthiazide (PRINZIDE, ZESTORETIC) 20-12.5 mg per tablet take 2 tablets by mouth twice a day 120 Tablet 5    aspirin 81 mg chewable tablet Take 4 Tablets by mouth daily. (Patient taking differently: Take 325 mg by mouth daily.  325 daily) 120 Tablet 5    traZODone (DESYREL) 100 mg tablet take 1 tablet by mouth nightly if needed for sleep 90 Tab 3    amLODIPine (NORVASC) 5 mg tablet take 1 tablet by mouth once daily 90 Tab 3    metoprolol tartrate (LOPRESSOR) 100 mg IR tablet take 1 tablet by mouth every 12 hours 60 Tab 11    baclofen (LIORESAL) 10 mg tablet Take 10 mg by mouth three (3) times daily.  HYDROcodone-acetaminophen (NORCO)  mg tablet Take 1 Tab by mouth every six (6) hours as needed for Pain.  naloxone 4 mg/actuation spry 4 mg by Nasal route once as needed (opioid overdose) for up to 1 dose. May substitute 2 mg syringe if insurance requires 1 Package 0    omeprazole (PRILOSEC) 20 mg capsule Take 20 mg by mouth daily.  ascorbic acid (VITAMIN C) 1,000 mg tablet Take 1,000 mg by mouth two (2) times a day.  MULTIVITAMIN PO Take  by mouth. No Known Allergies    Social History     Socioeconomic History    Marital status:      Spouse name: Not on file    Number of children: Not on file    Years of education: Not on file    Highest education level: Not on file   Occupational History    Not on file   Tobacco Use    Smoking status: Current Every Day Smoker     Packs/day: 2.00     Types: Cigarettes    Smokeless tobacco: Never Used   Substance and Sexual Activity    Alcohol use: No     Comment: One drink per 3-4 months    Drug use: No     Comment: Marijuana use in the [de-identified]    Sexual activity: Not on file   Other Topics Concern    Not on file   Social History Narrative    Not on file     Social Determinants of Health     Financial Resource Strain:     Difficulty of Paying Living Expenses: Not on file   Food Insecurity:     Worried About Running Out of Food in the Last Year: Not on file    Livia of Food in the Last Year: Not on file   Transportation Needs:     Lack of Transportation (Medical): Not on file    Lack of Transportation (Non-Medical):  Not on file   Physical Activity:     Days of Exercise per Week: Not on file    Minutes of Exercise per Session: Not on file   Stress:     Feeling of Stress : Not on file   Social Connections:     Frequency of Communication with Friends and Family: Not on file    Frequency of Social Gatherings with Friends and Family: Not on file    Attends Alevism Services: Not on file    Active Member of Clubs or Organizations: Not on file    Attends Club or Organization Meetings: Not on file    Marital Status: Not on file   Intimate Partner Violence:     Fear of Current or Ex-Partner: Not on file    Emotionally Abused: Not on file    Physically Abused: Not on file    Sexually Abused: Not on file   Housing Stability:     Unable to Pay for Housing in the Last Year: Not on file    Number of [place] in the Last Year: Not on file    Unstable Housing in the Last Year: Not on file        Review of Systems    14 pt Review of Systems is negative unless otherwise mentioned in the HPI. Wt Readings from Last 3 Encounters:   12/02/21 108.4 kg (239 lb)   06/15/21 103.4 kg (228 lb)   06/03/21 104.8 kg (231 lb)     Temp Readings from Last 3 Encounters:   06/15/21 97 °F (36.1 °C) (Temporal)   10/04/19 97.9 °F (36.6 °C) (Oral)   05/03/19 98.3 °F (36.8 °C) (Oral)     BP Readings from Last 3 Encounters:   12/02/21 134/62   06/15/21 114/69   06/03/21 122/80     Pulse Readings from Last 3 Encounters:   12/02/21 75   06/15/21 71   06/03/21 (!) 59     Physical Exam:    Visit Vitals  /62 (BP 1 Location: Left upper arm, BP Patient Position: Sitting, BP Cuff Size: Adult)   Pulse 75   Ht 6' (1.829 m)   Wt 108.4 kg (239 lb)   SpO2 98%   BMI 32.41 kg/m²      Physical Exam  Constitutional:       Appearance: He is well-developed. HENT:      Head: Normocephalic and atraumatic. Eyes:      General: No scleral icterus. Pupils: Pupils are equal, round, and reactive to light. Neck:      Vascular: No JVD. Cardiovascular:      Rate and Rhythm: Normal rate and regular rhythm. Heart sounds: Normal heart sounds.  No murmur heard. No friction rub. No gallop. Pulmonary:      Effort: Pulmonary effort is normal. No respiratory distress. Breath sounds: Normal breath sounds. No wheezing or rales. Chest:      Chest wall: No tenderness. Abdominal:      General: Bowel sounds are normal.      Palpations: Abdomen is soft. Skin:     General: Skin is warm and dry. Findings: No rash. Neurological:      Mental Status: He is alert and oriented to person, place, and time. EKG last: NSR, normal axis and intervals, no ST segment abnormalities    Lab Results   Component Value Date/Time    Cholesterol, total 137 06/12/2021 07:38 AM    HDL Cholesterol 32 (L) 06/12/2021 07:38 AM    LDL,Direct 72 02/17/2018 09:48 PM    LDL, calculated 70 06/12/2021 07:38 AM    VLDL, calculated 36 (H) 06/12/2021 07:38 AM    Triglyceride 178 (H) 06/12/2021 07:38 AM    CHOL/HDL Ratio 5.3 (H) 06/15/2010 10:02 AM     Lab Results   Component Value Date/Time    Hemoglobin A1c 6.1 (H) 06/12/2021 07:38 AM    Hemoglobin A1c, External 6.2 07/26/2016 02:00 PM     04/25/19   ECHO ADULT COMPLETE 04/26/2019 4/26/2019    Narrative · Estimated left ventricular ejection fraction is 61 - 65%. Left   ventricular moderate concentric hypertrophy. No regional wall motion   abnormality noted. Mild (grade 1) left ventricular diastolic dysfunction. · Mitral valve thickening. Mild mitral valve regurgitation. · Left atrium is upper limits of normal volume. Signed by: Shahab Guerin DO     04/25/19   CARDIAC PROCEDURE 04/29/2019 4/29/2019    Narrative · Diffuse 50% dominant RCA disease. · Diffuse LAD and circumflex 30% stenosis. · Culprit diagonal 85 to 90% stenosis  · . Diagonal branch stented to residual 0% using ROSA MARIA. · Overall normal left ventricular systolic function with EF 55 to 60%.         Signed by: Roberth Rebollar MD         Impression and Plan:  Leatha Cunha is a 62 y.o. with:    1.) pAF with RVR, likely in setting of NSTEMI/ ACS (only one episode, ChadsVasc ~2)  2.) NSTEMI s/p PCI diag 4/2019  3.) CAD with residual nonobstructive disease  4.) DM2, known  5.) Dyslipidemia, incl HyperTG, known  6.) Tobacco abuse  7.) +FH premature CAD  8.) Normal LV function  9.) HTN    1.) Completed DAPT and SOB stopped after DC Brillinta  2.) Increased ASA to 325 mg daily for AFib  3.) Buy OTC fish oil (to take +Tricor + Atorva)- FLP greatly improved  4.) Doing well, RTC yearly for CAD      Thank you for allowing me to participate in the care of your patient, please do not hesitate to call with questions or concerns. Follow-up and Dispositions    · Return in about 1 year (around 12/2/2022).      Kindest Regards,    Oniel Miller DO

## 2021-12-13 DIAGNOSIS — E11.9 CONTROLLED TYPE 2 DIABETES MELLITUS WITHOUT COMPLICATION, WITHOUT LONG-TERM CURRENT USE OF INSULIN (HCC): ICD-10-CM

## 2022-02-04 RX ORDER — TRAZODONE HYDROCHLORIDE 100 MG/1
TABLET ORAL
Qty: 90 TABLET | Refills: 3 | Status: SHIPPED | OUTPATIENT
Start: 2022-02-04 | End: 2022-02-16 | Stop reason: DRUGHIGH

## 2022-02-11 NOTE — PROGRESS NOTES
62 y.o. WHITE/NON- male who presents for evaluation. No cardiovascular complaints. Remains active with his job. Saw Dr Tushar Biggs 12/21 and he is now on asa 325    Denies polyuria, polydipsia, nocturia, vision change. Not checking sugars regularly. The diet regressed some and he regained some weight, still down from 260s    Vitals 2/16/2022 12/2/2021 6/15/2021 6/3/2021 10/4/2019   Weight 243 lb 239 lb 228 lb 231 lb 260 lb     Denies any gi or gu complaints. He was supposed to get colo done last year but he had some work/scheduling issues and he promises to reschedule this spring, very overdue    vascepa is not covered    He is requesting to inc the dosing of the trazodone    Still seeing pain mgmt and doing well there    Still smoking but trying to cut back.  He is open to doing LDCT    Past Medical History:   Diagnosis Date    Brachial neuritis or radiculitis 6/27/2014    Bursitis, hip 08/02/2016    Colon polyp 08/2008    Dr Charmayne Song    Degenerative arthritis of cervical spine 02/16/2016    Depression 12/13    PHQ-9 was 6/27     Diabetes ([address] 3/15    on basis of elev hba1c; [facility]; microalbumin 10/19    Dyslipidemia     Fatty liver     on US w negative serologies 2009    Foot fracture, right 09/2013    5th metatarsal    GERD (gastroesophageal reflux disease)     H/O echocardiogram 04/2019    nl lv, ef 61%, mild conc lvh, no wma, gr 1 dd, mild MR    Hypertension     Hypogonadism male 03/2013    Dr Lobo Gooden; took androgel in past    Morbid obesity ([address]     peak weight 275 lbs, bmi 36.3 from 2/14; IF 2/18 start weight 253 lbs but unable to do    NSTEMI (non-ST elevated myocardial infarction) ([address] 04/2019    s/p cath Dr King Graft and ROSA MARIA to DM1    Paroxysmal atrial fibrillation ([address] 04/2019    in setting on ACS    Phymatous rosacea     Postlaminectomy syndrome, lumbar region     S/P cardiac cath 04/2019    Dr Christine Downs; diffuse 50% RCA, diff LAD, 30% Cx, 85% DM1 culprit stented w ROSA MARIA  Thoracic or lumbosacral neuritis or radiculitis, unspecified     Tobacco dependence syndrome     failed chantix and wellbutrin     Past Surgical History:   Procedure Laterality Date    HX COLONOSCOPY      Dr Larry Parks 8/22/08    HX LUMBAR LAMINECTOMY  7/02    L5-S1 hemilaminectomy and diskectomy    HX ORTHOPAEDIC      DEXA t score -1.0 spine, -0.4 hip (1/14) Dr. Jaylene Miranda Left 12/2006    NEUROLOGICAL PROCEDURE UNLISTED  4/13    MRI pituitary normal     Social History     Socioeconomic History    Marital status:      Spouse name: Not on file    Number of children: Not on file    Years of education: Not on file    Highest education level: Not on file   Occupational History    Not on file   Tobacco Use    Smoking status: Current Every Day Smoker     Packs/day: 2.00     Years: 30.00     Pack years: 60.00     Types: Cigarettes    Smokeless tobacco: Never Used   Substance and Sexual Activity    Alcohol use: No     Comment: One drink per 3-4 months    Drug use: No     Comment: Marijuana use in the [de-identified]    Sexual activity: Not on file   Other Topics Concern    Not on file   Social History Narrative    Not on file     Social Determinants of Health     Financial Resource Strain:     Difficulty of Paying Living Expenses: Not on file   Food Insecurity:     Worried About Running Out of Food in the Last Year: Not on file    Livia of Food in the Last Year: Not on file   Transportation Needs:     Lack of Transportation (Medical): Not on file    Lack of Transportation (Non-Medical):  Not on file   Physical Activity:     Days of Exercise per Week: Not on file    Minutes of Exercise per Session: Not on file   Stress:     Feeling of Stress : Not on file   Social Connections:     Frequency of Communication with Friends and Family: Not on file    Frequency of Social Gatherings with Friends and Family: Not on file    Attends Druze Services: Not on file    Active Member of Clubs or Organizations: Not on file    Attends Club or Organization Meetings: Not on file    Marital Status: Not on file   Intimate Partner Violence:     Fear of Current or Ex-Partner: Not on file    Emotionally Abused: Not on file    Physically Abused: Not on file    Sexually Abused: Not on file   Housing Stability:     Unable to Pay for Housing in the Last Year: Not on file    Number of [place] in the Last Year: Not on file    Unstable Housing in the Last Year: Not on file     Current Outpatient Medications   Medication Sig    traZODone (DESYREL) 100 mg tablet Take 1.5 tabs at night as needed    Jardiance 25 mg tablet take 1 tablet by mouth once daily    aspirin 81 mg chewable tablet Take 4 Tablets by mouth daily. 325 daily    lisinopril-hydroCHLOROthiazide (PRINZIDE, ZESTORETIC) 20-12.5 mg per tablet take 2 tablets by mouth twice a day    atorvastatin (LIPITOR) 80 mg tablet Take 1 Tablet by mouth daily.  nitroglycerin (NITROSTAT) 0.4 mg SL tablet 1 Tablet by SubLINGual route every five (5) minutes as needed for Chest Pain. Up to 3 doses.  amLODIPine (NORVASC) 5 mg tablet Take 1 Tablet by mouth daily.  metoprolol tartrate (LOPRESSOR) 100 mg IR tablet Take 1 Tablet by mouth every twelve (12) hours.  fenofibrate nanocrystallized (TRICOR) 145 mg tablet take 1 tablet by mouth once daily    metFORMIN (GLUCOPHAGE) 1,000 mg tablet take 1 tablet by mouth twice a day with food    fish oil-omega-3 fatty acids (Fish Oil) 340-1,000 mg capsule Take 1 Capsule by mouth daily.  baclofen (LIORESAL) 10 mg tablet Take 10 mg by mouth three (3) times daily.  HYDROcodone-acetaminophen (NORCO)  mg tablet Take 1 Tab by mouth every six (6) hours as needed for Pain.  naloxone 4 mg/actuation spry 4 mg by Nasal route once as needed (opioid overdose) for up to 1 dose. May substitute 2 mg syringe if insurance requires    omeprazole (PRILOSEC) 20 mg capsule Take 20 mg by mouth daily.     ascorbic acid (VITAMIN C) 1,000 mg tablet Take 1,000 mg by mouth two (2) times a day.  MULTIVITAMIN PO Take  by mouth. No current facility-administered medications for this visit. No Known Allergies    REVIEW OF SYSTEMS: colo 6/08 Dr Taras Alexander - no vision change or eye pain  Oral - no mouth pain, tongue or tooth problems  Ears - no hearing loss, ear pain, fullness, no swallowing problems  Cardiac - no CP, PND, orthopnea, edema, palpitations or syncope  Chest - no breast masses  Resp - no wheezing, chronic coughing, dyspnea  GI - no heartburn, nausea, vomiting, change in bowel habits, bleeding, hemorrhoids  Urinary - no dysuria, hematuria, flank pain, urgency, frequency    Visit Vitals  /70   Pulse 76   Temp 97.2 °F (36.2 °C) (Temporal)   Resp 16   Ht 6' (1.829 m)   Wt 243 lb (110.2 kg)   SpO2 96%   BMI 32.96 kg/m²   A&O x3  Affect is appropriate. Mood stable  No apparent distress  Anicteric, no JVD, adenopathy or thyromegaly. No carotid bruits or radiated murmur  Lungs clear to auscultation, no wheezes or rales  Heart showed regular rate and rhythm. No murmur, rubs, gallops  Abdomen soft nontender, no hepatosplenomegaly or masses. Extremities without edema.   Pulses 1-2+ symmetrically    LABS  From 9/09 showed   gluc 88,   cr 0.70,              alt 106,                   chol 183, tg 431,  hdl 32, ldl-c NA,  wbc 8.0, hb 15.8, plt 149,        From 2/10 showed   gluc 120, cr 0.60, gfr>60, alt 113,                   chol 306, tg 1135, hdl 31, ldl-c NA,                                 ast 45, ap 95, tb 0.4  From 3/10 showed                                     2 hr gtt 165  From 6/10 showed   gluc 165,                               hba1c 5.6,                tg 406,  hdl 31, ldl-c NA   From 11/12 showed gluc 102, cr 0.60,      alt 43,   hba1c 5.9, chol 183, tg 489,  hdl 28, ldl-c NA, wbc 9.0, hb 15.8, plt 185,  tsh 3.0,     psa 0.09,   From 11/12 showed                                  vit d 15.2, test 145, james 5.5, acth 1.7, fsh 5.8, lh 1.5, prl 5.1, b12 878, RA neg, crp 0.83, %sat 31, ferritin 308, hep a/b/c neg,  gracie neg, lyme neg  From 8/13 showed           hba1c 5.8, chol 176, tg 521,  hdl 23, ldl-c NA  From 11/13 showed gluc 135, cr 0.80, gfr 52,   alt 30,   hba1c 5.8, chol 154, tg 323,  hdl 31, ldl-c 58,                      vit d 20.5, test 185  From 3/14 showed           hba1c 6.0, chol 177, tg 607,  hdl 26, ldl-c NA  From 6/14 showed   gluc 114, cr 0.80, gfr>60,  alt 32,   hba1c 5.8, chol 147, tg 360,  hdl 29, ldl-c 46  From 7/14 showed   gluc 148, cr 0.77, gfr>60,             wbc 9.3, hb 14.9, plt 214  From 2/15 showed   gluc 140, cr 0.72, gfr 107, alt 39,   hba1c 6.5, chol 180, tg 377,  hdl 31, ldl-c 74, wbc 7.5, hb 15.2, plt 205  From 7/15 showed           hba1c 6.2, chol 137, tg 293,  hdl 28, ldl-c 50,               umar neg  From 7/16 showed   gluc 125, cr 0.60, gfr>60, alt 29,    hba1c 6.2, chol 145, tg 247,  hdl 32, ldl-c 64, wbc 7.6, hb 15.2, plt 198, umar 11.8, psa 0.35  From 8/17 showed   gluc 129, cr 0.80, gfr>60, alt 22,    hba1c 6.5, chol 171, tg 479,  hdl 27, ldl-c na, wbc 9.4, hb 15.8, plt 204, umar 15.0, vit d 27.0  From 2/18 showed   gluc 121, cr 0.50, gfr>60, alt 44,    hba1c 6.5, chol 168, tg 501,  hdl 26, ldl-c na,           psa 0.11  From 4/19 showed   gluc 143, cr 0.86, gfr>60,             wbc 9.0, hb 12.4, plt 178, tsh 0.48,     ft4 1.00, trop+, ddimer neg  From 9/19 showed           hba1c 7.0, chol 160, tg 262, hdl 34, ldl-c 69,  wb 10.2, hb 14.7, plt 231, umar 39.8, psa 0.09, test 357  From 6/20 showed   gluc 157, cr 0.60, gfr>60,  alt 32,   hba1c 7.5, chol 136, tg 173, hdl 31, ldl-c 70,               umar 17.8  Form 11/20 showed gluc 122, cr 0.90, gfr>60,       hba1c 7.3  From 6/21 showed   gluc 131, cr 0.80, gfr>60,  alt 20,   hba1c 6.1, chol 137, tg 178, hdl 32, ldl-c 70, wbc 10.3, hb 17.1, plt 216,               psa 0.08    We reviewed the patient's labs from the last several visits to point out trends in the numbers        Patient Active Problem List   Diagnosis Code    Encounter for long-term (current) drug use Z79.899    Dyslipidemia E78.5   Jamas Roys Hypogonadism male Dr. Robbins Mu E29.1    Hypovitaminosis D E55.9    Essential hypertension I10    DDD (degenerative disc disease), cervical M50.30    Tobacco dependence syndrome F17.200    Colon polyp 8/08 Dr Norman Soliman K63.5    Arthritis of right knee M17.11    Chronic pain syndrome G89.4    Controlled type 2 diabetes mellitus without complication, without long-term current use of insulin (HCC) E11.9    Atrial fibrillation with rapid ventricular response (HCC) I48.91    S/P coronary artery stent placement Z95.5    Severe obesity ([address] E66.01    Depression F32. A     Assessment and plan:  1. Chronic pain. F/U pain clinic  2. HTN. Controlled on current   3. DM.  F/u ophth. Continue current regimen and work on wt mgmt  4. Dyslipidemia. Continue current, add vascepa once covered  5. Fatty liver. Wt loss would be ideal  6. CHD. F/u Dr Erlene Schilder  7. Hypovit d. Supplementation  8. Depression. Doing well  9. Colon polyp. Fiber, will send referral again  10. Smoking cessation emphasized. LDCT ordered  11. Obesity. Lifestyle and dietary measures. Congratulated him on the wt loss thus far  12. Inc trazodone as requested        RTC 8/22    Above conditions discussed at length and patient vocalized understanding. All questions answered to patient satisfaction        ICD-10-CM ICD-9-CM    1. Physical exam  Z00.00 V70.9 PSA SCREENING (SCREENING)      CBC W/O DIFF      METABOLIC PANEL, COMPREHENSIVE      MICROALBUMIN, UR, RAND W/ MICROALB/CREAT RATIO      HEMOGLOBIN A1C WITH EAG   2. Essential hypertension  I10 401.9    3. Atrial fibrillation with rapid ventricular response (HCC)  I48.91 427.31    4. Hyperplastic colonic polyp, unspecified part of colon  K63.5 211.3    5.  Controlled type 2 diabetes mellitus without complication, without long-term current use of insulin (HCC)  D55.1 401.48 METABOLIC PANEL, COMPREHENSIVE      MICROALBUMIN, UR, RAND W/ MICROALB/CREAT RATIO      HEMOGLOBIN A1C WITH EAG   6. Tobacco dependence syndrome  F17.200 305.1    7. DDD (degenerative disc disease), cervical  M50.30 722.4    8. S/P coronary artery stent placement  Z95.5 V45.82    9. Dyslipidemia  E78.5 272.4    10. Depression, unspecified depression type  F32. A 311    11. Chronic pain syndrome  G89.4 338.4    12. Insomnia, unspecified type  G47.00 780.52 traZODone (DESYREL) 100 mg tablet   13. Personal history of tobacco use, presenting hazards to health  Z87.891 V15.82 CT LOW DOSE LUNG CANCER SCREENING       Discussed with patient current guidelines for screening for lung cancer. Current recommendations are to obtain yearly screening LDCT yearly for age 46-80, or until smoke free for 15 years. Patient has 60 pack year history of cigarette smoking and currently 1.5 ppd. Discussed with patient risks and benefits of screening, including over-diagnosis, false positive rate, and total radiation exposure. Patient currently exhibits no signs or symptoms suggestive of lung cancer. Discussed with patient importance of compliance with yearly annual lung cancer screenings and willingness to undergo diagnosis and treatment if screening scan is positive. In addition, patient was counseled regarding (remaining smoke free/total smoking cessation).

## 2022-02-12 ENCOUNTER — HOSPITAL ENCOUNTER (OUTPATIENT)
Dept: LAB | Age: 62
Discharge: HOME | End: 2022-02-12
Payer: COMMERCIAL

## 2022-02-12 LAB
CHOLEST SERPL-MCNC: 143 MG/DL
EST. AVERAGE GLUCOSE BLD GHB EST-MCNC: 131 MG/DL
HBA1C MFR BLD: 6.2 % (ref 4.2–5.6)
HDLC SERPL-MCNC: 34 MG/DL (ref 40–60)
HDLC SERPL: 4.2 {RATIO} (ref 0–5)
LDLC SERPL CALC-MCNC: 63 MG/DL (ref 0–100)
LIPID PROFILE,FLP: ABNORMAL
TRIGL SERPL-MCNC: 230 MG/DL (ref ?–150)
VLDLC SERPL CALC-MCNC: 46 MG/DL

## 2022-02-12 PROCEDURE — 80061 LIPID PANEL: CPT

## 2022-02-12 PROCEDURE — 83036 HEMOGLOBIN GLYCOSYLATED A1C: CPT

## 2022-02-12 PROCEDURE — 36415 COLL VENOUS BLD VENIPUNCTURE: CPT

## 2022-02-16 ENCOUNTER — OFFICE VISIT (OUTPATIENT)
Dept: INTERNAL MEDICINE CLINIC | Age: 62
End: 2022-02-16
Payer: COMMERCIAL

## 2022-02-16 VITALS
HEART RATE: 76 BPM | RESPIRATION RATE: 16 BRPM | TEMPERATURE: 97.2 F | DIASTOLIC BLOOD PRESSURE: 70 MMHG | OXYGEN SATURATION: 96 % | SYSTOLIC BLOOD PRESSURE: 131 MMHG | WEIGHT: 243 LBS | BODY MASS INDEX: 32.91 KG/M2 | HEIGHT: 72 IN

## 2022-02-16 DIAGNOSIS — Z87.891 PERSONAL HISTORY OF TOBACCO USE, PRESENTING HAZARDS TO HEALTH: ICD-10-CM

## 2022-02-16 DIAGNOSIS — K63.5 HYPERPLASTIC COLONIC POLYP, UNSPECIFIED PART OF COLON: ICD-10-CM

## 2022-02-16 DIAGNOSIS — E11.9 CONTROLLED TYPE 2 DIABETES MELLITUS WITHOUT COMPLICATION, WITHOUT LONG-TERM CURRENT USE OF INSULIN (HCC): ICD-10-CM

## 2022-02-16 DIAGNOSIS — F17.200 TOBACCO DEPENDENCE SYNDROME: ICD-10-CM

## 2022-02-16 DIAGNOSIS — Z95.5 S/P CORONARY ARTERY STENT PLACEMENT: ICD-10-CM

## 2022-02-16 DIAGNOSIS — G47.00 INSOMNIA, UNSPECIFIED TYPE: ICD-10-CM

## 2022-02-16 DIAGNOSIS — M50.30 DDD (DEGENERATIVE DISC DISEASE), CERVICAL: ICD-10-CM

## 2022-02-16 DIAGNOSIS — Z00.00 PHYSICAL EXAM: Primary | ICD-10-CM

## 2022-02-16 DIAGNOSIS — G89.4 CHRONIC PAIN SYNDROME: ICD-10-CM

## 2022-02-16 DIAGNOSIS — F32.A DEPRESSION, UNSPECIFIED DEPRESSION TYPE: ICD-10-CM

## 2022-02-16 DIAGNOSIS — E78.5 DYSLIPIDEMIA: ICD-10-CM

## 2022-02-16 DIAGNOSIS — I10 ESSENTIAL HYPERTENSION: ICD-10-CM

## 2022-02-16 DIAGNOSIS — I48.91 ATRIAL FIBRILLATION WITH RAPID VENTRICULAR RESPONSE (HCC): ICD-10-CM

## 2022-02-16 PROCEDURE — 99396 PREV VISIT EST AGE 40-64: CPT | Performed by: INTERNAL MEDICINE

## 2022-02-16 PROCEDURE — G0296 VISIT TO DETERM LDCT ELIG: HCPCS | Performed by: INTERNAL MEDICINE

## 2022-02-16 RX ORDER — TRAZODONE HYDROCHLORIDE 100 MG/1
TABLET ORAL
Qty: 135 TABLET | Refills: 3 | Status: SHIPPED | OUTPATIENT
Start: 2022-02-16

## 2022-02-16 NOTE — PROGRESS NOTES
Erik Albert presents with   Chief Complaint   Patient presents with    Follow-up     6 month    Hypertension    Complete Physical    Diabetes    Labs     2-12-22            1. \"Have you been to the ER, urgent care clinic since your last visit? Hospitalized since your last visit? \" NO    2. \"Have you seen or consulted any other health care providers outside of the [address] [place] [address] since your last visit? \" YES    3. For patients aged 39-70: Has the patient had a colonoscopy? Yes - Care Gap present. Most recent result on file     If the patient is female:    4. For patients aged 41-77: Has the patient had a mammogram within the past 2 years? NA - based on age    11. For patients aged 21-65: Has the patient had a pap smear?  NA - based on age

## 2022-03-16 DIAGNOSIS — Z87.891 PERSONAL HISTORY OF TOBACCO USE, PRESENTING HAZARDS TO HEALTH: ICD-10-CM

## 2022-03-18 PROBLEM — I48.91 ATRIAL FIBRILLATION WITH RAPID VENTRICULAR RESPONSE (HCC): Status: ACTIVE | Noted: 2019-04-25

## 2022-03-19 PROBLEM — M17.11 ARTHRITIS OF RIGHT KNEE: Status: ACTIVE | Noted: 2017-03-16

## 2022-03-19 PROBLEM — E11.9 CONTROLLED TYPE 2 DIABETES MELLITUS WITHOUT COMPLICATION, WITHOUT LONG-TERM CURRENT USE OF INSULIN (HCC): Status: ACTIVE | Noted: 2017-08-14

## 2022-03-19 PROBLEM — E66.01 SEVERE OBESITY (HCC): Status: ACTIVE | Noted: 2019-09-13

## 2022-03-19 PROBLEM — K63.5 COLON POLYP: Status: ACTIVE | Noted: 2017-01-27

## 2022-03-20 PROBLEM — Z95.5 S/P CORONARY ARTERY STENT PLACEMENT: Status: ACTIVE | Noted: 2019-04-29

## 2022-03-20 PROBLEM — G89.4 CHRONIC PAIN SYNDROME: Status: ACTIVE | Noted: 2017-06-08

## 2022-05-18 RX ORDER — LISINOPRIL AND HYDROCHLOROTHIAZIDE 12.5; 2 MG/1; MG/1
TABLET ORAL
Qty: 360 TABLET | Refills: 3 | Status: SHIPPED | OUTPATIENT
Start: 2022-05-18

## 2022-05-19 NOTE — PROGRESS NOTES
[facility] Hospitalist Group  Progress Note    Patient: Jessenia Romano Age: 63 y.o. : 1960 MR#: [id number] SSN: [id number]  Date: 2019     Subjective:     Denies CP, SOB, palpitations or GI distress;  Pain controlled and notes small amt of drainage to scalp w/o other complaints. Assessment/Plan:   1. New onset afib w/ RVR - s/p cardiazem drip, now in SR; cont BB  2. Elevated troponin r/o ACS - heparin drip per cardiology; peak troponin 1.94 and now downtrending. Plan for cardiac catheterization on  per cardiology  3. Boil to scalp - cont augmentin   4. Chronic pain - pt reports no use of MS contin recently thus d'maxi; cont norco PRN  5.   Tobacco abuse - counseled on cessation    Additional Notes:      Case discussed with:  [x]Patient  [x]Family (wife at bedside)  [x]Nursing  []Case Management  DVT Prophylaxis:  []Lovenox  []Hep SQ  []SCDs  []Coumadin   [x]On Heparin gtt    Objective:   VS:   Visit Vitals  /77 (BP 1 Location: Right arm, BP Patient Position: At rest)   Pulse 65   Temp 97.4 °F (36.3 °C)   Resp 18   Ht 6' (1.829 m)   Wt 113.7 kg (250 lb 9.6 oz)   SpO2 96%   BMI 33.99 kg/m²      Tmax/24hrs: Temp (24hrs), Av.8 °F (36.6 °C), Min:97.4 °F (36.3 °C), Max:99 °F (37.2 °C)      Intake/Output Summary (Last 24 hours) at 2019 1143  Last data filed at 2019 0555  Gross per 24 hour   Intake 846.31 ml   Output 1050 ml   Net -203.69 ml     General:  Alert, NAD  Cardiovascular:  RRR  Pulmonary:  LSC throughout  GI:  +BS in all four quadrants, soft, non-tender  Extremities:  No edema; 2+ dorsalis pedis pulses bilaterally  Neuro: alert and oriented x 3  Head: left posterior aspect + boil, crusted with + erythema    Labs:    Recent Results (from the past 24 hour(s))   EKG, 12 LEAD, INITIAL    Collection Time: 19 12:54 PM   Result Value Ref Range    Ventricular Rate 72 BPM    Atrial Rate 72 BPM    P-R Interval 134 ms    QRS Duration 100 ms    Q-T Interval 416 ms    QTC Calculation (Bezet) 455 ms    Calculated P Axis 55 degrees    Calculated R Axis 5 degrees    Calculated T Axis 34 degrees    Diagnosis       Normal sinus rhythm  Possible Left atrial enlargement  Inferior infarct (cited on or before 25-APR-2019)  Abnormal ECG  When compared with ECG of 25-APR-2019 13:43,  Sinus rhythm has replaced Atrial fibrillation  Vent.  rate has decreased  BPM  ST no longer depressed in Inferior leads  ST no longer depressed in Anterolateral leads  T wave inversion no longer evident in Lateral leads     PTT    Collection Time: 04/26/19  2:16 PM   Result Value Ref Range    aPTT 31.1 23.0 - 36.4 SEC   TROPONIN I    Collection Time: 04/26/19  3:57 PM   Result Value Ref Range    Troponin-I, QT 1.56 (HH) 0.0 - 0.045 NG/ML   TROPONIN I    Collection Time: 04/26/19  9:36 PM   Result Value Ref Range    Troponin-I, QT 1.37 (H) 0.0 - 0.045 NG/ML   PTT    Collection Time: 04/26/19  9:36 PM   Result Value Ref Range    aPTT 34.3 23.0 - 36.4 SEC   CARDIAC PANEL,(CK, CKMB & TROPONIN)    Collection Time: 04/27/19  6:18 AM   Result Value Ref Range    CK 87 39 - 308 U/L    CK - MB 2.1 <3.6 ng/ml    CK-MB Index 2.4 0.0 - 4.0 %    Troponin-I, QT 1.18 (H) 0.0 - 0.045 NG/ML   PTT    Collection Time: 04/27/19  6:18 AM   Result Value Ref Range    aPTT 37.7 (H) 23.0 - 36.4 SEC     Signed By: Fortino Dill NP     April 27, 2019 negative

## 2022-08-10 RX ORDER — METFORMIN HYDROCHLORIDE 1000 MG/1
TABLET ORAL
Qty: 180 TABLET | Refills: 3 | Status: SHIPPED | OUTPATIENT
Start: 2022-08-10

## 2022-09-10 ENCOUNTER — HOSPITAL ENCOUNTER (OUTPATIENT)
Dept: LAB | Age: 62
Discharge: HOME | End: 2022-09-10
Payer: COMMERCIAL

## 2022-09-10 DIAGNOSIS — E11.9 CONTROLLED TYPE 2 DIABETES MELLITUS WITHOUT COMPLICATION, WITHOUT LONG-TERM CURRENT USE OF INSULIN (HCC): ICD-10-CM

## 2022-09-10 DIAGNOSIS — Z00.00 PHYSICAL EXAM: ICD-10-CM

## 2022-09-10 LAB
ALBUMIN SERPL-MCNC: 3.9 G/DL (ref 3.4–5)
ALBUMIN/GLOB SERPL: 1.4 {RATIO} (ref 0.8–1.7)
ALP SERPL-CCNC: 66 U/L (ref 45–117)
ALT SERPL-CCNC: 24 U/L (ref 16–61)
ANION GAP SERPL CALC-SCNC: 6 MMOL/L (ref 3–18)
AST SERPL-CCNC: 17 U/L (ref 10–38)
BILIRUB SERPL-MCNC: 0.5 MG/DL (ref 0.2–1)
BUN SERPL-MCNC: 21 MG/DL (ref 7–18)
BUN/CREAT SERPL: 22 (ref 12–20)
CALCIUM SERPL-MCNC: 9.5 MG/DL (ref 8.5–10.1)
CHLORIDE SERPL-SCNC: 102 MMOL/L (ref 100–111)
CO2 SERPL-SCNC: 31 MMOL/L (ref 21–32)
CREAT SERPL-MCNC: 0.96 MG/DL (ref 0.6–1.3)
CREAT UR-MCNC: 119 MG/DL (ref 30–125)
ERYTHROCYTE [DISTWIDTH] IN BLOOD BY AUTOMATED COUNT: 13.1 % (ref 11.6–14.5)
EST. AVERAGE GLUCOSE BLD GHB EST-MCNC: 126 MG/DL
GLOBULIN SER CALC-MCNC: 2.7 G/DL (ref 2–4)
GLUCOSE SERPL-MCNC: 115 MG/DL (ref 74–99)
HBA1C MFR BLD: 6 % (ref 4.2–5.6)
HCT VFR BLD AUTO: 49.9 % (ref 36–48)
HGB BLD-MCNC: 16.1 G/DL (ref 13–16)
MCH RBC QN AUTO: 28.4 PG (ref 24–34)
MCHC RBC AUTO-ENTMCNC: 32.3 G/DL (ref 31–37)
MCV RBC AUTO: 88 FL (ref 78–100)
MICROALBUMIN UR-MCNC: 3.21 MG/DL (ref 0–3)
MICROALBUMIN/CREAT UR-RTO: 27 MG/G (ref 0–30)
NRBC # BLD: 0 K/UL (ref 0–0.01)
NRBC BLD-RTO: 0 PER 100 WBC
PLATELET # BLD AUTO: 212 K/UL (ref 135–420)
PMV BLD AUTO: 10.4 FL (ref 9.2–11.8)
POTASSIUM SERPL-SCNC: 3.6 MMOL/L (ref 3.5–5.5)
PROT SERPL-MCNC: 6.6 G/DL (ref 6.4–8.2)
PSA SERPL-MCNC: 0.1 NG/ML (ref 0–4)
RBC # BLD AUTO: 5.67 M/UL (ref 4.35–5.65)
SODIUM SERPL-SCNC: 139 MMOL/L (ref 136–145)
WBC # BLD AUTO: 10 K/UL (ref 4.6–13.2)

## 2022-09-10 PROCEDURE — 84153 ASSAY OF PSA TOTAL: CPT

## 2022-09-10 PROCEDURE — 82043 UR ALBUMIN QUANTITATIVE: CPT

## 2022-09-10 PROCEDURE — 83036 HEMOGLOBIN GLYCOSYLATED A1C: CPT

## 2022-09-10 PROCEDURE — 80053 COMPREHEN METABOLIC PANEL: CPT

## 2022-09-10 PROCEDURE — 85027 COMPLETE CBC AUTOMATED: CPT

## 2022-09-10 PROCEDURE — 36415 COLL VENOUS BLD VENIPUNCTURE: CPT

## 2022-10-12 NOTE — PROGRESS NOTES
65 y.o. WHITE/NON- male who presents for evaluation. No cardiovascular complaints. Remains active with his job although no set exercise    Denies polyuria, polydipsia, nocturia, vision change. Not checking sugars regularly. The diet and portions have really been great, weight is down from the 260s!! Vitals 10/20/2022 2/16/2022 12/2/2021 6/15/2021 6/3/2021 10/4/2019   Weight 223 lb 243 lb 239 lb 228 lb 231 lb 260 lb     Denies any gi or gu complaints. Still has not scheduled the colo but wants the referral sent    Still seeing Dr Jama Washington but requesting referral to ortho for recurring pain left hip area    Still smoking but trying to cut back.  He thought long and hard but decided to hold off on doing LDCT    Past Medical History:   Diagnosis Date    Brachial neuritis or radiculitis 06/27/2014    Bursitis, hip 08/02/2016    Colon polyp 08/2008    Dr Lashon Anderson    Degenerative arthritis of cervical spine 02/16/2016    Depression 12/2013    PHQ-9 was 6/27     DM (diabetes mellitus) ([address] 03/2015    on basis of elev hba1c; [facility] for teaching; microalbumin 10/19    Dyslipidemia     Fatty liver     on US w negative serologies 2009    Foot fracture, right 09/2013    5th metatarsal    GERD (gastroesophageal reflux disease)     H/O echocardiogram 04/2019    nl lv, ef 61%, mild conc lvh, no wma, gr 1 dd, mild MR    Hypertension     Hypogonadism male 03/2013    Dr Mayur Martin; took androgel in past    Morbid obesity ([address]     peak weight 275 lbs, bmi 36.3 from 2/14; IF 2/18 start weight 253 lbs but unable to do    NSTEMI (non-ST elevated myocardial infarction) ([address] 04/2019    s/p cath Dr Akilah Sanchez and ROSA MARIA to DM1    PAF (paroxysmal atrial fibrillation) ([address] 04/2019    in setting on ACS    Phymatous rosacea     Postlaminectomy syndrome, lumbar region     S/P cardiac cath 04/2019    Dr Akilah Sanchez; diffuse 50% RCA, diff LAD, 30% Cx, 85% DM1 culprit stented w ROSA MARIA    Thoracic or lumbosacral neuritis or radiculitis, unspecified Tobacco dependence syndrome     failed chantix and wellbutrin     Past Surgical History:   Procedure Laterality Date    HX COLONOSCOPY      Dr Josephine Grider 8/22/08    HX LUMBAR LAMINECTOMY  7/02    L5-S1 hemilaminectomy and diskectomy    HX ORTHOPAEDIC      DEXA t score -1.0 spine, -0.4 hip (1/14) Dr. Angie Dumont Left 12/2006    NEUROLOGICAL PROCEDURE UNLISTED  4/13    MRI pituitary normal     Social History     Socioeconomic History    Marital status:      Spouse name: Not on file    Number of children: Not on file    Years of education: Not on file    Highest education level: Not on file   Occupational History    Not on file   Tobacco Use    Smoking status: Every Day     Packs/day: 2.00     Years: 30.00     Pack years: 60.00     Types: Cigarettes    Smokeless tobacco: Never   Substance and Sexual Activity    Alcohol use: No     Comment: One drink per 3-4 months    Drug use: No     Comment: Marijuana use in the 80's    Sexual activity: Not on file   Other Topics Concern    Not on file   Social History Narrative    Not on file     Social Determinants of Health     Financial Resource Strain: Not on file   Food Insecurity: Not on file   Transportation Needs: Not on file   Physical Activity: Not on file   Stress: Not on file   Social Connections: Not on file   Intimate Partner Violence: Not on file   Housing Stability: Not on file     Current Outpatient Medications   Medication Sig    tiZANidine (ZANAFLEX) 4 mg tablet take 1 tablet by mouth twice a day if needed for 30 DAYS    ibuprofen (MOTRIN) 800 mg tablet Take 800 mg by mouth every eight (8) hours as needed.     metFORMIN (GLUCOPHAGE) 1,000 mg tablet take 1 tablet by mouth twice a day with food    lisinopril-hydroCHLOROthiazide (PRINZIDE, ZESTORETIC) 20-12.5 mg per tablet take 2 tablets by mouth twice a day    traZODone (DESYREL) 100 mg tablet Take 1.5 tabs at night as needed    Jardiance 25 mg tablet take 1 tablet by mouth once daily aspirin 81 mg chewable tablet Take 4 Tablets by mouth daily. 325 daily    atorvastatin (LIPITOR) 80 mg tablet Take 1 Tablet by mouth daily. nitroglycerin (NITROSTAT) 0.4 mg SL tablet 1 Tablet by SubLINGual route every five (5) minutes as needed for Chest Pain. Up to 3 doses. amLODIPine (NORVASC) 5 mg tablet Take 1 Tablet by mouth daily. metoprolol tartrate (LOPRESSOR) 100 mg IR tablet Take 1 Tablet by mouth every twelve (12) hours. fenofibrate nanocrystallized (TRICOR) 145 mg tablet take 1 tablet by mouth once daily    fish oil-omega-3 fatty acids (Fish Oil) 340-1,000 mg capsule Take 1 Capsule by mouth daily. baclofen (LIORESAL) 10 mg tablet Take 10 mg by mouth three (3) times daily. HYDROcodone-acetaminophen (NORCO)  mg tablet Take 1 Tab by mouth every six (6) hours as needed for Pain.    naloxone 4 mg/actuation spry 4 mg by Nasal route once as needed (opioid overdose) for up to 1 dose. May substitute 2 mg syringe if insurance requires    omeprazole (PRILOSEC) 20 mg capsule Take 20 mg by mouth daily. ascorbic acid (VITAMIN C) 1,000 mg tablet Take 1,000 mg by mouth two (2) times a day. MULTIVITAMIN PO Take  by mouth. No current facility-administered medications for this visit. No Known Allergies    REVIEW OF SYSTEMS: colo 6/08 Dr Obdulia Bobo - no vision change or eye pain  Oral - no mouth pain, tongue or tooth problems  Ears - no hearing loss, ear pain, fullness, no swallowing problems  Cardiac - no CP, PND, orthopnea, edema, palpitations or syncope  Chest - no breast masses  Resp - no wheezing, chronic coughing, dyspnea  GI - no heartburn, nausea, vomiting, change in bowel habits, bleeding, hemorrhoids  Urinary - no dysuria, hematuria, flank pain, urgency, frequency    Visit Vitals  /68   Pulse 70   Temp 97.3 °F (36.3 °C) (Temporal)   Resp 16   Ht 6' 1\" (1.854 m)   Wt 223 lb (101.2 kg)   SpO2 96%   BMI 29.42 kg/m²     A&O x3  Affect is appropriate.   Mood stable  No apparent distress  Anicteric, no JVD, adenopathy or thyromegaly. No carotid bruits or radiated murmur  Lungs clear to auscultation, no wheezes or rales  Heart showed regular rate and rhythm. No murmur, rubs, gallops  Abdomen soft nontender, no hepatosplenomegaly or masses. Extremities without edema.   Pulses 1-2+ symmetrically  No pain on rotation of hip, tenderness noted in the trochanter    LABS  From 9/09 showed   gluc 88,   cr 0.70,              alt 106,                   chol 183, tg 431,  hdl 32, ldl-c NA,  wbc 8.0, hb 15.8, plt 149,        From 2/10 showed   gluc 120, cr 0.60, gfr>60, alt 113,                    chol 306, t 1135, hdl 31, ldl-c NA,                                                 ast 45, ap 95, tb 0.4  From 3/10 showed                               2 hr gtt 165  From 6/10 showed   gluc 165,                                hba1c 5.6,                   From 11/12 showed gluc 102, cr 0.60,      alt 43,    hba1c 5.9, chol 183, tg 489,  hdl 28, ldl-c NA, wbc 9.0, hb 15.8, plt 185,            psa 0.90, tsh 3.0,      From 11/12 showed                                    vit d 15.2,      test 145,  james 5.5, acth 1.7, fsh 5.8, lh 1.5, prl 5.1, b12 878, RA neg, crp 0.83, %sat 31, ferritin 308, hep a/b/c neg,  gracie neg, lyme neg  From 8/13 showed            hba1c 5.8, chol 176, tg 521,  hdl 23, ldl-c NA  From 11/13 showed gluc 135, cr 0.80, gfr 52,   alt 30,   hba1c 5.8, chol 154, tg 323,  hdl 31, ldl-c 58,                        vit d 20.5,       test 185  From 3/14 showed            hba1c 6.0, chol 177, tg 607,  hdl 26, ldl-c NA  From 6/14 showed   gluc 114, cr 0.80, gfr>60,  alt 32,   hba1c 5.8, chol 147, tg 360,  hdl 29, ldl-c 46  From 7/14 showed   gluc 148, cr 0.77, gfr>60,               wbc 9.3, hb 14.9, plt 214  From 2/15 showed   gluc 140, cr 0.72, gfr 107, alt 39,   hba1c 6.5, chol 180, tg 377,  hdl 31, ldl-c 74, wbc 7.5, hb 15.2, plt 205  From 7/15 showed            hba1c 6.2, chol 137, tg 293,  hdl 28, ldl-c 50,                 umar neg  From 7/16 showed   gluc 125, cr 0.60, gfr>60, alt 29,    hba1c 6.2, chol 145, tg 247,  hdl 32, ldl-c 64, wbc 7.6, hb 15.2, plt 198, umar 11.8,   psa 0.35  From 8/17 showed   gluc 129, cr 0.80, gfr>60, alt 22,    hba1c 6.5, chol 171, tg 479,  hdl 27, ldl-c na, wbc 9.4, hb 15.8, plt 204, umar 15.0, vit d 27.0  From 2/18 showed   gluc 121, cr 0.50, gfr>60, alt 44,    hba1c 6.5, chol 168, tg 501,  hdl 26, ldl-c na,                psa 0.11  From 4/19 showed   gluc 143, cr 0.86, gfr>60,               wbc 9.0, hb 12.4, plt 178,                tsh 0.48,  ft4 1.00, trop+, ddimer-  From 9/19 showed            hba1c 7.0, chol 160, tg 262, hdl 34, ldl-c 69,  wb 10.2, hb 14.7, plt 231, umar 39.8,   psa 0.09, test 357  From 6/20 showed   gluc 157, cr 0.60, gfr>60,  alt 32,   hba1c 7.5, chol 136, tg 173, hdl 31, ldl-c 70,                 umar 17.8  Form 11/20 showed gluc 122, cr 0.90, gfr>60,        hba1c 7.3  From 6/21 showed   gluc 131, cr 0.80, gfr>60,  alt 20,   hba1c 6.1, chol 137, tg 178, hdl 32, ldl-c 70, wbc 10.3, hb 17.1, plt 216,                   psa 0.08  From 2/22 showed            hba1c 6.2, chol 143, tg 230, hdl 34, ldl-c 43    Results for orders placed or performed during the hospital encounter of 09/10/22   PSA SCREENING (SCREENING)   Result Value Ref Range    Prostate Specific Ag 0.1 0.0 - 4.0 ng/mL   CBC W/O DIFF   Result Value Ref Range    WBC 10.0 4.6 - 13.2 K/uL    RBC 5.67 (H) 4.35 - 5.65 M/uL    HGB 16.1 (H) 13.0 - 16.0 g/dL    HCT 49.9 (H) 36.0 - 48.0 %    MCV 88.0 78.0 - 100.0 FL    MCH 28.4 24.0 - 34.0 PG    MCHC 32.3 31.0 - 37.0 g/dL    RDW 13.1 11.6 - 14.5 %    PLATELET 232 404 - 227 K/uL    MPV 10.4 9.2 - 11.8 FL    NRBC 0.0 0  WBC    ABSOLUTE NRBC 0.00 0.00 - 1.06 K/uL   METABOLIC PANEL, COMPREHENSIVE   Result Value Ref Range    Sodium 139 136 - 145 mmol/L    Potassium 3.6 3.5 - 5.5 mmol/L    Chloride 102 100 - 111 mmol/L    CO2 31 21 - 32 mmol/L    Anion gap 6 3.0 - 18 mmol/L    Glucose 115 (H) 74 - 99 mg/dL    BUN 21 (H) 7.0 - 18 MG/DL    Creatinine 0.96 0.6 - 1.3 MG/DL    BUN/Creatinine ratio 22 (H) 12 - 20      GFR est AA >60 >60 ml/min/1.73m2    GFR est non-AA >60 >60 ml/min/1.73m2    Calcium 9.5 8.5 - 10.1 MG/DL    Bilirubin, total 0.5 0.2 - 1.0 MG/DL    ALT (SGPT) 24 16 - 61 U/L    AST (SGOT) 17 10 - 38 U/L    Alk. phosphatase 66 45 - 117 U/L    Protein, total 6.6 6.4 - 8.2 g/dL    Albumin 3.9 3.4 - 5.0 g/dL    Globulin 2.7 2.0 - 4.0 g/dL    A-G Ratio 1.4 0.8 - 1.7     MICROALBUMIN, UR, RAND W/ MICROALB/CREAT RATIO   Result Value Ref Range    Microalbumin,urine random 3.21 (H) 0 - 3.0 MG/DL    Creatinine, urine random 119.00 30 - 125 mg/dL    Microalbumin/Creat ratio (mg/g creat) 27 0 - 30 mg/g   HEMOGLOBIN A1C WITH EAG   Result Value Ref Range    Hemoglobin A1c 6.0 (H) 4.2 - 5.6 %    Est. average glucose 126 mg/dL       We reviewed the patient's labs from the last several visits to point out trends in the numbers        Patient Active Problem List   Diagnosis Code    Encounter for long-term (current) drug use Z79.899    Dyslipidemia E78.5    Hypogonadism male Dr. Mayur Martin E29.1    Hypovitaminosis D E55.9    Essential hypertension I10    DDD (degenerative disc disease), cervical M50.30    Tobacco dependence syndrome F17.200    Colon polyp 8/08 Dr Lashon Anderson K63.5    Arthritis of right knee M17.11    Chronic pain syndrome G89.4    Controlled type 2 diabetes mellitus without complication, without long-term current use of insulin (HCC) E11.9    Atrial fibrillation with rapid ventricular response (HCC) I48.91    S/P coronary artery stent placement Z95.5    Severe obesity ([address] E66.01    Depression F32. A     Assessment and plan:  1. Chronic pain. F/U Dr Jama Washington. He requested we forward records to [place] [address]  2. HTN. Controlled on current   3. DM.  F/u ophth. Continue current regimen and work on wt mgmt  4. Dyslipidemia.   Continue current, vascepa once covered  5. Fatty liver. Wt loss would be ideal  6. CHD. F/u Dr Silvia Henry  7. Hypovit d. Supplementation  8. Depression. Doing well  9. Colon polyp. Fiber, will send referral again  10. Smoking cessation emphasized. LDCT to be done at his discretion  11. Obesity. Lifestyle and dietary measures. Congratulated him on the wt loss   12. Ortho. Referred to Dr Herminia Rojas        RTC 4/23    Above conditions discussed at length and patient vocalized understanding. All questions answered to patient satisfaction        ICD-10-CM ICD-9-CM    1. Screen for colon cancer  Z12.11 V76.51 REFERRAL TO GASTROENTEROLOGY      2. Hip pain  M25.559 719.45 REFERRAL TO ORTHOPEDICS      3. Chronic pain syndrome  G89.4 338.4       4. Controlled type 2 diabetes mellitus without complication, without long-term current use of insulin (HCC)  Q27.3 780.69 METABOLIC PANEL, BASIC      LIPID PANEL      HEMOGLOBIN A1C WITH EAG      5. Dyslipidemia  E78.5 272.4       6. Essential hypertension  I10 401.9       7. Severe obesity ([address]  E66.01 278.01       8. S/P coronary artery stent placement  Z95.5 V45.82       9.  Tobacco dependence syndrome  F17.200 305.1

## 2022-10-20 ENCOUNTER — TELEPHONE (OUTPATIENT)
Dept: INTERNAL MEDICINE CLINIC | Age: 62
End: 2022-10-20

## 2022-10-20 ENCOUNTER — OFFICE VISIT (OUTPATIENT)
Dept: INTERNAL MEDICINE CLINIC | Age: 62
End: 2022-10-20
Payer: COMMERCIAL

## 2022-10-20 VITALS
DIASTOLIC BLOOD PRESSURE: 68 MMHG | TEMPERATURE: 97.3 F | HEIGHT: 73 IN | SYSTOLIC BLOOD PRESSURE: 131 MMHG | WEIGHT: 223 LBS | RESPIRATION RATE: 16 BRPM | BODY MASS INDEX: 29.55 KG/M2 | HEART RATE: 70 BPM | OXYGEN SATURATION: 96 %

## 2022-10-20 DIAGNOSIS — G89.4 CHRONIC PAIN SYNDROME: ICD-10-CM

## 2022-10-20 DIAGNOSIS — E78.5 DYSLIPIDEMIA: ICD-10-CM

## 2022-10-20 DIAGNOSIS — I10 ESSENTIAL HYPERTENSION: ICD-10-CM

## 2022-10-20 DIAGNOSIS — M25.559 HIP PAIN: ICD-10-CM

## 2022-10-20 DIAGNOSIS — E66.01 SEVERE OBESITY (HCC): ICD-10-CM

## 2022-10-20 DIAGNOSIS — E11.9 CONTROLLED TYPE 2 DIABETES MELLITUS WITHOUT COMPLICATION, WITHOUT LONG-TERM CURRENT USE OF INSULIN (HCC): ICD-10-CM

## 2022-10-20 DIAGNOSIS — Z95.5 S/P CORONARY ARTERY STENT PLACEMENT: ICD-10-CM

## 2022-10-20 DIAGNOSIS — Z12.11 SCREEN FOR COLON CANCER: Primary | ICD-10-CM

## 2022-10-20 DIAGNOSIS — F17.200 TOBACCO DEPENDENCE SYNDROME: ICD-10-CM

## 2022-10-20 PROCEDURE — 3044F HG A1C LEVEL LT 7.0%: CPT | Performed by: INTERNAL MEDICINE

## 2022-10-20 PROCEDURE — 99214 OFFICE O/P EST MOD 30 MIN: CPT | Performed by: INTERNAL MEDICINE

## 2022-10-20 RX ORDER — IBUPROFEN 800 MG/1
800 TABLET ORAL
COMMUNITY
Start: 2022-10-05

## 2022-10-20 RX ORDER — TIZANIDINE 4 MG/1
TABLET ORAL
COMMUNITY
Start: 2022-09-26

## 2022-10-20 NOTE — PROGRESS NOTES
Apoorva Segovia presents with No chief complaint on file. 1. \"Have you been to the ER, urgent care clinic since your last visit? Hospitalized since your last visit? \" No    2. \"Have you seen or consulted any other health care providers outside of the [address] [place] [address] since your last visit? \" No     3. For patients aged 39-70: Has the patient had a colonoscopy / FIT/ Cologuard?  NA - based on age

## 2022-10-21 ENCOUNTER — DOCUMENTATION ONLY (OUTPATIENT)
Dept: INTERNAL MEDICINE CLINIC | Age: 62
End: 2022-10-21

## 2022-10-24 NOTE — PROGRESS NOTES
Faxed lab results of 9/10/22 to Nilam Love, [address] at [phone] per Dr Kessler Fraction

## 2022-11-02 RX ORDER — FENOFIBRATE 145 MG/1
TABLET, COATED ORAL
Qty: 30 TABLET | Refills: 11 | Status: SHIPPED | OUTPATIENT
Start: 2022-11-02

## 2022-11-24 DIAGNOSIS — E78.5 DYSLIPIDEMIA: ICD-10-CM

## 2022-11-24 DIAGNOSIS — I10 ESSENTIAL HYPERTENSION: ICD-10-CM

## 2022-11-28 RX ORDER — ATORVASTATIN CALCIUM 80 MG/1
TABLET, FILM COATED ORAL
Qty: 90 TABLET | Refills: 3 | Status: SHIPPED | OUTPATIENT
Start: 2022-11-28

## 2022-11-28 RX ORDER — AMLODIPINE BESYLATE 5 MG/1
TABLET ORAL
Qty: 90 TABLET | Refills: 3 | Status: SHIPPED | OUTPATIENT
Start: 2022-11-28

## 2022-11-29 DIAGNOSIS — E11.9 CONTROLLED TYPE 2 DIABETES MELLITUS WITHOUT COMPLICATION, WITHOUT LONG-TERM CURRENT USE OF INSULIN (HCC): ICD-10-CM

## 2022-11-30 RX ORDER — EMPAGLIFLOZIN 25 MG/1
TABLET, FILM COATED ORAL
Qty: 30 TABLET | Refills: 11 | Status: SHIPPED | OUTPATIENT
Start: 2022-11-30

## 2022-11-30 RX ORDER — METOPROLOL TARTRATE 100 MG/1
TABLET ORAL
Qty: 60 TABLET | Refills: 11 | Status: SHIPPED | OUTPATIENT
Start: 2022-11-30

## 2022-12-04 RX ORDER — GUAIFENESIN 100 MG/5ML
LIQUID (ML) ORAL
Qty: 120 TABLET | Refills: 11 | Status: SHIPPED | OUTPATIENT
Start: 2022-12-04

## 2023-01-26 DIAGNOSIS — G47.00 INSOMNIA, UNSPECIFIED TYPE: ICD-10-CM

## 2023-01-30 RX ORDER — TRAZODONE HYDROCHLORIDE 100 MG/1
TABLET ORAL
Qty: 135 TABLET | Refills: 3 | Status: SHIPPED | OUTPATIENT
Start: 2023-01-30

## 2023-02-04 DIAGNOSIS — E11.9 CONTROLLED TYPE 2 DIABETES MELLITUS WITHOUT COMPLICATION, WITHOUT LONG-TERM CURRENT USE OF INSULIN (HCC): Primary | ICD-10-CM

## 2023-02-05 DIAGNOSIS — E11.9 CONTROLLED TYPE 2 DIABETES MELLITUS WITHOUT COMPLICATION, WITHOUT LONG-TERM CURRENT USE OF INSULIN (HCC): Primary | ICD-10-CM

## 2023-03-07 RX ORDER — METOPROLOL TARTRATE 100 MG/1
TABLET ORAL
Qty: 180 TABLET | Refills: 3 | Status: SHIPPED | OUTPATIENT
Start: 2023-03-07

## 2023-03-31 RX ORDER — GABAPENTIN 300 MG/1
300 CAPSULE ORAL NIGHTLY
COMMUNITY
Start: 2023-01-05

## 2023-03-31 NOTE — PERIOP NOTE
member will come escort you to the surgical area on the second floor. If you have any questions or concerns, please do not hesitate to call:     (Prior to the day of surgery) PAT department:  [phone]   (Day of surgery) Pre-Op department:  [phone]    These surgical instructions were reviewed with Jerrod Lofton during the [facility] phone call.

## 2023-04-06 ENCOUNTER — ANESTHESIA EVENT (OUTPATIENT)
Facility: HOSPITAL | Age: 63
End: 2023-04-06
Payer: COMMERCIAL

## 2023-04-06 RX ORDER — FAMOTIDINE 20 MG/1
20 TABLET, FILM COATED ORAL ONCE
Status: CANCELLED | OUTPATIENT
Start: 2023-04-06 | End: 2023-04-06

## 2023-04-06 RX ORDER — SODIUM CHLORIDE 0.9 % (FLUSH) 0.9 %
5-40 SYRINGE (ML) INJECTION EVERY 12 HOURS SCHEDULED
Status: CANCELLED | OUTPATIENT
Start: 2023-04-06

## 2023-04-06 RX ORDER — SODIUM CHLORIDE 0.9 % (FLUSH) 0.9 %
5-40 SYRINGE (ML) INJECTION PRN
Status: CANCELLED | OUTPATIENT
Start: 2023-04-06

## 2023-04-06 RX ORDER — SODIUM CHLORIDE 9 MG/ML
INJECTION, SOLUTION INTRAVENOUS PRN
Status: CANCELLED | OUTPATIENT
Start: 2023-04-06

## 2023-04-06 RX ORDER — DEXTROSE MONOHYDRATE 100 MG/ML
INJECTION, SOLUTION INTRAVENOUS CONTINUOUS PRN
Status: CANCELLED | OUTPATIENT
Start: 2023-04-06

## 2023-04-06 RX ORDER — SODIUM CHLORIDE, SODIUM LACTATE, POTASSIUM CHLORIDE, CALCIUM CHLORIDE 600; 310; 30; 20 MG/100ML; MG/100ML; MG/100ML; MG/100ML
INJECTION, SOLUTION INTRAVENOUS CONTINUOUS
Status: CANCELLED | OUTPATIENT
Start: 2023-04-06

## 2023-04-06 RX ORDER — INSULIN LISPRO 100 [IU]/ML
0-12 INJECTION, SOLUTION INTRAVENOUS; SUBCUTANEOUS ONCE
Status: CANCELLED | OUTPATIENT
Start: 2023-04-06 | End: 2023-04-06

## 2023-04-07 ENCOUNTER — HOSPITAL ENCOUNTER (OUTPATIENT)
Facility: HOSPITAL | Age: 63
Setting detail: OUTPATIENT SURGERY
Discharge: HOME | End: 2023-04-07
Attending: INTERNAL MEDICINE | Admitting: INTERNAL MEDICINE
Payer: COMMERCIAL

## 2023-04-07 ENCOUNTER — ANESTHESIA (OUTPATIENT)
Facility: HOSPITAL | Age: 63
End: 2023-04-07
Payer: COMMERCIAL

## 2023-04-07 VITALS
SYSTOLIC BLOOD PRESSURE: 121 MMHG | BODY MASS INDEX: 28.89 KG/M2 | WEIGHT: 218 LBS | HEART RATE: 65 BPM | HEIGHT: 73 IN | DIASTOLIC BLOOD PRESSURE: 60 MMHG | TEMPERATURE: 98.2 F | OXYGEN SATURATION: 97 % | RESPIRATION RATE: 19 BRPM

## 2023-04-07 LAB — GLUCOSE BLD STRIP.AUTO-MCNC: 119 MG/DL (ref 70–110)

## 2023-04-07 PROCEDURE — 2500000003 HC RX 250 WO HCPCS: Performed by: NURSE ANESTHETIST, CERTIFIED REGISTERED

## 2023-04-07 PROCEDURE — 2580000003 HC RX 258: Performed by: NURSE ANESTHETIST, CERTIFIED REGISTERED

## 2023-04-07 PROCEDURE — 6360000002 HC RX W HCPCS: Performed by: NURSE ANESTHETIST, CERTIFIED REGISTERED

## 2023-04-07 PROCEDURE — 88305 TISSUE EXAM BY PATHOLOGIST: CPT

## 2023-04-07 PROCEDURE — 82962 GLUCOSE BLOOD TEST: CPT

## 2023-04-07 RX ORDER — PROPOFOL 10 MG/ML
INJECTION, EMULSION INTRAVENOUS PRN
Status: DISCONTINUED | OUTPATIENT
Start: 2023-04-07 | End: 2023-04-07 | Stop reason: SDUPTHER

## 2023-04-07 RX ORDER — LIDOCAINE HYDROCHLORIDE 20 MG/ML
INJECTION, SOLUTION EPIDURAL; INFILTRATION; INTRACAUDAL; PERINEURAL PRN
Status: DISCONTINUED | OUTPATIENT
Start: 2023-04-07 | End: 2023-04-07 | Stop reason: SDUPTHER

## 2023-04-07 RX ORDER — ONDANSETRON 2 MG/ML
4 INJECTION INTRAMUSCULAR; INTRAVENOUS
Status: CANCELLED | OUTPATIENT
Start: 2023-04-07 | End: 2023-04-08

## 2023-04-07 RX ORDER — DEXTROSE MONOHYDRATE 100 MG/ML
INJECTION, SOLUTION INTRAVENOUS CONTINUOUS PRN
Status: CANCELLED | OUTPATIENT
Start: 2023-04-07

## 2023-04-07 RX ORDER — SODIUM CHLORIDE 9 MG/ML
INJECTION, SOLUTION INTRAVENOUS PRN
Status: CANCELLED | OUTPATIENT
Start: 2023-04-07

## 2023-04-07 RX ORDER — SODIUM CHLORIDE, SODIUM LACTATE, POTASSIUM CHLORIDE, CALCIUM CHLORIDE 600; 310; 30; 20 MG/100ML; MG/100ML; MG/100ML; MG/100ML
INJECTION, SOLUTION INTRAVENOUS CONTINUOUS PRN
Status: DISCONTINUED | OUTPATIENT
Start: 2023-04-07 | End: 2023-04-07 | Stop reason: SDUPTHER

## 2023-04-07 RX ORDER — SODIUM CHLORIDE 0.9 % (FLUSH) 0.9 %
5-40 SYRINGE (ML) INJECTION EVERY 12 HOURS SCHEDULED
Status: CANCELLED | OUTPATIENT
Start: 2023-04-07

## 2023-04-07 RX ORDER — SODIUM CHLORIDE 0.9 % (FLUSH) 0.9 %
5-40 SYRINGE (ML) INJECTION PRN
Status: CANCELLED | OUTPATIENT
Start: 2023-04-07

## 2023-04-07 RX ADMIN — PROPOFOL 25 MG: 10 INJECTION, EMULSION INTRAVENOUS at 07:56

## 2023-04-07 RX ADMIN — PROPOFOL 100 MG: 10 INJECTION, EMULSION INTRAVENOUS at 07:46

## 2023-04-07 RX ADMIN — PROPOFOL 50 MG: 10 INJECTION, EMULSION INTRAVENOUS at 07:51

## 2023-04-07 RX ADMIN — LIDOCAINE HYDROCHLORIDE 60 MG: 20 INJECTION, SOLUTION EPIDURAL; INFILTRATION; INTRACAUDAL; PERINEURAL at 07:46

## 2023-04-07 RX ADMIN — PROPOFOL 50 MG: 10 INJECTION, EMULSION INTRAVENOUS at 07:50

## 2023-04-07 RX ADMIN — SODIUM CHLORIDE, SODIUM LACTATE, POTASSIUM CHLORIDE, AND CALCIUM CHLORIDE: 600; 310; 30; 20 INJECTION, SOLUTION INTRAVENOUS at 07:41

## 2023-04-07 NOTE — H&P
Chief Complaint: CRCS    History of present illness: No complaints. PMH:   Past Medical History:   Diagnosis Date    Brachial neuritis or radiculitis 06/27/2014    Bursitis, hip 08/02/2016    Colon polyp 08/2008    Dr Yessy Dorsey    Degenerative arthritis of cervical spine 02/16/2016    Depression 12/2013    PHQ-9 was 6/27     DM (diabetes mellitus) ([facility] 03/2015    on basis of elev hba1c; [facility] for teaching; microalbumin 10/19    Dyslipidemia     Fatty liver     on US w negative serologies 2009    Foot fracture, right 09/2013    5th metatarsal    GERD (gastroesophageal reflux disease)     H/O echocardiogram 04/2019    nl lv, ef 61%, mild conc lvh, no wma, gr 1 dd, mild MR    Hypertension     Hypogonadism male 03/2013    Dr Myra Gilford; took androgel in past    Morbid obesity ([facility]     peak weight 275 lbs, bmi 36.3 from 2/14; IF 2/18 start weight 253 lbs but unable to do    NSTEMI (non-ST elevated myocardial infarction) ([facility] 04/2019    s/p cath Dr Reuben Blair and NETTE to DM1    PAF (paroxysmal atrial fibrillation) ([facility] 04/2019    in setting on ACS    Phymatous rosacea     Postlaminectomy syndrome, lumbar region     S/P cardiac cath 04/2019    Dr Reuben Blair; diffuse 50% RCA, diff LAD, 30% Cx, 85% DM1 culprit stented w NETTE    Thoracic or lumbosacral neuritis or radiculitis, unspecified     Tobacco dependence syndrome     failed chantix and wellbutrin     Allergies: No Known Allergies  Medications: No current facility-administered medications for this encounter.   FH:   Family History   Problem Relation Age of Onset    Cancer Mother         stomach    Diabetes Mother      Social:   Social History     Socioeconomic History    Marital status:      Spouse name: None    Number of children: None    Years of education: None    Highest education level: None   Tobacco Use    Smoking status: Every Day     Packs/day: 2.00     Types: Cigarettes    Smokeless tobacco: Never   Vaping Use    Vaping Use: Never used   Substance and Sexual

## 2023-04-07 NOTE — BRIEF OP NOTE
[phone]    [facility]  [address] [place] [id number]      Brief Procedure Note    Chris Sagastume  1960  [id number]    Date of Procedure: 4/7/2023     Preoperative diagnosis: Screening for colorectal cancer [Z12.11, Z12.12]    Postoperative diagnosis: Screening for colorectal cancer [Z12.11, Z12.12]    Type of Anesthesia: MAC (Monitored anesthesia care)    Description of findings: same as post op dx    Procedure: Procedure(s):  COLONOSCOPY w/ polypectomies    :  Dr. Harinder Valdez MD    Assistant(s): Circulator: Holly Odonnell RN; Boris Smith RN    Devices/implants/grafts/tissues/prosthesis: None    EBL:None    Specimens:   ID Type Source Tests Collected by Time Destination   A : Rectal polyps Tissue Rectum SURGICAL PATHOLOGY Harinder Valdez MD 4/7/2023 0759        Findings: See printed and scanned procedure note    Complications: None    Dr. Harinder Valdez MD  4/7/2023  8:10 AM

## 2023-04-07 NOTE — ANESTHESIA POSTPROCEDURE EVALUATION
Department of Anesthesiology  Postprocedure Note    Patient: Latosha Mcintosh  MRN: [id number]  YOB: 1960  Date of evaluation: 4/7/2023      Procedure Summary     Date: 04/07/23 Room / Location: [facility] ENDO 02 / [facility] ENDOSCOPY    Anesthesia Start: 0583 Anesthesia Stop: 0804    Procedure: COLONOSCOPY w/ polypectomies (Abdomen) Diagnosis:       Screening for colorectal cancer      (Screening for colorectal cancer [Z12.11, Z12.12])    Surgeons: Amberly Castillo MD Responsible Provider: Lizzie Ewing MD    Anesthesia Type: MAC ASA Status: 3          Anesthesia Type: MAC    Ranjana Phase I: Ranjana Score: 10    Ranjana Phase II: Ranjana Score: 10      Anesthesia Post Evaluation    Patient location during evaluation: bedside  Patient participation: complete - patient participated  Level of consciousness: responsive to verbal stimuli  Airway patency: patent  Nausea & Vomiting: no nausea  Complications: no  Respiratory status: acceptable  Hydration status: euvolemic

## 2023-04-07 NOTE — ANESTHESIA PRE PROCEDURE
Department of Anesthesiology  Preprocedure Note       Name:  José Miguel Chao   Age:  59 y.o.  :  1960                                          MRN:  [id number]         Date:  2023      Surgeon: Ilana Munoz): Wyatt Ulloa MD    Procedure: Procedure(s):  COLONOSCOPY    Medications prior to admission:   Prior to Admission medications    Medication Sig Start Date End Date Taking? Authorizing Provider   NONFORMULARY Take by mouth daily Fish oil omega 3 fatty acid   Yes Historical Provider, MD   NONFORMULARY Take 250 mg by mouth daily Vitamin D3   Yes Historical Provider, MD   gabapentin (NEURONTIN) 300 MG capsule Take 300 mg by mouth nightly. 23   Historical Provider, MD   metoprolol (LOPRESSOR) 100 MG tablet take 1 tablet by mouth every 12 hours 3/7/23   Ximena Quintanilla APRN - NP   Multiple Vitamin (MULTIVITAMIN PO) Take by mouth daily    Ar Automatic Reconciliation   amLODIPine (NORVASC) 5 MG tablet take 1 tablet by mouth once daily 22   Ar Automatic Reconciliation   ascorbic acid (VITAMIN C) 1000 MG tablet Take 1,000 mg by mouth 2 times daily    Ar Automatic Reconciliation   aspirin 81 MG chewable tablet chew and swallow 4 tablets by mouth daily 22   Ar Automatic Reconciliation   atorvastatin (LIPITOR) 80 MG tablet take 1 tablet by mouth once daily 22   Ar Automatic Reconciliation   baclofen (LIORESAL) 10 MG tablet Take 10 mg by mouth 3 times daily  Patient not taking: Reported on 3/31/2023    Ar Automatic Reconciliation   empagliflozin (JARDIANCE) 25 MG tablet take 1 tablet by mouth once daily 22   Ar Automatic Reconciliation   fenofibrate (TRICOR) 145 MG tablet take 1 tablet by mouth once daily 22   Ar Automatic Reconciliation   HYDROcodone-acetaminophen (NORCO)  MG per tablet Take 1 tablet by mouth every 6 hours as needed.     Ar Automatic Reconciliation   ibuprofen (ADVIL;MOTRIN) 800 MG tablet Take 800 mg by mouth every 8 hours as needed  Patient not taking:

## 2023-04-07 NOTE — DISCHARGE INSTRUCTIONS
Other instructions    For your safety, do not drive or operate machinery until the medicine wears off and you can think clearly. Your doctor may tell you not to drive or operate machinery until the day after your test.     Do not sign legal documents or make major decisions until the medicine wears off and you can think clearly. The anesthesia can make it hard for you to fully understand what you are agreeing to. Follow-up care is a key part of your treatment and safety. Be sure to make and go to all appointments, and call your doctor if you are having problems. It's also a good idea to know your test results and keep a list of the medicines you take. When should you call for help? Call 911 anytime you think you may need emergency care. For example, call if:    You passed out (lost consciousness). You pass maroon or bloody stools. You have trouble breathing. Call your doctor now or seek immediate medical care if:    You have pain that does not get better after you take pain medicine. You are sick to your stomach or cannot drink fluids. You have new or worse belly pain. You have blood in your stools. You have a fever. You cannot pass stools or gas. Watch closely for changes in your health, and be sure to contact your doctor if you have any problems. Where can you learn more? Go to http://www.gray.com/  Enter E264 in the search box to learn more about \"Colonoscopy: What to Expect at Home. \"  Current as of: September 8, 2021               Content Version: 13.2  © 2006-2022 Healthwise, Incorporated. Care instructions adapted under license by [employer] (which disclaims liability or warranty for this information). If you have questions about a medical condition or this instruction, always ask your healthcare professional. [place] [address] any warranty or liability for your use of this information.     DISCHARGE SUMMARY from

## 2023-04-22 ENCOUNTER — HOSPITAL ENCOUNTER (OUTPATIENT)
Facility: HOSPITAL | Age: 63
Discharge: HOME | End: 2023-04-25

## 2023-04-22 LAB — SENTARA SPECIMEN COLLECTION: NORMAL

## 2023-04-22 PROCEDURE — 99001 SPECIMEN HANDLING PT-LAB: CPT

## 2023-04-23 LAB
ANION GAP SERPL CALCULATED.3IONS-SCNC: 11 MMOL/L (ref 3–15)
AVERAGE GLUCOSE: 128 MG/DL (ref 91–123)
BUN BLDV-MCNC: 16 MG/DL (ref 6–22)
CALCIUM SERPL-MCNC: 10.5 MG/DL (ref 8.4–10.5)
CHLORIDE BLD-SCNC: 93 MMOL/L (ref 98–110)
CHOLESTEROL/HDL RATIO: 3.4 (ref 0–5)
CHOLESTEROL: 122 MG/DL (ref 110–200)
CO2: 32 MMOL/L (ref 20–32)
CREAT SERPL-MCNC: 0.9 MG/DL (ref 0.8–1.6)
GLOMERULAR FILTRATION RATE: >60 ML/MIN/1.73 SQ.M.
GLUCOSE: 111 MG/DL (ref 70–99)
HBA1C MFR BLD: 6.1 % (ref 4.8–5.6)
HDLC SERPL-MCNC: 36 MG/DL
LDL CHOLESTEROL CALCULATED: 59 MG/DL (ref 50–99)
LDL/HDL RATIO: 1.6
NON-HDL CHOLESTEROL: 86 MG/DL
POTASSIUM SERPL-SCNC: 4.2 MMOL/L (ref 3.5–5.5)
SODIUM BLD-SCNC: 136 MMOL/L (ref 133–145)
TRIGL SERPL-MCNC: 136 MG/DL (ref 40–149)
VLDLC SERPL CALC-MCNC: 27 MG/DL (ref 8–30)

## 2023-04-26 RX ORDER — LISINOPRIL AND HYDROCHLOROTHIAZIDE 20; 12.5 MG/1; MG/1
TABLET ORAL
Qty: 360 TABLET | Refills: 3 | Status: SHIPPED | OUTPATIENT
Start: 2023-04-26

## 2023-05-08 ENCOUNTER — OFFICE VISIT (OUTPATIENT)
Age: 63
End: 2023-05-08
Payer: COMMERCIAL

## 2023-05-08 VITALS
RESPIRATION RATE: 19 BRPM | HEIGHT: 73 IN | TEMPERATURE: 97.8 F | WEIGHT: 225 LBS | OXYGEN SATURATION: 99 % | DIASTOLIC BLOOD PRESSURE: 62 MMHG | BODY MASS INDEX: 29.82 KG/M2 | SYSTOLIC BLOOD PRESSURE: 118 MMHG | HEART RATE: 74 BPM

## 2023-05-08 DIAGNOSIS — I48.91 ATRIAL FIBRILLATION WITH RAPID VENTRICULAR RESPONSE (HCC): ICD-10-CM

## 2023-05-08 DIAGNOSIS — F17.200 TOBACCO DEPENDENCE SYNDROME: ICD-10-CM

## 2023-05-08 DIAGNOSIS — Z12.5 SCREENING FOR MALIGNANT NEOPLASM OF PROSTATE: ICD-10-CM

## 2023-05-08 DIAGNOSIS — I10 ESSENTIAL HYPERTENSION: ICD-10-CM

## 2023-05-08 DIAGNOSIS — Z00.00 PHYSICAL EXAM: Primary | ICD-10-CM

## 2023-05-08 DIAGNOSIS — K63.5 POLYP OF COLON, UNSPECIFIED PART OF COLON, UNSPECIFIED TYPE: ICD-10-CM

## 2023-05-08 DIAGNOSIS — E78.5 DYSLIPIDEMIA: ICD-10-CM

## 2023-05-08 DIAGNOSIS — E11.9 CONTROLLED TYPE 2 DIABETES MELLITUS WITHOUT COMPLICATION, WITHOUT LONG-TERM CURRENT USE OF INSULIN (HCC): ICD-10-CM

## 2023-05-08 DIAGNOSIS — Z95.5 S/P CORONARY ARTERY STENT PLACEMENT: ICD-10-CM

## 2023-05-08 PROCEDURE — 3078F DIAST BP <80 MM HG: CPT | Performed by: INTERNAL MEDICINE

## 2023-05-08 PROCEDURE — 99396 PREV VISIT EST AGE 40-64: CPT | Performed by: INTERNAL MEDICINE

## 2023-05-08 PROCEDURE — 3074F SYST BP LT 130 MM HG: CPT | Performed by: INTERNAL MEDICINE

## 2023-05-08 RX ORDER — LISINOPRIL AND HYDROCHLOROTHIAZIDE 20; 12.5 MG/1; MG/1
2 TABLET ORAL DAILY
Qty: 180 TABLET | Refills: 3
Start: 2023-05-08

## 2023-05-08 SDOH — ECONOMIC STABILITY: FOOD INSECURITY: WITHIN THE PAST 12 MONTHS, YOU WORRIED THAT YOUR FOOD WOULD RUN OUT BEFORE YOU GOT MONEY TO BUY MORE.: NEVER TRUE

## 2023-05-08 SDOH — ECONOMIC STABILITY: FOOD INSECURITY: WITHIN THE PAST 12 MONTHS, THE FOOD YOU BOUGHT JUST DIDN'T LAST AND YOU DIDN'T HAVE MONEY TO GET MORE.: NEVER TRUE

## 2023-05-08 SDOH — ECONOMIC STABILITY: HOUSING INSECURITY
IN THE LAST 12 MONTHS, WAS THERE A TIME WHEN YOU DID NOT HAVE A STEADY PLACE TO SLEEP OR SLEPT IN A SHELTER (INCLUDING NOW)?: NO

## 2023-05-08 SDOH — ECONOMIC STABILITY: INCOME INSECURITY: HOW HARD IS IT FOR YOU TO PAY FOR THE VERY BASICS LIKE FOOD, HOUSING, MEDICAL CARE, AND HEATING?: NOT HARD AT ALL

## 2023-05-08 ASSESSMENT — PATIENT HEALTH QUESTIONNAIRE - PHQ9
5. POOR APPETITE OR OVEREATING: 0
3. TROUBLE FALLING OR STAYING ASLEEP: 0
2. FEELING DOWN, DEPRESSED OR HOPELESS: 0
SUM OF ALL RESPONSES TO PHQ QUESTIONS 1-9: 0
SUM OF ALL RESPONSES TO PHQ9 QUESTIONS 1 & 2: 0
4. FEELING TIRED OR HAVING LITTLE ENERGY: 0
10. IF YOU CHECKED OFF ANY PROBLEMS, HOW DIFFICULT HAVE THESE PROBLEMS MADE IT FOR YOU TO DO YOUR WORK, TAKE CARE OF THINGS AT HOME, OR GET ALONG WITH OTHER PEOPLE: 0
8. MOVING OR SPEAKING SO SLOWLY THAT OTHER PEOPLE COULD HAVE NOTICED. OR THE OPPOSITE, BEING SO FIGETY OR RESTLESS THAT YOU HAVE BEEN MOVING AROUND A LOT MORE THAN USUAL: 0
SUM OF ALL RESPONSES TO PHQ QUESTIONS 1-9: 0
1. LITTLE INTEREST OR PLEASURE IN DOING THINGS: 0
6. FEELING BAD ABOUT YOURSELF - OR THAT YOU ARE A FAILURE OR HAVE LET YOURSELF OR YOUR FAMILY DOWN: 0
SUM OF ALL RESPONSES TO PHQ QUESTIONS 1-9: 0
9. THOUGHTS THAT YOU WOULD BE BETTER OFF DEAD, OR OF HURTING YOURSELF: 0
SUM OF ALL RESPONSES TO PHQ QUESTIONS 1-9: 0
7. TROUBLE CONCENTRATING ON THINGS, SUCH AS READING THE NEWSPAPER OR WATCHING TELEVISION: 0

## 2023-05-08 NOTE — PROGRESS NOTES
Filbert Cushing presents today for   Chief Complaint   Patient presents with    Follow-up    Discuss Labs     4-22-23    Insomnia    Diabetes    Hypertension    Hyperlipidemia     1. \"Have you been to the ER, urgent care clinic since your last visit? Hospitalized since your last visit? \" no    2. \"Have you seen or consulted any other health care providers outside of the [address] [place] [address] since your last visit? \" no     3. For patients aged 39-70: Has the patient had a colonoscopy / FIT/ Cologuard? Yes - no Care Gap present      If the patient is female:    4. For patients aged 41-77: Has the patient had a mammogram within the past 2 years? NA - based on age or sex      11. For patients aged 21-65: Has the patient had a pap smear?  NA - based on age or sex

## 2023-05-24 ENCOUNTER — OFFICE VISIT (OUTPATIENT)
Age: 63
End: 2023-05-24
Payer: COMMERCIAL

## 2023-05-24 VITALS
SYSTOLIC BLOOD PRESSURE: 120 MMHG | HEIGHT: 73 IN | OXYGEN SATURATION: 97 % | DIASTOLIC BLOOD PRESSURE: 66 MMHG | WEIGHT: 221 LBS | BODY MASS INDEX: 29.29 KG/M2 | HEART RATE: 64 BPM

## 2023-05-24 DIAGNOSIS — E78.5 HYPERLIPIDEMIA, UNSPECIFIED HYPERLIPIDEMIA TYPE: Primary | ICD-10-CM

## 2023-05-24 DIAGNOSIS — I10 ESSENTIAL (PRIMARY) HYPERTENSION: ICD-10-CM

## 2023-05-24 DIAGNOSIS — I21.4 NON-ST ELEVATION (NSTEMI) MYOCARDIAL INFARCTION (HCC): ICD-10-CM

## 2023-05-24 PROBLEM — M54.12 BRACHIAL NEURITIS: Status: ACTIVE | Noted: 2020-11-10

## 2023-05-24 PROBLEM — M54.16 LUMBAR RADICULOPATHY: Status: ACTIVE | Noted: 2023-04-11

## 2023-05-24 PROCEDURE — 3074F SYST BP LT 130 MM HG: CPT | Performed by: INTERNAL MEDICINE

## 2023-05-24 PROCEDURE — 3078F DIAST BP <80 MM HG: CPT | Performed by: INTERNAL MEDICINE

## 2023-05-24 PROCEDURE — 93000 ELECTROCARDIOGRAM COMPLETE: CPT | Performed by: INTERNAL MEDICINE

## 2023-05-24 PROCEDURE — 99214 OFFICE O/P EST MOD 30 MIN: CPT | Performed by: INTERNAL MEDICINE

## 2023-05-24 RX ORDER — NITROGLYCERIN 0.4 MG/1
0.4 TABLET SUBLINGUAL EVERY 5 MIN PRN
Qty: 25 TABLET | Refills: 0 | Status: SHIPPED | OUTPATIENT
Start: 2023-05-24

## 2023-05-24 RX ORDER — CHLORAL HYDRATE 500 MG
1000 CAPSULE ORAL DAILY
COMMUNITY

## 2023-05-24 RX ORDER — TIZANIDINE 4 MG/1
4 TABLET ORAL EVERY 6 HOURS PRN
COMMUNITY

## 2023-05-24 RX ORDER — METOPROLOL SUCCINATE 50 MG/1
50 TABLET, EXTENDED RELEASE ORAL DAILY
Qty: 90 TABLET | Refills: 3 | Status: SHIPPED | OUTPATIENT
Start: 2023-05-24

## 2023-05-24 ASSESSMENT — PATIENT HEALTH QUESTIONNAIRE - PHQ9
4. FEELING TIRED OR HAVING LITTLE ENERGY: 0
3. TROUBLE FALLING OR STAYING ASLEEP: 0
9. THOUGHTS THAT YOU WOULD BE BETTER OFF DEAD, OR OF HURTING YOURSELF: 0
7. TROUBLE CONCENTRATING ON THINGS, SUCH AS READING THE NEWSPAPER OR WATCHING TELEVISION: 0
SUM OF ALL RESPONSES TO PHQ9 QUESTIONS 1 & 2: 0
6. FEELING BAD ABOUT YOURSELF - OR THAT YOU ARE A FAILURE OR HAVE LET YOURSELF OR YOUR FAMILY DOWN: 0
SUM OF ALL RESPONSES TO PHQ QUESTIONS 1-9: 0
1. LITTLE INTEREST OR PLEASURE IN DOING THINGS: 0
10. IF YOU CHECKED OFF ANY PROBLEMS, HOW DIFFICULT HAVE THESE PROBLEMS MADE IT FOR YOU TO DO YOUR WORK, TAKE CARE OF THINGS AT HOME, OR GET ALONG WITH OTHER PEOPLE: 0
2. FEELING DOWN, DEPRESSED OR HOPELESS: 0
SUM OF ALL RESPONSES TO PHQ QUESTIONS 1-9: 0
8. MOVING OR SPEAKING SO SLOWLY THAT OTHER PEOPLE COULD HAVE NOTICED. OR THE OPPOSITE, BEING SO FIGETY OR RESTLESS THAT YOU HAVE BEEN MOVING AROUND A LOT MORE THAN USUAL: 0
5. POOR APPETITE OR OVEREATING: 0
SUM OF ALL RESPONSES TO PHQ QUESTIONS 1-9: 0
SUM OF ALL RESPONSES TO PHQ QUESTIONS 1-9: 0

## 2023-05-24 NOTE — PROGRESS NOTES
Jazmyn Dinh    pAFib RVR, NSTEMI, CAD s/p PCI 4/2019    HPI    Jazmyn Dinh is a 62 y.o. male with CAD s/p NSTEMI here for routine follow up. As you know I met this extremely pleasant patient earlier this spring in April 2019. Patient was driving when he experienced sudden onset comfort in his left shoulder. The pain was actually quite severe and he became very short of breath and then experienced sudden onset racing palpitations. He ended up going to the emergency department was found to be in atrial fibrillation with rapid ventricular response as fast as 190 bpm.  It was in the setting he had a positive troponin but I suspected ischemia as the provoking trigger due to patient's risk factors and symptoms. We got him back in sinus rhythm and he subsequently had a left heart cath (please see report below)-and ultimately received percutaneous coronary intervention to the culprit lesion which was a diagonal.  He has done well since on medical therapy. He has not had any recurrent symptoms-chest pain no palpitations no shortness of breath. Despite the MI he has preserved LV function. No recurrent AFib or chest pressure. Was having some lightheadedness that resolved after PCP cut down his BP med.     Past Medical History:   Diagnosis Date    Brachial neuritis or radiculitis 6/27/2014    Bursitis, hip 08/02/2016    Colon polyp 08/2008    Dr Ameena Cedeno    DDD (degenerative disc disease), cervical 02/16/2016    Depression 12/13    PHQ-9 was 6/27     Diabetes ([facility] [address] 3/15    on basis of elev hba1c; [facility]; microalbumin 10/19    Dyslipidemia     Fatty liver     on US w negative serologies 2009    Foot fracture, right 09/2013    5th metatarsal    GERD (gastroesophageal reflux disease)     H/O echocardiogram 04/2019    nl lv, ef 61%, mild conc lvh, no wma, gr 1 dd, mild MR    Hypertension     Hypogonadism male 03/2013    Dr Abbott; took androgel in past    Morbid obesity ([address]     peak weight 275 lbs, bmi

## 2023-05-24 NOTE — PROGRESS NOTES
Jazmyn Allegra presents today for   Chief Complaint   Patient presents with    Follow-up     Overdue Follow Up - Last Seen : 12-       Jazmyn Dinh preferred language for health care discussion is english/other. Is someone accompanying this pt? no    Is the patient using any DME equipment during OV? no    Depression Screening:  Depression: Not at risk    PHQ-2 Score: 0        Learning Assessment:  Who is the primary learner? Patient    What is the preferred language for health care of the primary learner? ENGLISH    How does the primary learner prefer to learn new concepts? DEMONSTRATION    Answered By patient    Relationship to Learner SELF           Pt currently taking Anticoagulant therapy? no    Pt currently taking Antiplatelet therapy ?  mg once a day      Coordination of Care:  1. Have you been to the ER, urgent care clinic since your last visit? Hospitalized since your last visit? no    2. Have you seen or consulted any other health care providers outside of the [address] [place] [address] since your last visit? Include any pap smears or colon screening.  no

## 2023-06-16 NOTE — Clinical Note
Single view of the left ventricle obtained using hand injection.  PRESSURE MEASURED [de-identified] : fever [FreeTextEntry6] : fever started yesterday along with body aches and runny nose\par Wet cough also started yesterday\par both parents with low grade temp and chills and bodyaches

## 2023-09-16 ENCOUNTER — HOSPITAL ENCOUNTER (EMERGENCY)
Facility: HOSPITAL | Age: 63
Discharge: HOME | End: 2023-09-17
Attending: EMERGENCY MEDICINE
Payer: COMMERCIAL

## 2023-09-16 VITALS
RESPIRATION RATE: 20 BRPM | BODY MASS INDEX: 27.7 KG/M2 | HEART RATE: 79 BPM | OXYGEN SATURATION: 97 % | WEIGHT: 209 LBS | HEIGHT: 73 IN | DIASTOLIC BLOOD PRESSURE: 59 MMHG | SYSTOLIC BLOOD PRESSURE: 114 MMHG | TEMPERATURE: 98.3 F
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
## 2023-09-16 PROCEDURE — 99283 EMERGENCY DEPT VISIT LOW MDM: CPT

## 2023-09-16 PROCEDURE — 6370000000 HC RX 637 (ALT 250 FOR IP): Performed by: EMERGENCY MEDICINE

## 2023-09-16 RX ORDER — AMOXICILLIN AND CLAVULANATE POTASSIUM 875; 125 MG/1; MG/1
1 TABLET, FILM COATED ORAL
Status: COMPLETED | OUTPATIENT
Start: 2023-09-16 | End: 2023-09-16

## 2023-09-16 RX ORDER — LIDOCAINE HYDROCHLORIDE 20 MG/ML
INJECTION, SOLUTION EPIDURAL; INFILTRATION; INTRACAUDAL; PERINEURAL
Status: COMPLETED
Start: 2023-09-16 | End: 2023-09-16

## 2023-09-16 RX ORDER — LIDOCAINE HYDROCHLORIDE 20 MG/ML
5 INJECTION, SOLUTION EPIDURAL; INFILTRATION; INTRACAUDAL; PERINEURAL
Status: DISCONTINUED | OUTPATIENT
Start: 2023-09-17 | End: 2023-09-16

## 2023-09-16 RX ORDER — LIDOCAINE HYDROCHLORIDE 20 MG/ML
INJECTION, SOLUTION INFILTRATION; PERINEURAL
Status: COMPLETED
Start: 2023-09-16 | End: 2023-09-17

## 2023-09-16 RX ORDER — LIDOCAINE HYDROCHLORIDE 20 MG/ML
INJECTION, SOLUTION INFILTRATION; PERINEURAL
Status: DISCONTINUED
Start: 2023-09-16 | End: 2023-09-17

## 2023-09-16 RX ORDER — LIDOCAINE HYDROCHLORIDE 20 MG/ML
INJECTION, SOLUTION EPIDURAL; INFILTRATION; INTRACAUDAL; PERINEURAL
Status: DISCONTINUED
Start: 2023-09-16 | End: 2023-09-16

## 2023-09-16 RX ORDER — LIDOCAINE HYDROCHLORIDE 20 MG/ML
5 INJECTION, SOLUTION EPIDURAL; INFILTRATION; INTRACAUDAL; PERINEURAL
Status: DISCONTINUED | OUTPATIENT
Start: 2023-09-16 | End: 2023-09-16

## 2023-09-16 RX ORDER — OXYCODONE HYDROCHLORIDE AND ACETAMINOPHEN 5; 325 MG/1; MG/1
1 TABLET ORAL
Status: COMPLETED | OUTPATIENT
Start: 2023-09-16 | End: 2023-09-16

## 2023-09-16 RX ADMIN — OXYCODONE HYDROCHLORIDE AND ACETAMINOPHEN 1 TABLET: 5; 325 TABLET ORAL at 23:24

## 2023-09-16 RX ADMIN — AMOXICILLIN AND CLAVULANATE POTASSIUM 1 TABLET: 875; 125 TABLET, FILM COATED ORAL at 22:58

## 2023-09-16 ASSESSMENT — PAIN SCALES - GENERAL
PAINLEVEL_OUTOF10: 8
PAINLEVEL_OUTOF10: 8

## 2023-09-16 ASSESSMENT — PAIN - FUNCTIONAL ASSESSMENT: PAIN_FUNCTIONAL_ASSESSMENT: 0-10

## 2023-09-16 ASSESSMENT — PAIN DESCRIPTION - LOCATION: LOCATION: FACE

## 2023-09-17 PROCEDURE — 6370000000 HC RX 637 (ALT 250 FOR IP): Performed by: EMERGENCY MEDICINE

## 2023-09-17 PROCEDURE — 12053 INTMD RPR FACE/MM 5.1-7.5 CM: CPT

## 2023-09-17 PROCEDURE — 2500000003 HC RX 250 WO HCPCS

## 2023-09-17 RX ORDER — LIDOCAINE HYDROCHLORIDE 20 MG/ML
20 INJECTION, SOLUTION EPIDURAL; INFILTRATION; INTRACAUDAL; PERINEURAL
Status: COMPLETED | OUTPATIENT
Start: 2023-09-17 | End: 2023-09-17

## 2023-09-17 RX ORDER — AMOXICILLIN AND CLAVULANATE POTASSIUM 875; 125 MG/1; MG/1
1 TABLET, FILM COATED ORAL 2 TIMES DAILY
Qty: 14 TABLET | Refills: 0 | Status: SHIPPED | OUTPATIENT
Start: 2023-09-17 | End: 2023-09-24

## 2023-09-17 RX ORDER — OXYCODONE HYDROCHLORIDE AND ACETAMINOPHEN 5; 325 MG/1; MG/1
1 TABLET ORAL
Status: COMPLETED | OUTPATIENT
Start: 2023-09-17 | End: 2023-09-17

## 2023-09-17 RX ORDER — OXYCODONE HYDROCHLORIDE AND ACETAMINOPHEN 5; 325 MG/1; MG/1
1 TABLET ORAL EVERY 6 HOURS PRN
Qty: 12 TABLET | Refills: 0 | Status: SHIPPED | OUTPATIENT
Start: 2023-09-17 | End: 2023-09-20

## 2023-09-17 RX ADMIN — LIDOCAINE HYDROCHLORIDE 20 ML: 20 INJECTION, SOLUTION EPIDURAL; INFILTRATION; INTRACAUDAL; PERINEURAL at 00:16

## 2023-09-17 RX ADMIN — OXYCODONE HYDROCHLORIDE AND ACETAMINOPHEN 1 TABLET: 5; 325 TABLET ORAL at 02:25

## 2023-09-17 RX ADMIN — LIDOCAINE HYDROCHLORIDE 20 ML: 20 INJECTION, SOLUTION INFILTRATION; PERINEURAL at 00:16

## 2023-09-17 ASSESSMENT — ENCOUNTER SYMPTOMS
EYES NEGATIVE: 1
GASTROINTESTINAL NEGATIVE: 1
RESPIRATORY NEGATIVE: 1

## 2023-09-17 NOTE — ED NOTES
I have reviewed discharge instruction and prescriptions sent to pharmacy with patient and spouse. Patient verbalized understanding and has no further questions at this time. Education taught and patient verbalized understanding of education. Teach back method used. Patient discharged ambulatory to home with paperwork in hand.         Zain Coleman RN  09/17/23 [id number]

## 2023-09-17 NOTE — ED TRIAGE NOTES
Pt c/o dog bite to lower lip; pt states his own dog bit him when he attempted to kiss the dog. States the dog's vaccines are up to date. Pt is holding pressure to wound with gauze provided in triage.

## 2023-09-17 NOTE — ED NOTES
I was present at time of consent and witnessed Dr. Vinod Cerrato offer and explain to both patient and wife that [facility] services at [facility] would be beneficial due to the severity and high risk of infection of the wound. Pt declined PRS services and requested to be repaired by Dr. Vinod Cerrato at this time. No further questions at this time, consent was signed.      Rohith Casanova, MALA  09/17/23 [id number]

## 2023-09-19 ENCOUNTER — HOSPITAL ENCOUNTER (EMERGENCY)
Facility: HOSPITAL | Age: 63
Discharge: HOME | End: 2023-09-19
Attending: STUDENT IN AN ORGANIZED HEALTH CARE EDUCATION/TRAINING PROGRAM
Payer: COMMERCIAL

## 2023-09-19 VITALS
TEMPERATURE: 98 F | DIASTOLIC BLOOD PRESSURE: 70 MMHG | SYSTOLIC BLOOD PRESSURE: 140 MMHG | HEART RATE: 66 BPM | HEIGHT: 73 IN | WEIGHT: 209 LBS | OXYGEN SATURATION: 100 % | BODY MASS INDEX: 27.7 KG/M2 | RESPIRATION RATE: 18 BRPM

## 2023-09-19 DIAGNOSIS — Z51.89 VISIT FOR WOUND CHECK: Primary | ICD-10-CM

## 2023-09-19 PROCEDURE — 99284 EMERGENCY DEPT VISIT MOD MDM: CPT

## 2023-09-19 PROCEDURE — 6360000002 HC RX W HCPCS: Performed by: STUDENT IN AN ORGANIZED HEALTH CARE EDUCATION/TRAINING PROGRAM

## 2023-09-19 PROCEDURE — 90715 TDAP VACCINE 7 YRS/> IM: CPT | Performed by: STUDENT IN AN ORGANIZED HEALTH CARE EDUCATION/TRAINING PROGRAM

## 2023-09-19 PROCEDURE — 90471 IMMUNIZATION ADMIN: CPT | Performed by: STUDENT IN AN ORGANIZED HEALTH CARE EDUCATION/TRAINING PROGRAM

## 2023-09-19 RX ADMIN — TETANUS TOXOID, REDUCED DIPHTHERIA TOXOID AND ACELLULAR PERTUSSIS VACCINE, ADSORBED 0.5 ML: 5; 2.5; 8; 8; 2.5 SUSPENSION INTRAMUSCULAR at 08:06

## 2023-09-19 ASSESSMENT — PAIN - FUNCTIONAL ASSESSMENT: PAIN_FUNCTIONAL_ASSESSMENT: NONE - DENIES PAIN

## 2023-09-19 NOTE — DISCHARGE INSTRUCTIONS
Have stitches taken out in another 2 to 3 days. 5 days after the injury. Would recommend ice on for 10 minutes and off for 30 minutes to help with swelling. Continue taking antibiotics until they are completed.

## 2023-09-19 NOTE — ED TRIAGE NOTES
Patient arrived to ER for wound recheck and to receive his tetanus shot, states they ran out of the medication when he came to be seen and was told to return.

## 2023-09-19 NOTE — ED NOTES
Discharge instructions given to patient. Follow up information provided, verbalized understanding.       Audrey Valdovinos RN  09/19/23 0913

## 2023-09-19 NOTE — ED PROVIDER NOTES
[facility] EMERGENCY DEPT  EMERGENCY DEPARTMENT ENCOUNTER      Pt Name: Latisha Torres  MRN: [id number]  [address] [facility] [place] 1960  Date of evaluation: 9/19/2023  Provider: Lilibeth Cohen MD    CHIEF COMPLAINT       Chief Complaint   Patient presents with    Wound Check         HISTORY OF PRESENT ILLNESS   (Location/Symptom, Timing/Onset, Context/Setting, Quality, Duration, Modifying Factors, Severity)  Note limiting factors. Latisha Torres is a 59 y.o. male who presents to the emergency department for a wound check. She was bit by his dog a couple days ago. Apparently the ED was that of tetanus shot at that time so he was advised to come back in a couple days with a restocked to have his wound evaluated. He has been taking his antibiotics. The swelling has gone down. He states it is still sore but overall he feels that it is improving. Nursing Notes were reviewed. REVIEW OF SYSTEMS    (2-9 systems for level 4, 10 or more for level 5)     Constitutional: No fever  Neuro: No headache    Except as noted above the remainder of the review of systems was reviewed and negative.        PAST MEDICAL HISTORY     Past Medical History:   Diagnosis Date    Brachial neuritis or radiculitis 06/27/2014    Bursitis, hip 08/02/2016    Colon polyp 08/2008    Dr jT Parker    Degenerative arthritis of cervical spine 02/16/2016    Depression 12/2013    PHQ-9 was 6/27     DM (diabetes mellitus) ([address]) 03/2015    on basis of elev hba1c; [facility]; microalbumin 10/19    Dyslipidemia     Fatty liver     on US w negative serologies 2009    Foot fracture, right 09/2013    5th metatarsal    GERD (gastroesophageal reflux disease)     H/O echocardiogram 04/2019    nl lv, ef 61%, mild conc lvh, no wma, gr 1 dd, mild MR    Hypertension     Hypogonadism male 03/2013    Dr Be Puente; took androgel in past    Morbid obesity ([address])     peak weight 275 lbs, bmi 36.3 from 2/14; IF 2/18 start weight 253 lbs but unable to do    NSTEMI (non-ST elevated

## 2023-09-21 ENCOUNTER — OFFICE VISIT (OUTPATIENT)
Age: 63
End: 2023-09-21
Payer: COMMERCIAL

## 2023-09-21 DIAGNOSIS — S01.511D LIP LACERATION, SUBSEQUENT ENCOUNTER: Primary | ICD-10-CM

## 2023-09-21 PROCEDURE — 3074F SYST BP LT 130 MM HG: CPT | Performed by: INTERNAL MEDICINE

## 2023-09-21 PROCEDURE — 99213 OFFICE O/P EST LOW 20 MIN: CPT | Performed by: INTERNAL MEDICINE

## 2023-09-21 PROCEDURE — 3078F DIAST BP <80 MM HG: CPT | Performed by: INTERNAL MEDICINE

## 2023-09-21 RX ORDER — OXYCODONE HYDROCHLORIDE AND ACETAMINOPHEN 5; 325 MG/1; MG/1
1 TABLET ORAL EVERY 4 HOURS PRN
COMMUNITY
End: 2023-09-22 | Stop reason: SDUPTHER

## 2023-09-21 SDOH — ECONOMIC STABILITY: INCOME INSECURITY: HOW HARD IS IT FOR YOU TO PAY FOR THE VERY BASICS LIKE FOOD, HOUSING, MEDICAL CARE, AND HEATING?: NOT HARD AT ALL

## 2023-09-21 SDOH — ECONOMIC STABILITY: FOOD INSECURITY: WITHIN THE PAST 12 MONTHS, YOU WORRIED THAT YOUR FOOD WOULD RUN OUT BEFORE YOU GOT MONEY TO BUY MORE.: NEVER TRUE

## 2023-09-21 SDOH — ECONOMIC STABILITY: FOOD INSECURITY: WITHIN THE PAST 12 MONTHS, THE FOOD YOU BOUGHT JUST DIDN'T LAST AND YOU DIDN'T HAVE MONEY TO GET MORE.: NEVER TRUE

## 2023-09-21 ASSESSMENT — PATIENT HEALTH QUESTIONNAIRE - PHQ9
9. THOUGHTS THAT YOU WOULD BE BETTER OFF DEAD, OR OF HURTING YOURSELF: 0
10. IF YOU CHECKED OFF ANY PROBLEMS, HOW DIFFICULT HAVE THESE PROBLEMS MADE IT FOR YOU TO DO YOUR WORK, TAKE CARE OF THINGS AT HOME, OR GET ALONG WITH OTHER PEOPLE: 0
SUM OF ALL RESPONSES TO PHQ QUESTIONS 1-9: 0
1. LITTLE INTEREST OR PLEASURE IN DOING THINGS: 0
SUM OF ALL RESPONSES TO PHQ QUESTIONS 1-9: 0
5. POOR APPETITE OR OVEREATING: 0
4. FEELING TIRED OR HAVING LITTLE ENERGY: 0
SUM OF ALL RESPONSES TO PHQ QUESTIONS 1-9: 0
2. FEELING DOWN, DEPRESSED OR HOPELESS: 0
3. TROUBLE FALLING OR STAYING ASLEEP: 0
SUM OF ALL RESPONSES TO PHQ QUESTIONS 1-9: 0
7. TROUBLE CONCENTRATING ON THINGS, SUCH AS READING THE NEWSPAPER OR WATCHING TELEVISION: 0
6. FEELING BAD ABOUT YOURSELF - OR THAT YOU ARE A FAILURE OR HAVE LET YOURSELF OR YOUR FAMILY DOWN: 0
8. MOVING OR SPEAKING SO SLOWLY THAT OTHER PEOPLE COULD HAVE NOTICED. OR THE OPPOSITE, BEING SO FIGETY OR RESTLESS THAT YOU HAVE BEEN MOVING AROUND A LOT MORE THAN USUAL: 0
SUM OF ALL RESPONSES TO PHQ9 QUESTIONS 1 & 2: 0

## 2023-09-21 NOTE — PROGRESS NOTES
Chief Complaint   Patient presents with    Follow-up     ED follow-up; suture removal         Guicho Bradley presents today for   Chief Complaint   Patient presents with    Follow-up     ED follow-up; suture removal           1. \"Have you been to the ER, urgent care clinic since your last visit? Hospitalized since your last visit? \" Yes [facility]    2. \"Have you seen or consulted any other health care providers outside of the [address] [place] [address] since your last visit? \" no     3. For patients aged 43-73: Has the patient had a colonoscopy / FIT/ Cologuard? Yes - no Care Gap present      If the patient is female:    4. For patients aged 43-66: Has the patient had a mammogram within the past 2 years? NA - based on age or sex      11. For patients aged 21-65: Has the patient had a pap smear?  NA - based on age or sex

## 2023-09-22 ENCOUNTER — TELEPHONE (OUTPATIENT)
Age: 63
End: 2023-09-22

## 2023-09-22 VITALS
SYSTOLIC BLOOD PRESSURE: 136 MMHG | HEART RATE: 73 BPM | BODY MASS INDEX: 27.96 KG/M2 | HEIGHT: 73 IN | DIASTOLIC BLOOD PRESSURE: 72 MMHG | WEIGHT: 211 LBS | RESPIRATION RATE: 16 BRPM | OXYGEN SATURATION: 96 % | TEMPERATURE: 98 F

## 2023-09-22 RX ORDER — OXYCODONE HYDROCHLORIDE AND ACETAMINOPHEN 5; 325 MG/1; MG/1
1 TABLET ORAL EVERY 8 HOURS PRN
Qty: 30 TABLET | Refills: 0 | Status: SHIPPED | OUTPATIENT
Start: 2023-09-22 | End: 2023-10-02

## 2023-09-22 NOTE — TELEPHONE ENCOUNTER
Per Dr. Vern Tipton office please send referral, last office note and demo and they will call patient to schedule an appointment. Requested information faxed.

## 2023-09-22 NOTE — TELEPHONE ENCOUNTER
----- Message from Polina Pascual MD sent at 9/22/2023  7:41 AM EDT -----  Pls sched ASAP with Dr Nemiah Merlin at [facility] - regarding lip laceration - unclear if it's healing well

## 2023-09-22 NOTE — PROGRESS NOTES
59 y.o. male who presents for evaluation. He is here to follow up after his ER visits. He was bitten in the lips by his labrador on 9/16/23. ER notes describe lacerations in both upper and lower lips, he received 35 stitches. Was seen in follow up 9/19/23 for wound check and was told to see us and plastic surgery due to concerns about the wound. Reports some pain although overall better, there is a large black area in the lower lip and the mucosa inside the lip looks white, using fever, drainage, redness.  He did get Tdap during the 2nd ER visit    Past Medical History:   Diagnosis Date    Brachial neuritis or radiculitis 06/27/2014    Bursitis, hip 08/02/2016    Colon polyp 08/2008    Dr Elvin Baeza    Degenerative arthritis of cervical spine 02/16/2016    Depression 12/2013    PHQ-9 was 6/27     DM (diabetes mellitus) ([address]) 03/2015    on basis of elev hba1c; [facility]; microalbumin 10/19    Dyslipidemia     Fatty liver     on US w negative serologies 2009    Foot fracture, right 09/2013    5th metatarsal    GERD (gastroesophageal reflux disease)     H/O echocardiogram 04/2019    nl lv, ef 61%, mild conc lvh, no wma, gr 1 dd, mild MR    Hypertension     Hypogonadism male 03/2013    Dr Nasreen Lilly; took androgel in past    Morbid obesity ([address])     peak weight 275 lbs, bmi 36.3 from 2/14; IF 2/18 start weight 253 lbs but unable to do    NSTEMI (non-ST elevated myocardial infarction) ([address]) 04/2019    s/p cath Dr Parvez Yates and NETTE to DM1    PAF (paroxysmal atrial fibrillation) ([address]) 04/2019    in setting on ACS    Phymatous rosacea     Postlaminectomy syndrome, lumbar region     S/P cardiac cath 04/2019    Dr Parvez Yates; diffuse 50% RCA, diff LAD, 30% Cx, 85% DM1 culprit stented w NETTE    Thoracic or lumbosacral neuritis or radiculitis, unspecified     Tobacco dependence syndrome     failed chantix and wellbutrin     Past Surgical History:   Procedure Laterality Date    COLONOSCOPY      Dr Elvin Baeza 8/22/08    COLONOSCOPY

## 2023-11-03 RX ORDER — FENOFIBRATE 145 MG/1
145 TABLET, COATED ORAL DAILY
Qty: 90 TABLET | Refills: 2 | Status: SHIPPED | OUTPATIENT
Start: 2023-11-03

## 2023-11-08 DIAGNOSIS — Z12.5 SCREENING FOR MALIGNANT NEOPLASM OF PROSTATE: ICD-10-CM

## 2023-11-08 DIAGNOSIS — E11.9 CONTROLLED TYPE 2 DIABETES MELLITUS WITHOUT COMPLICATION, WITHOUT LONG-TERM CURRENT USE OF INSULIN (HCC): ICD-10-CM

## 2023-11-11 ENCOUNTER — HOSPITAL ENCOUNTER (OUTPATIENT)
Facility: HOSPITAL | Age: 63
Discharge: HOME | End: 2023-11-14

## 2023-11-11 LAB — LABCORP SPECIMEN COLLECTION: NORMAL

## 2023-11-12 LAB
ALBUMIN SERPL-MCNC: 4.4 G/DL (ref 3.9–4.9)
ALBUMIN/GLOB SERPL: 2.2 {RATIO} (ref 1.2–2.2)
ALP SERPL-CCNC: 75 IU/L (ref 44–121)
ALT SERPL-CCNC: 21 IU/L (ref 0–44)
AST SERPL-CCNC: 18 IU/L (ref 0–40)
BASOPHILS # BLD AUTO: 0.1 X10E3/UL (ref 0–0.2)
BASOPHILS NFR BLD AUTO: 1 %
BILIRUB SERPL-MCNC: 0.5 MG/DL (ref 0–1.2)
BUN SERPL-MCNC: 12 MG/DL (ref 8–27)
BUN/CREAT SERPL: 16 (ref 10–24)
CALCIUM SERPL-MCNC: 10.1 MG/DL (ref 8.6–10.2)
CHLORIDE SERPL-SCNC: 94 MMOL/L (ref 96–106)
CO2 SERPL-SCNC: 27 MMOL/L (ref 20–29)
CREAT SERPL-MCNC: 0.76 MG/DL (ref 0.76–1.27)
EGFRCR SERPLBLD CKD-EPI 2021: 102 ML/MIN/1.73
EOSINOPHIL # BLD AUTO: 0.3 X10E3/UL (ref 0–0.4)
EOSINOPHIL NFR BLD AUTO: 3 %
ERYTHROCYTE [DISTWIDTH] IN BLOOD BY AUTOMATED COUNT: 12.5 % (ref 11.6–15.4)
GLOBULIN SER CALC-MCNC: 2 G/DL (ref 1.5–4.5)
GLUCOSE SERPL-MCNC: 116 MG/DL (ref 70–99)
HCT VFR BLD AUTO: 51 % (ref 37.5–51)
HGB BLD-MCNC: 16.9 G/DL (ref 13–17.7)
IMM GRANULOCYTES # BLD AUTO: 0 X10E3/UL (ref 0–0.1)
IMM GRANULOCYTES NFR BLD AUTO: 0 %
LYMPHOCYTES # BLD AUTO: 1.8 X10E3/UL (ref 0.7–3.1)
LYMPHOCYTES NFR BLD AUTO: 19 %
MCH RBC QN AUTO: 29.1 PG (ref 26.6–33)
MCHC RBC AUTO-ENTMCNC: 33.1 G/DL (ref 31.5–35.7)
MCV RBC AUTO: 88 FL (ref 79–97)
MONOCYTES # BLD AUTO: 0.7 X10E3/UL (ref 0.1–0.9)
MONOCYTES NFR BLD AUTO: 8 %
NEUTROPHILS # BLD AUTO: 6.6 X10E3/UL (ref 1.4–7)
NEUTROPHILS NFR BLD AUTO: 69 %
PLATELET # BLD AUTO: 201 X10E3/UL (ref 150–450)
POTASSIUM SERPL-SCNC: 4.2 MMOL/L (ref 3.5–5.2)
PROT SERPL-MCNC: 6.4 G/DL (ref 6–8.5)
PSA SERPL-MCNC: 0.4 NG/ML (ref 0–4)
RBC # BLD AUTO: 5.81 X10E6/UL (ref 4.14–5.8)
SODIUM SERPL-SCNC: 138 MMOL/L (ref 134–144)
SPECIMEN STATUS REPORT: NORMAL
WBC # BLD AUTO: 9.4 X10E3/UL (ref 3.4–10.8)

## 2023-11-13 NOTE — PROGRESS NOTES
use    Depression    Controlled type 2 diabetes mellitus without complication, without long-term current use of insulin (HCC)    Chronic pain syndrome    S/P coronary artery stent placement    Brachial neuritis    Lumbar radiculopathy         Assessment and plan:  1. Chronic pain. F/U Dr Radha Wood. 2. HTN. Continue current regimen. 3. DM.  F/u ophth. Continue current regimen and doing well  4. Dyslipidemia. Continue current  5. Fatty liver. Wt loss would be ideal  6. CHD. F/u Dr Betzy Matthews  7. Hypovit d. Supplementation  8. Depression. Doing well  9. Colon polyp. Fiber, will send referral again  10. Smoking cessation emphasized. LDCT to be done at his discretion  11. Obesity. Lifestyle and dietary measures. Congratulated him on the wt loss   12. Shingrix advocated, prevnar 20 given        RTC 5/24    Above conditions discussed at length and patient vocalized understanding. All questions answered to patient satisfaction     Diagnosis Orders   1. Controlled type 2 diabetes mellitus without complication, without long-term current use of insulin (HCC)  HEMOGLOBIN A1C W/O EAG    Lipid Panel    Microalbumin / Creatinine Urine Ratio    Basic Metabolic Panel      2. Essential hypertension        3. Atrial fibrillation with rapid ventricular response ([address])        4. S/P coronary artery stent placement        5.  Dyslipidemia

## 2023-11-15 ENCOUNTER — OFFICE VISIT (OUTPATIENT)
Age: 63
End: 2023-11-15

## 2023-11-15 VITALS
TEMPERATURE: 97.7 F | RESPIRATION RATE: 16 BRPM | HEART RATE: 71 BPM | OXYGEN SATURATION: 99 % | WEIGHT: 216 LBS | DIASTOLIC BLOOD PRESSURE: 63 MMHG | BODY MASS INDEX: 28.63 KG/M2 | SYSTOLIC BLOOD PRESSURE: 135 MMHG | HEIGHT: 73 IN

## 2023-11-15 DIAGNOSIS — E11.9 CONTROLLED TYPE 2 DIABETES MELLITUS WITHOUT COMPLICATION, WITHOUT LONG-TERM CURRENT USE OF INSULIN (HCC): ICD-10-CM

## 2023-11-15 DIAGNOSIS — I10 ESSENTIAL HYPERTENSION: ICD-10-CM

## 2023-11-15 DIAGNOSIS — E78.5 DYSLIPIDEMIA: ICD-10-CM

## 2023-11-15 DIAGNOSIS — Z23 NEED FOR PNEUMOCOCCAL 20-VALENT CONJUGATE VACCINATION: Primary | ICD-10-CM

## 2023-11-15 DIAGNOSIS — I48.91 ATRIAL FIBRILLATION WITH RAPID VENTRICULAR RESPONSE (HCC): ICD-10-CM

## 2023-11-15 DIAGNOSIS — Z95.5 S/P CORONARY ARTERY STENT PLACEMENT: ICD-10-CM

## 2023-11-22 RX ORDER — EMPAGLIFLOZIN 25 MG/1
25 TABLET, FILM COATED ORAL DAILY
Qty: 90 TABLET | Refills: 2 | Status: SHIPPED | OUTPATIENT
Start: 2023-11-22

## 2023-11-25 RX ORDER — ATORVASTATIN CALCIUM 80 MG/1
80 TABLET, FILM COATED ORAL DAILY
Qty: 90 TABLET | Refills: 3 | Status: SHIPPED | OUTPATIENT
Start: 2023-11-25

## 2023-11-25 RX ORDER — AMLODIPINE BESYLATE 5 MG/1
5 TABLET ORAL DAILY
Qty: 90 TABLET | Refills: 3 | Status: SHIPPED | OUTPATIENT
Start: 2023-11-25

## 2023-12-28 RX ORDER — ASPIRIN 81 MG
TABLET,CHEWABLE ORAL
Qty: 90 TABLET | Refills: 3 | Status: SHIPPED | OUTPATIENT
Start: 2023-12-28

## 2024-01-22 RX ORDER — TRAZODONE HYDROCHLORIDE 100 MG/1
TABLET ORAL
Qty: 135 TABLET | Refills: 2 | Status: SHIPPED | OUTPATIENT
Start: 2024-01-22

## 2024-04-09 ENCOUNTER — TELEPHONE (OUTPATIENT)
Age: 64
End: 2024-04-09

## 2024-04-09 DIAGNOSIS — E78.5 DYSLIPIDEMIA: ICD-10-CM

## 2024-04-09 DIAGNOSIS — I10 ESSENTIAL HYPERTENSION: Primary | ICD-10-CM

## 2024-04-09 RX ORDER — AMLODIPINE BESYLATE 5 MG/1
5 TABLET ORAL DAILY
Qty: 90 TABLET | Refills: 3 | Status: SHIPPED | OUTPATIENT
Start: 2024-04-09

## 2024-04-09 RX ORDER — ATORVASTATIN CALCIUM 80 MG/1
80 TABLET, FILM COATED ORAL DAILY
Qty: 90 TABLET | Refills: 3 | Status: SHIPPED | OUTPATIENT
Start: 2024-04-09

## 2024-04-09 NOTE — TELEPHONE ENCOUNTER
Refill req       atorvastatin (LIPITOR) 80 MG tablet     amLODIPine (NORVASC) 5 MG tablet     Pharmacy   [id number] - [address] [phone] [facility] [phone] [[id number]]

## 2024-05-15 DIAGNOSIS — E11.9 CONTROLLED TYPE 2 DIABETES MELLITUS WITHOUT COMPLICATION, WITHOUT LONG-TERM CURRENT USE OF INSULIN (HCC): ICD-10-CM

## 2024-06-15 ENCOUNTER — HOSPITAL ENCOUNTER (OUTPATIENT)
Facility: HOSPITAL | Age: 64
Discharge: HOME | End: 2024-06-18

## 2024-06-15 LAB — SENTARA SPECIMEN COLLECTION: NORMAL

## 2024-06-15 PROCEDURE — 99001 SPECIMEN HANDLING PT-LAB: CPT

## 2024-06-19 ENCOUNTER — OFFICE VISIT (OUTPATIENT)
Facility: CLINIC | Age: 64
End: 2024-06-19

## 2024-06-19 VITALS
TEMPERATURE: 98.3 F | HEART RATE: 81 BPM | WEIGHT: 217 LBS | DIASTOLIC BLOOD PRESSURE: 59 MMHG | OXYGEN SATURATION: 96 % | HEIGHT: 73 IN | SYSTOLIC BLOOD PRESSURE: 113 MMHG | BODY MASS INDEX: 28.76 KG/M2 | RESPIRATION RATE: 16 BRPM

## 2024-06-19 DIAGNOSIS — E11.9 CONTROLLED TYPE 2 DIABETES MELLITUS WITHOUT COMPLICATION, WITHOUT LONG-TERM CURRENT USE OF INSULIN (HCC): ICD-10-CM

## 2024-06-19 DIAGNOSIS — Z00.00 PHYSICAL EXAM: Primary | ICD-10-CM

## 2024-06-19 DIAGNOSIS — I10 ESSENTIAL HYPERTENSION: ICD-10-CM

## 2024-06-19 DIAGNOSIS — I48.91 ATRIAL FIBRILLATION, UNSPECIFIED TYPE (HCC): ICD-10-CM

## 2024-06-19 DIAGNOSIS — F32.A DEPRESSION, UNSPECIFIED DEPRESSION TYPE: ICD-10-CM

## 2024-06-19 DIAGNOSIS — Z95.5 S/P CORONARY ARTERY STENT PLACEMENT: ICD-10-CM

## 2024-06-19 DIAGNOSIS — H53.9 VISION CHANGES: ICD-10-CM

## 2024-06-19 DIAGNOSIS — K63.5 POLYP OF COLON, UNSPECIFIED PART OF COLON, UNSPECIFIED TYPE: ICD-10-CM

## 2024-06-19 DIAGNOSIS — E78.5 DYSLIPIDEMIA: ICD-10-CM

## 2024-06-19 DIAGNOSIS — F17.200 TOBACCO DEPENDENCE SYNDROME: ICD-10-CM

## 2024-06-19 LAB — HBA1C MFR BLD: 6.4 %

## 2024-06-19 PROCEDURE — 3074F SYST BP LT 130 MM HG: CPT | Performed by: INTERNAL MEDICINE

## 2024-06-19 PROCEDURE — 99396 PREV VISIT EST AGE 40-64: CPT | Performed by: INTERNAL MEDICINE

## 2024-06-19 PROCEDURE — 83036 HEMOGLOBIN GLYCOSYLATED A1C: CPT | Performed by: INTERNAL MEDICINE

## 2024-06-19 PROCEDURE — 3078F DIAST BP <80 MM HG: CPT | Performed by: INTERNAL MEDICINE

## 2024-06-19 ASSESSMENT — PATIENT HEALTH QUESTIONNAIRE - PHQ9
3. TROUBLE FALLING OR STAYING ASLEEP: NOT AT ALL
5. POOR APPETITE OR OVEREATING: NOT AT ALL
8. MOVING OR SPEAKING SO SLOWLY THAT OTHER PEOPLE COULD HAVE NOTICED. OR THE OPPOSITE, BEING SO FIGETY OR RESTLESS THAT YOU HAVE BEEN MOVING AROUND A LOT MORE THAN USUAL: NOT AT ALL
SUM OF ALL RESPONSES TO PHQ QUESTIONS 1-9: 0
9. THOUGHTS THAT YOU WOULD BE BETTER OFF DEAD, OR OF HURTING YOURSELF: NOT AT ALL
SUM OF ALL RESPONSES TO PHQ QUESTIONS 1-9: 0
10. IF YOU CHECKED OFF ANY PROBLEMS, HOW DIFFICULT HAVE THESE PROBLEMS MADE IT FOR YOU TO DO YOUR WORK, TAKE CARE OF THINGS AT HOME, OR GET ALONG WITH OTHER PEOPLE: NOT DIFFICULT AT ALL
SUM OF ALL RESPONSES TO PHQ QUESTIONS 1-9: 0
SUM OF ALL RESPONSES TO PHQ QUESTIONS 1-9: 0
6. FEELING BAD ABOUT YOURSELF - OR THAT YOU ARE A FAILURE OR HAVE LET YOURSELF OR YOUR FAMILY DOWN: NOT AT ALL
4. FEELING TIRED OR HAVING LITTLE ENERGY: NOT AT ALL
2. FEELING DOWN, DEPRESSED OR HOPELESS: NOT AT ALL
7. TROUBLE CONCENTRATING ON THINGS, SUCH AS READING THE NEWSPAPER OR WATCHING TELEVISION: NOT AT ALL

## 2024-06-19 NOTE — PROGRESS NOTES
Chris Sagastume presents today for   Chief Complaint   Patient presents with    Discuss Labs    7 Month Follow-Up    Hypertension       \"Have you been to the ER, urgent care clinic since your last visit?  Hospitalized since your last visit?\"    NO    “Have you seen or consulted any other health care providers outside of [facility] since your last visit?”    NO             
orders placed or performed during the hospital encounter of 06/15/24 (from the past 2160 hour(s))   [facility] specimen collection   Result Value Ref Range    [facility] Specimen Collection Specimens collected/sent to [facility]     Results for orders placed or performed in visit on 06/15/24 (from the past 2160 hour(s))   Basic Metabolic Panel   Result Value Ref Range    Potassium 4.3 3.5 - 5.5 mmol/L    Sodium 141 133 - 145 mmol/L    Chloride 99 98 - 110 mmol/L    Glucose 126 (H) 70 - 99 mg/dL    Calcium 10.3 8.4 - 10.5 mg/dL    BUN 15 6 - 22 mg/dL    Creatinine 0.8 0.8 - 1.6 mg/dL    CO2 30 20 - 32 mmol/L    Est, Glom Filt Rate >60.0 >60.0 mL/min/1.73 sq.m.    Anion Gap 12.0 3.0 - 15.0 mmol/L   Lipid Panel   Result Value Ref Range    Cholesterol, Total 143 110 - 200 mg/dL    Triglycerides 162 (H) 40 - 149 mg/dL    HDL 37 (L) >=40 mg/dL    Chol/HDL Ratio 3.9 0.0 - 5.0    Non-HDL Cholesterol 106 <130 mg/dL    LDL Cholesterol 73 50 - 99 mg/dL    VLDL Cholesterol Calculated 32 (H) 8 - 30 mg/dL    LDL/HDL Ratio 2.0      We reviewed the patient's labs from the last several visits to point out trends in the numbers        Patient Active Problem List   Diagnosis    Dyslipidemia    Hypogonadism male    Atrial fibrillation (HCC)    Hypovitaminosis D    Tobacco dependence syndrome    Essential hypertension    Degeneration of cervical intervertebral disc    Arthritis of right knee    Colon polyp    Severe obesity (HCC)    Encounter for long-term (current) drug use    Depression    Controlled type 2 diabetes mellitus without complication, without long-term current use of insulin (HCC)    Chronic pain syndrome    S/P coronary artery stent placement    Brachial neuritis    Lumbar radiculopathy         Assessment and plan:  1. Chronic pain.  F/U Dr Milton.  2. HTN.  Continue current regimen.  3. DM.  F/u ophth.  Continue current regimen.  Long discussion about glps and interested in trial   4. Dyslipidemia.  Continue current  5. Fatty

## 2024-08-14 RX ORDER — LISINOPRIL AND HYDROCHLOROTHIAZIDE 20; 12.5 MG/1; MG/1
2 TABLET ORAL DAILY
Qty: 180 TABLET | Refills: 3 | Status: SHIPPED | OUTPATIENT
Start: 2024-08-14

## 2024-08-19 DIAGNOSIS — E11.9 CONTROLLED TYPE 2 DIABETES MELLITUS WITHOUT COMPLICATION, WITHOUT LONG-TERM CURRENT USE OF INSULIN (HCC): ICD-10-CM

## 2024-08-21 RX ORDER — SEMAGLUTIDE 0.68 MG/ML
0.5 INJECTION, SOLUTION SUBCUTANEOUS WEEKLY
Qty: 3 ML | Refills: 0 | Status: SHIPPED | OUTPATIENT
Start: 2024-08-21 | End: 2024-09-24

## 2024-09-05 ENCOUNTER — OFFICE VISIT (OUTPATIENT)
Age: 64
End: 2024-09-05
Payer: COMMERCIAL

## 2024-09-05 DIAGNOSIS — I21.4 NON-ST ELEVATION (NSTEMI) MYOCARDIAL INFARCTION (HCC): ICD-10-CM

## 2024-09-05 DIAGNOSIS — I10 ESSENTIAL (PRIMARY) HYPERTENSION: ICD-10-CM

## 2024-09-05 DIAGNOSIS — E78.5 HYPERLIPIDEMIA, UNSPECIFIED HYPERLIPIDEMIA TYPE: Primary | ICD-10-CM

## 2024-09-05 PROCEDURE — 99214 OFFICE O/P EST MOD 30 MIN: CPT | Performed by: INTERNAL MEDICINE

## 2024-09-05 PROCEDURE — 3074F SYST BP LT 130 MM HG: CPT | Performed by: INTERNAL MEDICINE

## 2024-09-05 PROCEDURE — 93000 ELECTROCARDIOGRAM COMPLETE: CPT | Performed by: INTERNAL MEDICINE

## 2024-09-05 PROCEDURE — 3078F DIAST BP <80 MM HG: CPT | Performed by: INTERNAL MEDICINE

## 2024-09-05 RX ORDER — ASPIRIN 81 MG/1
81 TABLET, CHEWABLE ORAL 4 TIMES DAILY
Qty: 360 TABLET | Refills: 3 | Status: SHIPPED | OUTPATIENT
Start: 2024-09-05

## 2024-09-05 RX ORDER — NITROGLYCERIN 0.4 MG/1
0.4 TABLET SUBLINGUAL EVERY 5 MIN PRN
Qty: 25 TABLET | Refills: 3 | Status: SHIPPED | OUTPATIENT
Start: 2024-09-05

## 2024-09-05 RX ORDER — METOPROLOL SUCCINATE 50 MG/1
50 TABLET, EXTENDED RELEASE ORAL DAILY
Qty: 90 TABLET | Refills: 3 | Status: CANCELLED | OUTPATIENT
Start: 2024-09-05

## 2024-09-06 VITALS
OXYGEN SATURATION: 95 % | WEIGHT: 214 LBS | DIASTOLIC BLOOD PRESSURE: 64 MMHG | BODY MASS INDEX: 28.36 KG/M2 | HEIGHT: 73 IN | SYSTOLIC BLOOD PRESSURE: 122 MMHG | HEART RATE: 76 BPM

## 2024-09-06 NOTE — PROGRESS NOTES
"HEALTHY BACK TOOLS        KEEP YOUR SPINE FEELING FINE   HEALTHY HABITS   Do your "GO TO" stretches 2/day   Get a good night's REST   Watch your POSTURE in sitting/standing Drink PLENTY of water   Use a lumbar roll Eat LOTS of fruits & vegetables   GET UP often (walk and/or stretch) Manage your STRESS   Make your workplace IDEAL FOR YOU  Don't smoke   Lift correctly EXERCISE                           WHAT TO DO WHEN SYMPTOMS FLARE UP  Back and neck pain may occasionally flare up.  If you experience a flare   up, remember your tools. Be encouraged, by remembering that flare-ups will   usually pass.   My Tools:    ~Use your "Go To" Stretches/Positions   ~Keep Moving-pain usually gets better if you move  ~Z lie (with or without ice)  10 min several times a day until symptoms reduce  ~Slowly resume normal activities   ~Practice Deep Breathing and Relaxation techniques                                                 MY EXERCISE PLAN  GO TO STRETCHES  2/day (like brushing your teeth) STRENGTHENING  2-3 times/week CARDIO PROGRAM  150 min/week   Side to side w/knees x 10 Bridging with Band x 20 Walking on Treadmill   Open book x 10 with and without band   Bird dog  Swimming   Cat/cow x 10 Plank holds     Cross over leg stretch 5 x 10 sec     Back extension in standing x 10     Press ups on stomach x 10        "
Chris Sagastume presents today for   Chief Complaint   Patient presents with    Follow-up     Overdue f/u     Dizziness     Dizzy spell a while back when looked up/down and informed pcp        Chris Sagastume preferred language for health care discussion is english/other.    Is someone accompanying this pt? no    Is the patient using any DME equipment during OV? no    Depression Screening:  Depression: Not at risk (6/19/2024)    PHQ-2     PHQ-2 Score: 0        Learning Assessment:  No question data found.       Pt currently taking Anticoagulant therapy? no    Pt currently taking Antiplatelet therapy ? ASA 81 mg 4 tabs daily       Coordination of Care:  1. Have you been to the ER, urgent care clinic since your last visit? Hospitalized since your last visit? no    2. Have you seen or consulted any other health care providers outside of the [facility] since your last visit? Include any pap smears or colon screening. no    
(NORVASC) 5 mg tablet take 1 tablet by mouth once daily 90 Tab 3    metoprolol tartrate (LOPRESSOR) 100 mg IR tablet take 1 tablet by mouth every 12 hours 60 Tab 11    baclofen (LIORESAL) 10 mg tablet Take 10 mg by mouth three (3) times daily.      HYDROcodone-acetaminophen (NORCO)  mg tablet Take 1 Tab by mouth every six (6) hours as needed for Pain.      naloxone 4 mg/actuation spry 4 mg by Nasal route once as needed (opioid overdose) for up to 1 dose. May substitute 2 mg syringe if insurance requires 1 Package 0    omeprazole (PRILOSEC) 20 mg capsule Take 20 mg by mouth daily.      ascorbic acid (VITAMIN C) 1,000 mg tablet Take 1,000 mg by mouth two (2) times a day.      MULTIVITAMIN PO Take  by mouth.         No Known Allergies    Social History     Socioeconomic History    Marital status:      Spouse name: Not on file    Number of children: Not on file    Years of education: Not on file    Highest education level: Not on file   Occupational History    Not on file   Tobacco Use    Smoking status: Current Every Day Smoker     Packs/day: 2.00     Types: Cigarettes    Smokeless tobacco: Never Used   Substance and Sexual Activity    Alcohol use: No     Comment: One drink per 3-4 months    Drug use: No     Comment: Marijuana use in the 80's    Sexual activity: Not on file   Other Topics Concern    Not on file   Social History Narrative    Not on file     Social Determinants of Health     Financial Resource Strain:     Difficulty of Paying Living Expenses: Not on file   Food Insecurity:     Worried About Running Out of Food in the Last Year: Not on file    Ran Out of Food in the Last Year: Not on file   Transportation Needs:     Lack of Transportation (Medical): Not on file    Lack of Transportation (Non-Medical): Not on file   Physical Activity:     Days of Exercise per Week: Not on file    Minutes of Exercise per Session: Not on file   Stress:     Feeling of Stress : Not on file   Social Connections:

## 2024-09-21 DIAGNOSIS — E11.9 CONTROLLED TYPE 2 DIABETES MELLITUS WITHOUT COMPLICATION, WITHOUT LONG-TERM CURRENT USE OF INSULIN (HCC): ICD-10-CM

## 2024-10-11 RX ORDER — TRAZODONE HYDROCHLORIDE 100 MG/1
TABLET ORAL
Qty: 135 TABLET | Refills: 2 | Status: SHIPPED | OUTPATIENT
Start: 2024-10-11

## 2024-10-21 DIAGNOSIS — E11.9 CONTROLLED TYPE 2 DIABETES MELLITUS WITHOUT COMPLICATION, WITHOUT LONG-TERM CURRENT USE OF INSULIN (HCC): ICD-10-CM

## 2024-10-21 NOTE — TELEPHONE ENCOUNTER
PCP: Edis Garcia MD    LAST OFFICE VISIT: 06/19/2024    LAST REFILL PER CHART:  Medication:Semaglutide, 1 MG/DOSE, (OZEMPIC) 4 MG/3ML SOPN sc injection   Ordered On:09/24/2024  Instructions:Inject 1 mg into the skin every 7 days   Dispense:3mL  Refills:0      Future Appointments   Date Time Provider Department Center   11/26/2024  2:40 PM Edis Garcia MD [facility]   9/4/2025  3:40 PM Maylin Obrien [facility] BS AMB

## 2024-10-28 RX ORDER — SEMAGLUTIDE 1.34 MG/ML
1 INJECTION, SOLUTION SUBCUTANEOUS
Qty: 3 ML | Refills: 5 | Status: SHIPPED | OUTPATIENT
Start: 2024-10-28

## 2024-11-18 NOTE — TELEPHONE ENCOUNTER
PCP: Edis Garcia MD    LAST OFFICE VISIT: 06/19/2024    LAST REFILL PER CHART:  Medication:fenofibrate (TRICOR) 145 MG tablet   Ordered On:11/03/2024  Instructions:take 1 tablet by mouth once daily   Dispense:90 tablets  Refills:2      Future Appointments   Date Time Provider Department Center   11/26/2024  2:40 PM Edis Garcia MD [facility]   9/4/2025  3:40 PM Maylin Obrien DO [facility] BS AMB

## 2024-11-20 RX ORDER — FENOFIBRATE 145 MG/1
145 TABLET, COATED ORAL DAILY
Qty: 180 TABLET | Refills: 3 | Status: SHIPPED | OUTPATIENT
Start: 2024-11-20

## 2025-01-04 ENCOUNTER — HOSPITAL ENCOUNTER (OUTPATIENT)
Facility: HOSPITAL | Age: 65
Setting detail: SPECIMEN
Discharge: HOME | End: 2025-01-07

## 2025-01-04 LAB — SENTARA SPECIMEN COLLECTION: NORMAL

## 2025-01-04 PROCEDURE — 99001 SPECIMEN HANDLING PT-LAB: CPT

## 2025-01-05 LAB
A/G RATIO: 1.9 RATIO (ref 1.1–2.6)
ALBUMIN: 4.3 G/DL (ref 3.5–5)
ALP BLD-CCNC: 82 U/L (ref 40–125)
ALT SERPL-CCNC: 24 U/L (ref 5–40)
ANION GAP SERPL CALCULATED.3IONS-SCNC: 13 MMOL/L (ref 3–15)
AST SERPL-CCNC: 23 U/L (ref 10–37)
BASOPHILS ABSOLUTE: 0.1 K/UL (ref 0–0.2)
BASOPHILS RELATIVE PERCENT: 1 % (ref 0–2)
BILIRUB SERPL-MCNC: 0.3 MG/DL (ref 0.2–1.2)
BUN BLDV-MCNC: 13 MG/DL (ref 6–22)
CALCIUM SERPL-MCNC: 10.5 MG/DL (ref 8.4–10.5)
CHLORIDE BLD-SCNC: 95 MMOL/L (ref 98–110)
CO2: 28 MMOL/L (ref 20–32)
CREAT SERPL-MCNC: 0.7 MG/DL (ref 0.8–1.6)
CREATININE URINE: 57 MG/DL
CREATININE URINE: 57 MG/DL
EOSINOPHIL # BLD: 3 % (ref 0–6)
EOSINOPHILS ABSOLUTE: 0.3 K/UL (ref 0–0.5)
ESTIMATED AVERAGE GLUCOSE: 117 MG/DL (ref 91–123)
GFR, ESTIMATED: >60 ML/MIN/1.73 SQ.M.
GLOBULIN: 2.3 G/DL (ref 2–4)
GLUCOSE: 97 MG/DL (ref 70–99)
HBA1C MFR BLD: 5.7 % (ref 4.8–5.6)
HCT VFR BLD CALC: 52.1 % (ref 39.3–51.6)
HEMOGLOBIN: 16.1 G/DL (ref 13.1–17.2)
LYMPHOCYTES # BLD: 21 % (ref 20–45)
LYMPHOCYTES ABSOLUTE: 1.9 K/UL (ref 1–4.8)
MCH RBC QN AUTO: 29 PG (ref 26–34)
MCHC RBC AUTO-ENTMCNC: 31 G/DL (ref 31–36)
MCV RBC AUTO: 93 FL (ref 80–95)
MICROALB/CREAT RATIO (UG/MG CREAT.): NORMAL
MICROALBUMIN/CREAT 24H UR: <12 MG/L (ref 0.1–17)
MONOCYTES ABSOLUTE: 0.7 K/UL (ref 0.1–1)
MONOCYTES: 8 % (ref 3–12)
NEUTROPHILS ABSOLUTE: 6.2 K/UL (ref 1.8–7.7)
NEUTROPHILS: 67 % (ref 40–75)
PDW BLD-RTO: 13.5 % (ref 10–15.5)
PLATELET # BLD: 238 K/UL (ref 140–440)
PMV BLD AUTO: 10.3 FL (ref 9–13)
POTASSIUM SERPL-SCNC: 4.5 MMOL/L (ref 3.5–5.5)
PROSTATE SPECIFIC ANTIGEN: 0.09 NG/ML
RBC # BLD: 5.62 M/UL (ref 3.8–5.8)
SODIUM BLD-SCNC: 136 MMOL/L (ref 133–145)
TOTAL PROTEIN: 6.6 G/DL (ref 6.2–8.1)
WBC # BLD: 9.2 K/UL (ref 4–11)

## 2025-01-07 SDOH — ECONOMIC STABILITY: FOOD INSECURITY: WITHIN THE PAST 12 MONTHS, THE FOOD YOU BOUGHT JUST DIDN'T LAST AND YOU DIDN'T HAVE MONEY TO GET MORE.: NEVER TRUE

## 2025-01-07 SDOH — ECONOMIC STABILITY: TRANSPORTATION INSECURITY
IN THE PAST 12 MONTHS, HAS LACK OF TRANSPORTATION KEPT YOU FROM MEETINGS, WORK, OR FROM GETTING THINGS NEEDED FOR DAILY LIVING?: NO

## 2025-01-07 SDOH — ECONOMIC STABILITY: INCOME INSECURITY: HOW HARD IS IT FOR YOU TO PAY FOR THE VERY BASICS LIKE FOOD, HOUSING, MEDICAL CARE, AND HEATING?: NOT HARD AT ALL

## 2025-01-07 SDOH — ECONOMIC STABILITY: FOOD INSECURITY: WITHIN THE PAST 12 MONTHS, YOU WORRIED THAT YOUR FOOD WOULD RUN OUT BEFORE YOU GOT MONEY TO BUY MORE.: NEVER TRUE

## 2025-01-08 ENCOUNTER — OFFICE VISIT (OUTPATIENT)
Facility: CLINIC | Age: 65
End: 2025-01-08
Payer: COMMERCIAL

## 2025-01-08 VITALS
RESPIRATION RATE: 16 BRPM | DIASTOLIC BLOOD PRESSURE: 77 MMHG | HEIGHT: 73 IN | BODY MASS INDEX: 28.49 KG/M2 | TEMPERATURE: 98.7 F | OXYGEN SATURATION: 96 % | WEIGHT: 215 LBS | HEART RATE: 82 BPM | SYSTOLIC BLOOD PRESSURE: 130 MMHG

## 2025-01-08 DIAGNOSIS — E66.01 SEVERE OBESITY (BMI 35.0-35.9 WITH COMORBIDITY): ICD-10-CM

## 2025-01-08 DIAGNOSIS — F32.A DEPRESSION, UNSPECIFIED DEPRESSION TYPE: ICD-10-CM

## 2025-01-08 DIAGNOSIS — K63.5 POLYP OF COLON, UNSPECIFIED PART OF COLON, UNSPECIFIED TYPE: ICD-10-CM

## 2025-01-08 DIAGNOSIS — E11.9 CONTROLLED TYPE 2 DIABETES MELLITUS WITHOUT COMPLICATION, WITHOUT LONG-TERM CURRENT USE OF INSULIN (HCC): Primary | ICD-10-CM

## 2025-01-08 DIAGNOSIS — I48.91 ATRIAL FIBRILLATION, UNSPECIFIED TYPE (HCC): ICD-10-CM

## 2025-01-08 DIAGNOSIS — E78.5 DYSLIPIDEMIA: ICD-10-CM

## 2025-01-08 DIAGNOSIS — I25.10 CORONARY ARTERY DISEASE INVOLVING NATIVE CORONARY ARTERY OF NATIVE HEART WITHOUT ANGINA PECTORIS: ICD-10-CM

## 2025-01-08 DIAGNOSIS — I10 ESSENTIAL HYPERTENSION: ICD-10-CM

## 2025-01-08 PROCEDURE — 3078F DIAST BP <80 MM HG: CPT | Performed by: INTERNAL MEDICINE

## 2025-01-08 PROCEDURE — 3075F SYST BP GE 130 - 139MM HG: CPT | Performed by: INTERNAL MEDICINE

## 2025-01-08 PROCEDURE — 3044F HG A1C LEVEL LT 7.0%: CPT | Performed by: INTERNAL MEDICINE

## 2025-01-08 PROCEDURE — 99214 OFFICE O/P EST MOD 30 MIN: CPT | Performed by: INTERNAL MEDICINE

## 2025-01-08 RX ORDER — MULTIVIT-MIN/IRON/FOLIC ACID/K 18-600-40
2000 CAPSULE ORAL DAILY
COMMUNITY

## 2025-01-08 ASSESSMENT — PATIENT HEALTH QUESTIONNAIRE - PHQ9
3. TROUBLE FALLING OR STAYING ASLEEP: NOT AT ALL
2. FEELING DOWN, DEPRESSED OR HOPELESS: NOT AT ALL
4. FEELING TIRED OR HAVING LITTLE ENERGY: NOT AT ALL
SUM OF ALL RESPONSES TO PHQ QUESTIONS 1-9: 0
7. TROUBLE CONCENTRATING ON THINGS, SUCH AS READING THE NEWSPAPER OR WATCHING TELEVISION: NOT AT ALL
8. MOVING OR SPEAKING SO SLOWLY THAT OTHER PEOPLE COULD HAVE NOTICED. OR THE OPPOSITE, BEING SO FIGETY OR RESTLESS THAT YOU HAVE BEEN MOVING AROUND A LOT MORE THAN USUAL: NOT AT ALL
10. IF YOU CHECKED OFF ANY PROBLEMS, HOW DIFFICULT HAVE THESE PROBLEMS MADE IT FOR YOU TO DO YOUR WORK, TAKE CARE OF THINGS AT HOME, OR GET ALONG WITH OTHER PEOPLE: NOT DIFFICULT AT ALL
SUM OF ALL RESPONSES TO PHQ QUESTIONS 1-9: 0
SUM OF ALL RESPONSES TO PHQ QUESTIONS 1-9: 0
9. THOUGHTS THAT YOU WOULD BE BETTER OFF DEAD, OR OF HURTING YOURSELF: NOT AT ALL
6. FEELING BAD ABOUT YOURSELF - OR THAT YOU ARE A FAILURE OR HAVE LET YOURSELF OR YOUR FAMILY DOWN: NOT AT ALL
SUM OF ALL RESPONSES TO PHQ9 QUESTIONS 1 & 2: 0
1. LITTLE INTEREST OR PLEASURE IN DOING THINGS: NOT AT ALL
SUM OF ALL RESPONSES TO PHQ QUESTIONS 1-9: 0
5. POOR APPETITE OR OVEREATING: NOT AT ALL

## 2025-01-08 NOTE — PROGRESS NOTES
Chris Sagastume presents today for   Chief Complaint   Patient presents with    6 Month Follow-Up    Hypertension    Diabetes       \"Have you been to the ER, urgent care clinic since your last visit?  Hospitalized since your last visit?\"    NO    “Have you seen or consulted any other health care providers outside of [facility] since your last visit?”    NO             
Specimens collected/sent to [facility]     Results for orders placed or performed in visit on 01/04/25 (from the past 2160 hour(s))   Comprehensive Metabolic Panel   Result Value Ref Range    Potassium 4.5 3.5 - 5.5 mmol/L    Sodium 136 133 - 145 mmol/L    Chloride 95 (L) 98 - 110 mmol/L    Glucose 97 70 - 99 mg/dL    Calcium 10.5 8.4 - 10.5 mg/dL    Albumin 4.3 3.5 - 5.0 g/dL    ALT 24 5 - 40 U/L    AST 23 10 - 37 U/L    Total Bilirubin 0.3 0.2 - 1.2 mg/dL    Alkaline Phosphatase 82 40 - 125 U/L    BUN 13 6 - 22 mg/dL    CO2 28 20 - 32 mmol/L    Creatinine 0.7 (L) 0.8 - 1.6 mg/dL    Est, Glom Filt Rate >60.0 >60.0 mL/min/1.73 sq.m.    Globulin 2.3 2.0 - 4.0 g/dL    Albumin/Globulin Ratio 1.9 1.1 - 2.6 ratio    Total Protein 6.6 6.2 - 8.1 g/dL    Anion Gap 13.0 3.0 - 15.0 mmol/L   PSA Screening   Result Value Ref Range    PSA 0.085 <=4.100 ng/mL   CBC with Auto Differential   Result Value Ref Range    WBC 9.2 4.0 - 11.0 K/uL    RBC 5.62 3.80 - 5.80 M/uL    Hemoglobin 16.1 13.1 - 17.2 g/dL    Hematocrit 52.1 (H) 39.3 - 51.6 %    MCV 93 80 - 95 fL    MCH 29 26 - 34 pg    MCHC 31 31 - 36 g/dL    RDW 13.5 10.0 - 15.5 %    Platelets 238 140 - 440 K/uL    MPV 10.3 9.0 - 13.0 fL    Neutrophils 67 40 - 75 %    Lymphocytes 21 20 - 45 %    Monocytes 8 3 - 12 %    Eosinophils 3 0 - 6 %    Basophils % 1 0 - 2 %    Neutrophils Absolute 6.2 1.8 - 7.7 K/uL    Lymphocytes Absolute 1.9 1.0 - 4.8 K/uL    Monocytes Absolute 0.7 0.1 - 1.0 K/uL    Eosinophils Absolute 0.3 0.0 - 0.5 K/uL    Basophils Absolute 0.1 0.0 - 0.2 K/uL   Hemoglobin A1C   Result Value Ref Range    Hemoglobin A1C 5.7 (H) 4.8 - 5.6 %    Estimated Avg Glucose 117 91 - 123 mg/dL   Creatinine, Random Urine   Result Value Ref Range    Creatinine, Ur 57 mg/dL   Albumin/Creatinine Ratio, Urine   Result Value Ref Range    Albumin Urine <12.0 0.1 - 17.0 mg/L    Microalb/Creat ratio (ug/mg creat.)      Creatinine, Ur 57 mg/dL     We reviewed the patient's labs from the last

## 2025-01-09 PROBLEM — I25.10 CORONARY ARTERY DISEASE INVOLVING NATIVE CORONARY ARTERY OF NATIVE HEART WITHOUT ANGINA PECTORIS: Status: ACTIVE | Noted: 2025-01-09

## 2025-01-09 PROBLEM — E66.01 SEVERE OBESITY (BMI 35.0-35.9 WITH COMORBIDITY): Status: ACTIVE | Noted: 2019-09-13

## 2025-01-14 RX ORDER — EMPAGLIFLOZIN 25 MG/1
25 TABLET, FILM COATED ORAL DAILY
Qty: 90 TABLET | Refills: 3 | Status: SHIPPED | OUTPATIENT
Start: 2025-01-14

## 2025-03-27 DIAGNOSIS — E78.5 DYSLIPIDEMIA: ICD-10-CM

## 2025-03-27 DIAGNOSIS — I10 ESSENTIAL HYPERTENSION: ICD-10-CM

## 2025-03-27 RX ORDER — AMLODIPINE BESYLATE 5 MG/1
5 TABLET ORAL DAILY
Qty: 90 TABLET | Refills: 3 | Status: SHIPPED | OUTPATIENT
Start: 2025-03-27

## 2025-03-27 RX ORDER — ATORVASTATIN CALCIUM 80 MG/1
80 TABLET, FILM COATED ORAL DAILY
Qty: 90 TABLET | Refills: 3 | Status: SHIPPED | OUTPATIENT
Start: 2025-03-27

## 2025-05-22 DIAGNOSIS — E11.9 CONTROLLED TYPE 2 DIABETES MELLITUS WITHOUT COMPLICATION, WITHOUT LONG-TERM CURRENT USE OF INSULIN (HCC): ICD-10-CM

## 2025-05-22 NOTE — TELEPHONE ENCOUNTER
Refill request via fax     Medication: semaglutide, 2 MG/DOSE, (OZEMPIC) 8 MG/3ML SOPN sc injection    Quantity: 3 mL  [address] P [phone] - F [phone] [[id number]]   Last Fill: 01/09/2025    PCP: Edis Garcia MD    LAST OFFICE VISIT: 01/08/2025      Future Appointments   Date Time Provider Department Center   7/9/2025  3:00 PM [facility]   9/4/2025  3:40 PM Maylin Obrien DO [facility] BS AMB

## 2025-06-02 RX ORDER — TRAZODONE HYDROCHLORIDE 100 MG/1
150 TABLET ORAL NIGHTLY PRN
Qty: 135 TABLET | Refills: 2 | Status: SHIPPED | OUTPATIENT
Start: 2025-06-02

## 2025-06-02 NOTE — TELEPHONE ENCOUNTER
Pt called stated he needs a refill for trazodone his [facility] pharmacy will be closing down and would like to have his medication sent to the [facility] on [address]

## 2025-07-09 DIAGNOSIS — E11.9 CONTROLLED TYPE 2 DIABETES MELLITUS WITHOUT COMPLICATION, WITHOUT LONG-TERM CURRENT USE OF INSULIN (HCC): ICD-10-CM

## 2025-07-12 ENCOUNTER — HOSPITAL ENCOUNTER (OUTPATIENT)
Age: 65
Setting detail: SPECIMEN
Discharge: HOME | End: 2025-07-15

## 2025-07-12 LAB
ANION GAP SERPL CALCULATED.3IONS-SCNC: 13 MMOL/L (ref 3–15)
BUN BLDV-MCNC: 16 MG/DL (ref 6–22)
CALCIUM SERPL-MCNC: 9.7 MG/DL (ref 8.4–10.5)
CHLORIDE BLD-SCNC: 104 MMOL/L (ref 98–110)
CO2: 25 MMOL/L (ref 20–32)
CREAT SERPL-MCNC: 0.6 MG/DL (ref 0.8–1.6)
ESTIMATED AVERAGE GLUCOSE: 109 MG/DL (ref 91–123)
GFR, ESTIMATED: >90 ML/MIN/1.73 SQ.M.
GLUCOSE: 101 MG/DL (ref 70–99)
HBA1C MFR BLD: 5.4 % (ref 4.8–5.6)
POTASSIUM SERPL-SCNC: 4.1 MMOL/L (ref 3.5–5.5)
SENTARA SPECIMEN COLLECTION: NORMAL
SODIUM BLD-SCNC: 142 MMOL/L (ref 133–145)

## 2025-07-16 ENCOUNTER — OFFICE VISIT (OUTPATIENT)
Facility: CLINIC | Age: 65
End: 2025-07-16
Payer: COMMERCIAL

## 2025-07-16 VITALS
HEART RATE: 87 BPM | WEIGHT: 202 LBS | BODY MASS INDEX: 26.77 KG/M2 | OXYGEN SATURATION: 97 % | RESPIRATION RATE: 16 BRPM | DIASTOLIC BLOOD PRESSURE: 83 MMHG | TEMPERATURE: 98.6 F | HEIGHT: 73 IN | SYSTOLIC BLOOD PRESSURE: 136 MMHG

## 2025-07-16 DIAGNOSIS — E11.9 CONTROLLED TYPE 2 DIABETES MELLITUS WITHOUT COMPLICATION, WITHOUT LONG-TERM CURRENT USE OF INSULIN (HCC): ICD-10-CM

## 2025-07-16 DIAGNOSIS — F32.A DEPRESSION, UNSPECIFIED DEPRESSION TYPE: ICD-10-CM

## 2025-07-16 DIAGNOSIS — I48.91 ATRIAL FIBRILLATION, UNSPECIFIED TYPE (HCC): Primary | ICD-10-CM

## 2025-07-16 DIAGNOSIS — I10 ESSENTIAL HYPERTENSION: ICD-10-CM

## 2025-07-16 DIAGNOSIS — I25.10 CORONARY ARTERY DISEASE INVOLVING NATIVE CORONARY ARTERY OF NATIVE HEART WITHOUT ANGINA PECTORIS: ICD-10-CM

## 2025-07-16 DIAGNOSIS — E78.5 DYSLIPIDEMIA: ICD-10-CM

## 2025-07-16 DIAGNOSIS — E66.3 OVERWEIGHT (BMI 25.0-29.9): ICD-10-CM

## 2025-07-16 DIAGNOSIS — Z12.5 SCREENING FOR MALIGNANT NEOPLASM OF PROSTATE: ICD-10-CM

## 2025-07-16 PROBLEM — E66.01 SEVERE OBESITY (BMI 35.0-35.9 WITH COMORBIDITY) (HCC): Status: RESOLVED | Noted: 2019-09-13 | Resolved: 2025-07-16

## 2025-07-16 PROCEDURE — 3044F HG A1C LEVEL LT 7.0%: CPT | Performed by: INTERNAL MEDICINE

## 2025-07-16 PROCEDURE — 3075F SYST BP GE 130 - 139MM HG: CPT | Performed by: INTERNAL MEDICINE

## 2025-07-16 PROCEDURE — 3079F DIAST BP 80-89 MM HG: CPT | Performed by: INTERNAL MEDICINE

## 2025-07-16 PROCEDURE — 99214 OFFICE O/P EST MOD 30 MIN: CPT | Performed by: INTERNAL MEDICINE

## 2025-07-16 RX ORDER — HYDROCODONE BITARTRATE AND ACETAMINOPHEN 10; 325 MG/1; MG/1
1 TABLET ORAL 4 TIMES DAILY PRN
COMMUNITY

## 2025-07-16 RX ORDER — CYCLOBENZAPRINE HCL 10 MG
10 TABLET ORAL 2 TIMES DAILY
COMMUNITY

## 2025-07-16 NOTE — PROGRESS NOTES
Chris Sagastume presents today for   Chief Complaint   Patient presents with    6 Month Follow-Up    Hypertension    Diabetes       \"Have you been to the ER, urgent care clinic since your last visit?  Hospitalized since your last visit?\"    NO    “Have you seen or consulted any other health care providers outside of [facility] since your last visit?”    YES - When: approximately 2 months ago.  Where and Why: Pain Management.

## 2025-07-16 NOTE — PROGRESS NOTES
65 y.o. male who presents for evaluation    No cardiovascular complaints. Remains active walking the job sites constantly, does some toning at home as well.     He did have some issues with the right shoulder, saw Dr Brown/[facility] and was told he has impingement syndrome and will be getting therapy.  Also seeing Dr Milton for the chronic pain    Denies polyuria, polydipsia, nocturia, vision change.  Not checking sugars regularly. He had remarkable wt loss as below in the last 5 years+ as below but then we added glp 6/24 and he's lost another 7% off or so    Vitals 10/20/2022 2/16/2022 12/2/2021 6/15/2021 6/3/2021 10/4/2019   Weight 223 lb 243 lb 239 lb 228 lb 231 lb 260 lb      11/15/2023 6/19/2024 9/5/2024 1/8/2025 7/16/2025   Vitals        Weight - Scale 216 lb  217 lb  214 lb  215 lb  202 lb      Denies any gi or gu complaints.     Still smoking but trying to cut back, he's held off on doing LDCT due to fears of finding something    Past Medical History:   Diagnosis Date    Brachial neuritis or radiculitis 06/27/2014    Bursitis, hip 08/02/2016    Colon polyp 08/2008    Dr Gtz    COVID-19 vaccine dose declined 06/2024    boosters    Degenerative arthritis of cervical spine 02/16/2016    Depression 12/2013    PHQ-9 was 6/27     DM (diabetes mellitus) (HCC) 03/2015    on basis of elev hba1c; [facility] for teaching; microalbumin 10/19    Dyslipidemia     Foot fracture, right 09/2013    5th metatarsal    GERD (gastroesophageal reflux disease)     H/O echocardiogram 04/2019    nl lv, ef 61%, mild conc lvh, no wma, gr 1 dd, mild MR    Hypertension     Hypogonadism male 03/2013    Dr Bond; took androgel in past    Metabolic dysfunction-associated steatotic liver disease (MASLD)     fatty liver on US, neg serologies (2009); Fib-4 1.21 (11/23); 1.24 (1/25)    Morbid obesity (HCC)     peak weight 275 lbs, bmi 36.3 (2/14); IF (2/18) start weight 253 lbs; ozempic (6/24) start wt 217 lbs    NSTEMI (non-ST elevated myocardial

## 2025-08-01 ENCOUNTER — TELEPHONE (OUTPATIENT)
Facility: CLINIC | Age: 65
End: 2025-08-01

## 2025-08-01 DIAGNOSIS — I10 ESSENTIAL HYPERTENSION: ICD-10-CM

## 2025-08-01 DIAGNOSIS — E11.9 CONTROLLED TYPE 2 DIABETES MELLITUS WITHOUT COMPLICATION, WITHOUT LONG-TERM CURRENT USE OF INSULIN (HCC): Primary | ICD-10-CM

## 2025-08-01 RX ORDER — AMLODIPINE BESYLATE 5 MG/1
5 TABLET ORAL DAILY
Qty: 30 TABLET | Refills: 0 | Status: SHIPPED | OUTPATIENT
Start: 2025-08-01

## 2025-08-24 DIAGNOSIS — I10 ESSENTIAL HYPERTENSION: ICD-10-CM

## 2025-08-25 RX ORDER — AMLODIPINE BESYLATE 5 MG/1
5 TABLET ORAL DAILY
Qty: 90 TABLET | Refills: 3 | Status: SHIPPED | OUTPATIENT
Start: 2025-08-25

## (undated) DEVICE — RADIFOCUS OPTITORQUE ANGIOGRAPHIC CATHETER: Brand: OPTITORQUE

## (undated) DEVICE — DEVICE INFL W ACCS + HEMSTAS VLV INSRT TOOL AND TORQ BASIX

## (undated) DEVICE — CATH GUID 6F .070 XB 4 100CM -- VISTA BRITE TIP - ORDER AS EA

## (undated) DEVICE — PROCEDURE KIT FLUID MGMT 10 FR CUST MAINFOLD

## (undated) DEVICE — HI-TORQUE BALANCE GUIDE WIRE W/HYDROCOAT .014 STRAIGHT TIP 3.0 CM X 190 CM: Brand: HI-TORQUE BALANCE

## (undated) DEVICE — GLIDESHEATH SLENDER STAINLESS STEEL KIT: Brand: GLIDESHEATH SLENDER

## (undated) DEVICE — TREK CORONARY DILATATION CATHETER 2.25 MM X 20 MM / RAPID-EXCHANGE: Brand: TREK

## (undated) DEVICE — PACK PROCEDURE SURG VASC CATH 161 MMC LF

## (undated) DEVICE — GUIDEWIRE VASC L260CM DIA0035IN TIP L3MM PTFE J STD TAPR FIX

## (undated) DEVICE — COPILOT BLEEDBACK CONTROL VALVE: Brand: COPILOT

## (undated) DEVICE — PRESSURE MONITORING SET: Brand: TRUWAVE

## (undated) DEVICE — BAND HEMOSTAT DRY D-STAT RAD --